# Patient Record
Sex: MALE | Race: WHITE | NOT HISPANIC OR LATINO | Employment: OTHER | URBAN - METROPOLITAN AREA
[De-identification: names, ages, dates, MRNs, and addresses within clinical notes are randomized per-mention and may not be internally consistent; named-entity substitution may affect disease eponyms.]

---

## 2017-08-28 ENCOUNTER — OFFICE VISIT (OUTPATIENT)
Dept: URGENT CARE | Facility: PHYSICIAN GROUP | Age: 62
End: 2017-08-28
Payer: OTHER GOVERNMENT

## 2017-08-28 VITALS
SYSTOLIC BLOOD PRESSURE: 124 MMHG | OXYGEN SATURATION: 98 % | RESPIRATION RATE: 18 BRPM | WEIGHT: 224 LBS | HEIGHT: 72 IN | TEMPERATURE: 98.7 F | BODY MASS INDEX: 30.34 KG/M2 | HEART RATE: 99 BPM | DIASTOLIC BLOOD PRESSURE: 74 MMHG

## 2017-08-28 DIAGNOSIS — G89.29 CHRONIC RIGHT-SIDED LOW BACK PAIN WITHOUT SCIATICA: ICD-10-CM

## 2017-08-28 DIAGNOSIS — M62.830 LUMBAR PARASPINAL MUSCLE SPASM: ICD-10-CM

## 2017-08-28 DIAGNOSIS — M54.50 CHRONIC RIGHT-SIDED LOW BACK PAIN WITHOUT SCIATICA: ICD-10-CM

## 2017-08-28 PROCEDURE — 99204 OFFICE O/P NEW MOD 45 MIN: CPT | Performed by: NURSE PRACTITIONER

## 2017-08-28 RX ORDER — CYCLOBENZAPRINE HCL 10 MG
10 TABLET ORAL 3 TIMES DAILY PRN
Qty: 30 TAB | Refills: 0 | Status: SHIPPED | OUTPATIENT
Start: 2017-08-28 | End: 2023-04-24

## 2017-08-28 ASSESSMENT — ENCOUNTER SYMPTOMS
BACK PAIN: 1
BOWEL INCONTINENCE: 0
FALLS: 0

## 2017-08-29 NOTE — PROGRESS NOTES
Subjective:      Josh Lerner is a 62 y.o. male who presents with Back Pain (pain x3days)            Back Pain   This is a new problem. The current episode started today (Pt states he has chronic back pain. However, in the last few years he will suffer from an acute back spasm once, sometimes twice per year. Today he was doing his normal activities and his back cramped). The problem occurs constantly. The problem is unchanged. The pain is present in the lumbar spine and sacro-iliac. The quality of the pain is described as cramping. The pain does not radiate. The pain is severe. The symptoms are aggravated by bending, standing and twisting. Stiffness is present all day. Pertinent negatives include no bladder incontinence or bowel incontinence. He has tried nothing for the symptoms.       Review of Systems   Gastrointestinal: Negative for bowel incontinence.   Genitourinary: Negative for bladder incontinence.   Musculoskeletal: Positive for back pain. Negative for falls.   All other systems reviewed and are negative.    No past medical history on file. No past surgical history on file.   Social History     Social History   • Marital status:      Spouse name: N/A   • Number of children: N/A   • Years of education: N/A     Occupational History   • Not on file.     Social History Main Topics   • Smoking status: Not on file   • Smokeless tobacco: Not on file   • Alcohol use Not on file   • Drug use: Unknown   • Sexual activity: Not on file     Other Topics Concern   • Not on file     Social History Narrative   • No narrative on file          Objective:     /74   Pulse 99   Temp 37.1 °C (98.7 °F)   Resp 18   Ht 1.829 m (6')   Wt 101.6 kg (224 lb)   SpO2 98%   BMI 30.38 kg/m²      Physical Exam   Constitutional: He is oriented to person, place, and time. Vital signs are normal. He appears well-developed and well-nourished.   HENT:   Head: Normocephalic and atraumatic.   Nose: Nose normal.   Eyes: EOM  are normal. Pupils are equal, round, and reactive to light.   Neck: Normal range of motion.   Cardiovascular: Normal rate and regular rhythm.    Pulmonary/Chest: Effort normal.   Musculoskeletal:        Lumbar back: He exhibits decreased range of motion, tenderness, pain and spasm.        Back:    Neurological: He is alert and oriented to person, place, and time.   Skin: Skin is warm and dry.   Psychiatric: He has a normal mood and affect. His speech is normal and behavior is normal. Thought content normal.   Vitals reviewed.              Assessment/Plan:     1. Chronic right-sided low back pain without sciatica  - cyclobenzaprine (FLEXERIL) 10 MG Tab; Take 1 Tab by mouth 3 times a day as needed.  Dispense: 30 Tab; Refill: 0    2. Lumbar paraspinal muscle spasm    Warm compresses  Back stretch booklet provided  Sedating effects of flexeril discussed  Ibuprofen 600 mg TID  Supportive care, differential diagnoses, and indications for immediate follow-up discussed with patient.    Pathogenesis of diagnosis discussed including typical length and natural progression.    Instructed to return to  or nearest emergency department if symptoms fail to improve, for any change in condition, further concerns, or new concerning symptoms.  Patient states understanding of the plan of care and discharge instructions.

## 2021-03-03 DIAGNOSIS — Z23 NEED FOR VACCINATION: ICD-10-CM

## 2023-04-14 ENCOUNTER — APPOINTMENT (OUTPATIENT)
Dept: ADMISSIONS | Facility: MEDICAL CENTER | Age: 68
DRG: 329 | End: 2023-04-14
Attending: SURGERY
Payer: MEDICARE

## 2023-04-18 ENCOUNTER — HOSPITAL ENCOUNTER (OUTPATIENT)
Dept: RADIOLOGY | Facility: MEDICAL CENTER | Age: 68
End: 2023-04-18
Attending: SURGERY
Payer: MEDICARE

## 2023-04-18 DIAGNOSIS — C20 MALIGNANT NEOPLASM OF RECTUM (HCC): ICD-10-CM

## 2023-04-18 PROCEDURE — A9579 GAD-BASE MR CONTRAST NOS,1ML: HCPCS | Performed by: SURGERY

## 2023-04-18 PROCEDURE — 72197 MRI PELVIS W/O & W/DYE: CPT

## 2023-04-18 PROCEDURE — 700117 HCHG RX CONTRAST REV CODE 255: Performed by: SURGERY

## 2023-04-18 PROCEDURE — 700111 HCHG RX REV CODE 636 W/ 250 OVERRIDE (IP): Mod: JG | Performed by: RADIOLOGY

## 2023-04-18 RX ADMIN — GADOTERIDOL 20 ML: 279.3 INJECTION, SOLUTION INTRAVENOUS at 17:38

## 2023-04-18 RX ADMIN — GLUCAGON 1 MG: 1 INJECTION, POWDER, LYOPHILIZED, FOR SOLUTION INTRAMUSCULAR; INTRAVENOUS at 16:15

## 2023-04-20 ENCOUNTER — HOSPITAL ENCOUNTER (OUTPATIENT)
Dept: RADIOLOGY | Facility: MEDICAL CENTER | Age: 68
End: 2023-04-20
Attending: INTERNAL MEDICINE

## 2023-04-20 ENCOUNTER — PRE-ADMISSION TESTING (OUTPATIENT)
Dept: ADMISSIONS | Facility: MEDICAL CENTER | Age: 68
DRG: 329 | End: 2023-04-20
Attending: SURGERY
Payer: MEDICARE

## 2023-04-20 RX ORDER — ACETAMINOPHEN 500 MG
500-1000 TABLET ORAL
COMMUNITY
End: 2023-12-17

## 2023-04-20 RX ORDER — NAPROXEN SODIUM 220 MG
220 TABLET ORAL
COMMUNITY
End: 2023-12-17

## 2023-04-20 RX ORDER — POLYETHYLENE GLYCOL 3350 17 G/17G
17 POWDER, FOR SOLUTION ORAL EVERY MORNING
Status: ON HOLD | COMMUNITY
End: 2023-05-26

## 2023-04-24 ENCOUNTER — PRE-ADMISSION TESTING (OUTPATIENT)
Dept: ADMISSIONS | Facility: MEDICAL CENTER | Age: 68
DRG: 329 | End: 2023-04-24
Attending: SURGERY
Payer: MEDICARE

## 2023-04-24 ENCOUNTER — HOSPITAL ENCOUNTER (OUTPATIENT)
Dept: RADIATION ONCOLOGY | Facility: MEDICAL CENTER | Age: 68
DRG: 329 | End: 2023-04-30
Attending: RADIOLOGY
Payer: MEDICARE

## 2023-04-24 ENCOUNTER — RESEARCH ENCOUNTER (OUTPATIENT)
Dept: HEMATOLOGY ONCOLOGY | Facility: MEDICAL CENTER | Age: 68
End: 2023-04-24
Payer: MEDICARE

## 2023-04-24 VITALS
SYSTOLIC BLOOD PRESSURE: 109 MMHG | HEART RATE: 96 BPM | OXYGEN SATURATION: 95 % | WEIGHT: 199.08 LBS | BODY MASS INDEX: 28.5 KG/M2 | HEIGHT: 70 IN | DIASTOLIC BLOOD PRESSURE: 65 MMHG | TEMPERATURE: 97.5 F

## 2023-04-24 DIAGNOSIS — Z01.812 PRE-OPERATIVE LABORATORY EXAMINATION: ICD-10-CM

## 2023-04-24 DIAGNOSIS — C20 MALIGNANT NEOPLASM OF RECTUM (HCC): ICD-10-CM

## 2023-04-24 LAB
ALBUMIN SERPL BCP-MCNC: 3.7 G/DL (ref 3.2–4.9)
ALBUMIN/GLOB SERPL: 1 G/DL
ALP SERPL-CCNC: 111 U/L (ref 30–99)
ALT SERPL-CCNC: 23 U/L (ref 2–50)
ANION GAP SERPL CALC-SCNC: 14 MMOL/L (ref 7–16)
AST SERPL-CCNC: 15 U/L (ref 12–45)
BASOPHILS # BLD AUTO: 0.5 % (ref 0–1.8)
BASOPHILS # BLD: 0.06 K/UL (ref 0–0.12)
BILIRUB SERPL-MCNC: 0.4 MG/DL (ref 0.1–1.5)
BUN SERPL-MCNC: 14 MG/DL (ref 8–22)
CALCIUM ALBUM COR SERPL-MCNC: 9.8 MG/DL (ref 8.5–10.5)
CALCIUM SERPL-MCNC: 9.6 MG/DL (ref 8.5–10.5)
CEA SERPL-MCNC: 47.6 NG/ML (ref 0–3)
CHLORIDE SERPL-SCNC: 100 MMOL/L (ref 96–112)
CO2 SERPL-SCNC: 22 MMOL/L (ref 20–33)
CREAT SERPL-MCNC: 1.06 MG/DL (ref 0.5–1.4)
EOSINOPHIL # BLD AUTO: 0.35 K/UL (ref 0–0.51)
EOSINOPHIL NFR BLD: 3 % (ref 0–6.9)
ERYTHROCYTE [DISTWIDTH] IN BLOOD BY AUTOMATED COUNT: 43.1 FL (ref 35.9–50)
GFR SERPLBLD CREATININE-BSD FMLA CKD-EPI: 77 ML/MIN/1.73 M 2
GLOBULIN SER CALC-MCNC: 3.8 G/DL (ref 1.9–3.5)
GLUCOSE SERPL-MCNC: 97 MG/DL (ref 65–99)
HCT VFR BLD AUTO: 44.5 % (ref 42–52)
HGB BLD-MCNC: 14.6 G/DL (ref 14–18)
IMM GRANULOCYTES # BLD AUTO: 0.06 K/UL (ref 0–0.11)
IMM GRANULOCYTES NFR BLD AUTO: 0.5 % (ref 0–0.9)
INR PPP: 1.23 (ref 0.87–1.13)
LYMPHOCYTES # BLD AUTO: 1.83 K/UL (ref 1–4.8)
LYMPHOCYTES NFR BLD: 15.5 % (ref 22–41)
MCH RBC QN AUTO: 29.1 PG (ref 27–33)
MCHC RBC AUTO-ENTMCNC: 32.8 G/DL (ref 33.7–35.3)
MCV RBC AUTO: 88.6 FL (ref 81.4–97.8)
MONOCYTES # BLD AUTO: 0.92 K/UL (ref 0–0.85)
MONOCYTES NFR BLD AUTO: 7.8 % (ref 0–13.4)
NEUTROPHILS # BLD AUTO: 8.62 K/UL (ref 1.82–7.42)
NEUTROPHILS NFR BLD: 72.7 % (ref 44–72)
NRBC # BLD AUTO: 0 K/UL
NRBC BLD-RTO: 0 /100 WBC
PLATELET # BLD AUTO: 391 K/UL (ref 164–446)
PMV BLD AUTO: 9.7 FL (ref 9–12.9)
POTASSIUM SERPL-SCNC: 4.2 MMOL/L (ref 3.6–5.5)
PROT SERPL-MCNC: 7.5 G/DL (ref 6–8.2)
PROTHROMBIN TIME: 15.3 SEC (ref 12–14.6)
RBC # BLD AUTO: 5.02 M/UL (ref 4.7–6.1)
SODIUM SERPL-SCNC: 136 MMOL/L (ref 135–145)
WBC # BLD AUTO: 11.8 K/UL (ref 4.8–10.8)

## 2023-04-24 PROCEDURE — 99214 OFFICE O/P EST MOD 30 MIN: CPT | Performed by: RADIOLOGY

## 2023-04-24 PROCEDURE — 85025 COMPLETE CBC W/AUTO DIFF WBC: CPT

## 2023-04-24 PROCEDURE — 80053 COMPREHEN METABOLIC PANEL: CPT

## 2023-04-24 PROCEDURE — 85610 PROTHROMBIN TIME: CPT

## 2023-04-24 PROCEDURE — 36415 COLL VENOUS BLD VENIPUNCTURE: CPT

## 2023-04-24 PROCEDURE — 99205 OFFICE O/P NEW HI 60 MIN: CPT | Performed by: RADIOLOGY

## 2023-04-24 PROCEDURE — 82378 CARCINOEMBRYONIC ANTIGEN: CPT

## 2023-04-24 ASSESSMENT — PAIN SCALES - GENERAL: PAINLEVEL: 5=MODERATE PAIN

## 2023-04-24 NOTE — PROGRESS NOTES
"Patient was seen today in clinic with Dr. Bustillos for consult.  Vitals signs and weight were obtained and pain assessment was completed.  Allergies and medications were reviewed with the patient.       Vitals/Pain:  Vitals:    04/24/23 1301   BP: 109/65   BP Location: Left arm   Patient Position: Sitting   Pulse: 96   Temp: 36.4 °C (97.5 °F)   SpO2: 95%   Weight: 90.3 kg (199 lb 1.2 oz)   Height: 1.765 m (5' 9.5\")   Pain Score: 5=Moderate Pain        Allergies:   Patient has no known allergies.    Current Medications:  Current Outpatient Medications   Medication Sig Dispense Refill    naproxen (ANAPROX) 220 MG tablet Take 220 mg by mouth 3 times a day.      acetaminophen (TYLENOL) 500 MG Tab Take 500-1,000 mg by mouth 3 times a day.      polyethylene glycol/lytes (MIRALAX) 17 g Pack Take 17 g by mouth every morning.      Wheat Dextrin (BENEFIBER PO) Take  by mouth as needed.       No current facility-administered medications for this encounter.           Greer Fernandez R.N.   "

## 2023-04-24 NOTE — CONSULTS
RADIATION ONCOLOGY CONSULT    DATE OF SERVICE: 4/24/2023    IDENTIFICATION: A 67 y.o. male with   Visit Diagnoses     ICD-10-CM   1. Malignant neoplasm of rectum (HCC)  C20     Malignant neoplasm of rectum (HCC)  Staging form: Colon and Rectum, AJCC 8th Edition  - Clinical stage from 4/24/2023: Stage IIC (cT4b, cN0, cM0) - Signed by Luis Bustillos M.D. on 4/24/2023  Total positive nodes: 0  Histologic grade (G): G2  Histologic grading system: 4 grade system  Microsatellite instability (MSI): Stable    He is here at the kind request of Dr. Sewlel     HISTORY OF PRESENT ILLNESS:   Patient is a pleasant 67-year-old male with stools and intermittent bleeding for close to a year and then underwent colonoscopy March 13, 2023 by Dr. Larson which showed multilobulated mass 2 cm from anal verge encompassing 70% circumference of distal rectum with pathology showing invasive moderately differentiated adenocarcinoma mismatch repair proteins intact.  Patient underwent flexible sigmoidoscopy with endoscopic ultrasound April 4, 2023 by Dr. Ramirez which showed T4 mass 0 to 10 cm from anal verge.  Patient had a CT chest abdomen pelvis April 6, 2023 which showed rectal mass with no evidence of distant metastatic disease.  Patient had an MRI rectal protocol April 18, 2023 which showed a large circumferential low rectal tumor with extension into the prostate internal anal sphincter clinical T4BN0 with enhancing fluid collection adjacent.  Patient was seen by Dr. Kirkpatrick and Dr. Sewell who is planning for ostomy and Mediport placement.  CEA 47.6. Plan for diverting colostomy 5/16/23.     PAST MEDICAL HISTORY:  Past Medical History:   Diagnosis Date    Cancer (HCC)     Rectal per patient       PAST SURGICAL HISTORY:  Past Surgical History:   Procedure Laterality Date    INGUINAL HERNIA REPAIR BILATERAL Bilateral        CURRENT MEDICATIONS:  Current Outpatient Medications   Medication Sig Dispense Refill    naproxen (ANAPROX) 220 MG  "tablet Take 220 mg by mouth 3 times a day.      acetaminophen (TYLENOL) 500 MG Tab Take 500-1,000 mg by mouth 3 times a day.      polyethylene glycol/lytes (MIRALAX) 17 g Pack Take 17 g by mouth every morning.      Wheat Dextrin (BENEFIBER PO) Take  by mouth as needed.       No current facility-administered medications for this encounter.       ALLERGIES:    Patient has no known allergies.    FAMILY HISTORY:    family history includes Brain Cancer in his maternal uncle; Lung Cancer in his father; Stomach Cancer in his maternal grandfather.    SOCIAL HISTORY:     reports that he has been smoking cigarettes. He has a 14.00 pack-year smoking history. He has never used smokeless tobacco. He reports that he does not currently use alcohol. He reports that he does not use drugs. He states he lives in [place] with his spouse Su. He is retired.     REVIEW OF SYSTEMS:  A review of systems for today's date of service was reviewed and uploaded into the electronic medical record.      PHYSICAL EXAM:    ECOG PERFORMANCE STATUS:      4/24/2023     1:09 PM   ECOG Performance Review   ECOG Performance Status Restricted in physically strenuous activity but ambulatory and able to carry out work of a light or sedentary nature, e.g., light house work, office work         4/24/2023     1:08 PM   Karnofsky Score   Karnofsky Score 80     /65 (BP Location: Left arm, Patient Position: Sitting)   Pulse 96   Temp 36.4 °C (97.5 °F)   Ht 1.765 m (5' 9.5\")   Wt 90.3 kg (199 lb 1.2 oz)   SpO2 95%   BMI 28.98 kg/m²   Physical Exam  Vitals reviewed.   HENT:      Head: Normocephalic.      Mouth/Throat:      Mouth: Mucous membranes are moist.   Eyes:      Pupils: Pupils are equal, round, and reactive to light.   Cardiovascular:      Rate and Rhythm: Normal rate.   Pulmonary:      Effort: Pulmonary effort is normal.   Abdominal:      General: Abdomen is flat.   Musculoskeletal:         General: Normal range of motion.      Cervical back: " Normal range of motion.   Neurological:      General: No focal deficit present.      Mental Status: He is alert.   Psychiatric:         Mood and Affect: Mood normal.        LABORATORY DATA:   No results found for: WBC, HEMOGLOBIN, HEMATOCRIT, MCV, PLATELETCT, NEUTS   No results found for: SODIUM, POTASSIUM, BUN, CREATININE, CALCIUM, ALBUMIN, ASTSGOT, ALKPHOSPHAT, IFNOTAFR, LDHTOTAL    RADIOLOGY DATA:  MR-RECTAL/PROSTATE PROTOCOL    Result Date: 4/19/2023 4/18/2023 4:21 PM HISTORY/REASON FOR EXAM:  Malignant neoplasm of rectum (HCC) TECHNIQUE/EXAM DESCRIPTION: MRI of the pelvis without and with contrast. The study was performed on a Sid 3.0 Sobia MRI scanner. Sagittal and axial T2; oblique high resolution axial and coronal T2; oblique axial diffusion-weighted imaging and ADC map with B values of 100, 400, and 1000; oblique axial pre- and post contrast images; axial post contrast T1-fat-sat of the full pelvis. 1 mg of glucagon was given intravenously at the start of the exam. 20 mL ProHance contrast was administered intravenously. COMPARISON: None. FINDINGS: RECTAL TUMOR: Morphologic description: Annular. Distance of the inferior tumor margin to the anal verge: 2cm. Distance of the inferior tumor margin to the pelvic floor: 0cm. Superior to inferior extension: 8 cm. Circumferential extension: Circumferential. Maximum depth of extramural spread beyond the muscularis propria: 13mm. Tumor border: Nodular Minimum distance from the tumor margin to mesorectal fascia: The tumor invades the mesorectal fascia. Extramural venous invasion: No tumor signal in vessels. CRM status: Distance to the mesorectal fascia <= 1 mm, potential CRM involved. Tumor extends into the prostate. Apparent defect in the lateral wall at the mid rectum with associated irregular peripherally enhancing fluid collection in the LEFT perirectal space, extending through the mesorectal fascia into the LEFT obturator space.  Fluid collection measures 4.4  cm  in the perirectal space and 3.5 cm in the obturator space, with surrounding inflammatory changes. Tumor involves the internal anal sphincter on the RIGHT. T STAGE: T4b: Tumor directly invades or is adherent to other organs or structures ALETHEA SPREAD: No visible lymph nodes, N0 ADDITIONAL PELVIC NODES: None BONES: No abnormal bony signal is appreciated.     1.  Large circumferential low rectal tumor with extension into the prostate and internal anal sphincter.  T4b N0 2.  Peripherally enhancing irregular fluid collection adjacent the tumor in the LEFT perirectal space, extending through the mesorectal fascia into the LEFT obturator space, which appears to communicate through the mass indicating abscess/contained perforation.       IMPRESSION:    A 67 y.o. with  Visit Diagnoses     ICD-10-CM   1. Malignant neoplasm of rectum (HCC)  C20     Malignant neoplasm of rectum (HCC)  Staging form: Colon and Rectum, AJCC 8th Edition  - Clinical stage from 4/24/2023: Stage IIC (cT4b, cN0, cM0) - Signed by Luis Bustillos M.D. on 4/24/2023  Total positive nodes: 0  Histologic grade (G): G2  Histologic grading system: 4 grade system  Microsatellite instability (MSI): Stable    RECOMMENDATIONS:   I discussed that given obstructive symptoms he would benefit from upfront diverting colostomy which is planned for 5/16/23 by Dr. Sewell. Afterwards the goal of treatment would be prevent recurrence, decrease the chance of needing a temporary or permanent colostomy, and ultimately delay or prevent death from rectal cancer.  Radiation therapy was discussed including high dose conformal external beam using IMRT. We will plan on 54Gy in 30 fractions with concurrent xeloda per Dr. Kirkpatrick.     Risks and side effects related to the radiation therapy include those which are:  Likely   Tanning, redness, or darkening of skin in treatment area; including substantial norman-anal desquamation causing pain which can become severe enough to require  a break in treatment.  Rash, itching or peeling of skin   Temporary hair loss in the treatment area   Temporary fatigue   Abdominal cramps   Frequent bowel movements, sometime with urgency, or diarrhea  Rectal cramps and irritation with pain on defecation   Mild rectal bleeding that does not require treatment  Bladder irritation with a stinging sensation   Frequency or urgency of urination   Small amounts of blood in the urine   In females, vaginal stenosis causing inability to have sexual activity or long term stenosis.    Less Likely    Urinary obstruction requiring the placement of a temporary urinary catheter and/or dilatation because of stricture (narrowing)  Less ability to control urine (increased incontinence)  Inability to achieve an erection (inability of the penis to become hard) in males  Rectal bleeding that requires medication or laser treatment to stop    Rare but serious   Injury to the bladder, urethra, bowel, or other tissues in the pelvis or abdomen requiring additional procedure or surgery, such as a colostomy (bag for stool).  Intestinal obstruction requiring surgery    We will set the patient up with CT simulation with contrast early next week and plan to start 7 to 10 days later.  We will plan on 6 weeks of treatment with concurrent chemotherapy.    Thank you for the opportunity to participate in his care.  If any questions or comments, please do not hesitate in calling.    Orders Placed This Encounter    REFERRAL TO ONCOLOGY NURSE NAVIGATOR       CC: Dr. Sewell, Dr. Kirkpatrick, Dr. Larson, Dr. Ramirez

## 2023-04-24 NOTE — RESEARCH NOTE
Screening/Consent note:    Participation in the [id number] clinical trial was discussed with patient today. All aspects of the study purpose and procedures were explained.  He was given ample time to review the consent and all questions were answered to his satisfaction. Patient aware that the clinical trial is voluntary and he may withdraw consent at any time without affecting the level of care they receive.  Subject signed consent without coercion and undue influence and was given a copy of the signed consent. No study-related procedures took place prior to consenting and all assessments were conducted per protocol.

## 2023-04-26 ENCOUNTER — PATIENT OUTREACH (OUTPATIENT)
Dept: ONCOLOGY | Facility: MEDICAL CENTER | Age: 68
End: 2023-04-26
Payer: MEDICARE

## 2023-04-26 NOTE — PROGRESS NOTES
Oncology nurse navigator called patient to follow up unable to leave a message on phone number 1 and on the other line ONN could hear people talking but no one acknowledged the call.  ONN will try at a later time.

## 2023-04-26 NOTE — CT SIMULATION
PATIENT NAME Josh Lerner   PRIMARY PHYSICIAN Pcp Pt States None [id number]   REFERRING PHYSICIAN Yakov Sewell M.D. 1955     Malignant neoplasm of rectum (HCC)  Staging form: Colon and Rectum, AJCC 8th Edition  - Clinical stage from 4/24/2023: Stage IIC (cT4b, cN0, cM0) - Signed by Luis Bustillos M.D. on 4/24/2023  Total positive nodes: 0  Histologic grade (G): G2  Histologic grading system: 4 grade system  Microsatellite instability (MSI): Stable         Treatment Planning CT Simulation      Order Questions     Question Answer Comment    Is this for a new course of treatment? Yes     Is this an Addendum? No     Simulation Status Initial     Planned Start Date 5/29/2023     Treatment Site Rectum     Laterality Axial     Treatment Technique IMRT     Treatment Pattern/Frequency Daily     Concurrent Chemotherapy Yes xeloda    Ordering Provider RONALDO REYES III     CT Technique 3D     Scan Extent Pelvis     Contrast IV     Treatment Device(s) Vac Edgar anal bb    Patient Attire Gown     Patient Position Supine     Chin Position Neutral     Bladder No preference     Treatment Machine No preference     Treatment Image Guidance CBCT     Frequency (CBCT) Daily     Image Guidance Match Bone     Treatment Planning Image Fusion CT/MR     Other Orders Special Tx Procedure      Weekly Physics Check

## 2023-04-28 ENCOUNTER — PATIENT OUTREACH (OUTPATIENT)
Dept: ONCOLOGY | Facility: MEDICAL CENTER | Age: 68
End: 2023-04-28
Payer: MEDICARE

## 2023-04-28 NOTE — PROGRESS NOTES
Oncology nurse navigator called patient to follow up on referral.  ONN left a voicemail with my contact number in a voicemail and introduction letter sent o the e-mail on file.

## 2023-04-28 NOTE — PROGRESS NOTES
Oncology nurse navigator called patient to follow up on referral.  VELVET introduced self my role and services to the patient and the entire support team.  Patient reports that he is mely bad way and is pretty miserable and really looking forward to the up and coming surgery with Dr. Sewell. VELVET explained that after his surgery if he needs anything or any resources not to hesitate to reach out.  Patient has my contact information on the e-mail on file.

## 2023-05-01 ENCOUNTER — HOSPITAL ENCOUNTER (OUTPATIENT)
Dept: RADIATION ONCOLOGY | Facility: MEDICAL CENTER | Age: 68
End: 2023-05-31
Attending: RADIOLOGY
Payer: MEDICARE

## 2023-05-15 ENCOUNTER — NON-PROVIDER VISIT (OUTPATIENT)
Dept: WOUND CARE | Facility: MEDICAL CENTER | Age: 68
DRG: 329 | End: 2023-05-15
Attending: SURGERY
Payer: MEDICARE

## 2023-05-15 PROCEDURE — 99211 OFF/OP EST MAY X REQ PHY/QHP: CPT

## 2023-05-15 NOTE — CERTIFICATION
"Ostomy Pre-Operative Marking and Teaching       Date of Surgery: 23  Type of Surgery: Colostomy  Surgeon: Dr. Sewell    Subjective: \"I will be mapped next week\" (for radiation and chemo to start).    Objective: Assessed patient to identify potential stoma site within his/her line of site on a flat pouching surface, within the rectus muscle, away from belt-line, bony prominences, exiting scars and umbilicus.    Assessment:  Potential site (s) located for: Colostomy  1. Procedure explained to the patient.  2. Rectus muscle identified with patient in supine position.  3. Appropriate abdominal quadrant for planned surgical procedure identified.  4. Existing scars identified.  5. Potential sites evaluated with patient:      a. Supine      b. Sitting       c. Bending forward/ twisting at waist   6. Site (s) selected with respect to skin folds, creases, abdominal contours and belt line.  7. Special considerations:      a. Patient has minimal use of left hand      b. Distended abdomen with full intestines of stool      c. Patient unable to sit for periods of time due to pain in his anus      d. Ambulatory  8. Potential site (s) marked with an \"X\" (skin prepped with skin protectant wipe, palomo applied with indelible marker, palomo fixed with skin protectant wipe again.                       Site(s) marked: LLQ    Patient Teachin. Reviewed nature of surgical procedure.  2. Reviewed basic anatomy and function of the GI or  Tract.  3. Reviewed and provided the patient with literature-Colostomy   Other_[facility] resource list.  4. Pouching system with hands on demonstrated.  5. Questions and concerns addressed from patient & daughter  6. Other- Patient able to empty his own pouch    Plan: RN report called to IP ostomy team at [facility].  Patient has been a very independent male his whole life and reports he will just figure it out, reminded patient of support in area & possible plan to return to [facility] post surgery  "

## 2023-05-15 NOTE — PATIENT INSTRUCTIONS
Change colostomies every 5-7 days. Change appliance immediately if it is leaking or peristomal skin feels irritated, has itching, or  burning.   To change the appliance, remove previous appliance, cleanse peristomal skin with warm water/washcloth, pat dry, make an ostomy template or use cardboard measuring guide and trace ostomy shape onto back of barrier, cut out barrier, apply a paste ring around barrier opening and apply appliance. Empty pouches when no more than ½ full. Check contents every 2 hours or as needed. Do not leave soap residue on tissue and do not use baby wipes or skin prep wipes.     Should you experience any significant changes in your condition, such as infection (redness, swelling, localized heat, increased pain, fever > 101 F, chills) or have any questions regarding your home care instructions, please contact the wound center at [phone]. If after hours, contact your primary care physician or go to the hospital emergency room.

## 2023-05-16 ENCOUNTER — APPOINTMENT (OUTPATIENT)
Dept: RADIOLOGY | Facility: MEDICAL CENTER | Age: 68
DRG: 329 | End: 2023-05-16
Attending: SURGERY
Payer: MEDICARE

## 2023-05-16 ENCOUNTER — HOSPITAL ENCOUNTER (INPATIENT)
Facility: MEDICAL CENTER | Age: 68
LOS: 10 days | DRG: 329 | End: 2023-05-26
Attending: SURGERY | Admitting: SURGERY
Payer: MEDICARE

## 2023-05-16 ENCOUNTER — ANESTHESIA (OUTPATIENT)
Dept: SURGERY | Facility: MEDICAL CENTER | Age: 68
DRG: 329 | End: 2023-05-16
Payer: MEDICARE

## 2023-05-16 ENCOUNTER — ANESTHESIA EVENT (OUTPATIENT)
Dept: SURGERY | Facility: MEDICAL CENTER | Age: 68
DRG: 329 | End: 2023-05-16
Payer: MEDICARE

## 2023-05-16 DIAGNOSIS — J44.9 CHRONIC OBSTRUCTIVE PULMONARY DISEASE, UNSPECIFIED COPD TYPE (HCC): Chronic | ICD-10-CM

## 2023-05-16 DIAGNOSIS — Z71.89 OSTOMY NURSE CONSULTATION: ICD-10-CM

## 2023-05-16 DIAGNOSIS — C20 MALIGNANT NEOPLASM OF RECTUM (HCC): Primary | ICD-10-CM

## 2023-05-16 LAB — GLUCOSE BLD STRIP.AUTO-MCNC: 96 MG/DL (ref 65–99)

## 2023-05-16 PROCEDURE — 700102 HCHG RX REV CODE 250 W/ 637 OVERRIDE(OP): Performed by: REGISTERED NURSE

## 2023-05-16 PROCEDURE — 700111 HCHG RX REV CODE 636 W/ 250 OVERRIDE (IP): Performed by: SURGERY

## 2023-05-16 PROCEDURE — 700105 HCHG RX REV CODE 258: Performed by: SURGERY

## 2023-05-16 PROCEDURE — C1788 PORT, INDWELLING, IMP: HCPCS | Performed by: SURGERY

## 2023-05-16 PROCEDURE — 0JH63WZ INSERTION OF TOTALLY IMPLANTABLE VASCULAR ACCESS DEVICE INTO CHEST SUBCUTANEOUS TISSUE AND FASCIA, PERCUTANEOUS APPROACH: ICD-10-PCS | Performed by: SURGERY

## 2023-05-16 PROCEDURE — 160048 HCHG OR STATISTICAL LEVEL 1-5: Performed by: SURGERY

## 2023-05-16 PROCEDURE — 64488 TAP BLOCK BI INJECTION: CPT | Performed by: SURGERY

## 2023-05-16 PROCEDURE — 3E0T3BZ INTRODUCTION OF ANESTHETIC AGENT INTO PERIPHERAL NERVES AND PLEXI, PERCUTANEOUS APPROACH: ICD-10-PCS | Performed by: ANESTHESIOLOGY

## 2023-05-16 PROCEDURE — 160036 HCHG PACU - EA ADDL 30 MINS PHASE I: Performed by: SURGERY

## 2023-05-16 PROCEDURE — 160002 HCHG RECOVERY MINUTES (STAT): Performed by: SURGERY

## 2023-05-16 PROCEDURE — 160041 HCHG SURGERY MINUTES - EA ADDL 1 MIN LEVEL 4: Performed by: SURGERY

## 2023-05-16 PROCEDURE — 700102 HCHG RX REV CODE 250 W/ 637 OVERRIDE(OP): Performed by: ANESTHESIOLOGY

## 2023-05-16 PROCEDURE — 82962 GLUCOSE BLOOD TEST: CPT

## 2023-05-16 PROCEDURE — 160009 HCHG ANES TIME/MIN: Performed by: SURGERY

## 2023-05-16 PROCEDURE — 700101 HCHG RX REV CODE 250: Performed by: SURGERY

## 2023-05-16 PROCEDURE — 700111 HCHG RX REV CODE 636 W/ 250 OVERRIDE (IP): Performed by: ANESTHESIOLOGY

## 2023-05-16 PROCEDURE — 770001 HCHG ROOM/CARE - MED/SURG/GYN PRIV*

## 2023-05-16 PROCEDURE — 64486 TAP BLOCK UNIL BY INJECTION: CPT | Mod: 50,59 | Performed by: ANESTHESIOLOGY

## 2023-05-16 PROCEDURE — A9270 NON-COVERED ITEM OR SERVICE: HCPCS | Performed by: ANESTHESIOLOGY

## 2023-05-16 PROCEDURE — 0D1N4Z4 BYPASS SIGMOID COLON TO CUTANEOUS, PERCUTANEOUS ENDOSCOPIC APPROACH: ICD-10-PCS | Performed by: SURGERY

## 2023-05-16 PROCEDURE — 160035 HCHG PACU - 1ST 60 MINS PHASE I: Performed by: SURGERY

## 2023-05-16 PROCEDURE — 02HV33Z INSERTION OF INFUSION DEVICE INTO SUPERIOR VENA CAVA, PERCUTANEOUS APPROACH: ICD-10-PCS | Performed by: SURGERY

## 2023-05-16 PROCEDURE — 700101 HCHG RX REV CODE 250: Performed by: ANESTHESIOLOGY

## 2023-05-16 PROCEDURE — 160029 HCHG SURGERY MINUTES - 1ST 30 MINS LEVEL 4: Performed by: SURGERY

## 2023-05-16 PROCEDURE — 00860 ANES XTRPRTL PX LWR ABD NOS: CPT | Performed by: ANESTHESIOLOGY

## 2023-05-16 PROCEDURE — A9270 NON-COVERED ITEM OR SERVICE: HCPCS | Performed by: REGISTERED NURSE

## 2023-05-16 DEVICE — POWER PORTMRI COMPATABLE: Type: IMPLANTABLE DEVICE | Site: CHEST | Status: FUNCTIONAL

## 2023-05-16 RX ORDER — ACETAMINOPHEN 500 MG
1000 TABLET ORAL EVERY 4 HOURS PRN
Status: DISCONTINUED | OUTPATIENT
Start: 2023-05-16 | End: 2023-05-16 | Stop reason: HOSPADM

## 2023-05-16 RX ORDER — CELECOXIB 200 MG/1
200 CAPSULE ORAL 2 TIMES DAILY
Status: DISCONTINUED | OUTPATIENT
Start: 2023-05-16 | End: 2023-05-19

## 2023-05-16 RX ORDER — OXYCODONE HCL 10 MG/1
10 TABLET, FILM COATED, EXTENDED RELEASE ORAL ONCE
Status: COMPLETED | OUTPATIENT
Start: 2023-05-16 | End: 2023-05-16

## 2023-05-16 RX ORDER — SODIUM CHLORIDE, SODIUM LACTATE, POTASSIUM CHLORIDE, CALCIUM CHLORIDE 600; 310; 30; 20 MG/100ML; MG/100ML; MG/100ML; MG/100ML
INJECTION, SOLUTION INTRAVENOUS CONTINUOUS
Status: DISCONTINUED | OUTPATIENT
Start: 2023-05-16 | End: 2023-05-16 | Stop reason: HOSPADM

## 2023-05-16 RX ORDER — LIDOCAINE HYDROCHLORIDE 20 MG/ML
INJECTION, SOLUTION EPIDURAL; INFILTRATION; INTRACAUDAL; PERINEURAL PRN
Status: DISCONTINUED | OUTPATIENT
Start: 2023-05-16 | End: 2023-05-16 | Stop reason: SURG

## 2023-05-16 RX ORDER — SCOLOPAMINE TRANSDERMAL SYSTEM 1 MG/1
1 PATCH, EXTENDED RELEASE TRANSDERMAL
Status: DISCONTINUED | OUTPATIENT
Start: 2023-05-16 | End: 2023-05-26 | Stop reason: HOSPADM

## 2023-05-16 RX ORDER — DEXAMETHASONE SODIUM PHOSPHATE 4 MG/ML
4 INJECTION, SOLUTION INTRA-ARTICULAR; INTRALESIONAL; INTRAMUSCULAR; INTRAVENOUS; SOFT TISSUE
Status: DISCONTINUED | OUTPATIENT
Start: 2023-05-16 | End: 2023-05-26 | Stop reason: HOSPADM

## 2023-05-16 RX ORDER — ACETAMINOPHEN 500 MG
1000 TABLET ORAL EVERY 6 HOURS
Status: DISCONTINUED | OUTPATIENT
Start: 2023-05-17 | End: 2023-05-19

## 2023-05-16 RX ORDER — EPHEDRINE SULFATE 50 MG/ML
5 INJECTION, SOLUTION INTRAVENOUS
Status: DISCONTINUED | OUTPATIENT
Start: 2023-05-16 | End: 2023-05-16 | Stop reason: HOSPADM

## 2023-05-16 RX ORDER — HYDROMORPHONE HYDROCHLORIDE 1 MG/ML
0.4 INJECTION, SOLUTION INTRAMUSCULAR; INTRAVENOUS; SUBCUTANEOUS
Status: DISCONTINUED | OUTPATIENT
Start: 2023-05-16 | End: 2023-05-16 | Stop reason: HOSPADM

## 2023-05-16 RX ORDER — SODIUM CHLORIDE, SODIUM LACTATE, POTASSIUM CHLORIDE, CALCIUM CHLORIDE 600; 310; 30; 20 MG/100ML; MG/100ML; MG/100ML; MG/100ML
INJECTION, SOLUTION INTRAVENOUS CONTINUOUS
Status: ACTIVE | OUTPATIENT
Start: 2023-05-16 | End: 2023-05-16

## 2023-05-16 RX ORDER — ACETAMINOPHEN 500 MG
1000 TABLET ORAL EVERY 6 HOURS PRN
Status: DISCONTINUED | OUTPATIENT
Start: 2023-05-22 | End: 2023-05-19

## 2023-05-16 RX ORDER — OXYCODONE HCL 5 MG/5 ML
10 SOLUTION, ORAL ORAL
Status: DISCONTINUED | OUTPATIENT
Start: 2023-05-16 | End: 2023-05-16 | Stop reason: HOSPADM

## 2023-05-16 RX ORDER — DIPHENHYDRAMINE HYDROCHLORIDE 50 MG/ML
12.5 INJECTION INTRAMUSCULAR; INTRAVENOUS
Status: DISCONTINUED | OUTPATIENT
Start: 2023-05-16 | End: 2023-05-16 | Stop reason: HOSPADM

## 2023-05-16 RX ORDER — HYDROMORPHONE HYDROCHLORIDE 1 MG/ML
0.5 INJECTION, SOLUTION INTRAMUSCULAR; INTRAVENOUS; SUBCUTANEOUS
Status: DISCONTINUED | OUTPATIENT
Start: 2023-05-16 | End: 2023-05-19

## 2023-05-16 RX ORDER — OXYCODONE HCL 5 MG/5 ML
5 SOLUTION, ORAL ORAL
Status: DISCONTINUED | OUTPATIENT
Start: 2023-05-16 | End: 2023-05-16 | Stop reason: HOSPADM

## 2023-05-16 RX ORDER — DEXAMETHASONE SODIUM PHOSPHATE 4 MG/ML
INJECTION, SOLUTION INTRA-ARTICULAR; INTRALESIONAL; INTRAMUSCULAR; INTRAVENOUS; SOFT TISSUE PRN
Status: DISCONTINUED | OUTPATIENT
Start: 2023-05-16 | End: 2023-05-16 | Stop reason: SURG

## 2023-05-16 RX ORDER — CELECOXIB 200 MG/1
200 CAPSULE ORAL 2 TIMES DAILY PRN
Status: DISCONTINUED | OUTPATIENT
Start: 2023-05-21 | End: 2023-05-19

## 2023-05-16 RX ORDER — TRAZODONE HYDROCHLORIDE 50 MG/1
50 TABLET ORAL NIGHTLY PRN
Status: DISCONTINUED | OUTPATIENT
Start: 2023-05-16 | End: 2023-05-19

## 2023-05-16 RX ORDER — HEPARIN SODIUM,PORCINE 1000/ML
VIAL (ML) INJECTION
Status: DISCONTINUED | OUTPATIENT
Start: 2023-05-16 | End: 2023-05-16 | Stop reason: HOSPADM

## 2023-05-16 RX ORDER — ROCURONIUM BROMIDE 10 MG/ML
INJECTION, SOLUTION INTRAVENOUS PRN
Status: DISCONTINUED | OUTPATIENT
Start: 2023-05-16 | End: 2023-05-16 | Stop reason: SURG

## 2023-05-16 RX ORDER — HALOPERIDOL 5 MG/ML
1 INJECTION INTRAMUSCULAR EVERY 6 HOURS PRN
Status: DISCONTINUED | OUTPATIENT
Start: 2023-05-16 | End: 2023-05-26 | Stop reason: HOSPADM

## 2023-05-16 RX ORDER — HYDRALAZINE HYDROCHLORIDE 20 MG/ML
5 INJECTION INTRAMUSCULAR; INTRAVENOUS
Status: DISCONTINUED | OUTPATIENT
Start: 2023-05-16 | End: 2023-05-16 | Stop reason: HOSPADM

## 2023-05-16 RX ORDER — ONDANSETRON 2 MG/ML
4 INJECTION INTRAMUSCULAR; INTRAVENOUS
Status: DISCONTINUED | OUTPATIENT
Start: 2023-05-16 | End: 2023-05-16 | Stop reason: HOSPADM

## 2023-05-16 RX ORDER — BUPIVACAINE HYDROCHLORIDE 5 MG/ML
INJECTION, SOLUTION EPIDURAL; INTRACAUDAL
Status: COMPLETED | OUTPATIENT
Start: 2023-05-16 | End: 2023-05-16

## 2023-05-16 RX ORDER — BUPIVACAINE HYDROCHLORIDE 5 MG/ML
INJECTION, SOLUTION EPIDURAL; INTRACAUDAL
Status: DISCONTINUED | OUTPATIENT
Start: 2023-05-16 | End: 2023-05-16 | Stop reason: HOSPADM

## 2023-05-16 RX ORDER — CALCIUM CARBONATE 500 MG/1
500 TABLET, CHEWABLE ORAL
Status: DISCONTINUED | OUTPATIENT
Start: 2023-05-16 | End: 2023-05-19

## 2023-05-16 RX ORDER — CEFOTETAN DISODIUM 2 G/20ML
INJECTION, POWDER, FOR SOLUTION INTRAMUSCULAR; INTRAVENOUS PRN
Status: DISCONTINUED | OUTPATIENT
Start: 2023-05-16 | End: 2023-05-16 | Stop reason: SURG

## 2023-05-16 RX ORDER — HYDROMORPHONE HYDROCHLORIDE 2 MG/ML
INJECTION, SOLUTION INTRAMUSCULAR; INTRAVENOUS; SUBCUTANEOUS PRN
Status: DISCONTINUED | OUTPATIENT
Start: 2023-05-16 | End: 2023-05-16 | Stop reason: SURG

## 2023-05-16 RX ORDER — PHENYLEPHRINE HCL IN 0.9% NACL 0.5 MG/5ML
SYRINGE (ML) INTRAVENOUS PRN
Status: DISCONTINUED | OUTPATIENT
Start: 2023-05-16 | End: 2023-05-16 | Stop reason: SURG

## 2023-05-16 RX ORDER — MEPERIDINE HYDROCHLORIDE 25 MG/ML
6.25 INJECTION INTRAMUSCULAR; INTRAVENOUS; SUBCUTANEOUS
Status: DISCONTINUED | OUTPATIENT
Start: 2023-05-16 | End: 2023-05-16 | Stop reason: HOSPADM

## 2023-05-16 RX ORDER — OXYCODONE HYDROCHLORIDE 5 MG/1
5 TABLET ORAL
Status: DISCONTINUED | OUTPATIENT
Start: 2023-05-16 | End: 2023-05-19

## 2023-05-16 RX ORDER — OXYCODONE HYDROCHLORIDE 10 MG/1
10 TABLET ORAL
Status: DISCONTINUED | OUTPATIENT
Start: 2023-05-16 | End: 2023-05-19

## 2023-05-16 RX ORDER — HYDROMORPHONE HYDROCHLORIDE 1 MG/ML
0.2 INJECTION, SOLUTION INTRAMUSCULAR; INTRAVENOUS; SUBCUTANEOUS
Status: DISCONTINUED | OUTPATIENT
Start: 2023-05-16 | End: 2023-05-16 | Stop reason: HOSPADM

## 2023-05-16 RX ORDER — ONDANSETRON 2 MG/ML
INJECTION INTRAMUSCULAR; INTRAVENOUS PRN
Status: DISCONTINUED | OUTPATIENT
Start: 2023-05-16 | End: 2023-05-16 | Stop reason: SURG

## 2023-05-16 RX ORDER — CELECOXIB 200 MG/1
200 CAPSULE ORAL ONCE
Status: COMPLETED | OUTPATIENT
Start: 2023-05-16 | End: 2023-05-16

## 2023-05-16 RX ORDER — DIPHENHYDRAMINE HYDROCHLORIDE 50 MG/ML
25 INJECTION INTRAMUSCULAR; INTRAVENOUS EVERY 6 HOURS PRN
Status: DISCONTINUED | OUTPATIENT
Start: 2023-05-16 | End: 2023-05-26 | Stop reason: HOSPADM

## 2023-05-16 RX ORDER — HYDROMORPHONE HYDROCHLORIDE 1 MG/ML
0.1 INJECTION, SOLUTION INTRAMUSCULAR; INTRAVENOUS; SUBCUTANEOUS
Status: DISCONTINUED | OUTPATIENT
Start: 2023-05-16 | End: 2023-05-16 | Stop reason: HOSPADM

## 2023-05-16 RX ORDER — HALOPERIDOL 5 MG/ML
1 INJECTION INTRAMUSCULAR
Status: DISCONTINUED | OUTPATIENT
Start: 2023-05-16 | End: 2023-05-16 | Stop reason: HOSPADM

## 2023-05-16 RX ORDER — ONDANSETRON 2 MG/ML
4 INJECTION INTRAMUSCULAR; INTRAVENOUS EVERY 4 HOURS PRN
Status: DISCONTINUED | OUTPATIENT
Start: 2023-05-16 | End: 2023-05-26 | Stop reason: HOSPADM

## 2023-05-16 RX ADMIN — CEFOTETAN DISODIUM 2 G: 2 INJECTION, POWDER, FOR SOLUTION INTRAMUSCULAR; INTRAVENOUS at 15:43

## 2023-05-16 RX ADMIN — HYDROMORPHONE HYDROCHLORIDE 0.3 MG: 2 INJECTION INTRAMUSCULAR; INTRAVENOUS; SUBCUTANEOUS at 16:55

## 2023-05-16 RX ADMIN — ACETAMINOPHEN 1000 MG: 500 TABLET, FILM COATED ORAL at 13:51

## 2023-05-16 RX ADMIN — DEXAMETHASONE SODIUM PHOSPHATE 8 MG: 4 INJECTION INTRA-ARTICULAR; INTRALESIONAL; INTRAMUSCULAR; INTRAVENOUS; SOFT TISSUE at 15:41

## 2023-05-16 RX ADMIN — ROCURONIUM BROMIDE 50 MG: 50 INJECTION, SOLUTION INTRAVENOUS at 15:41

## 2023-05-16 RX ADMIN — LIDOCAINE HYDROCHLORIDE 100 MG: 20 INJECTION, SOLUTION EPIDURAL; INFILTRATION; INTRACAUDAL at 15:41

## 2023-05-16 RX ADMIN — OXYCODONE HYDROCHLORIDE 10 MG: 10 TABLET, FILM COATED, EXTENDED RELEASE ORAL at 13:51

## 2023-05-16 RX ADMIN — Medication 100 MCG: at 16:16

## 2023-05-16 RX ADMIN — Medication 200 MCG: at 16:04

## 2023-05-16 RX ADMIN — CELECOXIB 200 MG: 200 CAPSULE ORAL at 13:50

## 2023-05-16 RX ADMIN — Medication 100 MCG: at 16:00

## 2023-05-16 RX ADMIN — SODIUM CHLORIDE, POTASSIUM CHLORIDE, SODIUM LACTATE AND CALCIUM CHLORIDE: 600; 310; 30; 20 INJECTION, SOLUTION INTRAVENOUS at 15:37

## 2023-05-16 RX ADMIN — HYDROMORPHONE HYDROCHLORIDE 0.3 MG: 2 INJECTION INTRAMUSCULAR; INTRAVENOUS; SUBCUTANEOUS at 15:52

## 2023-05-16 RX ADMIN — CELECOXIB 200 MG: 200 CAPSULE ORAL at 21:42

## 2023-05-16 RX ADMIN — BUPIVACAINE HYDROCHLORIDE 30 ML: 5 INJECTION, SOLUTION EPIDURAL; INTRACAUDAL at 15:48

## 2023-05-16 RX ADMIN — SUGAMMADEX 200 MG: 100 INJECTION, SOLUTION INTRAVENOUS at 16:55

## 2023-05-16 RX ADMIN — Medication 100 MCG: at 15:57

## 2023-05-16 RX ADMIN — PROPOFOL 50 MG: 10 INJECTION, EMULSION INTRAVENOUS at 15:41

## 2023-05-16 RX ADMIN — ONDANSETRON 4 MG: 2 INJECTION INTRAMUSCULAR; INTRAVENOUS at 15:41

## 2023-05-16 RX ADMIN — SODIUM CHLORIDE, POTASSIUM CHLORIDE, SODIUM LACTATE AND CALCIUM CHLORIDE: 600; 310; 30; 20 INJECTION, SOLUTION INTRAVENOUS at 13:57

## 2023-05-16 ASSESSMENT — LIFESTYLE VARIABLES
AVERAGE NUMBER OF DAYS PER WEEK YOU HAVE A DRINK CONTAINING ALCOHOL: 0
TOTAL SCORE: 0
HAVE YOU EVER FELT YOU SHOULD CUT DOWN ON YOUR DRINKING: NO
CONSUMPTION TOTAL: NEGATIVE
TOTAL SCORE: 0
ALCOHOL_USE: NO
EVER HAD A DRINK FIRST THING IN THE MORNING TO STEADY YOUR NERVES TO GET RID OF A HANGOVER: NO
TOTAL SCORE: 0
ON A TYPICAL DAY WHEN YOU DRINK ALCOHOL HOW MANY DRINKS DO YOU HAVE: 0
HAVE PEOPLE ANNOYED YOU BY CRITICIZING YOUR DRINKING: NO
EVER FELT BAD OR GUILTY ABOUT YOUR DRINKING: NO
HOW MANY TIMES IN THE PAST YEAR HAVE YOU HAD 5 OR MORE DRINKS IN A DAY: 0

## 2023-05-16 ASSESSMENT — PAIN DESCRIPTION - PAIN TYPE
TYPE: ACUTE PAIN;SURGICAL PAIN
TYPE: ACUTE PAIN;SURGICAL PAIN

## 2023-05-16 ASSESSMENT — FIBROSIS 4 INDEX: FIB4 SCORE: 0.54

## 2023-05-16 ASSESSMENT — PAIN SCALES - GENERAL: PAIN_LEVEL: 2

## 2023-05-16 NOTE — ANESTHESIA PROCEDURE NOTES
Airway    Date/Time: 5/16/2023 3:42 PM    Performed by: Annalise Kamara M.D.  Authorized by: Annalise Kamara M.D.    Location:  OR  Urgency:  Elective  Indications for Airway Management:  Anesthesia      Spontaneous Ventilation: absent    Sedation Level:  Deep  Preoxygenated: Yes    Patient Position:  Sniffing  MILS Maintained Throughout: Yes    Mask Difficulty Assessment:  1 - vent by mask  Final Airway Type:  Endotracheal airway  Final Endotracheal Airway:  ETT  Cuffed: Yes    Technique Used for Successful ETT Placement:  Direct laryngoscopy    Insertion Site:  Oral  Blade Type:  Lawrence  Laryngoscope Blade/Videolaryngoscope Blade Size:  2  ETT Size (mm):  7.5  Measured from:  Lips  ETT to Lips (cm):  21  Placement Verified by: capnometry    Cormack-Lehane Classification:  Grade I - full view of glottis  Number of Attempts at Approach:  1

## 2023-05-16 NOTE — OR NURSING
Assumed care in pre op . Patient is alert oriented x 4 tearful with facial grimaced due to rectal pain . Wife at bedside.   V/s stable. Allergies , Npo , surgical /medical hx , surgical procedure verified and signed by the patient.   IV started at right FA g 18 by Maranda  and LR 1 Liter connected and regulated at 10 ml/hr.Infusing well.  Dr. Kamara informed about patient's severe pain and Multimodal medications ordered given.   Waiting for MD's .  On bed resting on a semi fowlers position with call bell at bedside.

## 2023-05-16 NOTE — H&P
Surgery General History & Physical Note    Date  5/16/2023    Primary Care Physician  Pcp Pt States None    CC  Presents for elective colostomy creation and port placement    HPI  This is a 67 y.o. male who presented with history of locally advanced rectal cancer.  He is here today for port placement, flexible sigmoidoscopy and laparoscopic colostomy creation.  Since his clinic visit he denies any new medications, new physicians or emergency room visits.    Past Medical History:   Diagnosis Date    Cancer (HCC)     Rectal per patient       Past Surgical History:   Procedure Laterality Date    INGUINAL HERNIA REPAIR BILATERAL Bilateral        Current Facility-Administered Medications   Medication Dose Route Frequency Provider Last Rate Last Admin    lactated ringers infusion   Intravenous Continuous Yakov Sewell M.D. 10 mL/hr at 05/16/23 1357 New Bag at 05/16/23 1357    acetaminophen (TYLENOL) tablet 1,000 mg  1,000 mg Oral Q4HRS PRN Annalise Kamara M.D.   1,000 mg at 05/16/23 1351       Social History     Socioeconomic History    Marital status:      Spouse name: Not on file    Number of children: Not on file    Years of education: Not on file    Highest education level: Not on file   Occupational History    Not on file   Tobacco Use    Smoking status: Every Day     Packs/day: 1.00     Years: 14.00     Pack years: 14.00     Types: Cigarettes    Smokeless tobacco: Never   Vaping Use    Vaping Use: Never used   Substance and Sexual Activity    Alcohol use: Not Currently    Drug use: Never    Sexual activity: Not on file   Other Topics Concern    Not on file   Social History Narrative    Not on file     Social Determinants of Health     Financial Resource Strain: Not on file   Food Insecurity: Not on file   Transportation Needs: Not on file   Physical Activity: Not on file   Stress: Not on file   Social Connections: Not on file   Intimate Partner Violence: Not on file   Housing Stability: Not on file        Family History   Problem Relation Age of Onset    Lung Cancer Father     Brain Cancer Maternal Uncle     Stomach Cancer Maternal Grandfather        Allergies  Patient has no known allergies.    Review of Systems  Negative    Physical Exam  Vitals reviewed.   Constitutional:       Appearance: He is normal weight.   HENT:      Head: Normocephalic.      Nose: Nose normal.      Mouth/Throat:      Mouth: Mucous membranes are moist.   Eyes:      Extraocular Movements: Extraocular movements intact.   Cardiovascular:      Rate and Rhythm: Normal rate.      Pulses: Normal pulses.   Pulmonary:      Effort: Pulmonary effort is normal.   Abdominal:      General: Abdomen is flat.      Palpations: Abdomen is soft.   Musculoskeletal:         General: Normal range of motion.      Cervical back: Normal range of motion.   Skin:     General: Skin is warm and dry.   Neurological:      Mental Status: He is alert and oriented to person, place, and time.   Psychiatric:         Mood and Affect: Mood normal.         Behavior: Behavior normal.         Thought Content: Thought content normal.         Judgment: Judgment normal.         Vital Signs  Blood Pressure : 131/76   Temperature: 36.9 °C (98.5 °F)   Pulse: 97   Respiration: 16   Pulse Oximetry: 92 %       Labs:                    Radiology:  DX-PORTABLE FLUOROSCOPY < 1 HOUR    (Results Pending)         Assessment/Plan:  67-year-old male with locally advanced obstructing rectal cancer  Procedure(s):  LAPAROSCOPIC VERSUS OPEN COLOSTOMY CREATION, WITH FLEXIBLE SIGMOIDOSCOPY AND PORT PLACEMENT  SIGMOIDOSCOPY, FLEXIBLE  INSERTION, CATHETER  Risks, benefits, and alternatives were discussed with consenting person(s). Consenting person(s) were given an opportunity to ask questions and discuss other options. Risks including but not limited to failed or incomplete planned procedure, ineffective therapy, perforation, infection, bleeding, missed lesion(s), missed diagnosis, cardiac and/or  pulmonary event, aspiration, stroke, possible need for surgery, hospitalization possibly prolonged, discomfort, unsuccessful and/or incomplete procedure, possible need for repeat procedures and/or additional testings, damage to adjacent organs and/or vascular structures, medication reaction, disability, death, and other adverse events possibly life-threatening. Discussion was undertaken with Layman's terms. Consenting persons stated understanding and acceptance of these risks, and wished to proceed. Consent was given in clear state of mind.   Yakov Sewell MD PhD  [facility]  Colon and Rectal Surgeon  [phone]

## 2023-05-16 NOTE — ANESTHESIA PROCEDURE NOTES
Peripheral Block    Date/Time: 5/16/2023 3:48 PM    Performed by: Annalise Kamara M.D.  Authorized by: Annalise Kamara M.D.    Patient Location:  OR  Start Time:  5/16/2023 3:48 PM  End Time:  5/16/2023 3:52 PM  Reason for Block: at surgeon's request and post-op pain management ONLY    patient identified, IV checked, site marked, risks and benefits discussed, surgical consent, monitors and equipment checked, pre-op evaluation and timeout performed    Patient Position:  Supine  Prep: ChloraPrep    Monitoring:  Continuous pulse ox, cardiac monitor and heart rate  Block Region:  Trunk  Trunk - Block Type:  Abdominal plane block - TAP block    Laterality:  Bilateral  Procedures: ultrasound guided  Image captured, interpreted and electronically stored.  Block Type:  Single-shot  Needle Length:  100mm  Needle Gauge:  21 G  Needle Localization:  Ultrasound guidance  Injection Assessment:  Negative aspiration for heme and incremental injection  Evidence of intravascular injection: No

## 2023-05-16 NOTE — ANESTHESIA PREPROCEDURE EVALUATION
Case: [id number] Date/Time: 05/16/23 1445    Procedures:       LAPAROSCOPIC VERSUS OPEN COLOSTOMY CREATION, WITH FLEXIBLE SIGMOIDOSCOPY AND PORT PLACEMENT      SIGMOIDOSCOPY, FLEXIBLE      INSERTION, CATHETER    Pre-op diagnosis: MALIGNANT TUMOR OF RECTUM    Location: [facility] OR 08 / SURGERY [facility]    Surgeons: Yakov Sewell M.D.        67yoM with hx of rectal cancer    NPO  NKDA  No AC    +tobacco    Relevant Problems   No relevant active problems       Physical Exam    Airway   Mallampati: II       Cardiovascular - normal exam     Dental - normal exam           Pulmonary - normal exam     Abdominal - normal exam     Neurological - normal exam                 Anesthesia Plan    ASA 2       Plan - general and peripheral nerve block     Peripheral nerve block will be post-op pain control  Airway plan will be ETT          Induction: intravenous    Postoperative Plan: Postoperative administration of opioids is intended.    Pertinent diagnostic labs and testing reviewed    Informed Consent:    Anesthetic plan and risks discussed with patient.

## 2023-05-17 LAB
ANION GAP SERPL CALC-SCNC: 14 MMOL/L (ref 7–16)
BUN SERPL-MCNC: 15 MG/DL (ref 8–22)
CALCIUM SERPL-MCNC: 8.6 MG/DL (ref 8.5–10.5)
CHLORIDE SERPL-SCNC: 100 MMOL/L (ref 96–112)
CO2 SERPL-SCNC: 21 MMOL/L (ref 20–33)
CREAT SERPL-MCNC: 0.83 MG/DL (ref 0.5–1.4)
ERYTHROCYTE [DISTWIDTH] IN BLOOD BY AUTOMATED COUNT: 44.7 FL (ref 35.9–50)
GFR SERPLBLD CREATININE-BSD FMLA CKD-EPI: 95 ML/MIN/1.73 M 2
GLUCOSE SERPL-MCNC: 162 MG/DL (ref 65–99)
HCT VFR BLD AUTO: 44.8 % (ref 42–52)
HGB BLD-MCNC: 15.2 G/DL (ref 14–18)
MCH RBC QN AUTO: 30 PG (ref 27–33)
MCHC RBC AUTO-ENTMCNC: 33.9 G/DL (ref 33.7–35.3)
MCV RBC AUTO: 88.4 FL (ref 81.4–97.8)
PLATELET # BLD AUTO: 223 K/UL (ref 164–446)
PMV BLD AUTO: 10.2 FL (ref 9–12.9)
POTASSIUM SERPL-SCNC: 4.4 MMOL/L (ref 3.6–5.5)
RBC # BLD AUTO: 5.07 M/UL (ref 4.7–6.1)
SODIUM SERPL-SCNC: 135 MMOL/L (ref 135–145)
WBC # BLD AUTO: 8.8 K/UL (ref 4.8–10.8)

## 2023-05-17 PROCEDURE — 36415 COLL VENOUS BLD VENIPUNCTURE: CPT

## 2023-05-17 PROCEDURE — 97602 WOUND(S) CARE NON-SELECTIVE: CPT

## 2023-05-17 PROCEDURE — 302111 WAFER OST 2.25IN N IMG RD 2 PC (BARRIER): Performed by: SURGERY

## 2023-05-17 PROCEDURE — 770001 HCHG ROOM/CARE - MED/SURG/GYN PRIV*

## 2023-05-17 PROCEDURE — A9270 NON-COVERED ITEM OR SERVICE: HCPCS | Performed by: REGISTERED NURSE

## 2023-05-17 PROCEDURE — 700102 HCHG RX REV CODE 250 W/ 637 OVERRIDE(OP): Performed by: REGISTERED NURSE

## 2023-05-17 PROCEDURE — 302098 PASTE RING (FLAT): Performed by: SURGERY

## 2023-05-17 PROCEDURE — 80048 BASIC METABOLIC PNL TOTAL CA: CPT

## 2023-05-17 PROCEDURE — 306473 SCISSORS,IRIS STERILE DISP: Performed by: SURGERY

## 2023-05-17 PROCEDURE — 85027 COMPLETE CBC AUTOMATED: CPT

## 2023-05-17 PROCEDURE — 302102 BAG OST N IMG 2.25IN 2PC (FECAL): Performed by: SURGERY

## 2023-05-17 RX ADMIN — ACETAMINOPHEN 1000 MG: 500 TABLET, FILM COATED ORAL at 18:01

## 2023-05-17 RX ADMIN — ACETAMINOPHEN 1000 MG: 500 TABLET, FILM COATED ORAL at 11:55

## 2023-05-17 RX ADMIN — CELECOXIB 200 MG: 200 CAPSULE ORAL at 18:01

## 2023-05-17 ASSESSMENT — COGNITIVE AND FUNCTIONAL STATUS - GENERAL
HELP NEEDED FOR BATHING: A LITTLE
SUGGESTED CMS G CODE MODIFIER MOBILITY: CH
DRESSING REGULAR UPPER BODY CLOTHING: A LITTLE
DAILY ACTIVITIY SCORE: 21
MOBILITY SCORE: 24
SUGGESTED CMS G CODE MODIFIER DAILY ACTIVITY: CJ
DRESSING REGULAR LOWER BODY CLOTHING: A LITTLE

## 2023-05-17 ASSESSMENT — PATIENT HEALTH QUESTIONNAIRE - PHQ9
SUM OF ALL RESPONSES TO PHQ9 QUESTIONS 1 AND 2: 0
2. FEELING DOWN, DEPRESSED, IRRITABLE, OR HOPELESS: NOT AT ALL
1. LITTLE INTEREST OR PLEASURE IN DOING THINGS: NOT AT ALL

## 2023-05-17 ASSESSMENT — PAIN DESCRIPTION - PAIN TYPE
TYPE: ACUTE PAIN;SURGICAL PAIN
TYPE: ACUTE PAIN;SURGICAL PAIN
TYPE: ACUTE PAIN
TYPE: ACUTE PAIN
TYPE: ACUTE PAIN;SURGICAL PAIN
TYPE: ACUTE PAIN
TYPE: ACUTE PAIN

## 2023-05-17 NOTE — DISCHARGE INSTR - CASE MGT
Per Stephanie,  Pt has an  an appointment  on 5/30 Tuesday at 08:00 am with a 07'45 check in time at the [facility]

## 2023-05-17 NOTE — CARE PLAN
The patient is Stable - Low risk of patient condition declining or worsening    Shift Goals  Clinical Goals: monitor for pain  Patient Goals: ambulation    Progress made toward(s) clinical / shift goals:  Pt describes pain as very minimal compared to what he was experiencing for the last year. Pt medicated with scheduled medications and has been resting comfortably. Goal for pt is to ambulate in AM.    Patient is not progressing towards the following goals:

## 2023-05-17 NOTE — WOUND TEAM
" [facility]  Inpatient Services  New Ostomy Management & Teaching    HPI:  Reviewed  PMH: Reviewed   SH: Reviewed    Past Surgical History:   Procedure Laterality Date    CO LAP, SURG COLOSTOMY Left 5/16/2023    Procedure: LAPAROSCOPIC  COLOSTOMY CREATION;  Surgeon: Yakov Sewell M.D.;  Location: SURGERY [facility];  Service: Gastroenterology    CO SIGMOIDOSCOPY,DIAGNOSTIC N/A 5/16/2023    Procedure: SIGMOIDOSCOPY, FLEXIBLE;  Surgeon: Yakov Sewell M.D.;  Location: SURGERY [facility];  Service: Gastroenterology    CATH PLACEMENT Right 5/16/2023    Procedure: INSERTION, CATHETER;  Surgeon: Yakov Sewell M.D.;  Location: SURGERY [facility];  Service: Gastroenterology    INGUINAL HERNIA REPAIR BILATERAL Bilateral      Surgery Date: 05/16/23    Surgeon(s):  Yakov Sewell M.D.    Procedure(s):  LAPAROSCOPIC VERSUS OPEN COLOSTOMY CREATION, WITH FLEXIBLE SIGMOIDOSCOPY AND PORT PLACEMENT  SIGMOIDOSCOPY, FLEXIBLE  INSERTION, CATHETER     Permanence: yes    Pertinent History: The patient is a 67 y.o. male with locally advanced rectal cancer. patient went to OR 05/16/23 for port placement and laparoscopic colostomy creation.      PROCEDURE PERFORMED:  1.  Flexible sigmoidoscopy  2.  Tunneled central venous catheter placement with subcutaneous port under ultrasound guidance with fluoroscopy  3.  Laparoscopic colostomy creation                 Colostomy 05/16/23 LUQ (Active)   Stomal Appliance Assessment Leaking;Changed    Stoma Assessment Red;Edema    Stoma Shape Oval;Budded Less Than One Inch    Stoma Size (in) 1.5    Peristomal Assessment Intact    Mucocutaneous Junction Intact    Treatment Appliance Changed;Cleansed with water/washcloth    Peristomal Protectant Paste Ring    Stomal Appliance Paste Ring, 2\";2 1/4\" (57mm) CTF    Output (mL) 0 mL    Output Color Bloody    WOUND RN ONLY - Stomal Appliance  2 Piece;Paste Ring, 2\";2 1/4\" (57mm) CTF    Appliance Brand Shanon    Appliance " "Supplier [employer]    Ostomy Care Resources Provided [facility] Tip Sheet    WOUND NURSE ONLY - Time Spent with Patient (mins) 60                   Ostomy Appliance (type and size): 2.25\" 2 piece and 2\" Paste Ring    Interventions: daughter was at bedside but left, patient decided he did want daughter to observe teaching but she had already left.  Went over paperwork with patient, discussed and answered all questions.  Patient observed all steps and verbalized understanding.  Removed previous appliance and cleansed skin with warm wash cloth.  Measured with cardboard template, traced to barrier, barrier cut, paste ring applied to barrier, applied to skin, attached pouch.  Patient observed pinching and closing pouch.       Pt education: Ostomy folder was discussed in detail. Pt was educated regarding the following things: stoma size and shape and to be aware stoma will likely change sizes in the first 4 to 6 weeks. Pt aware that we are using the most basic supplies while in the hospital and there are many brands and options in regards to supplies. Pt educated on when to empty and how to empty, as well as burping appliance. Wear time discussed.  Pt aware that they should keep an extra set of appliances with them at all times (do not leave in warm cars). Follow up, supply ordering and support groups discussed as well as accessories that may be beneficial. Questions and concerns addressed.    Needs for next visit: hands on, would like daughter and wife to be present.  Meet between 12-1.      Evaluation: patient having a hard time looking at stoma.  He feels he will be able to manage.  Discussed high output pouch and down drain as patient will be undergoing chemo/radiation.      Flatus: Not Present  Stool Output: small and bloody  Urine Output: NA, Fecal Ostomy  Diet: Regular  Mobility: Ambulating at Baseline    Plan: Ostomy nurses to continue to follow for ostomy needs and teaching until discharge    Anticipated discharge needs: " "Supplies, supplier information, possible HH, outpatient ostomy clinic, Skilled Nursing/Rehab     Secure Start Signed Yes  Outpatient Referral Placed Yes  5 Sets of appliances in Ostomy bag for discharge Ordered    INSURANCE OPTIONS:                 [employer] ([place])              MediCARE/MEDICAID & All other Private Insurance companies (Prism Form)         X     MediCAID & Fee for Service (Care Chest Paperwork + Prism Form)                            Form signed/Catalog Marked and Copy left with patient OR medicaid paperwork given to patient      Anticipated Discharge Plans:  Outpatient Wound clinic, Family Care, and Self Care    Ostomy Supplies for DC:  New Image Flextend Extended-Wear FLAT Skin Barrier 2 1/4\" ([place] [id number]) , FECAL 2 PIECE POUCH - 20 per month, 12in New Image Lock n' Roll withOUT Filter 2 1/4\" ([place] [id number]), and Adapt Flat paste ring 2\" ([employer] [id number]) - 15 per month    "

## 2023-05-17 NOTE — CARE PLAN
The patient is Stable - Low risk of patient condition declining or worsening    Shift Goals  Clinical Goals: pain control, monitor ostomy, ambulate  Patient Goals: rest    Progress made toward(s) clinical / shift goals:      Patient's pain will be well controlled with scheduled tylenol and celebrex and increasing mobility. Patient's knowledge will improve with education about ostomy from nursing and wound care.     Problem: Knowledge Deficit - Standard  Goal: Patient and family/care givers will demonstrate understanding of plan of care, disease process/condition, diagnostic tests and medications  Outcome: Progressing     Problem: Pain - Standard  Goal: Alleviation of pain or a reduction in pain to the patient’s comfort goal  Outcome: Progressing

## 2023-05-17 NOTE — DISCHARGE PLANNING
Case Management Discharge Planning    Admission Date: 5/16/2023  GMLOS: 2.4  ALOS: 1    6-Clicks ADL Score: 21  6-Clicks Mobility Score: 24      Anticipated Discharge Dispo: Discharge Disposition: Discharged to home/self care (01) with close OP follow Up    DME Needed: No    Action(s) Taken: Updated Provider/Nurse on Discharge Plan    Pt is S/P LAPAROSCOPIC  COLOSTOMY CREATION (Left: Abdomen)      SIGMOIDOSCOPY, FLEXIBLE (Anus)      INSERTION, CATHETER (Right: Chest) on 5/16/23.    This RN CM  left a VM to [facility] scheduler to obtain appointment for Pt , awaiting return call.     [place] is [place] and has Medicare and [employer]. Pt has Su, Spouse for support.     This RN CM spoke with Stephanie  at the [facility]. Pt has an appointment on Monday May 22 at 14:30  pm at the [facility].     Escalations Completed: None    Medically Clear: No    Next Steps:   This RN CM to continue to assist Pt with discharge as needed    Barriers to Discharge:   Medical clearance    Is the patient up for discharge tomorrow: No

## 2023-05-17 NOTE — PROGRESS NOTES
"Pt awake and oriented X4, VSS. Port placement sites CDI, Abdominal sites CDI, Colostomy appliance fitted appropriately, \"beefy red\" ostomy. No pain or nausea present. Provided caloric drink and tolerating well. 10L oxymask per ERAS.   "

## 2023-05-17 NOTE — OP REPORT
DATE OF OPERATION: 5/16/2023    PREOPERATIVE DIAGNOSIS:    Locally advanced rectal cancer  Obstipation    POSTOPERATIVE DIAGNOSIS: Locally advanced rectal cancer  Obstipation    PROCEDURE PERFORMED:  1.  Flexible sigmoidoscopy  2.  Tunneled central venous catheter placement with subcutaneous port under ultrasound guidance with fluoroscopy  3.  Laparoscopic colostomy creation    SURGEON: Yakov Sewell M.D.    ASSISTANT: Mary MCNEIL  The indications for a surgical assistant in this surgery were indicated due to complexity of the procedure. Their role included aiding in incision, retraction, holding devices including camera for laparoscopic procedure, and closure of the wound.      ANESTHESIOLOGIST: Dr. Kamara    ANESTHESIA:  General endotracheal anesthesia.    ASA CLASSIFICATION: II.    INDICATION:  The patient is a 67 y.o. male with locally advanced rectal cancer. He is taken to the operating room for endoscopic evaluation with port placement and laparoscopic colostomy creation.    FINDINGS: Fixed and firm rectal mass at 4 cm from anal verge.    WOUND CLASSIFICATION: Class III, Contaminated.    SPECIMEN: None   ESTIMATED BLOOD LOSS:  50 mL.    PROCEDURE:    After informed witnessed consent was obtained, the patient was taken to the operating room and underwent general endotracheal anesthesia without incident.  Preoperative antibiotics were administered.  Bilateral lower sequential compression devices were placed.    Digital rectal examination demonstrated a firm and fixed mass in a circumferential pattern 4 cm from the anal verge.  The flexible colonoscope was introduced through the rectum and advanced under direct visualization until the distal sigmoid colon was reached.  A friable and bleeding fixed mass was visualized easily.  The colonoscope was not retroflexed within the rectum.  Careful visualization was performed as the instrument was withdrawn.  Patient tolerates to the procedure was good.         The right neck and chest area were prepped and draped in the usual sterile fashion. Time-out was called. The correct patient, correct diagnosis, correct surgery, and correct location of the surgery were discussed and agreed upon. The patient had her head turned gently towards the left side. Using a 12.5 MHz ultrasound probe, the venous anatomy and arterial anatomy of the neck was mapped out. A carotid artery, internal jugular vein and subclavian vein were all identified. A small incision was made near the junction of the 2 heads of the sternocleidomastoid muscle with a #11 blade. Through this small incision, a hollow 16-gauge needle was inserted into the neck, and then under ultrasound guidance, the internal jugular vein was cannulated. Once blood returned within the needle and syringe, a guidewire was placed through the hollow needle, advanced into superior vena cava. Fluoroscopy revealed that the guidewire was indeed within the superior vena cava. The needle was removed and the wire was secured to the neck. Next, an incision was made just below the mid portion of the left clavicle.  The incision was made with #15 blade through the skin and dermis. All bleeding was controlled with electrocautery. Incision was carried down until the fascia of the pectoralis major muscle was encountered. A pocket was then made inferior to the incisionthat would house the port. Once this was done, the port with the attached catheter was flushed with heparinized saline. The port was placed into the pocket and then using a tunneling device, the catheter portion was tunneled from this infraclavicular incision up over the catheter through a subcutaneousroute and then out the incision of the neck. The catheter was cut to the predetermined distance. A peel away introducer and trocar were then placed over the guidewire in the neck and advanced under fluoroscopic evaluation. The guidewire and the trocar removed. The catheter portion of the  PowerPort was then placed into the peel-away introducer, advanced to its full distance. The peel away introducer was then removed. Fluoroscopy was then performed   and revealed the tip of the catheter to be at the right atrial superior vena cava junction. There was no kinking of the catheter. Blood could easily be aspirated through the PowerPort and the PowerPort was injected with 10 mL of a very dilute heparinized saline and then 1 mL of 1000 units per mL heparin. The wounds were irrigated and then closed using running 3-0 Vicryl suture for the dermis and running 4-0 Monocryl suture for the skin. Dermabond and sterile dressings were applied. The patient tolerated the procedure well without complications. Final sponge and needle counts were correct.    The abdomen was prepped and draped in the usual standard sterile fashion.  A timeout was performed verifying the correct patient, procedure, site, positioning in availability of equipment prior to starting surgery.  A 5 mm incision was made lateral to the umbilicus and using a 0 degree 5 mm laparoscope the abdominal cavity was entered using an Optiview technique without injury to the underlying bowel.  Gross survey revealed no evidence of metastatic disease.  I then placed a 5 mm port in the suprapubic midline and grasped the colon and found it to be mobile and easily reached the abdominal wall.  An incision was made at the preoperatively marked in the colostomy site and a trocar was placed in the colon grasped through this and found to reach the abdominal wall without kinking or narrowing.  A trephine of skin was cut at the site and the subcutaneous tissues were retracted and incised.  I incised the anterior rectus sheath and split the rectus muscles atraumatically and similarly incised the posterior rectus sheath.  Patrice forceps were placed through this incision and the sigmoid colon was grasped.  I brought a loop of sigmoid colon through the incision easily and  divided the sigmoid colon with a linear cutting stapler.  I then passed the distal sigmoid colon back into the abdominal cavity and visualized the planned ostomy to ensure the correct orientation without kinking or twisting.  Incision sites were closed with 4-0 Monocryl in a subcuticular fashion and Dermabond was placed as a dressing.  The colostomy was then matured in the manner of Regina with interrupted 3-0 Vicryl sutures.  A colostomy appliance was then placed.  All sponge and instrument counts were correct and the patient was extubated and taken to the postanesthesia care unit in stable condition.  Yakov Sewell MD PhD  [facility]  Colon and Rectal Surgeon  [phone]

## 2023-05-17 NOTE — ANESTHESIA TIME REPORT
Anesthesia Start and Stop Event Times     Date Time Event    5/16/2023 1500 Ready for Procedure     1537 Anesthesia Start     1705 Anesthesia Stop        Responsible Staff  05/16/23    Name Role Begin End    Annalise Kamara M.D. Anesth 1537 1705        Overtime Reason:  no overtime (within assigned shift)    Comments:

## 2023-05-17 NOTE — ANESTHESIA POSTPROCEDURE EVALUATION
Patient: Josh Lerner    Procedure Summary     Date: 05/16/23 Room / Location: [facility] 08 / SURGERY [facility]    Anesthesia Start: 1537 Anesthesia Stop: 1705    Procedures:       LAPAROSCOPIC  COLOSTOMY CREATION (Left: Abdomen)      SIGMOIDOSCOPY, FLEXIBLE (Anus)      INSERTION, CATHETER (Right: Chest) Diagnosis: (LOCALLY ADVANCED RECTAL CANCER, OBSTIPATION)    Surgeons: Yakov Sewell M.D. Responsible Provider: Annalise Kamara M.D.    Anesthesia Type: general, peripheral nerve block ASA Status: 2          Final Anesthesia Type: general, peripheral nerve block  Last vitals  BP   Blood Pressure : 131/76    Temp   36.9 °C (98.5 °F)    Pulse   97   Resp   16    SpO2   92 %      Anesthesia Post Evaluation    Patient location during evaluation: PACU  Patient participation: complete - patient participated  Level of consciousness: awake and alert  Pain score: 2    Airway patency: patent  Anesthetic complications: no  Cardiovascular status: adequate and hemodynamically stable  Respiratory status: acceptable  Hydration status: acceptable    PONV: none          No notable events documented.

## 2023-05-17 NOTE — PROGRESS NOTES
..4 Eyes Skin Assessment Completed by MARITZA Hui and MARITZA Iqbal.    Head WDL  Ears WDL  Nose WDL  Mouth WDL  Neck Incision R neck  Breast/Chest Incision R chest  Shoulder Blades WDL  Spine WDL  (R) Arm/Elbow/Hand WDL  (L) Arm/Elbow/Hand Redness and Scab elbow  Abdomen Incision x2, LLQ ostomy  Groin WDL  Scrotum/Coccyx/Buttocks WDL  (R) Leg WDL  (L) Leg WDL  (R) Heel/Foot/Toe dry  (L) Heel/Foot/Toe dry          Devices In Places Pulse Ox, SCD's, and Central Line (chest port not accessed)      Interventions In Place Pillows and Low Air Loss Mattress    Possible Skin Injury No    Pictures Uploaded Into Epic N/A  Wound Consult Placed Yes for ostomy  RN Wound Prevention Protocol Ordered No

## 2023-05-17 NOTE — PROGRESS NOTES
Bedside report received.  Assessment complete.  A&O x 4. Patient calls appropriately.  Patient ambulates with standby assist. Bed alarm off.   Patient has 0/10 pain. Pain managed with prescribed medications.  Denies N&V. Tolerating regular diet.  Surgical: LLQ ostomy, x2 lap sites with dermabond, R neck incision closed with dermabond  + void, - flatus, - BM via ostomy.  Patient denies SOB.  SCD's refused.  Review plan with of care with patient. Call light and personal belongings within reach. Hourly rounding in place. All needs met at this time.

## 2023-05-18 ENCOUNTER — HOSPITAL ENCOUNTER (OUTPATIENT)
Dept: RADIATION ONCOLOGY | Facility: MEDICAL CENTER | Age: 68
End: 2023-05-18

## 2023-05-18 LAB
ANION GAP SERPL CALC-SCNC: 12 MMOL/L (ref 7–16)
BUN SERPL-MCNC: 26 MG/DL (ref 8–22)
CALCIUM SERPL-MCNC: 8.2 MG/DL (ref 8.5–10.5)
CHLORIDE SERPL-SCNC: 100 MMOL/L (ref 96–112)
CO2 SERPL-SCNC: 22 MMOL/L (ref 20–33)
CREAT SERPL-MCNC: 0.96 MG/DL (ref 0.5–1.4)
ERYTHROCYTE [DISTWIDTH] IN BLOOD BY AUTOMATED COUNT: 45.1 FL (ref 35.9–50)
GFR SERPLBLD CREATININE-BSD FMLA CKD-EPI: 86 ML/MIN/1.73 M 2
GLUCOSE SERPL-MCNC: 120 MG/DL (ref 65–99)
HCT VFR BLD AUTO: 43 % (ref 42–52)
HGB BLD-MCNC: 14.1 G/DL (ref 14–18)
MCH RBC QN AUTO: 29 PG (ref 27–33)
MCHC RBC AUTO-ENTMCNC: 32.8 G/DL (ref 33.7–35.3)
MCV RBC AUTO: 88.3 FL (ref 81.4–97.8)
PLATELET # BLD AUTO: 208 K/UL (ref 164–446)
PMV BLD AUTO: 10.1 FL (ref 9–12.9)
POTASSIUM SERPL-SCNC: 3.8 MMOL/L (ref 3.6–5.5)
RBC # BLD AUTO: 4.87 M/UL (ref 4.7–6.1)
SODIUM SERPL-SCNC: 134 MMOL/L (ref 135–145)
WBC # BLD AUTO: 8.7 K/UL (ref 4.8–10.8)

## 2023-05-18 PROCEDURE — 700102 HCHG RX REV CODE 250 W/ 637 OVERRIDE(OP): Performed by: REGISTERED NURSE

## 2023-05-18 PROCEDURE — 97602 WOUND(S) CARE NON-SELECTIVE: CPT

## 2023-05-18 PROCEDURE — 77263 THER RADIOLOGY TX PLNG CPLX: CPT | Performed by: RADIOLOGY

## 2023-05-18 PROCEDURE — 77334 RADIATION TREATMENT AID(S): CPT | Performed by: RADIOLOGY

## 2023-05-18 PROCEDURE — 77470 SPECIAL RADIATION TREATMENT: CPT | Mod: 26 | Performed by: RADIOLOGY

## 2023-05-18 PROCEDURE — A9270 NON-COVERED ITEM OR SERVICE: HCPCS | Performed by: REGISTERED NURSE

## 2023-05-18 PROCEDURE — 77334 RADIATION TREATMENT AID(S): CPT | Mod: 26 | Performed by: RADIOLOGY

## 2023-05-18 PROCEDURE — 770001 HCHG ROOM/CARE - MED/SURG/GYN PRIV*

## 2023-05-18 PROCEDURE — 36415 COLL VENOUS BLD VENIPUNCTURE: CPT

## 2023-05-18 PROCEDURE — 77290 THER RAD SIMULAJ FIELD CPLX: CPT | Performed by: RADIOLOGY

## 2023-05-18 PROCEDURE — 80048 BASIC METABOLIC PNL TOTAL CA: CPT

## 2023-05-18 PROCEDURE — 85027 COMPLETE CBC AUTOMATED: CPT

## 2023-05-18 PROCEDURE — 77470 SPECIAL RADIATION TREATMENT: CPT | Performed by: RADIOLOGY

## 2023-05-18 RX ADMIN — CELECOXIB 200 MG: 200 CAPSULE ORAL at 16:55

## 2023-05-18 RX ADMIN — ANTACID TABLETS 500 MG: 500 TABLET, CHEWABLE ORAL at 16:55

## 2023-05-18 RX ADMIN — ACETAMINOPHEN 1000 MG: 500 TABLET, FILM COATED ORAL at 16:55

## 2023-05-18 RX ADMIN — ACETAMINOPHEN 1000 MG: 500 TABLET, FILM COATED ORAL at 12:05

## 2023-05-18 RX ADMIN — ANTACID TABLETS 500 MG: 500 TABLET, CHEWABLE ORAL at 10:44

## 2023-05-18 ASSESSMENT — PAIN DESCRIPTION - PAIN TYPE
TYPE: ACUTE PAIN
TYPE: ACUTE PAIN
TYPE: ACUTE PAIN;SURGICAL PAIN
TYPE: ACUTE PAIN
TYPE: ACUTE PAIN;SURGICAL PAIN

## 2023-05-18 NOTE — CARE PLAN
The patient is Stable - Low risk of patient condition declining or worsening    Shift Goals  Clinical Goals: monitor ostomy output  Patient Goals: rest    Progress made toward(s) clinical / shift goals:  Ostomy with scant bloody output, stoma red. Pt has resting comfortably during the shift.    Patient is not progressing towards the following goals:

## 2023-05-18 NOTE — PROGRESS NOTES
Bedside report received.  Assessment complete.  A&O x 4. Patient calls appropriately.  Patient ambulates with standby assist. Bed alarm off.   Patient has 0/10 pain. Pain managed with prescribed medications.  Denies N&V. Tolerating regular diet.  Surgical LLQ ostomy, x2 lap sites with dermabond, R neck incision closed with dermabond  + void, - flatus, - BM via ostomy.   Patient denies SOB.  SCD's refused.  Review plan with of care with patient. Call light and personal belongings within reach. Hourly rounding in place. All needs met at this time.

## 2023-05-18 NOTE — DIETARY
"Nutrition services: Day 2 of admit.  Josh Lerner is a 67 y.o. male with admitting DX of malignant tumor of rectum    Consult received for MST of 4 on nutrition screen due to report of >/= 34 lb wt loss x > 1 year. No report of poor PO PTA.       Assessment:  Height: 175.3 cm (5' 9\")  Weight: 88 kg (194 lb 0.1 oz)  Body mass index is 28.65 kg/m²., BMI classification: overweight  Diet/Intake: regular ERAS/25-50% of a snack and 25-50% of meals x 2 today.     Evaluation:   Pt w/ lap colostomy creation.   RD visited pt in his room. Pt reports UBW of 245 lb 1 year ago. He has lost 51 lb (21%) x 1 year. Wt loss is significant. Wt loss is r/t pt was not able to have BM. He started taking laxatives which were causing frequent (~18/day) small BMs. He said that he still had an appetite. Intake was less due to his irregular BMs.   Pt noted to have moderate fat loss in upper arm and moderate muscle loss in temple and clavicle region.   Pt ate poorly at lunch. He still is having discomfort that he r/t his bowel function not returning yet. He is agreeable to trying Boost supplements w/ meals for additional kcals and protein.   Pt's dtr brought food in to pt. Pt is on regular diet. Outside food encouraged.     Malnutrition Risk: pt meets ASPEN criteria for moderate malnutrition in the context of chronic illness r/t diminished PO intake due to irregular bowel movements and hypermetabolism w/ dx of rectal cancer AEB 21% wt loss x 1 year, moderate muscle loss and moderate fat loss.     Recommendations/Plan:  Add Boost Plus to meals TID   Encourage intake of >50%  Document intake of all meals and supplements  as % taken in ADL's to provide interdisciplinary communication across all shifts.   Monitor weight.  Nutrition rep will continue to see patient for ongoing meal and snack preferences.         RD will follow.  "

## 2023-05-18 NOTE — RADIATION COMPLETION NOTES
DATE OF SERVICE: 5/18/2023    DIAGNOSIS:  Malignant neoplasm of rectum (HCC)  Staging form: Colon and Rectum, AJCC 8th Edition  - Clinical stage from 4/24/2023: Stage IIC (cT4b, cN0, cM0) - Signed by Luis Bustillos M.D. on 4/24/2023  Total positive nodes: 0  Histologic grade (G): G2  Histologic grading system: 4 grade system  Microsatellite instability (MSI): Stable       DATE OF SERVICE: 5/18/2023    TYPE OF SIMULATION: Pelvis    GOAL OF TREATMENT:   [x] Curative  [] Palliative  [] Oligometastatic    CONTRAST:    [x] IV Contrast*  [] Small Bowel  [] Rectal  [] Urethral              POSITION:    [x]  Supine  [] Prone with belly board    COMPLEX:  [x] Complex Blocking   [x]Arcs  [] Custom Blocks  [] >3 Sites    PROCEDURE: Patient positioned on CT table in Vac-Edgar immobilization device with or without belly board depending on position. CT acquired thorough the entire volume of interest.  Images reviewed and exported to treatment planning system.    I have personally reviewed the relevant data, performed the target localization, and determined all relevant factors for this patient’s simulation.    *Omnipaque 80 -100cc IVP in conjunction with 500cc NS

## 2023-05-18 NOTE — RADIATION COMPLETION NOTES
PATIENT NAME Josh Shah [facility]   PRIMARY PHYSICIAN Pcp Pt States None [id number]   REFERRING PHYSICIAN No ref. provider found 1955       DATE OF SERVICE: 5/18/2023    DIAGNOSIS:  Malignant neoplasm of rectum (HCC)  Staging form: Colon and Rectum, AJCC 8th Edition  - Clinical stage from 4/24/2023: Stage IIC (cT4b, cN0, cM0) - Signed by Luis Bustillos M.D. on 4/24/2023  Total positive nodes: 0  Histologic grade (G): G2  Histologic grading system: 4 grade system  Microsatellite instability (MSI): Stable       SPECIAL TREATMENT PROCEDURE NOTE:  Considerable additional effort required in the management of this case because of administration of concurrent xeloda chemotherapy which may result in increased normal tissue toxicity. This includes longer and possibly more frequent on treatment visits.

## 2023-05-18 NOTE — PROGRESS NOTES
Bedside report received.  Assessment complete.  A&O x 4. Patient calls appropriately.  Patient is up self.   Patient has 3/10 pain. Pain managed with heat packs and ambulation.  Denies N&V. Tolerating regular diet.  2 lap sites closed with dermabond, CDI. LLQ ostomy with scant bloody output, stoma red. R neck incision closed with dermabond. R chest port.  + void  Patient denies SOB.  Review plan with of care with patient. Call light and personal belongings within reach. Hourly rounding in place. All needs met at this time.

## 2023-05-18 NOTE — PROGRESS NOTES
"S:  67 y.o.male s/p port placement and colostomy creation  POD# 1.  Patient did well overnight.  Participating with ostomy education.  Tolerating a diet.  Ambulating without assistance.    O:  /77   Pulse 95   Temp 36.9 °C (98.4 °F) (Temporal)   Resp 14   Ht 1.753 m (5' 9\")   Wt 88 kg (194 lb 0.1 oz)   SpO2 88%   I/O last 3 completed shifts:  In: 240 [P.O.:240]  Out: 1750 [Urine:1750]  Recent Labs     05/17/23  0016 05/18/23  0042   SODIUM 135 134*   POTASSIUM 4.4 3.8   CHLORIDE 100 100   CO2 21 22   GLUCOSE 162* 120*   BUN 15 26*   CREATININE 0.83 0.96   CALCIUM 8.6 8.2*     Recent Labs     05/17/23 0016 05/18/23  0042   WBC 8.8 8.7   RBC 5.07 4.87   HEMOGLOBIN 15.2 14.1   HEMATOCRIT 44.8 43.0   MCV 88.4 88.3   MCH 30.0 29.0   MCHC 33.9 32.8*   RDW 44.7 45.1   PLATELETCT 223 208   MPV 10.2 10.1       Alert and Oriented x3, No Acute Distress  Normal Respiratory Effort  Abdomen soft, appropriately tender  Incisions/Bandages clean/dry/intact  Extremities warm and well perfused  Ostomy pink and healthy, output scant    A/P:  Patient encouraged to take meals in the chair rather then the bed.  Patient educated on appropriate judicious use of narcotics.  Hep-Lock IV.  Encourage by mouth intake.  Johansen catheter removed and patient voiding appropriately.  Advance to low residue diet.  Patient encouraged to ambulate ad aby.  Yakov Sewell M.D. PhD  [facility]  Colon and Rectal Surgery  (Office) [phone]  (Cell)  [phone]  "

## 2023-05-18 NOTE — PROGRESS NOTES
"S:  67 y.o.male s/p port placement and colostomy creation  POD# 2.  Patient did well overnight.  Participating with ostomy education.  Tolerating a diet.  Ambulating without assistance.    O:  /77   Pulse 95   Temp 36.9 °C (98.4 °F) (Temporal)   Resp 14   Ht 1.753 m (5' 9\")   Wt 88 kg (194 lb 0.1 oz)   SpO2 88%   I/O last 3 completed shifts:  In: 240 [P.O.:240]  Out: 1750 [Urine:1750]  Recent Labs     05/17/23  0016 05/18/23  0042   SODIUM 135 134*   POTASSIUM 4.4 3.8   CHLORIDE 100 100   CO2 21 22   GLUCOSE 162* 120*   BUN 15 26*   CREATININE 0.83 0.96   CALCIUM 8.6 8.2*       Recent Labs     05/17/23 0016 05/18/23  0042   WBC 8.8 8.7   RBC 5.07 4.87   HEMOGLOBIN 15.2 14.1   HEMATOCRIT 44.8 43.0   MCV 88.4 88.3   MCH 30.0 29.0   MCHC 33.9 32.8*   RDW 44.7 45.1   PLATELETCT 223 208   MPV 10.2 10.1         Alert and Oriented x3, No Acute Distress  Normal Respiratory Effort  Abdomen soft, appropriately tender  Incisions/Bandages clean/dry/intact  Extremities warm and well perfused  Ostomy pink and healthy, output stool and flatus    A/P:  Patient encouraged to take meals in the chair rather then the bed.  Patient educated on appropriate judicious use of narcotics.  Encourage by mouth intake.  patient voiding appropriately.  Low residue diet.  Patient encouraged to ambulate ad aby.  Possibly home tomorrow if comfortable with ostomy education and care  Yakov Sewell M.D. PhD  [facility]  Colon and Rectal Surgery  (Office) [phone]  (Cell)  [phone]  "

## 2023-05-18 NOTE — CARE PLAN
The patient is Stable - Low risk of patient condition declining or worsening    Shift Goals  Clinical Goals: monitor ostomy output, prep for radiation mapping  Patient Goals: rest    Progress made toward(s) clinical / shift goals:      Patient's pain will be controlled with scheduled tylenol and celebrex in addition to heat packs and encouraging increased ambulation.     Problem: Pain - Standard  Goal: Alleviation of pain or a reduction in pain to the patient’s comfort goal  Outcome: Progressing

## 2023-05-18 NOTE — WOUND TEAM
" [facility]  Inpatient Services  New Ostomy Management & Teaching    HPI:  Reviewed  PMH: Reviewed   SH: Reviewed    Past Surgical History:   Procedure Laterality Date    ME LAP, SURG COLOSTOMY Left 5/16/2023    Procedure: LAPAROSCOPIC  COLOSTOMY CREATION;  Surgeon: Yakov Sewell M.D.;  Location: SURGERY [facility];  Service: Gastroenterology    ME SIGMOIDOSCOPY,DIAGNOSTIC N/A 5/16/2023    Procedure: SIGMOIDOSCOPY, FLEXIBLE;  Surgeon: Yakov Sewell M.D.;  Location: SURGERY [facility];  Service: Gastroenterology    CATH PLACEMENT Right 5/16/2023    Procedure: INSERTION, CATHETER;  Surgeon: Yakov Sewell M.D.;  Location: SURGERY [facility];  Service: Gastroenterology    INGUINAL HERNIA REPAIR BILATERAL Bilateral      Surgery Date: 05/16/23    Surgeon(s):  Yakov Sewell M.D.    Procedure(s):  LAPAROSCOPIC VERSUS OPEN COLOSTOMY CREATION, WITH FLEXIBLE SIGMOIDOSCOPY AND PORT PLACEMENT  SIGMOIDOSCOPY, FLEXIBLE  INSERTION, CATHETER     Permanence: yes    Pertinent History: The patient is a 67 y.o. male with locally advanced rectal cancer. patient went to OR 05/16/23 for port placement and laparoscopic colostomy creation.      PROCEDURE PERFORMED:  1.  Flexible sigmoidoscopy  2.  Tunneled central venous catheter placement with subcutaneous port under ultrasound guidance with fluoroscopy  3.  Laparoscopic colostomy creation                 Colostomy 05/16/23 LUQ (Active)   Stomal Appliance Assessment Intact;Changed    Stoma Assessment Pink;Red;Edema    Stoma Shape Budded Less Than One Inch;Oval    Stoma Size (in) 1.5    Peristomal Assessment Intact    Mucocutaneous Junction Intact    Treatment Appliance Changed;Cleansed with water/washcloth    Peristomal Protectant Paste Ring    Stomal Appliance Paste Ring, 2\";2 1/4\" (57mm) CTF    Output (mL) 0 mL    Output Color Bloody    WOUND RN ONLY - Stomal Appliance  2 Piece;Paste Ring, 2\";2 1/2\" (64mm) CTF    Appliance Brand Shanon  " "  Appliance Supplier [facility]    Ostomy Care Resources Provided UOAA Tip Sheet    WOUND NURSE ONLY - Time Spent with Patient (mins) 60                   Ostomy Appliance (type and size): 2.25\" 2 piece and 2\" Paste Ring    Interventions: daughter at bedside, observing and participating.  Answered questions.  Removed previous appliance and cleansed skin with warm wash cloth.  Checked cardboard template and measured to barrier, started to cut barrier and daughter finished, removed plastic and applied paste ring, applied to skin, daughter removed paper backing, rubbed to adhere.  Started to attach pouch and daughter completed, patient and daughter both practiced closing end.       Pt education: Ostomy folder was discussed in detail. Pt was educated regarding the following things: stoma size and shape and to be aware stoma will likely change sizes in the first 4 to 6 weeks. Pt aware that we are using the most basic supplies while in the hospital and there are many brands and options in regards to supplies. Pt educated on when to empty and how to empty, as well as burping appliance. Wear time discussed.  Pt aware that they should keep an extra set of appliances with them at all times (do not leave in warm cars). Follow up, supply ordering and support groups discussed as well as accessories that may be beneficial. Questions and concerns addressed.    Needs for next visit: hands on.  Daughter/family to continue to be present.  Saturday 12-1    Evaluation:   05/18/23:  still with output.  Some gas appreciated.  ABD distended.  Patient still having a hard time accepting stoma.      05/17/23:  patient having a hard time looking at stoma.  He feels he will be able to manage.  Discussed high output pouch and down drain as patient will be undergoing chemo/radiation.      Flatus: Not Present  Stool Output: small and bloody  Urine Output: NA, Fecal Ostomy  Diet: Regular  Mobility: Ambulating at Baseline  - not wanting to ambulate due " "to discomfort.      Plan: Ostomy nurses to continue to follow for ostomy needs and teaching until discharge    Anticipated discharge needs: Supplies, supplier information, possible HH, outpatient ostomy clinic, Skilled Nursing/Rehab     Secure Start Signed Yes  Outpatient Referral Placed Yes  5 Sets of appliances in Ostomy bag for discharge Ordered    INSURANCE OPTIONS:                 long-term [employer] ([employer])              MediCARE/MEDICAID & All other Private Insurance companies (Prism Form)         X     MediCAID & Fee for Service (Care Chest Paperwork + Prism Form)                            Form signed/Catalog Marked and Copy left with patient OR medicaid paperwork given to patient      Anticipated Discharge Plans:  Outpatient Wound clinic, Family Care, and Self Care    Ostomy Supplies for DC:  New Image Flextend Extended-Wear FLAT Skin Barrier 2 1/4\" (Shanon [id number]) , FECAL 2 PIECE POUCH - 20 per month, 12in New Image Lock n' Roll withOUT Filter 2 1/4\" (Shanon [id number]), and Adapt Flat paste ring 2\" (Shanon [id number]) - 15 per month    "

## 2023-05-18 NOTE — RADIATION COMPLETION NOTES
Clinical Treatment Planning Note    DATE OF SERVICE: 5/18/2023    DIAGNOSIS:  Malignant neoplasm of rectum (HCC)  Staging form: Colon and Rectum, AJCC 8th Edition  - Clinical stage from 4/24/2023: Stage IIC (cT4b, cN0, cM0) - Signed by Luis Bustillos M.D. on 4/24/2023  Total positive nodes: 0  Histologic grade (G): G2  Histologic grading system: 4 grade system  Microsatellite instability (MSI): Stable         IMAGING REVIEWED:  [] CT     [x] MRI     [] PET/CT     [] BONE SCAN     [] MAMMO     [] OTHER      TREATMENT INTENT:   [x] CURATIVE     [] MAINTENANCE     []  PALLIATIVE      []  SUPPORTIVE     []  PROPHYLACTIC     [] BENIGN     []  CONSOLIDATIVE      [] DEFINITIVE   []  OLOGIMETASTATIC      LINE OF TREATMENT:  [] ADJUVANT   [x] DEFINITIVE   [] NEOADJUVANT   [] RE-TREATMENT      TECHNIQUE PLANNED:  [x] IMRT   [] 3D   [] SBRT   [] SRS/SRT   [] HDR   [] ELECTRON       IMRT JUSTIFICATION:  []   An immediately adjacent area has been previously irradiated and abutting portals must be established with high precision.    []  Dose escalation is planned to deliver radiation doses in excess of those commonly utilized for similar tumors with conventional treatment.    [x]  The target volume is concave or convex, and the critical normal tissues are within or around that convexity or concavity.    [x]  The target volume is in close proximity to critical structures that must be protected.    []  The volume of interest must be covered with narrow margins to adequately protect  immediately adjacent structures.      FIELDS & BLOCKING:  [x] COMPLEX BLOCKS     []  = 3 TX AREAS     []  ARCS     []  CUSTOM SHEILD        []  SIMPLE BLOCK      CHEMOTHERAPY:  [x]  CONCURRENT     []  INDUCTION     [] SEQUENTIAL     []  <30 DAYS FROM XRT      NOTES:  OAR CONSTRAINTS: (GUIDELINES ONLY NOT ABSOLUTE)   Target Prescribed Coverage   PTV 95% of PTV covered by 100% (cGy) of RX Dose     PTV 99% of PTV covered by 93% (cGy) of RX Dose       ALYSSIA  Goal   R Femoral Head Max Dose < 50Gy   L Femoral Head Max Dose < 50Gy   Individual Small Bowel Loops V15Gy < 120cc   Entire Cavity Small Bowel V45Gy < 195cc   Bladder V40Gy < 40%   Bladder V45Gy < 15%   *RTOG 0822, QUANTEC

## 2023-05-19 ENCOUNTER — ANESTHESIA (OUTPATIENT)
Dept: SURGERY | Facility: MEDICAL CENTER | Age: 68
DRG: 329 | End: 2023-05-19
Payer: MEDICARE

## 2023-05-19 ENCOUNTER — APPOINTMENT (OUTPATIENT)
Dept: RADIOLOGY | Facility: MEDICAL CENTER | Age: 68
DRG: 329 | End: 2023-05-19
Attending: SURGERY
Payer: MEDICARE

## 2023-05-19 ENCOUNTER — ANESTHESIA EVENT (OUTPATIENT)
Dept: SURGERY | Facility: MEDICAL CENTER | Age: 68
DRG: 329 | End: 2023-05-19
Payer: MEDICARE

## 2023-05-19 LAB
ANION GAP SERPL CALC-SCNC: 14 MMOL/L (ref 7–16)
BUN SERPL-MCNC: 26 MG/DL (ref 8–22)
CALCIUM SERPL-MCNC: 8.5 MG/DL (ref 8.5–10.5)
CHLORIDE SERPL-SCNC: 101 MMOL/L (ref 96–112)
CO2 SERPL-SCNC: 22 MMOL/L (ref 20–33)
CREAT SERPL-MCNC: 0.65 MG/DL (ref 0.5–1.4)
ERYTHROCYTE [DISTWIDTH] IN BLOOD BY AUTOMATED COUNT: 45.1 FL (ref 35.9–50)
GFR SERPLBLD CREATININE-BSD FMLA CKD-EPI: 103 ML/MIN/1.73 M 2
GLUCOSE SERPL-MCNC: 113 MG/DL (ref 65–99)
HCT VFR BLD AUTO: 44.6 % (ref 42–52)
HGB BLD-MCNC: 14.7 G/DL (ref 14–18)
MCH RBC QN AUTO: 29.2 PG (ref 27–33)
MCHC RBC AUTO-ENTMCNC: 33 G/DL (ref 33.7–35.3)
MCV RBC AUTO: 88.7 FL (ref 81.4–97.8)
PLATELET # BLD AUTO: 216 K/UL (ref 164–446)
PMV BLD AUTO: 10.1 FL (ref 9–12.9)
POTASSIUM SERPL-SCNC: 3.5 MMOL/L (ref 3.6–5.5)
RBC # BLD AUTO: 5.03 M/UL (ref 4.7–6.1)
SODIUM SERPL-SCNC: 137 MMOL/L (ref 135–145)
WBC # BLD AUTO: 9.5 K/UL (ref 4.8–10.8)

## 2023-05-19 PROCEDURE — 700105 HCHG RX REV CODE 258: Performed by: SURGERY

## 2023-05-19 PROCEDURE — 700111 HCHG RX REV CODE 636 W/ 250 OVERRIDE (IP): Performed by: SURGERY

## 2023-05-19 PROCEDURE — 770001 HCHG ROOM/CARE - MED/SURG/GYN PRIV*

## 2023-05-19 PROCEDURE — 74177 CT ABD & PELVIS W/CONTRAST: CPT

## 2023-05-19 PROCEDURE — 160041 HCHG SURGERY MINUTES - EA ADDL 1 MIN LEVEL 4: Performed by: SURGERY

## 2023-05-19 PROCEDURE — 36415 COLL VENOUS BLD VENIPUNCTURE: CPT

## 2023-05-19 PROCEDURE — 700101 HCHG RX REV CODE 250: Performed by: ANESTHESIOLOGY

## 2023-05-19 PROCEDURE — 700111 HCHG RX REV CODE 636 W/ 250 OVERRIDE (IP): Performed by: ANESTHESIOLOGY

## 2023-05-19 PROCEDURE — 700117 HCHG RX CONTRAST REV CODE 255: Performed by: SURGERY

## 2023-05-19 PROCEDURE — 160029 HCHG SURGERY MINUTES - 1ST 30 MINS LEVEL 4: Performed by: SURGERY

## 2023-05-19 PROCEDURE — 99140 ANES COMP EMERGENCY COND: CPT | Performed by: ANESTHESIOLOGY

## 2023-05-19 PROCEDURE — 0DTN0ZZ RESECTION OF SIGMOID COLON, OPEN APPROACH: ICD-10-PCS | Performed by: SURGERY

## 2023-05-19 PROCEDURE — A9270 NON-COVERED ITEM OR SERVICE: HCPCS | Performed by: REGISTERED NURSE

## 2023-05-19 PROCEDURE — 160048 HCHG OR STATISTICAL LEVEL 1-5: Performed by: SURGERY

## 2023-05-19 PROCEDURE — 160002 HCHG RECOVERY MINUTES (STAT): Performed by: SURGERY

## 2023-05-19 PROCEDURE — 160009 HCHG ANES TIME/MIN: Performed by: SURGERY

## 2023-05-19 PROCEDURE — 700102 HCHG RX REV CODE 250 W/ 637 OVERRIDE(OP): Performed by: REGISTERED NURSE

## 2023-05-19 PROCEDURE — 700105 HCHG RX REV CODE 258: Performed by: ANESTHESIOLOGY

## 2023-05-19 PROCEDURE — 700111 HCHG RX REV CODE 636 W/ 250 OVERRIDE (IP): Performed by: REGISTERED NURSE

## 2023-05-19 PROCEDURE — 00840 ANES IPER PX LOWER ABD NOS: CPT | Performed by: ANESTHESIOLOGY

## 2023-05-19 PROCEDURE — 160035 HCHG PACU - 1ST 60 MINS PHASE I: Performed by: SURGERY

## 2023-05-19 PROCEDURE — 80048 BASIC METABOLIC PNL TOTAL CA: CPT

## 2023-05-19 PROCEDURE — 85027 COMPLETE CBC AUTOMATED: CPT

## 2023-05-19 RX ORDER — CELECOXIB 200 MG/1
200 CAPSULE ORAL 2 TIMES DAILY
Status: DISCONTINUED | OUTPATIENT
Start: 2023-05-19 | End: 2023-05-19

## 2023-05-19 RX ORDER — OXYCODONE HYDROCHLORIDE 10 MG/1
10 TABLET ORAL
Status: DISCONTINUED | OUTPATIENT
Start: 2023-05-19 | End: 2023-05-19

## 2023-05-19 RX ORDER — ACETAMINOPHEN 500 MG
1000 TABLET ORAL EVERY 6 HOURS PRN
Status: DISCONTINUED | OUTPATIENT
Start: 2023-05-22 | End: 2023-05-19

## 2023-05-19 RX ORDER — CALCIUM CARBONATE 500 MG/1
500 TABLET, CHEWABLE ORAL
Status: DISCONTINUED | OUTPATIENT
Start: 2023-05-19 | End: 2023-05-26

## 2023-05-19 RX ORDER — OXYCODONE HYDROCHLORIDE 5 MG/1
5 TABLET ORAL
Status: DISCONTINUED | OUTPATIENT
Start: 2023-05-19 | End: 2023-05-19

## 2023-05-19 RX ORDER — LORAZEPAM 2 MG/ML
0.5 INJECTION INTRAMUSCULAR EVERY 4 HOURS PRN
Status: DISCONTINUED | OUTPATIENT
Start: 2023-05-19 | End: 2023-05-26 | Stop reason: HOSPADM

## 2023-05-19 RX ORDER — ACETAMINOPHEN 500 MG
1000 TABLET ORAL EVERY 6 HOURS
Status: DISCONTINUED | OUTPATIENT
Start: 2023-05-19 | End: 2023-05-19

## 2023-05-19 RX ORDER — HYDROMORPHONE HYDROCHLORIDE 1 MG/ML
1 INJECTION, SOLUTION INTRAMUSCULAR; INTRAVENOUS; SUBCUTANEOUS
Status: DISCONTINUED | OUTPATIENT
Start: 2023-05-19 | End: 2023-05-26 | Stop reason: HOSPADM

## 2023-05-19 RX ORDER — CEFOTETAN DISODIUM 2 G/20ML
INJECTION, POWDER, FOR SOLUTION INTRAMUSCULAR; INTRAVENOUS PRN
Status: DISCONTINUED | OUTPATIENT
Start: 2023-05-19 | End: 2023-05-20 | Stop reason: SURG

## 2023-05-19 RX ORDER — TRAZODONE HYDROCHLORIDE 50 MG/1
50 TABLET ORAL NIGHTLY PRN
Status: DISCONTINUED | OUTPATIENT
Start: 2023-05-19 | End: 2023-05-19

## 2023-05-19 RX ORDER — SODIUM CHLORIDE, SODIUM LACTATE, POTASSIUM CHLORIDE, CALCIUM CHLORIDE 600; 310; 30; 20 MG/100ML; MG/100ML; MG/100ML; MG/100ML
INJECTION, SOLUTION INTRAVENOUS
Status: DISCONTINUED | OUTPATIENT
Start: 2023-05-19 | End: 2023-05-20 | Stop reason: SURG

## 2023-05-19 RX ORDER — ROCURONIUM BROMIDE 10 MG/ML
INJECTION, SOLUTION INTRAVENOUS PRN
Status: DISCONTINUED | OUTPATIENT
Start: 2023-05-19 | End: 2023-05-20 | Stop reason: SURG

## 2023-05-19 RX ORDER — CELECOXIB 200 MG/1
200 CAPSULE ORAL 2 TIMES DAILY PRN
Status: DISCONTINUED | OUTPATIENT
Start: 2023-05-21 | End: 2023-05-19

## 2023-05-19 RX ORDER — SUCCINYLCHOLINE CHLORIDE 20 MG/ML
INJECTION INTRAMUSCULAR; INTRAVENOUS PRN
Status: DISCONTINUED | OUTPATIENT
Start: 2023-05-19 | End: 2023-05-20 | Stop reason: SURG

## 2023-05-19 RX ORDER — SODIUM CHLORIDE, SODIUM LACTATE, POTASSIUM CHLORIDE, CALCIUM CHLORIDE 600; 310; 30; 20 MG/100ML; MG/100ML; MG/100ML; MG/100ML
INJECTION, SOLUTION INTRAVENOUS CONTINUOUS
Status: DISCONTINUED | OUTPATIENT
Start: 2023-05-19 | End: 2023-05-26 | Stop reason: HOSPADM

## 2023-05-19 RX ORDER — HYDROMORPHONE HYDROCHLORIDE 1 MG/ML
0.5 INJECTION, SOLUTION INTRAMUSCULAR; INTRAVENOUS; SUBCUTANEOUS
Status: DISCONTINUED | OUTPATIENT
Start: 2023-05-19 | End: 2023-05-19

## 2023-05-19 RX ORDER — LIDOCAINE HYDROCHLORIDE 20 MG/ML
INJECTION, SOLUTION EPIDURAL; INFILTRATION; INTRACAUDAL; PERINEURAL PRN
Status: DISCONTINUED | OUTPATIENT
Start: 2023-05-19 | End: 2023-05-20 | Stop reason: SURG

## 2023-05-19 RX ADMIN — CELECOXIB 200 MG: 200 CAPSULE ORAL at 04:36

## 2023-05-19 RX ADMIN — ROCURONIUM BROMIDE 50 MG: 50 INJECTION, SOLUTION INTRAVENOUS at 23:51

## 2023-05-19 RX ADMIN — LIDOCAINE HYDROCHLORIDE 80 MG: 20 INJECTION, SOLUTION EPIDURAL; INFILTRATION; INTRACAUDAL at 23:42

## 2023-05-19 RX ADMIN — SUCCINYLCHOLINE CHLORIDE 130 MG: 20 INJECTION, SOLUTION INTRAMUSCULAR; INTRAVENOUS at 23:42

## 2023-05-19 RX ADMIN — PROPOFOL 150 MG: 10 INJECTION, EMULSION INTRAVENOUS at 23:42

## 2023-05-19 RX ADMIN — ANTACID TABLETS 500 MG: 500 TABLET, CHEWABLE ORAL at 04:36

## 2023-05-19 RX ADMIN — SODIUM CHLORIDE, POTASSIUM CHLORIDE, SODIUM LACTATE AND CALCIUM CHLORIDE: 600; 310; 30; 20 INJECTION, SOLUTION INTRAVENOUS at 23:33

## 2023-05-19 RX ADMIN — HYDROMORPHONE HYDROCHLORIDE 0.5 MG: 1 INJECTION, SOLUTION INTRAMUSCULAR; INTRAVENOUS; SUBCUTANEOUS at 18:40

## 2023-05-19 RX ADMIN — CEFOTETAN DISODIUM 2 G: 2 INJECTION, POWDER, FOR SOLUTION INTRAMUSCULAR; INTRAVENOUS at 23:44

## 2023-05-19 RX ADMIN — FENTANYL CITRATE 100 MCG: 50 INJECTION, SOLUTION INTRAMUSCULAR; INTRAVENOUS at 23:37

## 2023-05-19 RX ADMIN — IOHEXOL 100 ML: 350 INJECTION, SOLUTION INTRAVENOUS at 22:30

## 2023-05-19 RX ADMIN — FENTANYL CITRATE 50 MCG: 50 INJECTION, SOLUTION INTRAMUSCULAR; INTRAVENOUS at 23:59

## 2023-05-19 RX ADMIN — ACETAMINOPHEN 1000 MG: 500 TABLET, FILM COATED ORAL at 04:36

## 2023-05-19 RX ADMIN — HYDROMORPHONE HYDROCHLORIDE 0.5 MG: 1 INJECTION, SOLUTION INTRAMUSCULAR; INTRAVENOUS; SUBCUTANEOUS at 15:29

## 2023-05-19 RX ADMIN — SODIUM CHLORIDE, POTASSIUM CHLORIDE, SODIUM LACTATE AND CALCIUM CHLORIDE: 600; 310; 30; 20 INJECTION, SOLUTION INTRAVENOUS at 19:55

## 2023-05-19 RX ADMIN — ONDANSETRON 4 MG: 2 INJECTION INTRAMUSCULAR; INTRAVENOUS at 15:31

## 2023-05-19 RX ADMIN — HYDROMORPHONE HYDROCHLORIDE 1 MG: 1 INJECTION, SOLUTION INTRAMUSCULAR; INTRAVENOUS; SUBCUTANEOUS at 20:48

## 2023-05-19 ASSESSMENT — PAIN DESCRIPTION - PAIN TYPE
TYPE: ACUTE PAIN

## 2023-05-19 NOTE — PROGRESS NOTES
Assumed care of patient at 0645. Bedside report received. Assessment complete.  AA&Ox4. Denies CP/SOB.  Reporting discomfort. Declined intervention at this time. Educated patient regarding pharmacologic and non pharmacologic modalities for pain management.   Skin per flow sheets.3x lap sites to abdomen, new chest port placed to right, 1 incision above port site. LLQ ostomy.  Tolerating diet. Oral hydration encouraged. Denies N/V.  + void. Scant output on ostomy. Abdomen is distended. Hyperactive bowel sounds.   Ambulates self. Ambulation encouraged, patient declined at this time, stated he would walk with his wife around noon.  Fall prevention measures in place per flowsheets.  SCD's in use/Educated on SCD use. Pharmacologic VTE prophylaxis in use.  Plan of care discussed, all questions answered.  Educated regarding importance of oral care. Oral care kit at bedside. Call light is within reach, treaded slipper socks on, bed in lowest/ locked position, hourly rounding in place, all needs met at this time.

## 2023-05-19 NOTE — CARE PLAN
The patient is Stable - Low risk of patient condition declining or worsening    Shift Goals  Clinical Goals: Safety  Patient Goals: Rest    Progress made toward(s) clinical / shift goals:      Problem: Pain - Standard  Goal: Alleviation of pain or a reduction in pain to the patient’s comfort goal  Outcome: Progressing  Flowsheets (Taken 5/18/2023 1940)  Pain Rating Scale (NPRS): 0  Note: Declines at this time. Provided extra pillows/ blankets for support/ repositioning. Will continue to assess/ treat accordingly.     Problem: Skin Integrity  Goal: Skin integrity is maintained or improved  Outcome: Progressing  Note: Appropriate interventions in place to maintain/ improve skin integrity.

## 2023-05-19 NOTE — DOCUMENTATION QUERY
[facility]                                                                       Query Response Note      PATIENT:               KETURAH GRANADOS  ACCT #:                  [id number]  MRN:                     [id number]  :                      1955  ADMIT DATE:       2023 11:35 AM  DISCH DATE:          RESPONDING  PROVIDER #:        [id number]           QUERY TEXT:    Per RD evaluation on 23, patient meets ASPEN criteria for moderate malnutrition in the context of chronic illness AEB 21% weight loss x 1 year, moderate muscle loss and moderate fat loss.    Based upon your judgment and the above clinical indicators, please select the most appropriate diagnosis for the findings.      The patient's clinical indicators include:  68 yo M with malignant tumor of rectum admitted for elective colostomy creation and port placement    Clinical Indicators: American Society for Parenteral and Enteral Nutrition (ASPEN) criteria (presence of at least two)  Per dietary consult dated 23:   - 21% wt loss x 1 year  - moderate muscle loss in temple and clavicle region  - moderate fat loss in upper arm    Risk Factors:  Decreased intake due to his irregular BMs, reported frequent (18/day) small BMs after laxative use, malignant tumor of rectum    Treatment: RD consult; document oral intake; monitor wt.; nutrition rep, Boost pus TID    Contact me with any questions.    Thank you for your time and attention,  Laura Feliciano RN, CDI  [email or web address]  Connect via email, Voalte or messenger.  Options provided:   -- Moderate malnutrition   -- Mild malnutrition   -- Insignificant finding/no effect on patient stay and treatment   -- Other explanation, (please specify other explanation)      Query created by: Laura Feliciano on 2023 10:13 AM    RESPONSE TEXT:    Moderate malnutrition          Electronically signed by:  NURIS DENISE MD  5/19/2023 11:27 AM

## 2023-05-20 LAB
ANION GAP SERPL CALC-SCNC: 13 MMOL/L (ref 7–16)
BUN SERPL-MCNC: 29 MG/DL (ref 8–22)
CALCIUM SERPL-MCNC: 8.4 MG/DL (ref 8.5–10.5)
CHLORIDE SERPL-SCNC: 101 MMOL/L (ref 96–112)
CO2 SERPL-SCNC: 23 MMOL/L (ref 20–33)
CREAT SERPL-MCNC: 0.75 MG/DL (ref 0.5–1.4)
ERYTHROCYTE [DISTWIDTH] IN BLOOD BY AUTOMATED COUNT: 44.6 FL (ref 35.9–50)
GFR SERPLBLD CREATININE-BSD FMLA CKD-EPI: 98 ML/MIN/1.73 M 2
GLUCOSE SERPL-MCNC: 168 MG/DL (ref 65–99)
HCT VFR BLD AUTO: 44.3 % (ref 42–52)
HGB BLD-MCNC: 14.9 G/DL (ref 14–18)
MCH RBC QN AUTO: 29.6 PG (ref 27–33)
MCHC RBC AUTO-ENTMCNC: 33.6 G/DL (ref 33.7–35.3)
MCV RBC AUTO: 87.9 FL (ref 81.4–97.8)
PLATELET # BLD AUTO: 217 K/UL (ref 164–446)
PMV BLD AUTO: 10.2 FL (ref 9–12.9)
POTASSIUM SERPL-SCNC: 4.7 MMOL/L (ref 3.6–5.5)
RBC # BLD AUTO: 5.04 M/UL (ref 4.7–6.1)
SODIUM SERPL-SCNC: 137 MMOL/L (ref 135–145)
WBC # BLD AUTO: 7.6 K/UL (ref 4.8–10.8)

## 2023-05-20 PROCEDURE — 85027 COMPLETE CBC AUTOMATED: CPT

## 2023-05-20 PROCEDURE — A9270 NON-COVERED ITEM OR SERVICE: HCPCS | Performed by: REGISTERED NURSE

## 2023-05-20 PROCEDURE — 700105 HCHG RX REV CODE 258: Performed by: SURGERY

## 2023-05-20 PROCEDURE — 700111 HCHG RX REV CODE 636 W/ 250 OVERRIDE (IP): Performed by: SURGERY

## 2023-05-20 PROCEDURE — 700111 HCHG RX REV CODE 636 W/ 250 OVERRIDE (IP): Performed by: ANESTHESIOLOGY

## 2023-05-20 PROCEDURE — 88304 TISSUE EXAM BY PATHOLOGIST: CPT

## 2023-05-20 PROCEDURE — 97602 WOUND(S) CARE NON-SELECTIVE: CPT

## 2023-05-20 PROCEDURE — 80048 BASIC METABOLIC PNL TOTAL CA: CPT

## 2023-05-20 PROCEDURE — 302098 PASTE RING (FLAT): Performed by: SURGERY

## 2023-05-20 PROCEDURE — 700101 HCHG RX REV CODE 250: Performed by: ANESTHESIOLOGY

## 2023-05-20 PROCEDURE — 88307 TISSUE EXAM BY PATHOLOGIST: CPT

## 2023-05-20 PROCEDURE — 700102 HCHG RX REV CODE 250 W/ 637 OVERRIDE(OP): Performed by: REGISTERED NURSE

## 2023-05-20 PROCEDURE — 770001 HCHG ROOM/CARE - MED/SURG/GYN PRIV*

## 2023-05-20 PROCEDURE — 36415 COLL VENOUS BLD VENIPUNCTURE: CPT

## 2023-05-20 RX ORDER — KETAMINE HYDROCHLORIDE 50 MG/ML
INJECTION, SOLUTION, CONCENTRATE INTRAMUSCULAR; INTRAVENOUS PRN
Status: DISCONTINUED | OUTPATIENT
Start: 2023-05-20 | End: 2023-05-20 | Stop reason: SURG

## 2023-05-20 RX ORDER — DEXAMETHASONE SODIUM PHOSPHATE 4 MG/ML
INJECTION, SOLUTION INTRA-ARTICULAR; INTRALESIONAL; INTRAMUSCULAR; INTRAVENOUS; SOFT TISSUE PRN
Status: DISCONTINUED | OUTPATIENT
Start: 2023-05-20 | End: 2023-05-20 | Stop reason: SURG

## 2023-05-20 RX ORDER — HYDROMORPHONE HYDROCHLORIDE 1 MG/ML
0.1 INJECTION, SOLUTION INTRAMUSCULAR; INTRAVENOUS; SUBCUTANEOUS
Status: DISCONTINUED | OUTPATIENT
Start: 2023-05-20 | End: 2023-05-20 | Stop reason: HOSPADM

## 2023-05-20 RX ORDER — HYDRALAZINE HYDROCHLORIDE 20 MG/ML
5 INJECTION INTRAMUSCULAR; INTRAVENOUS
Status: DISCONTINUED | OUTPATIENT
Start: 2023-05-20 | End: 2023-05-20 | Stop reason: HOSPADM

## 2023-05-20 RX ORDER — HYDROMORPHONE HYDROCHLORIDE 1 MG/ML
0.4 INJECTION, SOLUTION INTRAMUSCULAR; INTRAVENOUS; SUBCUTANEOUS
Status: DISCONTINUED | OUTPATIENT
Start: 2023-05-20 | End: 2023-05-20 | Stop reason: HOSPADM

## 2023-05-20 RX ORDER — HALOPERIDOL 5 MG/ML
1 INJECTION INTRAMUSCULAR
Status: DISCONTINUED | OUTPATIENT
Start: 2023-05-20 | End: 2023-05-20 | Stop reason: HOSPADM

## 2023-05-20 RX ORDER — OXYCODONE HCL 5 MG/5 ML
10 SOLUTION, ORAL ORAL
Status: DISCONTINUED | OUTPATIENT
Start: 2023-05-20 | End: 2023-05-20 | Stop reason: HOSPADM

## 2023-05-20 RX ORDER — LABETALOL HYDROCHLORIDE 5 MG/ML
5 INJECTION, SOLUTION INTRAVENOUS
Status: DISCONTINUED | OUTPATIENT
Start: 2023-05-20 | End: 2023-05-20 | Stop reason: HOSPADM

## 2023-05-20 RX ORDER — EPHEDRINE SULFATE 50 MG/ML
5 INJECTION, SOLUTION INTRAVENOUS
Status: DISCONTINUED | OUTPATIENT
Start: 2023-05-20 | End: 2023-05-20 | Stop reason: HOSPADM

## 2023-05-20 RX ORDER — ONDANSETRON 2 MG/ML
INJECTION INTRAMUSCULAR; INTRAVENOUS PRN
Status: DISCONTINUED | OUTPATIENT
Start: 2023-05-20 | End: 2023-05-20 | Stop reason: SURG

## 2023-05-20 RX ORDER — SODIUM CHLORIDE, SODIUM LACTATE, POTASSIUM CHLORIDE, CALCIUM CHLORIDE 600; 310; 30; 20 MG/100ML; MG/100ML; MG/100ML; MG/100ML
INJECTION, SOLUTION INTRAVENOUS CONTINUOUS
Status: DISCONTINUED | OUTPATIENT
Start: 2023-05-20 | End: 2023-05-20 | Stop reason: HOSPADM

## 2023-05-20 RX ORDER — DIPHENHYDRAMINE HYDROCHLORIDE 50 MG/ML
12.5 INJECTION INTRAMUSCULAR; INTRAVENOUS
Status: DISCONTINUED | OUTPATIENT
Start: 2023-05-20 | End: 2023-05-20 | Stop reason: HOSPADM

## 2023-05-20 RX ORDER — ONDANSETRON 2 MG/ML
4 INJECTION INTRAMUSCULAR; INTRAVENOUS
Status: DISCONTINUED | OUTPATIENT
Start: 2023-05-20 | End: 2023-05-20 | Stop reason: HOSPADM

## 2023-05-20 RX ORDER — HYDROMORPHONE HYDROCHLORIDE 1 MG/ML
0.2 INJECTION, SOLUTION INTRAMUSCULAR; INTRAVENOUS; SUBCUTANEOUS
Status: DISCONTINUED | OUTPATIENT
Start: 2023-05-20 | End: 2023-05-20 | Stop reason: HOSPADM

## 2023-05-20 RX ORDER — OXYCODONE HCL 5 MG/5 ML
5 SOLUTION, ORAL ORAL
Status: DISCONTINUED | OUTPATIENT
Start: 2023-05-20 | End: 2023-05-20 | Stop reason: HOSPADM

## 2023-05-20 RX ADMIN — HYDROMORPHONE HYDROCHLORIDE 0.4 MG: 1 INJECTION, SOLUTION INTRAMUSCULAR; INTRAVENOUS; SUBCUTANEOUS at 01:07

## 2023-05-20 RX ADMIN — HYDROMORPHONE HYDROCHLORIDE 1 MG: 1 INJECTION, SOLUTION INTRAMUSCULAR; INTRAVENOUS; SUBCUTANEOUS at 07:43

## 2023-05-20 RX ADMIN — HYDROMORPHONE HYDROCHLORIDE 0.2 MG: 1 INJECTION, SOLUTION INTRAMUSCULAR; INTRAVENOUS; SUBCUTANEOUS at 01:12

## 2023-05-20 RX ADMIN — HYDROMORPHONE HYDROCHLORIDE 1 MG: 1 INJECTION, SOLUTION INTRAMUSCULAR; INTRAVENOUS; SUBCUTANEOUS at 17:40

## 2023-05-20 RX ADMIN — SUGAMMADEX 200 MG: 100 INJECTION, SOLUTION INTRAVENOUS at 00:34

## 2023-05-20 RX ADMIN — DEXAMETHASONE SODIUM PHOSPHATE 4 MG: 4 INJECTION INTRA-ARTICULAR; INTRALESIONAL; INTRAMUSCULAR; INTRAVENOUS; SOFT TISSUE at 00:08

## 2023-05-20 RX ADMIN — HYDROMORPHONE HYDROCHLORIDE 1 MG: 1 INJECTION, SOLUTION INTRAMUSCULAR; INTRAVENOUS; SUBCUTANEOUS at 12:51

## 2023-05-20 RX ADMIN — ONDANSETRON 4 MG: 2 INJECTION INTRAMUSCULAR; INTRAVENOUS at 00:27

## 2023-05-20 RX ADMIN — SODIUM CHLORIDE, POTASSIUM CHLORIDE, SODIUM LACTATE AND CALCIUM CHLORIDE: 600; 310; 30; 20 INJECTION, SOLUTION INTRAVENOUS at 08:01

## 2023-05-20 RX ADMIN — HYDROMORPHONE HYDROCHLORIDE 1 MG: 1 INJECTION, SOLUTION INTRAMUSCULAR; INTRAVENOUS; SUBCUTANEOUS at 21:35

## 2023-05-20 RX ADMIN — Medication 20 MG: at 00:06

## 2023-05-20 RX ADMIN — HYDROMORPHONE HYDROCHLORIDE 1 MG: 1 INJECTION, SOLUTION INTRAMUSCULAR; INTRAVENOUS; SUBCUTANEOUS at 03:32

## 2023-05-20 RX ADMIN — Medication 1 LOZENGE: at 17:53

## 2023-05-20 RX ADMIN — HYDROMORPHONE HYDROCHLORIDE 0.4 MG: 1 INJECTION, SOLUTION INTRAMUSCULAR; INTRAVENOUS; SUBCUTANEOUS at 01:02

## 2023-05-20 RX ADMIN — FENTANYL CITRATE 50 MCG: 50 INJECTION, SOLUTION INTRAMUSCULAR; INTRAVENOUS at 00:39

## 2023-05-20 RX ADMIN — LORAZEPAM 0.5 MG: 2 INJECTION INTRAMUSCULAR; INTRAVENOUS at 18:09

## 2023-05-20 ASSESSMENT — PAIN DESCRIPTION - PAIN TYPE
TYPE: SURGICAL PAIN
TYPE: ACUTE PAIN;SURGICAL PAIN
TYPE: SURGICAL PAIN
TYPE: ACUTE PAIN;SURGICAL PAIN
TYPE: SURGICAL PAIN

## 2023-05-20 NOTE — OP REPORT
DATE OF OPERATION: 5/20/2023    PREOPERATIVE DIAGNOSIS:    History of rectal cancer  Colostomy in place  Large bowel obstruction    POSTOPERATIVE DIAGNOSIS:    History of rectal cancer  Colostomy in place  Sigmoid volvulus    PROCEDURE PERFORMED:  1.  Sigmoid colectomy  2.  Complex ostomy revision    SURGEON: Yakov Sewell M.D.    ASSISTANT: Celina    ANESTHESIOLOGIST: Dr Ro    ANESTHESIA:  General endotracheal anesthesia.    ASA CLASSIFICATION: III.    INDICATION:  The patient is a 67 y.o. male with history of rectal cancer with obstruction status post colostomy creation with obstruction at the site of the colostomy. He is taken to the operating room for exploratory laparotomy with possible ostomy revision.    FINDINGS: Patient had a very redundant sigmoid colon which had volvulized.  Sigmoid colon was resected and the ostomy was recreated with the descending colon    WOUND CLASSIFICATION: Class III, Contaminated.    SPECIMEN: Colostomy and and sigmoid colon    ESTIMATED BLOOD LOSS: 20 mL.    PROCEDURE:    After informed witnessed consent was obtained, the patient was taken to the operating room and underwent general endotracheal anesthesia without incident.  Preoperative antibiotics were administered.  Bilateral lower sequential compression devices were placed.  The abdomen was prepped and draped in the usual standard sterile fashion.  A timeout was performed verifying the correct patient, procedure, site, positioning in availability of equipment prior to starting surgery.   I began by taking down the mucocutaneous border of the colostomy and exteriorizing it.  I divided the colostomy end with a linear cutting stapler and placed back into the abdomen and found a large amount of dilated volvulized sigmoid colon which had twisted upon itself and I exteriorized all of this redundant sigmoid colon and divided the mesentery with a LigaSure device.  I then divided the bowel with a linear cutting stapler at the  descending colon.  I passed off the specimen.  I then recreated the colostomy with the descending colon in the manner of Regina with interrupted 3-0 Vicryl sutures.  A colostomy appliance was placed.  All sponge and instrument counts were correct x2.  Patient was extubated and taken to the postanesthesia care unit in stable condition.  Yakov Sewell MD PhD  [facility]  Colon and Rectal Surgeon  [phone]

## 2023-05-20 NOTE — OR NURSING
0044 Pt from OR, asleep, with O2 support of 10 L/min via oxymask, respirations regular and spontaneous, vital signs within normal limits. Pt colostomy beefy red in color, colostomy bag in place and intact. Pt with NG tube at right nare, hooked to low continuous wall suction. SCDs ON, bed set to lowest position and locked in place.    0102 Pt in pain, medicated per MAR.    0110 Pt able to cough, noted colostomy passes air.    0128 Report given to Delroy SALAS.    0031 Updated Su (Spouse) thru telephone call.    0140 Pt to room with transport, with O2 support of 5 L/min via oxymask.

## 2023-05-20 NOTE — PROGRESS NOTES
"S:  67 y.o.male s/p port placement and colostomy creation  POD# 3.  Patient complaining of worsening abdominal pain.  O:  BP (!) 139/90   Pulse (!) 109   Temp 36.9 °C (98.4 °F) (Temporal)   Resp 14   Ht 1.753 m (5' 9\")   Wt 88 kg (194 lb 0.1 oz)   SpO2 92%   I/O last 3 completed shifts:  In: 240 [P.O.:240]  Out: 1750 [Urine:1750]  Recent Labs     05/17/23  0016 05/18/23  0042 05/19/23  0436   SODIUM 135 134* 137   POTASSIUM 4.4 3.8 3.5*   CHLORIDE 100 100 101   CO2 21 22 22   GLUCOSE 162* 120* 113*   BUN 15 26* 26*   CREATININE 0.83 0.96 0.65   CALCIUM 8.6 8.2* 8.5       Recent Labs     05/17/23 0016 05/18/23  0042 05/19/23  0436   WBC 8.8 8.7 9.5   RBC 5.07 4.87 5.03   HEMOGLOBIN 15.2 14.1 14.7   HEMATOCRIT 44.8 43.0 44.6   MCV 88.4 88.3 88.7   MCH 30.0 29.0 29.2   MCHC 33.9 32.8* 33.0*   RDW 44.7 45.1 45.1   PLATELETCT 223 208 216   MPV 10.2 10.1 10.1         Alert and Oriented x3, No Acute Distress  Normal Respiratory Effort  Abdomen distended diffusely tender  Incisions/Bandages clean/dry/intact  Extremities warm and well perfused  Ostomy pink and healthy, output normal    A/P:  NGT with strict NPO  IVF  Minimize narcotics  CT stat  Yakov Sewell M.D. PhD  [facility]  Colon and Rectal Surgery  (Office) [phone]  (Cell)  [phone]  "

## 2023-05-20 NOTE — ANESTHESIA PREPROCEDURE EVALUATION
Case: [id number] Anesthesia Start Date/Time: 05/19/23 5943    Procedure: LAPAROTOMY, EXPLORATORY (Abdomen)    Anesthesia type: General    Location: [facility] OR 10 / SURGERY [facility]    Surgeons: Yakov Sewell M.D.        S/p laparoscopic colostomy 5/16, now with likely large bowel obstruction.     Relevant Problems   Other   (positive) Malignant neoplasm of rectum (HCC)       Physical Exam    Airway   Mallampati: II  TM distance: >3 FB  Neck ROM: full       Cardiovascular - normal exam  Rhythm: regular  Rate: normal  (-) murmur     Dental - normal exam           Pulmonary - normal exam  Breath sounds clear to auscultation     Abdominal    Neurological - normal exam                 Anesthesia Plan    ASA 2- EMERGENT   ASA physical status emergent criteria: compromised vital organ, limb or tissue    Plan - general       Airway plan will be ETT          Induction: intravenous    Postoperative Plan: Postoperative administration of opioids is intended.    Pertinent diagnostic labs and testing reviewed    Informed Consent:    Anesthetic plan and risks discussed with patient.    Use of blood products discussed with: patient whom consented to blood products.

## 2023-05-20 NOTE — PROGRESS NOTES
Assumed care of patient at 0645. Bedside report received. Assessment complete. Family member bedside   AA&Ox4. Denies CP/SOB.   Reporting mild pain. Medicated for pain. Educated patient regarding pharmacologic and non pharmacologic modalities for pain management.   Skin per flow sheets.3x lap sites to abdomen, new chest port placed to right, 1 incision above port site. LLQ ostomy.  Strict NPO at this time. Patient states desire to drink water, understands NPO status. Fluids running. R nare NG to low continuous suction. Denies N/V.  + void. Scant output on ostomy, +gas from ostomy. Abdomen is distended. Hyperactive bowel sounds.   Ambulates SBA. Ambulation encouraged, patient declined at this time.  Fall prevention measures in place per flowsheets.  SCD's in use/Educated on SCD use. Pharmacologic VTE prophylaxis in use.  Plan of care discussed, all questions answered.  Educated regarding importance of oral care. Oral care kit at bedside. Call light is within reach, treaded slipper socks on, bed in lowest/ locked position, hourly rounding in place, all needs met at this time.

## 2023-05-20 NOTE — ANESTHESIA TIME REPORT
Anesthesia Start and Stop Event Times     Date Time Event    5/19/2023 2326 Ready for Procedure     2333 Anesthesia Start    5/20/2023 0043 Anesthesia Stop        Responsible Staff  05/19/23 to 05/20/23    Name Role Begin End    Miguel Ro M.D. Anesth 2333 0043        Overtime Reason:  no overtime (within assigned shift)    Comments:

## 2023-05-20 NOTE — CARE PLAN
Problem: Knowledge Deficit - Standard  Goal: Patient and family/care givers will demonstrate understanding of plan of care, disease process/condition, diagnostic tests and medications  Outcome: Progressing     Problem: Pain - Standard  Goal: Alleviation of pain or a reduction in pain to the patient’s comfort goal  Outcome: Progressing   The patient is Watcher - Medium risk of patient condition declining or worsening    Shift Goals  Clinical Goals: ct scan, pain control, ngt to suction  Patient Goals: pain control  Family Goals: updates on poc    Progress made toward(s) clinical / shift goals:  patient a+o x 4, 6-8/10 sharp intermittent pain to left mid and lower abdomen - iv dilaudid admin with minimal relief, hyperactive bowel sounds only auscultated in LUQ, colostomy present - no new output since report from day nurse, patient denies nausea, abdomen distended/semi-firm, tender to palpation left mid to left lower quadrant, iv fluids running a/o, patient went down to CT scan, Dr. Sewell notified of pain and completion of ct scan, patient maintained npo, no needs at this time, will monitor closely, wife at bedside.     Patient is not progressing towards the following goals: n/a    Fall precautions/hourly rounding maintained, call light within reach and functioning, all items within reach.  Patient encouraged to call for assistance, poc reviewed with patient, ?'s/concerns answered.

## 2023-05-20 NOTE — PROGRESS NOTES
"S:  67 y.o.male s/p laparoscopic colostomy creation and port placement with volvulus of sigmoid colon last night.  Patient reports improved abdominal pain.    O:  /73   Pulse 94   Temp 36.5 °C (97.7 °F) (Temporal)   Resp 20   Ht 1.753 m (5' 9\")   Wt 88 kg (194 lb 0.1 oz)   SpO2 92%   No intake/output data recorded.  Recent Labs     05/18/23 0042 05/19/23 0436 05/20/23  0324   SODIUM 134* 137 137   POTASSIUM 3.8 3.5* 4.7   CHLORIDE 100 101 101   CO2 22 22 23   GLUCOSE 120* 113* 168*   BUN 26* 26* 29*   CREATININE 0.96 0.65 0.75   CALCIUM 8.2* 8.5 8.4*       Recent Labs     05/18/23 0042 05/19/23 0436 05/20/23  0324   WBC 8.7 9.5 7.6   RBC 4.87 5.03 5.04   HEMOGLOBIN 14.1 14.7 14.9   HEMATOCRIT 43.0 44.6 44.3   MCV 88.3 88.7 87.9   MCH 29.0 29.2 29.6   MCHC 32.8* 33.0* 33.6*   RDW 45.1 45.1 44.6   PLATELETCT 208 216 217   MPV 10.1 10.1 10.2         Alert and Oriented x3, No Acute Distress  Normal Respiratory Effort  Abdomen soft, appropriately tender  Incisions/Bandages clean/dry/intact  Extremities warm and well perfused  Ostomy pink and healthy, output scant      A/P: 67-year-old male with a history of rectal cancer status post colostomy creation and port placement revision of colostomy and sigmoid colectomy  Out of bed to chair for meals  Continue IV fluids  Continue ostomy education  Discontinue NG tube and allow clear liquids    Yakov Sewell MD PhD  [facility]  Colon and Rectal Surgeon  [phone]  "

## 2023-05-20 NOTE — PROGRESS NOTES
"S:  67 y.o.male s/p laparoscopic colostomy creation and port placement now with large bowel obstruction.  Patient with increasing abdominal distention and no output from his nasogastric tube underwent CT imaging demonstrated possible transition point at the colostomy creation site.    O:  /86   Pulse (!) 106   Temp 36.7 °C (98.1 °F) (Temporal)   Resp 18   Ht 1.753 m (5' 9\")   Wt 88 kg (194 lb 0.1 oz)   SpO2 93%   No intake/output data recorded.  Recent Labs     05/17/23  0016 05/18/23  0042 05/19/23  0436   SODIUM 135 134* 137   POTASSIUM 4.4 3.8 3.5*   CHLORIDE 100 100 101   CO2 21 22 22   GLUCOSE 162* 120* 113*   BUN 15 26* 26*   CREATININE 0.83 0.96 0.65   CALCIUM 8.6 8.2* 8.5     Recent Labs     05/17/23 0016 05/18/23 0042 05/19/23  0436   WBC 8.8 8.7 9.5   RBC 5.07 4.87 5.03   HEMOGLOBIN 15.2 14.1 14.7   HEMATOCRIT 44.8 43.0 44.6   MCV 88.4 88.3 88.7   MCH 30.0 29.0 29.2   MCHC 33.9 32.8* 33.0*   RDW 44.7 45.1 45.1   PLATELETCT 223 208 216   MPV 10.2 10.1 10.1       Alert and Oriented x3, No Acute Distress  Normal Respiratory Effort  Abdomen soft, appropriately tender  Incisions/Bandages clean/dry/intact  Extremities warm and well perfused  Ostomy pink and healthy, output scant      A/P: 67-year-old male with a history of rectal cancer status post colostomy creation and port placement now with large bowel obstruction possibly a colostomy site  Patient will be taken to the operating room for colostomy revision possible exploratory laparotomy with bowel resection.  I discussed with the patient that I have not clear on why his ostomy is not functioning however that the dilatation of his large bowel is concerning for perforation and we will proceed emergently to the operating room for colostomy revision.  I discussed the possibility of an exploratory laparotomy in case of inability to identify the obstruction at the colostomy site.  Risks, benefits, and alternatives were discussed with patient.  The " patient was given an opportunity to ask questions and discuss other options. Risks including but not limited to failed or incomplete planned procedure, ineffective therapy, perforation, infection, bleeding, missed lesion(s), missed diagnosis, cardiac and/or pulmonary event, aspiration, stroke, possible need for surgery, hospitalization possibly prolonged, discomfort, unsuccessful and/or incomplete procedure, possible need for repeat procedures and/or additional testings, damage to adjacent organs and/or vascular structures, medication reaction, disability, death, and other adverse events possibly life-threatening. Discussion was undertaken with Layman's terms.  The patient stated understanding and acceptance of these risks, and wished to proceed. Consent was given in clear state of mind.   Yakov Sewell MD PhD  [facility]  Colon and Rectal Surgeon  [phone]

## 2023-05-20 NOTE — ANESTHESIA PROCEDURE NOTES
Airway    Date/Time: 5/19/2023 11:43 PM    Performed by: Miguel Ro M.D.  Authorized by: Miguel Ro M.D.    Location:  OR  Urgency:  Elective  Indications for Airway Management:  Anesthesia      Spontaneous Ventilation: absent    Sedation Level:  Deep  Preoxygenated: Yes    Patient Position:  Sniffing  Mask Difficulty Assessment:  0 - not attempted  Final Airway Type:  Endotracheal airway  Final Endotracheal Airway:  ETT  Cuffed: Yes    Technique Used for Successful ETT Placement:  Direct laryngoscopy    Insertion Site:  Oral  Blade Type:  Sal  Laryngoscope Blade/Videolaryngoscope Blade Size:  3  ETT Size (mm):  8.0  Measured from:  Lips  ETT to Lips (cm):  24  Placement Verified by: auscultation and capnometry    Cormack-Lehane Classification:  Grade I - full view of glottis  Number of Attempts at Approach:  1  Number of Other Approaches Attempted:  0

## 2023-05-20 NOTE — WOUND TEAM
" [facility]  Inpatient Services  New Ostomy Management & Teaching    HPI:  Reviewed  PMH: Reviewed   SH: Reviewed    Past Surgical History:   Procedure Laterality Date    [place] LAP, SURG COLOSTOMY Left 5/16/2023    Procedure: LAPAROSCOPIC  COLOSTOMY CREATION;  Surgeon: Yakov Sewell M.D.;  Location: SURGERY [facility];  Service: Gastroenterology    NJ SIGMOIDOSCOPY,DIAGNOSTIC N/A 5/16/2023    Procedure: SIGMOIDOSCOPY, FLEXIBLE;  Surgeon: Yakov Sewell M.D.;  Location: SURGERY [facility];  Service: Gastroenterology    CATH PLACEMENT Right 5/16/2023    Procedure: INSERTION, CATHETER;  Surgeon: Yakov Sewell M.D.;  Location: SURGERY [facility];  Service: Gastroenterology    INGUINAL HERNIA REPAIR BILATERAL Bilateral      Surgery Date: 05/16/23    Surgeon(s):  Yakov Sewell M.D.    Procedure(s):  LAPAROSCOPIC VERSUS OPEN COLOSTOMY CREATION, WITH FLEXIBLE SIGMOIDOSCOPY AND PORT PLACEMENT  SIGMOIDOSCOPY, FLEXIBLE  INSERTION, CATHETER     Permanence: yes    Pertinent History: The patient is a 67 y.o. male with locally advanced rectal cancer. patient went to OR 05/16/23 for port placement and laparoscopic colostomy creation. Patient had sigmoid colon resection with ostomy revision on 5/19 r/t large bowel obstruction and volvulus.    PROCEDURE PERFORMED:  1.  Flexible sigmoidoscopy  2.  Tunneled central venous catheter placement with subcutaneous port under ultrasound guidance with fluoroscopy  3.  Laparoscopic colostomy creation                 Colostomy 05/16/23 LUQ (Active)   Wound Image   05/20/23 1600   Stomal Appliance Assessment Changed    Stoma Assessment Beefy red;Edema    Stoma Shape Budded Greater Than One Inch;Round    Stoma Size (in) 2    Peristomal Assessment Pink    Mucocutaneous Junction Intact    Treatment Appliance Changed    Peristomal Protectant Paste Ring    Stomal Appliance Paste Ring, 2\";2 3/4\" (70mm) CTF    Output (mL) 0 mL    Output Color Bloody    WOUND RN ONLY - " "Stomal Appliance  2 Piece;Paste Ring, 2\";2 3/4\" (70mm) CTF    Appliance (Pouch) # Pouch: [id number], Barrier: [id number]    Appliance Brand Shanon    Appliance Supplier [employer]    Ostomy Care Resources Provided [facility] Tip Sheet    WOUND NURSE ONLY - Time Spent with Patient (mins) 60       Ostomy Appliance (type and size): 2 3/4\" 2 piece and 2\" Paste Ring    Interventions: All of the following performed by wife Su with verbal guidance from this RN: Removed previous appliance and cleansed skin with warm wash cloth.  Checked cardboard template and measured to barrier, started to cut barrier and daughter finished, removed plastic and applied paste ring, applied to skin, daughter removed paper backing, rubbed to adhere.  Started to attach pouch and daughter completed, patient and daughter both practiced closing end.       Pt education: Ostomy folder was discussed in detail. Pt was educated regarding the following things: stoma size and shape and to be aware stoma will likely change sizes in the first 4 to 6 weeks. Pt aware that we are using the most basic supplies while in the hospital and there are many brands and options in regards to supplies. Pt educated on when to empty and how to empty, as well as burping appliance. Wear time discussed.  Pt aware that they should keep an extra set of appliances with them at all times (do not leave in warm cars). Follow up, supply ordering and support groups discussed as well as accessories that may be beneficial. Questions and concerns addressed.    Needs for next visit: Continue hands-on teaching with wife Nitza. Patient would also like daughters to learn appliance change as well.    Evaluation:   5/20/23: Patient with lots of pain today r/t having undergone surgery yesterday. Revised colostomy larger than previous stoma- fits into 2 3/4\". Nitza performed entire change today with verbal guidance- did very well. Will attempt to coordinate to do further ostomy education with one of their " "daughters on Monday ~1600.    05/18/23:  still with output.  Some gas appreciated.  ABD distended.  Patient still having a hard time accepting stoma.    05/17/23:  patient having a hard time looking at stoma.  He feels he will be able to manage.  Discussed high output pouch and down drain as patient will be undergoing chemo/radiation.      Flatus: Present  Stool Output: minimum and bloody  Urine Output: NA, Fecal Ostomy  Diet: Regular  Mobility: Ambulating at Baseline  - not wanting to ambulate due to discomfort.      Plan: Ostomy nurses to continue to follow for ostomy needs and teaching until discharge    Anticipated discharge needs: Supplies, supplier information, possible HH, outpatient ostomy clinic, Skilled Nursing/Rehab     Secure Start Signed Yes  Outpatient Referral Placed Yes  5 Sets of appliances in Ostomy bag for discharge Ordered    INSURANCE OPTIONS:                 [employer] ([employer])              MediCARE/MEDICAID & All other Private Insurance companies (Prism Form)         X     MediCAID & Fee for Service (Care Chest Paperwork + [employer] Form)                            Form signed/Catalog Marked and Copy left with patient OR medicaid paperwork given to patient      Anticipated Discharge Plans:  Outpatient Wound clinic, Family Care, and Self Care    Ostomy Supplies for DC:  New Image Flextend Extended-Wear FLAT Skin Barrier 2 3/4\" ([place] [id number]), FECAL 2 PIECE POUCH - 20 per month, 12in New Image Lock n' Roll withOUT Filter 2 3/4\" ([place] [id number]), and Adapt Flat paste ring 2\" ([place] [id number]) - 15 per month    "

## 2023-05-21 LAB
ANION GAP SERPL CALC-SCNC: 11 MMOL/L (ref 7–16)
BUN SERPL-MCNC: 22 MG/DL (ref 8–22)
CALCIUM SERPL-MCNC: 8.7 MG/DL (ref 8.5–10.5)
CHLORIDE SERPL-SCNC: 98 MMOL/L (ref 96–112)
CO2 SERPL-SCNC: 25 MMOL/L (ref 20–33)
CREAT SERPL-MCNC: 0.63 MG/DL (ref 0.5–1.4)
ERYTHROCYTE [DISTWIDTH] IN BLOOD BY AUTOMATED COUNT: 43.9 FL (ref 35.9–50)
GFR SERPLBLD CREATININE-BSD FMLA CKD-EPI: 104 ML/MIN/1.73 M 2
GLUCOSE SERPL-MCNC: 122 MG/DL (ref 65–99)
HCT VFR BLD AUTO: 41.8 % (ref 42–52)
HGB BLD-MCNC: 13.6 G/DL (ref 14–18)
MCH RBC QN AUTO: 28.8 PG (ref 27–33)
MCHC RBC AUTO-ENTMCNC: 32.5 G/DL (ref 33.7–35.3)
MCV RBC AUTO: 88.4 FL (ref 81.4–97.8)
PLATELET # BLD AUTO: 222 K/UL (ref 164–446)
PMV BLD AUTO: 9.9 FL (ref 9–12.9)
POTASSIUM SERPL-SCNC: 4.8 MMOL/L (ref 3.6–5.5)
RBC # BLD AUTO: 4.73 M/UL (ref 4.7–6.1)
SODIUM SERPL-SCNC: 134 MMOL/L (ref 135–145)
WBC # BLD AUTO: 8.8 K/UL (ref 4.8–10.8)

## 2023-05-21 PROCEDURE — 51798 US URINE CAPACITY MEASURE: CPT

## 2023-05-21 PROCEDURE — 85027 COMPLETE CBC AUTOMATED: CPT

## 2023-05-21 PROCEDURE — 36415 COLL VENOUS BLD VENIPUNCTURE: CPT

## 2023-05-21 PROCEDURE — 80048 BASIC METABOLIC PNL TOTAL CA: CPT

## 2023-05-21 PROCEDURE — 700105 HCHG RX REV CODE 258: Performed by: SURGERY

## 2023-05-21 PROCEDURE — 700111 HCHG RX REV CODE 636 W/ 250 OVERRIDE (IP): Performed by: SURGERY

## 2023-05-21 PROCEDURE — 700102 HCHG RX REV CODE 250 W/ 637 OVERRIDE(OP): Performed by: SURGERY

## 2023-05-21 PROCEDURE — A9270 NON-COVERED ITEM OR SERVICE: HCPCS | Performed by: SURGERY

## 2023-05-21 PROCEDURE — 770001 HCHG ROOM/CARE - MED/SURG/GYN PRIV*

## 2023-05-21 RX ORDER — ENOXAPARIN SODIUM 100 MG/ML
40 INJECTION SUBCUTANEOUS DAILY
Status: DISCONTINUED | OUTPATIENT
Start: 2023-05-21 | End: 2023-05-26 | Stop reason: HOSPADM

## 2023-05-21 RX ADMIN — SODIUM CHLORIDE, POTASSIUM CHLORIDE, SODIUM LACTATE AND CALCIUM CHLORIDE: 600; 310; 30; 20 INJECTION, SOLUTION INTRAVENOUS at 17:54

## 2023-05-21 RX ADMIN — HYDROMORPHONE HYDROCHLORIDE 1 MG: 1 INJECTION, SOLUTION INTRAMUSCULAR; INTRAVENOUS; SUBCUTANEOUS at 22:26

## 2023-05-21 RX ADMIN — ENOXAPARIN SODIUM 40 MG: 100 INJECTION SUBCUTANEOUS at 17:43

## 2023-05-21 RX ADMIN — HYDROMORPHONE HYDROCHLORIDE 1 MG: 1 INJECTION, SOLUTION INTRAMUSCULAR; INTRAVENOUS; SUBCUTANEOUS at 05:13

## 2023-05-21 RX ADMIN — HYDROMORPHONE HYDROCHLORIDE 1 MG: 1 INJECTION, SOLUTION INTRAMUSCULAR; INTRAVENOUS; SUBCUTANEOUS at 10:15

## 2023-05-21 RX ADMIN — HYDROMORPHONE HYDROCHLORIDE 1 MG: 1 INJECTION, SOLUTION INTRAMUSCULAR; INTRAVENOUS; SUBCUTANEOUS at 17:43

## 2023-05-21 RX ADMIN — ANTACID TABLETS 500 MG: 500 TABLET, CHEWABLE ORAL at 05:21

## 2023-05-21 RX ADMIN — ANTACID TABLETS 500 MG: 500 TABLET, CHEWABLE ORAL at 10:15

## 2023-05-21 RX ADMIN — HYDROMORPHONE HYDROCHLORIDE 1 MG: 1 INJECTION, SOLUTION INTRAMUSCULAR; INTRAVENOUS; SUBCUTANEOUS at 14:30

## 2023-05-21 RX ADMIN — ANTACID TABLETS 500 MG: 500 TABLET, CHEWABLE ORAL at 14:30

## 2023-05-21 RX ADMIN — HYDROMORPHONE HYDROCHLORIDE 1 MG: 1 INJECTION, SOLUTION INTRAMUSCULAR; INTRAVENOUS; SUBCUTANEOUS at 01:31

## 2023-05-21 ASSESSMENT — PAIN DESCRIPTION - PAIN TYPE: TYPE: ACUTE PAIN

## 2023-05-21 NOTE — CARE PLAN
The patient is Watcher - Medium risk of patient condition declining or worsening    Shift Goals  Clinical Goals: Pain control, mobilize, safety  Patient Goals: Pain control, sleep  Family Goals: Update on POC    Progress made toward(s) clinical / shift goals:      Problem: Knowledge Deficit - Standard  Goal: Patient and family/care givers will demonstrate understanding of plan of care, disease process/condition, diagnostic tests and medications  Description: Target End Date:  1-3 days or as soon as patient condition allows    Document in Patient Education    1.  Patient and family/caregiver oriented to unit, equipment, visitation policy and means for communicating concern  2.  Complete/review Learning Assessment  3.  Assess knowledge level of disease process/condition, treatment plan, diagnostic tests and medications  4.  Explain disease process/condition, treatment plan, diagnostic tests and medications  Outcome: Progressing     Problem: Pain - Standard  Goal: Alleviation of pain or a reduction in pain to the patient’s comfort goal  Description: Target End Date:  Prior to discharge or change in level of care    Document on Vitals flowsheet    1.  Document pain using the appropriate pain scale per order or unit policy  2.  Educate and implement non-pharmacologic comfort measures (i.e. relaxation, distraction, massage, cold/heat therapy, etc.)  3.  Pain management medications as ordered  4.  Reassess pain after pain med administration per policy  5.  If opiods administered assess patient's response to pain medication is appropriate per POSS sedation scale  6.  Follow pain management plan developed in collaboration with patient and interdisciplinary team (including palliative care or pain specialists if applicable)  Outcome: Progressing

## 2023-05-21 NOTE — PROGRESS NOTES
Patient aox4. No visible signs of distress. Requiring 6L of O2 oxymask to maintain adequate O2 saturations, patient with productive cough, no complaints of SOB, sleeps with mouth open. Patient reporting increased abdominal pain this shift, medicated per MAR with some relief. Tolerating diet without any n/v. +bowel sounds, +output in ostomy bag.

## 2023-05-21 NOTE — PROGRESS NOTES
"Surgery staff    67 y.o.male s/p laparoscopic colostomy creation and port placement with volvulus of sigmoid colon two nights ago necessitating revision.  Too much pain to get up to chair or walk.  Burping up last night's clears. Minimal stoma output.    /78   Pulse 94   Temp 36.5 °C (97.7 °F) (Temporal)   Resp 17   Ht 1.753 m (5' 9\")   Wt 88 kg (194 lb 0.1 oz)   SpO2 90%   BMI 28.65 kg/m²   NAD, face mask, comfortable, pleasant, talkative  Bilateral upper airway wheezing, mild.  RRR  Abdomen soft but distended. Stoma intact with small amount of stool in bag.     A/P: 67 y.o.male s/p laparoscopic colostomy creation and port placement with volvulus of sigmoid colon two nights ago necessitating revision.  Slow to open up post op. Leave on clears today with aspiration precautions.   Therapies, OOB.   Lovenox in place  "

## 2023-05-22 ENCOUNTER — NON-PROVIDER VISIT (OUTPATIENT)
Dept: WOUND CARE | Facility: MEDICAL CENTER | Age: 68
DRG: 329 | End: 2023-05-22
Attending: SURGERY
Payer: MEDICARE

## 2023-05-22 LAB
ANION GAP SERPL CALC-SCNC: 9 MMOL/L (ref 7–16)
BUN SERPL-MCNC: 32 MG/DL (ref 8–22)
CALCIUM SERPL-MCNC: 8.4 MG/DL (ref 8.5–10.5)
CHLORIDE SERPL-SCNC: 98 MMOL/L (ref 96–112)
CO2 SERPL-SCNC: 30 MMOL/L (ref 20–33)
CREAT SERPL-MCNC: 0.59 MG/DL (ref 0.5–1.4)
ERYTHROCYTE [DISTWIDTH] IN BLOOD BY AUTOMATED COUNT: 44.7 FL (ref 35.9–50)
GFR SERPLBLD CREATININE-BSD FMLA CKD-EPI: 106 ML/MIN/1.73 M 2
GLUCOSE SERPL-MCNC: 160 MG/DL (ref 65–99)
HCT VFR BLD AUTO: 40.3 % (ref 42–52)
HGB BLD-MCNC: 13.2 G/DL (ref 14–18)
MCH RBC QN AUTO: 29.3 PG (ref 27–33)
MCHC RBC AUTO-ENTMCNC: 32.8 G/DL (ref 33.7–35.3)
MCV RBC AUTO: 89.4 FL (ref 81.4–97.8)
PATHOLOGY CONSULT NOTE: NORMAL
PLATELET # BLD AUTO: 235 K/UL (ref 164–446)
PMV BLD AUTO: 10 FL (ref 9–12.9)
POTASSIUM SERPL-SCNC: 4.4 MMOL/L (ref 3.6–5.5)
RBC # BLD AUTO: 4.51 M/UL (ref 4.7–6.1)
SODIUM SERPL-SCNC: 137 MMOL/L (ref 135–145)
WBC # BLD AUTO: 8.9 K/UL (ref 4.8–10.8)

## 2023-05-22 PROCEDURE — 700111 HCHG RX REV CODE 636 W/ 250 OVERRIDE (IP): Performed by: SURGERY

## 2023-05-22 PROCEDURE — A9270 NON-COVERED ITEM OR SERVICE: HCPCS | Performed by: SURGERY

## 2023-05-22 PROCEDURE — 302098 PASTE RING (FLAT): Performed by: SURGERY

## 2023-05-22 PROCEDURE — 700102 HCHG RX REV CODE 250 W/ 637 OVERRIDE(OP): Performed by: SURGERY

## 2023-05-22 PROCEDURE — 97602 WOUND(S) CARE NON-SELECTIVE: CPT

## 2023-05-22 PROCEDURE — 770001 HCHG ROOM/CARE - MED/SURG/GYN PRIV*

## 2023-05-22 PROCEDURE — 85027 COMPLETE CBC AUTOMATED: CPT

## 2023-05-22 PROCEDURE — 80048 BASIC METABOLIC PNL TOTAL CA: CPT

## 2023-05-22 PROCEDURE — 36415 COLL VENOUS BLD VENIPUNCTURE: CPT

## 2023-05-22 PROCEDURE — 700105 HCHG RX REV CODE 258: Performed by: SURGERY

## 2023-05-22 RX ADMIN — ENOXAPARIN SODIUM 40 MG: 100 INJECTION SUBCUTANEOUS at 17:26

## 2023-05-22 RX ADMIN — HYDROMORPHONE HYDROCHLORIDE 1 MG: 1 INJECTION, SOLUTION INTRAMUSCULAR; INTRAVENOUS; SUBCUTANEOUS at 15:35

## 2023-05-22 RX ADMIN — ANTACID TABLETS 500 MG: 500 TABLET, CHEWABLE ORAL at 15:35

## 2023-05-22 RX ADMIN — SODIUM CHLORIDE, POTASSIUM CHLORIDE, SODIUM LACTATE AND CALCIUM CHLORIDE: 600; 310; 30; 20 INJECTION, SOLUTION INTRAVENOUS at 20:25

## 2023-05-22 RX ADMIN — HYDROMORPHONE HYDROCHLORIDE 1 MG: 1 INJECTION, SOLUTION INTRAMUSCULAR; INTRAVENOUS; SUBCUTANEOUS at 02:38

## 2023-05-22 RX ADMIN — ANTACID TABLETS 500 MG: 500 TABLET, CHEWABLE ORAL at 11:56

## 2023-05-22 RX ADMIN — ANTACID TABLETS 500 MG: 500 TABLET, CHEWABLE ORAL at 09:23

## 2023-05-22 RX ADMIN — SODIUM CHLORIDE, POTASSIUM CHLORIDE, SODIUM LACTATE AND CALCIUM CHLORIDE: 600; 310; 30; 20 INJECTION, SOLUTION INTRAVENOUS at 11:54

## 2023-05-22 RX ADMIN — SODIUM CHLORIDE, POTASSIUM CHLORIDE, SODIUM LACTATE AND CALCIUM CHLORIDE: 600; 310; 30; 20 INJECTION, SOLUTION INTRAVENOUS at 02:39

## 2023-05-22 RX ADMIN — HYDROMORPHONE HYDROCHLORIDE 1 MG: 1 INJECTION, SOLUTION INTRAMUSCULAR; INTRAVENOUS; SUBCUTANEOUS at 09:23

## 2023-05-22 ASSESSMENT — PAIN DESCRIPTION - PAIN TYPE
TYPE: ACUTE PAIN
TYPE: ACUTE PAIN

## 2023-05-22 ASSESSMENT — PATIENT HEALTH QUESTIONNAIRE - PHQ9
SUM OF ALL RESPONSES TO PHQ9 QUESTIONS 1 AND 2: 0
1. LITTLE INTEREST OR PLEASURE IN DOING THINGS: NOT AT ALL
2. FEELING DOWN, DEPRESSED, IRRITABLE, OR HOPELESS: NOT AT ALL

## 2023-05-22 NOTE — CARE PLAN
The patient is Stable - Low risk of patient condition declining or worsening    Shift Goals  Clinical Goals: Pain control, increased mobility    Progress made toward(s) clinical / shift goals: PRN Dilaudid administered for complaints of pain. Patient reports reduction in pain level post medication administration.    Patient is not progressing towards the following goals: Patient refuses out of bed mobility due to pain.

## 2023-05-22 NOTE — PROGRESS NOTES
Assumed care of patient  AAOx4  Pt on 6L oxymask.  in place  Diminished lung sounds. IS encouraged.  Abdomen round, semifirm, tender. Hypoactive bowel sounds. +output in ostomy, brown. Stoma beefy red.  +void in urinal    LR infusing at 125    Pt resting comfortably in bed  Frequent rounding in place, call light within reach  Calls appropriately. Education provided, safety maintained    Pt refusing ambulation, education provided on importance of early ambulation, frequent repositioning.

## 2023-05-22 NOTE — DIETARY
Nutrition Update:    Day 6 of admit.  Josh Lerner is a 67 y.o. male with admitting DX of Rectum neoplasm.    Patient being followed to optimize nutrition.    Current Diet: Clear liquid    Problem: Nutritional:  Goal: Achieve adequate nutritional intake  Description: Patient will consume 50% of meals  Outcome: Progressing slower than expected    Hx of rectal cancer s/p colostomy creation, with obstruction at colostomy site.  Pt s/p sigmoid colectomy with complex ostomy revision 5/20.  Pt was previously on full liquids 5/16, then advanced Regular diet the same day with PO intake 0-50%.  Clear liquid diet resumed post procedure (5/20). Recent PO intake has been <25%.  Per chart review, yesterday pt had tender, distended abdomen and was not tolerating PO fluids. Pt also reporting pain.  +output via ostomy today    RD will continue to follow for diet advancement and adequate PO intake.

## 2023-05-22 NOTE — PROGRESS NOTES
Patient unable to attend scheduled initial ostomy evaluation due patient still currently admitted in hospital.

## 2023-05-22 NOTE — PROGRESS NOTES
"Received report of patient at start of shift. Patient is AOx4, family at bedside through shift. PRN Dilaudid administered for complaints of pain. Assessment complete. Abdomen distended, tender, and semi-firm. Small amount of stool present via ostomy. Patient reports intermittent nausea. Occasional hiccups observed. Patient states \"Whatever you do, do not put another tube in my nose. I can't handle that again.\" Patient not tolerating PO fluids. LR @ 125 ml/hr restarted. Patient refuses out of bed mobility due to pain. Patient updated on plan of care, encouraged to notify staff for any needs/assistance. Call light within reach.  "

## 2023-05-22 NOTE — PROGRESS NOTES
"S:  67 y.o.male s/p laparoscopic colostomy creation and port placement with volvulus of sigmoid colon repair.  overnight reports markedly improved pain.  Denies nausea or vomiting.  Tolerating clear liquids.  O:  /62   Pulse 74   Temp 36.7 °C (98.1 °F) (Temporal)   Resp 18   Ht 1.753 m (5' 9\")   Wt 88 kg (194 lb 0.1 oz)   SpO2 93%   No intake/output data recorded.  Recent Labs     05/20/23 0324 05/21/23  0017 05/22/23  0302   SODIUM 137 134* 137   POTASSIUM 4.7 4.8 4.4   CHLORIDE 101 98 98   CO2 23 25 30   GLUCOSE 168* 122* 160*   BUN 29* 22 32*   CREATININE 0.75 0.63 0.59   CALCIUM 8.4* 8.7 8.4*       Recent Labs     05/20/23 0324 05/21/23  0017 05/22/23  0302   WBC 7.6 8.8 8.9   RBC 5.04 4.73 4.51*   HEMOGLOBIN 14.9 13.6* 13.2*   HEMATOCRIT 44.3 41.8* 40.3*   MCV 87.9 88.4 89.4   MCH 29.6 28.8 29.3   MCHC 33.6* 32.5* 32.8*   RDW 44.6 43.9 44.7   PLATELETCT 217 222 235   MPV 10.2 9.9 10.0         Alert and Oriented x3, No Acute Distress  Normal Respiratory Effort  Abdomen soft, appropriately tender  Incisions/Bandages clean/dry/intact  Extremities warm and well perfused  Ostomy pink and healthy, output stool and flatus      A/P: 67-year-old male with a history of rectal cancer status post colostomy creation and port placement revision of colostomy and sigmoid colectomy  Out of bed to chair for meals  Continue IV fluids  Continue ostomy education  Continue clear liquids  DC planning when patient is tolerating regular diet with good bowel function  Yakov Sewell MD PhD  [facility]  Colon and Rectal Surgeon  [phone]  "

## 2023-05-22 NOTE — WOUND TEAM
[facility]  Inpatient Services  New Ostomy Management & Teaching    HPI:  Reviewed  PMH: Reviewed   SH: Reviewed    Past Surgical History:   Procedure Laterality Date    VA PART REMOVAL COLON W ANASTOMOSIS  5/19/2023    Procedure: SIGMOID COLON RESECTION;  Surgeon: Yakov Sewell M.D.;  Location: SURGERY [facility];  Service: Gastroenterology    VA COLOSTOMY  5/19/2023    Procedure: REVISION, COLOSTOMY;  Surgeon: Yakov Sewell M.D.;  Location: SURGERY [facility];  Service: Gastroenterology    VA LAP, SURG COLOSTOMY Left 5/16/2023    Procedure: LAPAROSCOPIC  COLOSTOMY CREATION;  Surgeon: Yakov Sewell M.D.;  Location: SURGERY [facility];  Service: Gastroenterology    VA SIGMOIDOSCOPY,DIAGNOSTIC N/A 5/16/2023    Procedure: SIGMOIDOSCOPY, FLEXIBLE;  Surgeon: Yakov Sewell M.D.;  Location: SURGERY [facility];  Service: Gastroenterology    CATH PLACEMENT Right 5/16/2023    Procedure: INSERTION, CATHETER;  Surgeon: Yakov Sewell M.D.;  Location: SURGERY [facility];  Service: Gastroenterology    INGUINAL HERNIA REPAIR BILATERAL Bilateral      Surgery Date: 05/16/23    Surgeon(s):  Yakov Sewell M.D.    Procedure(s):  LAPAROSCOPIC VERSUS OPEN COLOSTOMY CREATION, WITH FLEXIBLE SIGMOIDOSCOPY AND PORT PLACEMENT  SIGMOIDOSCOPY, FLEXIBLE  INSERTION, CATHETER     Permanence: yes    Pertinent History: The patient is a 67 y.o. male with locally advanced rectal cancer. patient went to OR 05/16/23 for port placement and laparoscopic colostomy creation. Patient had sigmoid colon resection with ostomy revision on 5/19 r/t large bowel obstruction and volvulus.    PROCEDURE PERFORMED:  1.  Flexible sigmoidoscopy  2.  Tunneled central venous catheter placement with subcutaneous port under ultrasound guidance with fluoroscopy  3.  Laparoscopic colostomy creation                 05/19/23:  patient went back to surgery after retractable pain.      Findings: Patient had a very redundant  "sigmoid colon which had volvulized.  Sigmoid colon was resected and the ostomy was recreated with the descending colon    Colostomy 05/19/23 LUQ (Active)   Wound Image   05/20/23 1600   Stomal Appliance Assessment Intact;Changed    Stoma Assessment Red;Edema;Pink    Stoma Shape Budded Less Than One Inch;Round    Stoma Size (in) 2    Peristomal Assessment Intact    Mucocutaneous Junction Intact    Treatment Appliance Changed;Cleansed with water/washcloth    Peristomal Protectant Paste Ring    Stomal Appliance Paste Ring, 2\";2 3/4\" (70mm) CTF    Output (mL) 200 mL    Output Color Brown    WOUND RN ONLY - Stomal Appliance  2 Piece;Paste Ring, 2\";2 3/4\" (70mm) CTF    Appliance (Pouch) # Pouch: [id number], Barrier: [id number]    Appliance Brand [place]    Appliance Supplier Prism    Ostomy Care Resources Provided UOAA Tip Sheet    WOUND NURSE ONLY - Time Spent with Patient (mins) 60       Ostomy Appliance (type and size): 2 3/4\" 2 piece and 2\" Paste Ring    Interventions: preformed with wife, daughter and friend observed:  Removed previous appliance and cleansed skin with warm wash cloth.  Patient having continuous liquid output at this time so further steps completed per RN: Checked cardboard template and measured to barrier, cut barrier and applied paste ring to barrier, due to output attached pouch and closed end first, cleansed skin again and applied.  rubbed to adhere.  Answered questions.       Pt education: Ostomy folder was discussed in detail. Pt was educated regarding the following things: stoma size and shape and to be aware stoma will likely change sizes in the first 4 to 6 weeks. Pt aware that we are using the most basic supplies while in the hospital and there are many brands and options in regards to supplies. Pt educated on when to empty and how to empty, as well as burping appliance. Wear time discussed.  Pt aware that they should keep an extra set of appliances with them at all times (do not leave in warm " "cars). Follow up, supply ordering and support groups discussed as well as accessories that may be beneficial. Questions and concerns addressed.    Needs for next visit: Continue hands-on teaching with wife Nitza. Patient would also like daughters to learn appliance change as well.  Afternoon works well for family.  Will meet again on Wednesday.      Evaluation:   05/22/23: patient feeling much better overall.  Wife is feeling comfortable with ostomy changes.  Would like to continue hands on while patient is IP.      5/20/23: Patient with lots of pain today r/t having undergone surgery yesterday. Revised colostomy larger than previous stoma- fits into 2 3/4\". Nitza performed entire change today with verbal guidance- did very well. Will attempt to coordinate to do further ostomy education with one of their daughters on Monday ~1600.  05/18/23:  still with output.  Some gas appreciated.  ABD distended.  Patient still having a hard time accepting stoma.    05/17/23:  patient having a hard time looking at stoma.  He feels he will be able to manage.  Discussed high output pouch and down drain as patient will be undergoing chemo/radiation.      Flatus: Present  Stool Output: moderate, large, and brown  Urine Output: NA, Fecal Ostomy  Diet: Clears  Mobility: Ambulating at Baseline      Plan: Ostomy nurses to continue to follow for ostomy needs and teaching until discharge    Anticipated discharge needs: Supplies, supplier information, possible HH, outpatient ostomy clinic, Skilled Nursing/Rehab     Secure Start Signed Yes  Outpatient Referral Placed Yes  5 Sets of appliances in Ostomy bag for discharge Ordered    INSURANCE OPTIONS:                 [email or web address] ([employer])              MediCARE/MEDICAID & All other Private Insurance companies (Prism Form)         X     MediCAID & Fee for Service (Care Chest Paperwork + Prism Form)                            Form signed/Catalog Marked and Copy left with " "patient OR medicaid paperwork given to patient      Anticipated Discharge Plans:  Outpatient Wound clinic, Family Care, and Self Care    Ostomy Supplies for DC:  New Image Flextend Extended-Wear FLAT Skin Barrier 2 3/4\" (Shanon [id number]), FECAL 2 PIECE POUCH - 20 per month, 12in New Image Lock n' Roll withOUT Filter 2 3/4\" (Shanon [id number]), and Adapt Flat paste ring 2\" (Shanon [id number]) - 15 per month    "

## 2023-05-22 NOTE — CARE PLAN
The patient is Watcher - Medium risk of patient condition declining or worsening    Shift Goals  Clinical Goals: pain control, +output ostomy  Patient Goals: pain control, slee  Family Goals: Update on POC    Progress made toward(s) clinical / shift goals:    Problem: Knowledge Deficit - Standard  Goal: Patient and family/care givers will demonstrate understanding of plan of care, disease process/condition, diagnostic tests and medications  Description: Target End Date:  1-3 days or as soon as patient condition allows    Document in Patient Education    1.  Patient and family/caregiver oriented to unit, equipment, visitation policy and means for communicating concern  2.  Complete/review Learning Assessment  3.  Assess knowledge level of disease process/condition, treatment plan, diagnostic tests and medications  4.  Explain disease process/condition, treatment plan, diagnostic tests and medications  Outcome: Progressing     Problem: Pain - Standard  Goal: Alleviation of pain or a reduction in pain to the patient’s comfort goal  Description: Target End Date:  Prior to discharge or change in level of care    Document on Vitals flowsheet    1.  Document pain using the appropriate pain scale per order or unit policy  2.  Educate and implement non-pharmacologic comfort measures (i.e. relaxation, distraction, massage, cold/heat therapy, etc.)  3.  Pain management medications as ordered  4.  Reassess pain after pain med administration per policy  5.  If opiods administered assess patient's response to pain medication is appropriate per POSS sedation scale  6.  Follow pain management plan developed in collaboration with patient and interdisciplinary team (including palliative care or pain specialists if applicable)  Outcome: Progressing     Problem: Skin Integrity  Goal: Skin integrity is maintained or improved  Description: Target End Date:  Prior to discharge or change in level of care    Document interventions on Skin  Risk/Dieudonne flowsheet groups and corresponding LDA    1.  Assess and monitor skin integrity, appearance and/or temperature  2.  Assess risk factors for impaired skin integrity and/or pressures ulcers  3.  Implement precautions to protect skin integrity in collaboration with interdisciplinary team  4.  Implement pressure ulcer prevention protocol if at risk for skin breakdown  5.  Confirm wound care consult if at risk for skin breakdown  6.  Ensure patient use of pressure relieving devices  (Low air loss bed, waffle overlay, heel protectors, ROHO cushion, etc)  Outcome: Progressing       Patient is not progressing towards the following goals:

## 2023-05-23 LAB
ANION GAP SERPL CALC-SCNC: 9 MMOL/L (ref 7–16)
BUN SERPL-MCNC: 18 MG/DL (ref 8–22)
CALCIUM SERPL-MCNC: 8.4 MG/DL (ref 8.5–10.5)
CHLORIDE SERPL-SCNC: 100 MMOL/L (ref 96–112)
CO2 SERPL-SCNC: 27 MMOL/L (ref 20–33)
CREAT SERPL-MCNC: 0.5 MG/DL (ref 0.5–1.4)
ERYTHROCYTE [DISTWIDTH] IN BLOOD BY AUTOMATED COUNT: 43.1 FL (ref 35.9–50)
GFR SERPLBLD CREATININE-BSD FMLA CKD-EPI: 111 ML/MIN/1.73 M 2
GLUCOSE SERPL-MCNC: 83 MG/DL (ref 65–99)
HCT VFR BLD AUTO: 33.9 % (ref 42–52)
HGB BLD-MCNC: 11.2 G/DL (ref 14–18)
MCH RBC QN AUTO: 29.2 PG (ref 27–33)
MCHC RBC AUTO-ENTMCNC: 33 G/DL (ref 32.3–36.5)
MCV RBC AUTO: 88.3 FL (ref 81.4–97.8)
PLATELET # BLD AUTO: 217 K/UL (ref 164–446)
PMV BLD AUTO: 9.8 FL (ref 9–12.9)
POTASSIUM SERPL-SCNC: 4 MMOL/L (ref 3.6–5.5)
RBC # BLD AUTO: 3.84 M/UL (ref 4.7–6.1)
SODIUM SERPL-SCNC: 136 MMOL/L (ref 135–145)
WBC # BLD AUTO: 6.2 K/UL (ref 4.8–10.8)

## 2023-05-23 PROCEDURE — 700105 HCHG RX REV CODE 258: Performed by: SURGERY

## 2023-05-23 PROCEDURE — 700111 HCHG RX REV CODE 636 W/ 250 OVERRIDE (IP): Performed by: SURGERY

## 2023-05-23 PROCEDURE — 85027 COMPLETE CBC AUTOMATED: CPT

## 2023-05-23 PROCEDURE — 770001 HCHG ROOM/CARE - MED/SURG/GYN PRIV*

## 2023-05-23 PROCEDURE — 80048 BASIC METABOLIC PNL TOTAL CA: CPT

## 2023-05-23 PROCEDURE — A9270 NON-COVERED ITEM OR SERVICE: HCPCS | Performed by: SURGERY

## 2023-05-23 PROCEDURE — 700102 HCHG RX REV CODE 250 W/ 637 OVERRIDE(OP): Performed by: SURGERY

## 2023-05-23 PROCEDURE — 36415 COLL VENOUS BLD VENIPUNCTURE: CPT

## 2023-05-23 RX ADMIN — HYDROMORPHONE HYDROCHLORIDE 1 MG: 1 INJECTION, SOLUTION INTRAMUSCULAR; INTRAVENOUS; SUBCUTANEOUS at 03:25

## 2023-05-23 RX ADMIN — SODIUM CHLORIDE, POTASSIUM CHLORIDE, SODIUM LACTATE AND CALCIUM CHLORIDE: 600; 310; 30; 20 INJECTION, SOLUTION INTRAVENOUS at 03:35

## 2023-05-23 RX ADMIN — ENOXAPARIN SODIUM 40 MG: 100 INJECTION SUBCUTANEOUS at 17:28

## 2023-05-23 RX ADMIN — ANTACID TABLETS 500 MG: 500 TABLET, CHEWABLE ORAL at 21:30

## 2023-05-23 ASSESSMENT — PAIN DESCRIPTION - PAIN TYPE
TYPE: SURGICAL PAIN
TYPE: SURGICAL PAIN

## 2023-05-23 ASSESSMENT — PAIN SCALES - GENERAL: PAIN_LEVEL: 3

## 2023-05-23 NOTE — PROGRESS NOTES
Received report of patient at start of shift. Patient is AOx4, no complaints of pain. Assessment complete. Patient tolerating regular diet, wife bringing in food from home. Patient ambulates with SBA, assisted with shower this morning. Patient updated on plan of care, encouraged to notify staff for any needs/assistance. Call light within reach.

## 2023-05-23 NOTE — DIETARY
Nutrition Update:    Day 7 of admit.  Josh Lerner is a 67 y.o. male with admitting DX of Rectum neoplasm.    Patient being followed to optimize nutrition.    Current Diet: Regular    Problem: Nutritional:  Goal: Achieve adequate nutritional intake  Description: Patient will consume 50% of meals on diet > clear liquids  Outcome: Progressing    Diet advanced to Regular this morning.  Will continue to follow for adequate PO intake.

## 2023-05-23 NOTE — PROGRESS NOTES
AA&Ox 4. On 3-5L of oxygen via oxymask.  Pt verbalizes acute pain; declines need for pain medication at this time. PRN pain meds in use per MAR.  Skin per flowsheets.  Lap sites with derma bond, EDUARDA to neck, R chest and abd.  LLQ ostomy, intact.  Denies SOB/chest pain/numbness or tingling.  + void. LBM 5/22 (brown, into ostomy) and +flatus.  Denies N/V. Tolerating clears diet.  Pt can ambulate with x1 assist using a FWW.  SCDs refused. Lovenox in use for VTE prophylaxis.

## 2023-05-23 NOTE — ANESTHESIA POSTPROCEDURE EVALUATION
Patient: Josh Lerner    Procedure Summary     Date: 05/19/23 Room / Location: [place] [address] / SURGERY [facility]    Anesthesia Start: 2333 Anesthesia Stop: 05/20/23 0043    Procedures:       SIGMOID COLON RESECTION (Abdomen)      REVISION, COLOSTOMY (Abdomen) Diagnosis: (SIGMOID VOLVULUS WITH OBSTRUCTION)    Surgeons: Yakov Sewell M.D. Responsible Provider: Miguel Ro M.D.    Anesthesia Type: general ASA Status: 2 - Emergent          Final Anesthesia Type: general  Last vitals  BP   Blood Pressure : 109/67    Temp   36.7 °C (98.1 °F)    Pulse   75   Resp   18    SpO2   93 %      Anesthesia Post Evaluation    Patient location during evaluation: PACU  Patient participation: complete - patient participated  Level of consciousness: sleepy but conscious  Pain score: 3    Airway patency: patent  Anesthetic complications: no  Cardiovascular status: hemodynamically stable  Respiratory status: acceptable  Hydration status: euvolemic    PONV: none          There were no known notable events for this encounter.     Nurse Pain Score: 0 (NPRS)

## 2023-05-23 NOTE — CARE PLAN
The patient is Stable - Low risk of patient condition declining or worsening    Shift Goals  Clinical Goals: Pain control, tolerate diet, increased mobility    Progress made toward(s) clinical / shift goals: Patient reports reduction in pain with use of PRN Dilaudid. Patient tolerating clear liquid diet. Patient ambulates with SBA, walked in room and sat up in chair today.    Patient is not progressing towards the following goals: N/A

## 2023-05-23 NOTE — PROGRESS NOTES
"Received report of patient at start of shift. Patient is AOx4, family at bedside throughout day. Patient reports feeling \"much better\" compared to yesterday. PRN Dilaudid administered for complaints of moderate pain. Assessment complete. Patient tolerating clear liquid diet. LLQ colostomy with + output. Abdomen round and soft. Patient ambulates with SBA, walked in room and sat up in chair today. Patient updated on plan of care, encouraged to notify staff for any needs/assistance. Call light within reach.   "

## 2023-05-24 ENCOUNTER — APPOINTMENT (OUTPATIENT)
Dept: RADIOLOGY | Facility: MEDICAL CENTER | Age: 68
DRG: 329 | End: 2023-05-24
Attending: SURGERY
Payer: MEDICARE

## 2023-05-24 LAB
ERYTHROCYTE [DISTWIDTH] IN BLOOD BY AUTOMATED COUNT: 43.1 FL (ref 35.9–50)
HCT VFR BLD AUTO: 36.4 % (ref 42–52)
HGB BLD-MCNC: 12 G/DL (ref 14–18)
MCH RBC QN AUTO: 29.3 PG (ref 27–33)
MCHC RBC AUTO-ENTMCNC: 33 G/DL (ref 32.3–36.5)
MCV RBC AUTO: 88.8 FL (ref 81.4–97.8)
PLATELET # BLD AUTO: 254 K/UL (ref 164–446)
PMV BLD AUTO: 9.7 FL (ref 9–12.9)
RBC # BLD AUTO: 4.1 M/UL (ref 4.7–6.1)
WBC # BLD AUTO: 7.5 K/UL (ref 4.8–10.8)

## 2023-05-24 PROCEDURE — 36415 COLL VENOUS BLD VENIPUNCTURE: CPT

## 2023-05-24 PROCEDURE — A9270 NON-COVERED ITEM OR SERVICE: HCPCS | Performed by: SURGERY

## 2023-05-24 PROCEDURE — 770001 HCHG ROOM/CARE - MED/SURG/GYN PRIV*

## 2023-05-24 PROCEDURE — 71046 X-RAY EXAM CHEST 2 VIEWS: CPT

## 2023-05-24 PROCEDURE — 97602 WOUND(S) CARE NON-SELECTIVE: CPT

## 2023-05-24 PROCEDURE — 700111 HCHG RX REV CODE 636 W/ 250 OVERRIDE (IP): Performed by: SURGERY

## 2023-05-24 PROCEDURE — 700102 HCHG RX REV CODE 250 W/ 637 OVERRIDE(OP): Performed by: SURGERY

## 2023-05-24 PROCEDURE — 85027 COMPLETE CBC AUTOMATED: CPT

## 2023-05-24 RX ORDER — CHOLECALCIFEROL (VITAMIN D3) 125 MCG
5 CAPSULE ORAL NIGHTLY PRN
Status: DISCONTINUED | OUTPATIENT
Start: 2023-05-24 | End: 2023-05-26 | Stop reason: HOSPADM

## 2023-05-24 RX ADMIN — ENOXAPARIN SODIUM 40 MG: 100 INJECTION SUBCUTANEOUS at 16:34

## 2023-05-24 RX ADMIN — ANTACID TABLETS 500 MG: 500 TABLET, CHEWABLE ORAL at 16:37

## 2023-05-24 RX ADMIN — Medication 5 MG: at 22:34

## 2023-05-24 RX ADMIN — ANTACID TABLETS 500 MG: 500 TABLET, CHEWABLE ORAL at 00:58

## 2023-05-24 ASSESSMENT — PAIN DESCRIPTION - PAIN TYPE
TYPE: ACUTE PAIN;SURGICAL PAIN
TYPE: SURGICAL PAIN
TYPE: SURGICAL PAIN

## 2023-05-24 NOTE — PROGRESS NOTES
Bedside report received, assessment completed    A&O x  4, pt calls appropriately  Mobility: Up with SBA, FWW   - PT/OT evaluations needed   Fall Risk Assessment: low, bed alarm n/a, door notifications n/a  Pain Assessment / Reassessment completed, medication provided per MAR  Diet: Regular  LDA:   IV Access: 20G LFA, CDI/ flushed/ SL  GI/: + void, + flatus, + BM  DVT Prophylaxis: lovenox, SCD on while in bed   Dieudonne Score: 20, Interventions per flow sheet  Procedures:    - 5/16 Laparoscopic colostomy creation   - 5/19 Sigmoid colon resection   D/C Plan: home no needs     Reviewed plan of care with patient, bed in lowest position and locked, pt resting comfortably now, call light within reach, all needs met at this time. Interventions will be executed per plan of care

## 2023-05-24 NOTE — CARE PLAN
The patient is Stable - Low risk of patient condition declining or worsening    Problem: Knowledge Deficit - Standard  Goal: Patient and family/care givers will demonstrate understanding of plan of care, disease process/condition, diagnostic tests and medications  Outcome: Progressing     Problem: Pain - Standard  Goal: Alleviation of pain or a reduction in pain to the patient’s comfort goal  Outcome: Progressing     Shift Goals  Clinical Goals: pain control, rest  Patient Goals: rest, pain control  Family Goals: Update on POC    Progress made toward(s) clinical / shift goals:  Patient's pain managed per MAR. Patient able to obtain adequate rest during this shift.    Patient is not progressing towards the following goals:

## 2023-05-24 NOTE — WOUND TEAM
[facility]  Inpatient Services  New Ostomy Management & Teaching    HPI:  Reviewed  PMH: Reviewed   SH: Reviewed    Past Surgical History:   Procedure Laterality Date    RI PART REMOVAL COLON W ANASTOMOSIS  5/19/2023    Procedure: SIGMOID COLON RESECTION;  Surgeon: Yakov Sewell M.D.;  Location: SURGERY [facility];  Service: Gastroenterology    RI COLOSTOMY  5/19/2023    Procedure: REVISION, COLOSTOMY;  Surgeon: Yakov Sewell M.D.;  Location: SURGERY [facility];  Service: Gastroenterology    RI LAP, SURG COLOSTOMY Left 5/16/2023    Procedure: LAPAROSCOPIC  COLOSTOMY CREATION;  Surgeon: Yakov Sewell M.D.;  Location: SURGERY [facility];  Service: Gastroenterology    RI SIGMOIDOSCOPY,DIAGNOSTIC N/A 5/16/2023    Procedure: SIGMOIDOSCOPY, FLEXIBLE;  Surgeon: Yakov Sewell M.D.;  Location: SURGERY [facility];  Service: Gastroenterology    CATH PLACEMENT Right 5/16/2023    Procedure: INSERTION, CATHETER;  Surgeon: Yakov Sewell M.D.;  Location: SURGERY [facility];  Service: Gastroenterology    INGUINAL HERNIA REPAIR BILATERAL Bilateral      Surgery Date: 05/16/23    Surgeon(s):  Yakov Sewell M.D.    Procedure(s):  LAPAROSCOPIC VERSUS OPEN COLOSTOMY CREATION, WITH FLEXIBLE SIGMOIDOSCOPY AND PORT PLACEMENT  SIGMOIDOSCOPY, FLEXIBLE  INSERTION, CATHETER     Permanence: yes    Pertinent History: The patient is a 67 y.o. male with locally advanced rectal cancer. patient went to OR 05/16/23 for port placement and laparoscopic colostomy creation. Patient had sigmoid colon resection with ostomy revision on 5/19 r/t large bowel obstruction and volvulus.    PROCEDURE PERFORMED:  1.  Flexible sigmoidoscopy  2.  Tunneled central venous catheter placement with subcutaneous port under ultrasound guidance with fluoroscopy  3.  Laparoscopic colostomy creation                 05/19/23:  patient went back to surgery after retractable pain.      Findings: Patient had a very redundant  "sigmoid colon which had volvulized.  Sigmoid colon was resected and the ostomy was recreated with the descending colon    Colostomy 05/19/23 LUQ (Active)   Wound Image   05/20/23 1600   Stomal Appliance Assessment Intact;Changed    Stoma Assessment Red;Pink    Stoma Shape Round;Budded Less Than One Inch    Stoma Size (in) 2    Peristomal Assessment Intact    Mucocutaneous Junction Intact    Treatment Appliance Changed;Cleansed with water/washcloth    Peristomal Protectant Paste Ring    Stomal Appliance Paste Ring, 2\";2 3/4\" (70mm) CTF    Output (mL) 150 mL    Output Color Brown    WOUND RN ONLY - Stomal Appliance  2 Piece;Paste Ring, 2\";2 3/4\" (70mm) CTF    Appliance (Pouch) # Pouch: [id number], Barrier: [id number]    Appliance Brand [place]    Appliance Supplier [employer]    Secure Start completed Yes    Ostomy Care Resources Provided UOAA Tip Sheet    WOUND NURSE ONLY - Time Spent with Patient (mins) 60       Ostomy Appliance (type and size): 2 3/4\" 2 piece and 2\" Paste Ring    Interventions: all steps preformed by wife: Removed previous appliance and cleansed skin with warm wash cloth.  Assisted her to check sizing, wife cut barrier to 2\", she removed plastic and stretched paste ring, applied paste ring to barrier, applied barrier to skin.  Attached pouch and closed end.       Pt education: Ostomy folder was discussed in detail. Pt was educated regarding the following things: stoma size and shape and to be aware stoma will likely change sizes in the first 4 to 6 weeks. Pt aware that we are using the most basic supplies while in the hospital and there are many brands and options in regards to supplies. Pt educated on when to empty and how to empty, as well as burping appliance. Wear time discussed.  Pt aware that they should keep an extra set of appliances with them at all times (do not leave in warm cars). Follow up, supply ordering and support groups discussed as well as accessories that may be beneficial. Questions and " "concerns addressed.    Needs for next visit:  none.  Wife comfortable with care.  Will follow up with OP clinic post Dc.      Evaluation:   05/24/23:  wife competent in changes. Will follow up with ostomy RN post DC.    05/22/23: patient feeling much better overall.  Wife is feeling comfortable with ostomy changes.  Would like to continue hands on while patient is IP.    5/20/23: Patient with lots of pain today r/t having undergone surgery yesterday. Revised colostomy larger than previous stoma- fits into 2 3/4\". Nitza performed entire change today with verbal guidance- did very well. Will attempt to coordinate to do further ostomy education with one of their daughters on Monday ~1600.  05/18/23:  still with output.  Some gas appreciated.  ABD distended.  Patient still having a hard time accepting stoma.    05/17/23:  patient having a hard time looking at stoma.  He feels he will be able to manage.  Discussed high output pouch and down drain as patient will be undergoing chemo/radiation.      Flatus: Present  Stool Output: moderate, large, and brown  Urine Output: NA, Fecal Ostomy  Diet: Regular  Mobility: Not Mobilizing   chest xray completed today.  Working on mobilizing with PT.     Plan: Ostomy nurses to continue to follow for ostomy needs and teaching until discharge    Anticipated discharge needs: Supplies, supplier information, possible HH, outpatient ostomy clinic, Skilled Nursing/Rehab     Secure Start Signed Yes  Outpatient Referral Placed Yes  5 Sets of appliances in Ostomy bag for discharge Ordered    INSURANCE OPTIONS:                 [employer] ([employer])              MediCARE/MEDICAID & All other Private Insurance companies (Prism Form)         X     MediCAID & Fee for Service (Care Chest Paperwork + [employer] Form)                            Form signed/Catalog Marked and Copy left with patient OR medicaid paperwork given to patient      Anticipated Discharge Plans:  Outpatient " "Wound clinic, Family Care, and Self Care    Ostomy Supplies for DC:  New Image Flextend Extended-Wear FLAT Skin Barrier 2 3/4\" (Shanon [id number]), FECAL 2 PIECE POUCH - 20 per month, 12in New Image Lock n' Roll withOUT Filter 2 3/4\" (Shanon [id number]), and Adapt Flat paste ring 2\" (Shanon [id number]) - 15 per month    "

## 2023-05-24 NOTE — CARE PLAN
The patient is Stable - Low risk of patient condition declining or worsening    Shift Goals  Clinical Goals: walking O2/ Pain Mgt/ CXR/ PT/OT evaluations  Patient Goals: Discharge Home  Family Goals: Update on POC    Progress made toward(s) clinical / shift goals:       Problem: Knowledge Deficit - Standard  Goal: Patient and family/care givers will demonstrate understanding of plan of care, disease process/condition, diagnostic tests and medications  Outcome: Progressing     Problem: Pain - Standard  Goal: Alleviation of pain or a reduction in pain to the patient’s comfort goal  Outcome: Progressing     Problem: Skin Integrity  Goal: Skin integrity is maintained or improved  Outcome: Progressing     Problem: Fall Risk  Goal: Patient will remain free from falls  Outcome: Progressing

## 2023-05-24 NOTE — CARE PLAN
The patient is Stable - Low risk of patient condition declining or worsening    Shift Goals  Clinical Goals: Pain control, tolerate diet, increased mobility    Progress made toward(s) clinical / shift goals:  Patient denies pain this shift, tolerating regular diet. Patient ambulated 125 feet x2 in hallway.    Patient is not progressing towards the following goals: N/A

## 2023-05-24 NOTE — PROGRESS NOTES
Bedside report received.  Assessment complete.    A&O x 4. Patient calls appropriately.    Patient ambulates with standby to x1 assist with FFW. Bed frame alarm on.     Patient states no pain at this time. No pharmacological interventions provided at this time.    Denies N&V. Tolerating regular diet.    R chest port incision, dermabond, EDUARDA. Right neck incision, dermabond, EDUARDA. 3 lap sites to abdomen, dermabond, EDUARDA.    + void, + flatus and + BM via LLQ ostomy, last BM 5/23.    Patient denies SOB.    Review plan with of care with patient. Call light and personal belongings within reach. Hourly rounding in place. All needs met at this time.

## 2023-05-25 PROBLEM — J44.9 COPD (CHRONIC OBSTRUCTIVE PULMONARY DISEASE) (HCC): Chronic | Status: ACTIVE | Noted: 2023-05-25

## 2023-05-25 PROCEDURE — 97162 PT EVAL MOD COMPLEX 30 MIN: CPT

## 2023-05-25 PROCEDURE — 97535 SELF CARE MNGMENT TRAINING: CPT

## 2023-05-25 PROCEDURE — 700111 HCHG RX REV CODE 636 W/ 250 OVERRIDE (IP): Performed by: SURGERY

## 2023-05-25 PROCEDURE — 77301 RADIOTHERAPY DOSE PLAN IMRT: CPT | Mod: 26 | Performed by: RADIOLOGY

## 2023-05-25 PROCEDURE — 77338 DESIGN MLC DEVICE FOR IMRT: CPT | Mod: 26 | Performed by: RADIOLOGY

## 2023-05-25 PROCEDURE — 77301 RADIOTHERAPY DOSE PLAN IMRT: CPT | Performed by: RADIOLOGY

## 2023-05-25 PROCEDURE — 77300 RADIATION THERAPY DOSE PLAN: CPT | Performed by: RADIOLOGY

## 2023-05-25 PROCEDURE — 77338 DESIGN MLC DEVICE FOR IMRT: CPT | Performed by: RADIOLOGY

## 2023-05-25 PROCEDURE — 770001 HCHG ROOM/CARE - MED/SURG/GYN PRIV*

## 2023-05-25 PROCEDURE — 77300 RADIATION THERAPY DOSE PLAN: CPT | Mod: 26 | Performed by: RADIOLOGY

## 2023-05-25 PROCEDURE — 97165 OT EVAL LOW COMPLEX 30 MIN: CPT

## 2023-05-25 RX ADMIN — ENOXAPARIN SODIUM 40 MG: 100 INJECTION SUBCUTANEOUS at 18:10

## 2023-05-25 ASSESSMENT — COGNITIVE AND FUNCTIONAL STATUS - GENERAL
DAILY ACTIVITIY SCORE: 20
WALKING IN HOSPITAL ROOM: A LITTLE
STANDING UP FROM CHAIR USING ARMS: A LITTLE
CLIMB 3 TO 5 STEPS WITH RAILING: A LOT
MOVING FROM LYING ON BACK TO SITTING ON SIDE OF FLAT BED: A LITTLE
HELP NEEDED FOR BATHING: A LITTLE
DRESSING REGULAR LOWER BODY CLOTHING: A LITTLE
SUGGESTED CMS G CODE MODIFIER DAILY ACTIVITY: CJ
PERSONAL GROOMING: A LITTLE
TURNING FROM BACK TO SIDE WHILE IN FLAT BAD: A LITTLE
MOBILITY SCORE: 15
TOILETING: A LITTLE
SUGGESTED CMS G CODE MODIFIER MOBILITY: CK
MOVING TO AND FROM BED TO CHAIR: UNABLE

## 2023-05-25 ASSESSMENT — PAIN DESCRIPTION - PAIN TYPE
TYPE: ACUTE PAIN
TYPE: ACUTE PAIN
TYPE: ACUTE PAIN;SURGICAL PAIN
TYPE: ACUTE PAIN;SURGICAL PAIN
TYPE: ACUTE PAIN
TYPE: ACUTE PAIN

## 2023-05-25 ASSESSMENT — GAIT ASSESSMENTS
ASSISTIVE DEVICE: NONE;SINGLE POINT CANE
DISTANCE (FEET): 80
DEVIATION: ANTALGIC;INCREASED BASE OF SUPPORT
GAIT LEVEL OF ASSIST: MINIMAL ASSIST

## 2023-05-25 ASSESSMENT — ACTIVITIES OF DAILY LIVING (ADL): TOILETING: INDEPENDENT
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
## 2023-05-25 NOTE — DISCHARGE PLANNING
Case Management Discharge Planning    Admission Date: 5/16/2023  GMLOS: 9.8  ALOS: 9    6-Clicks ADL Score: 20  6-Clicks Mobility Score: 15  PT and/or OT Eval ordered: Yes  Post-acute Referrals Ordered: Yes  Post-acute Choice Obtained: Yes  Has referral(s) been sent to post-acute provider:  Yes      Anticipated Discharge Dispo: Discharge Disposition: Discharged to home/self care (01)    DME Needed: No    Action(s) Taken: Choice obtained    Medically Clear: No    Next Steps: Received updates from bedside RN, pt needing HH post discharge.    Met with pt at bedside, obtained HH choice for [facility], faxed to [facility].    Barriers to Discharge: Medical clearence, HH acceptance

## 2023-05-25 NOTE — PROGRESS NOTES
Bedside report received.  Assessment complete.     A&O x 4. Patient calls appropriately.     Patient ambulates with standby to x1 assist with FFW. Bed frame alarm on.      Patient states no pain at this time. No pharmacological interventions provided at this time.     Denies N&V. Tolerating regular diet.     R chest port incision, dermabond, EDUARDA. Right neck incision, dermabond, EDUARDA. 3 lap sites to abdomen, dermabond, EDUARDA.     + void, + flatus and + BM via LLQ ostomy, last BM 5/24.     Patient denies SOB.     Review plan with of care with patient. Call light and personal belongings within reach. Hourly rounding in place. All needs met at this time.

## 2023-05-25 NOTE — PROGRESS NOTES
"S:  67 y.o.male s/p laparoscopic colostomy creation and port placement with volvulus of sigmoid colon repair.  overnight reports markedly improved pain.  Denies nausea or vomiting.  Tolerating clear liquids.  O:  BP 95/64   Pulse 82   Temp 37.1 °C (98.8 °F) (Temporal)   Resp 18   Ht 1.753 m (5' 9\")   Wt 88 kg (194 lb 0.1 oz)   SpO2 90%   No intake/output data recorded.  Recent Labs     05/22/23  0302 05/23/23  0714   SODIUM 137 136   POTASSIUM 4.4 4.0   CHLORIDE 98 100   CO2 30 27   GLUCOSE 160* 83   BUN 32* 18   CREATININE 0.59 0.50   CALCIUM 8.4* 8.4*       Recent Labs     05/22/23  0302 05/23/23  0714 05/24/23  1012   WBC 8.9 6.2 7.5   RBC 4.51* 3.84* 4.10*   HEMOGLOBIN 13.2* 11.2* 12.0*   HEMATOCRIT 40.3* 33.9* 36.4*   MCV 89.4 88.3 88.8   MCH 29.3 29.2 29.3   MCHC 32.8* 33.0 33.0   RDW 44.7 43.1 43.1   PLATELETCT 235 217 254   MPV 10.0 9.8 9.7         Alert and Oriented x3, No Acute Distress  Normal Respiratory Effort  Abdomen soft, appropriately tender  Incisions/Bandages clean/dry/intact  Extremities warm and well perfused  Ostomy pink and healthy, output stool and flatus      A/P: 67-year-old male with a history of rectal cancer status post colostomy creation and port placement revision of colostomy and sigmoid colectomy  Out of bed to chair for meals  Continue IV fluids  Continue ostomy education  Advance diet  DC planning when patient is tolerating regular diet with good bowel function  Yakov Sewell MD PhD  [facility]  Colon and Rectal Surgeon  [phone]  "

## 2023-05-25 NOTE — FACE TO FACE
Face to Face Note  -  Durable Medical Equipment    Yakov Sewell M.D. - NPI: [id number]  I certify that this patient is under my care and that they have had a durable medical equipment(DME)face to face encounter by myself that meets the physician DME face-to-face encounter requirements with this patient on:    Date of encounter:   Patient:                    MRN:                       YOB: 2023  Josh Lerner  [id number]  1955     The encounter with the patient was in whole, or in part, for the following medical condition, which is the primary reason for durable medical equipment:  Post-Op Surgery    I certify that, based on my findings, the following durable medical equipment is medically necessary:  Cane.        ------------------------------------------------------------------------------------------------------------------    Face to Face Supporting Documentation - Home Health    The encounter with this patient was in whole or in part the primary reason for home health admission.    Date of encounter:   Patient:                    MRN:                       YOB: 2023  Josh Lerner  [id number]  1955     Home health to see patient for:  Wound Care, Physical Therapy evaluation and treatment, and Occupational therapy evaluation and treatment    Skilled need for:  Surgical Aftercare ostomy    Skilled nursing interventions to include:  Wound Care    Homebound evidenced status by:  Needs the assistance of another person in order to leave the home. Leaving home must require a considerable and taxing effort. There must exist a normal inability to leave the home.    Community Physician to provide follow up care: Pcp Pt States None     Optional Interventions    Wound information & treatment:    Home Infusion Therapy orders:    Line/Drain/Airway:    I certify the face to face encounter for this home care referral meets the CMS requirements and the  encounter/clinical assessment with the patient was, in whole, or in part, for the medical condition(s) listed above, which is the primary reason for home health care. Based on my clinical findings: the service(s) are medically necessary, support the need for home health care, and the homebound criteria are met.  I certify that this patient has had a face to face encounter by myself.  Yakov Sewell M.D. - NPI: [id number]    *Debility, frailty and advanced age in the absence of an acute deterioration or exacerbation of a condition do not qualify a patient for home health.
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
## 2023-05-25 NOTE — DISCHARGE PLANNING
Case Management Discharge Planning    Admission Date: 5/16/2023  GMLOS: 9.8  ALOS: 9    6-Clicks ADL Score: 20  6-Clicks Mobility Score: 15  PT and/or OT Eval ordered: Yes  Post-acute Referrals Ordered: Yes  Post-acute Choice Obtained: Yes  Has referral(s) been sent to post-acute provider:  Yes      Anticipated Discharge Dispo: Discharge Disposition: D/T to home under care of home health w/planned hosp IP readmit (86)    DME Needed: Yes    DME Ordered: Yes    Action(s) Taken: Updated Provider/Nurse on Discharge Plan  This RN CM spoke with Joyce of [facility].    Pt has been accepted and Joyce is aware that the plan is to discharge Pt tonight or tomorrow.    Per Dr Joanne Cook to follow Pt while on Home Health.     This RN CM informed MARITZA Garcia  about this update.  This RN CM requested MARITZA Elkins to order Pt;s cane with Traction.   Per MARITZA Elkins Pt's wife will just purchase  a cane.    This RN CM requested Dr Sewell to write face to face for DME oxygen. , order is in but there is no face to face.    Escalations Completed: None    Medically Clear: No    Next Steps:  This RN CM to continue  to assist Pt with discharge as needed    Barriers to Discharge:   Pending medical clearance  Pending DME acceptance and delivery to bedside.     Is the patient up for discharge tomorrow: No    Addendum: 5/26/23     Pt has been accepted by [facility], [facility] is now processing Pt's oxygen. Dr Sewell updated the oxygen order. Informed Kiley of [facility].    Per Kiley Pt has  been accepted and oxygen will be delivered soon. This RN CM requested MARITZA Rg to order the cane with Traction.

## 2023-05-25 NOTE — PROGRESS NOTES
"S:  67 y.o.male s/p laparoscopic colostomy creation and port placement with volvulus of sigmoid colon repair.    Denies nausea or vomiting.  Tolerating regular diet  O:  BP 95/64   Pulse 82   Temp 37.1 °C (98.8 °F) (Temporal)   Resp 18   Ht 1.753 m (5' 9\")   Wt 88 kg (194 lb 0.1 oz)   SpO2 90%   No intake/output data recorded.  Recent Labs     05/22/23  0302 05/23/23  0714   SODIUM 137 136   POTASSIUM 4.4 4.0   CHLORIDE 98 100   CO2 30 27   GLUCOSE 160* 83   BUN 32* 18   CREATININE 0.59 0.50   CALCIUM 8.4* 8.4*       Recent Labs     05/22/23  0302 05/23/23  0714 05/24/23  1012   WBC 8.9 6.2 7.5   RBC 4.51* 3.84* 4.10*   HEMOGLOBIN 13.2* 11.2* 12.0*   HEMATOCRIT 40.3* 33.9* 36.4*   MCV 89.4 88.3 88.8   MCH 29.3 29.2 29.3   MCHC 32.8* 33.0 33.0   RDW 44.7 43.1 43.1   PLATELETCT 235 217 254   MPV 10.0 9.8 9.7         Alert and Oriented x3, No Acute Distress  Normal Respiratory Effort  Abdomen soft, appropriately tender  Incisions/Bandages clean/dry/intact  Extremities warm and well perfused  Ostomy pink and healthy, output stool and flatus      A/P: 67-year-old male with a history of rectal cancer status post colostomy creation and port placement revision of colostomy and sigmoid colectomy  Out of bed to chair for meals  PT/OT reccs for DC  Continue ostomy education  Continue diet  Yakov Sewell MD PhD  [facility]  Colon and Rectal Surgeon  [phone]  "

## 2023-05-25 NOTE — PROGRESS NOTES
AA&Ox4. Denies CP/SOB.  No complaints of pain.  Educated patient regarding pharmacologic and non pharmacologic modalities for pain management.  Skin per flowsheet.  Tolerating regular diet. Denies N/V.  + void. Last BM 5/25 per ostomy.  Pt ambulates SB w/FWW.  All needs met at this time. Call light within reach. Pt calls appropriately. Bed low and locked, non skid socks in place. Hourly rounding in place.

## 2023-05-25 NOTE — CARE PLAN
The patient is Stable - Low risk of patient condition declining or worsening    Problem: Knowledge Deficit - Standard  Goal: Patient and family/care givers will demonstrate understanding of plan of care, disease process/condition, diagnostic tests and medications  Outcome: Progressing     Problem: Pain - Standard  Goal: Alleviation of pain or a reduction in pain to the patient’s comfort goal  Outcome: Progressing     Shift Goals  Clinical Goals: pain control, rest  Patient Goals: rest, comfort  Family Goals: Update on POC    Progress made toward(s) clinical / shift goals:  Patient's pain managed per MAR. Patient is able to obtain adequate rest during this shift.    Patient is not progressing towards the following goals:

## 2023-05-26 VITALS
BODY MASS INDEX: 28.73 KG/M2 | TEMPERATURE: 98.4 F | HEIGHT: 69 IN | DIASTOLIC BLOOD PRESSURE: 70 MMHG | HEART RATE: 76 BPM | SYSTOLIC BLOOD PRESSURE: 110 MMHG | RESPIRATION RATE: 16 BRPM | OXYGEN SATURATION: 94 % | WEIGHT: 194 LBS

## 2023-05-26 PROBLEM — Z93.3 COLOSTOMY IN PLACE (HCC): Status: ACTIVE | Noted: 2023-05-26

## 2023-05-26 RX ORDER — CALCIUM CARBONATE 500 MG/1
500 TABLET, CHEWABLE ORAL
Status: DISCONTINUED | OUTPATIENT
Start: 2023-05-26 | End: 2023-05-26 | Stop reason: HOSPADM

## 2023-05-26 ASSESSMENT — PATIENT HEALTH QUESTIONNAIRE - PHQ9
2. FEELING DOWN, DEPRESSED, IRRITABLE, OR HOPELESS: NOT AT ALL
SUM OF ALL RESPONSES TO PHQ9 QUESTIONS 1 AND 2: 0
1. LITTLE INTEREST OR PLEASURE IN DOING THINGS: NOT AT ALL

## 2023-05-26 ASSESSMENT — PAIN DESCRIPTION - PAIN TYPE
TYPE: ACUTE PAIN
TYPE: ACUTE PAIN

## 2023-05-26 NOTE — DIETARY
Nutrition Update:    Day 10 of admit.  Josh Lerner is a 67 y.o. male with admitting DX of Rectum neoplasm [D49.0].  Patient being followed to optimize nutrition.    Current Diet: Regular. Per ADL,  PO variable but eating 50% or greater of last five out of seven documented meals + supplements. No supplements orders in place.   Pt reports minimal intake /poor appetite and he dislikes hospital food but states family bringing in foods from home that he tolerates better with Top care supplement drinks brought from home. Discussed tips for optimizing nutrition and encouraged small, frequent meals with nutrient dense foods, reviewed different high Kcal and high protein foods as well as foods tolerated better with colostomy. Pt verbalized understanding and declined handouts on topics discussed; pt states he has a lot of literature at home. Pt declined adding any snacks, etc as he states he is leaving today.   Pt status post colostomy creation and port placement revision of colostomy and sigmoid colectomy      Problem: Nutritional:  Goal: Achieve adequate nutritional intake  Description: Patient will consume >50% of meals  Outcome: Not met.       Plan/Rec: Will add Boost Plus to meals for pt to try. RD to follow for adequate intake. RD following.

## 2023-05-26 NOTE — FACE TO FACE
"Face to Face Note  -  Durable Medical Equipment    Yakov Sewell M.D. - NPI: [id number]  I certify that this patient is under my care and that they had a durable medical equipment(DME)face to face encounter by myself that meets the physician DME face-to-face encounter requirements with this patient on:    Date of encounter:   Patient:                    MRN:                       YOB: 2023  [facility]  [id number]  1955     The encounter with the patient was in whole, or in part, for the following medical condition, which is the primary reason for durable medical equipment:  COPD    I certify that, based on my findings, the following durable medical equipment is medically necessary:    Oxygen   HOME O2 Saturation Measurements:(Values must be present for Home Oxygen orders)  Room air sat at rest: 88  Room air sat with amb: 83  With liters of O2: 5, O2 sat at rest with O2: 95  With Liters of O2: 6, O2 sat with amb with O2 : 94  Is the patient mobile?: Yes  If patient feels more short of breath, they can go up to 6 liters per minute and contact healthcare provider.    Supporting Symptoms: The patient requires supplemental oxygen, as the following interventions have been tried with limited or no improvement: \"Ambulation with oximetry and \"Incentive spirometry.    My Clinical findings support the need for the above equipment due to:  Hypoxia  "

## 2023-05-26 NOTE — PROGRESS NOTES
"S:  67 y.o.male s/p laparoscopic colostomy creation and port placement with volvulus of sigmoid colon repair.    Denies nausea or vomiting.  Tolerating regular diet  O:  /71   Pulse 85   Temp 36.6 °C (97.9 °F) (Temporal)   Resp 18   Ht 1.753 m (5' 9\")   Wt 88 kg (194 lb 0.1 oz)   SpO2 95%   No intake/output data recorded.  Recent Labs     05/23/23  0714   SODIUM 136   POTASSIUM 4.0   CHLORIDE 100   CO2 27   GLUCOSE 83   BUN 18   CREATININE 0.50   CALCIUM 8.4*       Recent Labs     05/23/23  0714 05/24/23  1012   WBC 6.2 7.5   RBC 3.84* 4.10*   HEMOGLOBIN 11.2* 12.0*   HEMATOCRIT 33.9* 36.4*   MCV 88.3 88.8   MCH 29.2 29.3   MCHC 33.0 33.0   RDW 43.1 43.1   PLATELETCT 217 254   MPV 9.8 9.7         Alert and Oriented x3, No Acute Distress  Normal Respiratory Effort  Abdomen soft, appropriately tender  Incisions/Bandages clean/dry/intact  Extremities warm and well perfused  Ostomy pink and healthy, output stool and flatus      A/P: 67-year-old male with a history of rectal cancer status post colostomy creation and port placement revision of colostomy and sigmoid colectomy  Out of bed to chair for meals  PT/OT reccs for DC  Continue ostomy education  Continue diet  Home in the AM if DC needs met at home  Yakov Sewell MD PhD  [facility]  Colon and Rectal Surgeon  [phone]  "

## 2023-05-26 NOTE — DISCHARGE PLANNING
0828-  Received Choice Form @: 0816  Agency/Facility Name: [facility]  Referral Sent per Choice Form @: 0828  (Sent via hard fax through )    0914-  Agency/Facility Name: [facility]  Spoke To: Joyce  Outcome: Per KAM Cook to inform intake when pt is discharging.

## 2023-05-26 NOTE — PROGRESS NOTES
Bedside report received.  Assessment complete.     A&O x 4. Patient calls appropriately.     Patient ambulates with standby assist with cane.      Patient states no pain at this time. No pharmacological interventions provided at this time.     Denies N&V. Tolerating regular diet.     R chest port incision, dermabond, EDUARDA. Right neck incision, dermabond, EDUARDA. 3 lap sites to abdomen, dermabond, EDUARDA.     + void, + flatus and + BM via LLQ ostomy, last BM 5/25.     Patient denies SOB.     Review plan with of care with patient. Call light and personal belongings within reach. Hourly rounding in place. All needs met at this time.

## 2023-05-26 NOTE — CARE PLAN
The patient is Stable - Low risk of patient condition declining or worsening    Problem: Knowledge Deficit - Standard  Goal: Patient and family/care givers will demonstrate understanding of plan of care, disease process/condition, diagnostic tests and medications  Outcome: Progressing     Problem: Pain - Standard  Goal: Alleviation of pain or a reduction in pain to the patient’s comfort goal  Outcome: Progressing     Shift Goals  Clinical Goals: rest  Patient Goals: rest, comfort  Family Goals: Update on POC    Progress made toward(s) clinical / shift goals:  Patient's pain managed per MAR. Patient able to obtain adequate rest during this shift.    Patient is not progressing towards the following goals:

## 2023-05-29 NOTE — DISCHARGE SUMMARY
Discharge Summary    CHIEF COMPLAINT ON ADMISSION  No chief complaint on file.      Reason for Admission  Rectum neoplasm     Admission Date  5/16/2023    CODE STATUS  Prior    HPI & HOSPITAL COURSE  This is a 67 y.o. male here with rectal cancer status post port placement and and colostomy creation.  The patient's hospital course was complicated by a sigmoid volvulus involving the sigmoid colon and portion used for sigmoid colostomy.  He was returned to the operating room on postoperative day 3 and the sigmoid colon was resected and the descending colon was instead used for the ostomy.he then continued to improve and at time of discharge was participating with all of his own ostomy care, ambulating without assistance with good pain control and bowel function.  He was seen by physical therapy and occupational therapy for discharge recommendations.  He was given a postoperative appointment as well as instructions.    Therefore, he is discharged in good and stable condition to home with organized home healthcare and close outpatient follow-up.    The patient met 2-midnight criteria for an inpatient stay at the time of discharge.    Discharge Date  5/26/2023    FOLLOW UP ITEMS POST DISCHARGE  Oncology treatment for rectal cancer    DISCHARGE DIAGNOSES  Principal Problem:    COPD (chronic obstructive pulmonary disease) (HCC) (Chronic) (POA: Yes)  Active Problems:    Malignant neoplasm of rectum (HCC) (POA: Yes)    Colostomy in place (HCC) (POA: No)  Resolved Problems:    * No resolved hospital problems. *      FOLLOW UP  Future Appointments   Date Time Provider Department Center   5/30/2023  8:00 AM TAMIE Aly [facility] [address]   5/30/2023  3:45 PM Rohini STEVEN M.D. RADT None   5/31/2023  3:45 PM RADIATION THERAPY RADT None   6/1/2023  3:45 PM RADIATION THERAPY RADT None   6/2/2023  3:45 PM RADIATION THERAPY RADT None   6/5/2023  3:45 PM RADIATION THERAPY RADT None   6/6/2023  3:45 PM RADIATION THERAPY  RADT None   6/7/2023  3:45 PM RADIATION THERAPY RADT None   6/8/2023  3:45 PM RADIATION THERAPY RADT None   6/9/2023  3:45 PM RADIATION THERAPY RADT None   6/12/2023  3:45 PM RADIATION THERAPY RADT None   6/13/2023  3:45 PM RADIATION THERAPY RADT None   6/14/2023  3:45 PM RADIATION THERAPY RADT None   6/15/2023  3:45 PM RADIATION THERAPY RADT None   6/16/2023  3:45 PM RADIATION THERAPY RADT None   6/19/2023  3:45 PM RADIATION THERAPY RADT None   6/20/2023  3:45 PM RADIATION THERAPY RADT None   6/21/2023  3:45 PM RADIATION THERAPY RADT None   6/22/2023  3:45 PM RADIATION THERAPY RADT None   6/23/2023  3:45 PM RADIATION THERAPY RADT None   6/26/2023  3:45 PM RADIATION THERAPY RADT None   6/27/2023  3:45 PM RADIATION THERAPY RADT None   6/28/2023  3:45 PM RADIATION THERAPY RADT None   6/29/2023  3:45 PM RADIATION THERAPY RADT None   6/30/2023  3:45 PM RADIATION THERAPY RADT None   7/3/2023  3:45 PM RADIATION THERAPY RADT None   7/5/2023  3:45 PM RADIATION THERAPY RADT None   7/6/2023  3:45 PM RADIATION THERAPY RADT None   7/7/2023  3:45 PM RADIATION THERAPY RADT None   7/10/2023  3:45 PM RADIATION THERAPY RADT None   7/11/2023  3:45 PM RADIATION THERAPY RADT None     [facility]  [address]  [place] [address]  [phone]  Follow up in 1 week(s)  For ostomy assistance    Yakov Sewell M.D.  [address] [facility]  [address]  [facility] [id number] [phone]    Schedule an appointment as soon as possible for a visit in 2 week(s)  Routine follow-up, For wound re-check    Pcp Pt States None            MEDICATIONS ON DISCHARGE     Medication List        CONTINUE taking these medications        Instructions   acetaminophen 500 MG Tabs  Commonly known as: TYLENOL   Take 500-1,000 mg by mouth 3 times a day.  Dose: 500-1,000 mg     BENEFIBER PO   Take  by mouth as needed.     naproxen 220 MG tablet  Commonly known as: ANAPROX   Take 220 mg by mouth 3 times a day.  Dose: 220 mg            STOP taking these  medications      polyethylene glycol/lytes 17 g Pack  Commonly known as: MIRALAX              Allergies  No Known Allergies    DIET  No orders of the defined types were placed in this encounter.      ACTIVITY  As tolerated.  Exercise encouraged.  10-lb lifting restriction    CONSULTATIONS  Pathology  Wound care  Physical therapy and occupational therapy    PROCEDURES  Laparoscopic creation of a sigmoid colostomy with port placement  Take back for Ostomy revision with creation of an end ostomy using descending colon    LABORATORY  Lab Results   Component Value Date    SODIUM 136 05/23/2023    POTASSIUM 4.0 05/23/2023    CHLORIDE 100 05/23/2023    CO2 27 05/23/2023    GLUCOSE 83 05/23/2023    BUN 18 05/23/2023    CREATININE 0.50 05/23/2023        Lab Results   Component Value Date    WBC 7.5 05/24/2023    HEMOGLOBIN 12.0 (L) 05/24/2023    HEMATOCRIT 36.4 (L) 05/24/2023    PLATELETCT 254 05/24/2023        Total time of the discharge process exceeds 25 minutes.

## 2023-05-30 ENCOUNTER — HOSPITAL ENCOUNTER (OUTPATIENT)
Dept: RADIATION ONCOLOGY | Facility: MEDICAL CENTER | Age: 68
End: 2023-05-30

## 2023-05-30 ENCOUNTER — HOSPITAL ENCOUNTER (OUTPATIENT)
Facility: MEDICAL CENTER | Age: 68
End: 2023-05-30
Attending: INTERNAL MEDICINE
Payer: MEDICARE

## 2023-05-30 LAB
ALBUMIN SERPL BCP-MCNC: 3.1 G/DL (ref 3.2–4.9)
ALBUMIN/GLOB SERPL: 1.2 G/DL
ALP SERPL-CCNC: 87 U/L (ref 30–99)
ALT SERPL-CCNC: 34 U/L (ref 2–50)
ANION GAP SERPL CALC-SCNC: 7 MMOL/L (ref 7–16)
AST SERPL-CCNC: 19 U/L (ref 12–45)
BILIRUB SERPL-MCNC: 0.2 MG/DL (ref 0.1–1.5)
BUN SERPL-MCNC: 20 MG/DL (ref 8–22)
CALCIUM ALBUM COR SERPL-MCNC: 9.1 MG/DL (ref 8.5–10.5)
CALCIUM SERPL-MCNC: 8.4 MG/DL (ref 8.5–10.5)
CEA SERPL-MCNC: 48.6 NG/ML (ref 0–3)
CHEMOTHERAPY INFUSION START DATE: NORMAL
CHEMOTHERAPY RECORDS: 1.8
CHEMOTHERAPY RECORDS: 4500
CHEMOTHERAPY RECORDS: NORMAL
CHEMOTHERAPY RX CANCER: NORMAL
CHLORIDE SERPL-SCNC: 102 MMOL/L (ref 96–112)
CO2 SERPL-SCNC: 29 MMOL/L (ref 20–33)
CREAT SERPL-MCNC: 0.65 MG/DL (ref 0.5–1.4)
DATE 1ST CHEMO CANCER: NORMAL
GFR SERPLBLD CREATININE-BSD FMLA CKD-EPI: 103 ML/MIN/1.73 M 2
GLOBULIN SER CALC-MCNC: 2.6 G/DL (ref 1.9–3.5)
GLUCOSE SERPL-MCNC: 114 MG/DL (ref 65–99)
POTASSIUM SERPL-SCNC: 3.9 MMOL/L (ref 3.6–5.5)
PROT SERPL-MCNC: 5.7 G/DL (ref 6–8.2)
RAD ONC ARIA COURSE LAST TREATMENT DATE: NORMAL
RAD ONC ARIA COURSE TREATMENT ELAPSED DAYS: NORMAL
RAD ONC ARIA REFERENCE POINT DOSAGE GIVEN TO DATE: 1.8
RAD ONC ARIA REFERENCE POINT DOSAGE GIVEN TO DATE: 1.85
RAD ONC ARIA REFERENCE POINT ID: NORMAL
RAD ONC ARIA REFERENCE POINT ID: NORMAL
RAD ONC ARIA REFERENCE POINT SESSION DOSAGE GIVEN: 1.8
RAD ONC ARIA REFERENCE POINT SESSION DOSAGE GIVEN: 1.85
SODIUM SERPL-SCNC: 138 MMOL/L (ref 135–145)

## 2023-05-30 PROCEDURE — 77386 HCHG IMRT DELIVERY COMPLEX: CPT | Performed by: RADIOLOGY

## 2023-05-30 PROCEDURE — 82378 CARCINOEMBRYONIC ANTIGEN: CPT

## 2023-05-30 PROCEDURE — 77280 THER RAD SIMULAJ FIELD SMPL: CPT | Performed by: RADIOLOGY

## 2023-05-30 PROCEDURE — 80053 COMPREHEN METABOLIC PANEL: CPT

## 2023-05-30 NOTE — CT SIMULATION
DATE OF SERVICE: 5/30/2023    Radiation Therapy Episodes       Active Episodes       Radiation Therapy: IMRT (5/29/2023)                   Radiation Treatments         Plan Last Treated On Elapsed Days Fractions Treated Prescribed Fraction Dose (cGy) Prescribed Total Dose (cGy)    Rectum 5/30/2023 0 @ [id number] 1 of 25 180 4,500                  Reference Point Last Treated On Elapsed Days Most Recent Session Dose (cGy) Total Dose (cGy)    Rectum 5/30/2023 0 @ [id number] 180 180    Rectum CP 5/30/2023 0 @ [id number] 185 185                            First Visit Simple Simulation: Called by [employer] machine to verify treatment parameters including:  treatment site, treatment dose, and treatment setup prior to first treatment. Image derived shifts reviewed in all appropriate planes.  Shifts approved.  Patient treated.    I have personally reviewed the relevant data, performed the target localization, and determined all relevant factors for this patient’s simulation.

## 2023-05-31 ENCOUNTER — HOSPITAL ENCOUNTER (OUTPATIENT)
Dept: RADIATION ONCOLOGY | Facility: MEDICAL CENTER | Age: 68
End: 2023-05-31

## 2023-05-31 VITALS
WEIGHT: 192.68 LBS | BODY MASS INDEX: 28.45 KG/M2 | DIASTOLIC BLOOD PRESSURE: 65 MMHG | HEART RATE: 82 BPM | SYSTOLIC BLOOD PRESSURE: 122 MMHG | OXYGEN SATURATION: 98 %

## 2023-05-31 LAB
CHEMOTHERAPY INFUSION START DATE: NORMAL
CHEMOTHERAPY RECORDS: 1.8
CHEMOTHERAPY RECORDS: 4500
CHEMOTHERAPY RECORDS: NORMAL
CHEMOTHERAPY RX CANCER: NORMAL
DATE 1ST CHEMO CANCER: NORMAL
RAD ONC ARIA COURSE LAST TREATMENT DATE: NORMAL
RAD ONC ARIA COURSE TREATMENT ELAPSED DAYS: NORMAL
RAD ONC ARIA REFERENCE POINT DOSAGE GIVEN TO DATE: 3.6
RAD ONC ARIA REFERENCE POINT DOSAGE GIVEN TO DATE: 3.7
RAD ONC ARIA REFERENCE POINT ID: NORMAL
RAD ONC ARIA REFERENCE POINT ID: NORMAL
RAD ONC ARIA REFERENCE POINT SESSION DOSAGE GIVEN: 1.8
RAD ONC ARIA REFERENCE POINT SESSION DOSAGE GIVEN: 1.85

## 2023-05-31 PROCEDURE — 77386 HCHG IMRT DELIVERY COMPLEX: CPT | Performed by: RADIOLOGY

## 2023-05-31 PROCEDURE — 77014 PR CT GUIDANCE PLACEMENT RAD THERAPY FIELDS: CPT | Mod: 26 | Performed by: RADIOLOGY

## 2023-05-31 ASSESSMENT — FIBROSIS 4 INDEX: FIB4 SCORE: 0.86

## 2023-05-31 NOTE — ON TREATMENT VISIT
ON TREATMENT  NOTE  RADIATION ONCOLOGY DEPARTMENT    Patient name:  Josh Lerner    Primary Physician:  Pcp Pt States None MRN: [id number]  CSN: [id number]   Referring physician:  Yakov Sewell M.D.   : 1955, 67 y.o.     ENCOUNTER DATE:  2023    DIAGNOSIS:  Malignant neoplasm of rectum (HCC)  Staging form: Colon and Rectum, AJCC 8th Edition  - Clinical stage from 2023: Stage IIC (cT4b, cN0, cM0) - Signed by Luis Bustillos M.D. on 2023  Total positive nodes: 0  Histologic grade (G): G2  Histologic grading system: 4 grade system  Microsatellite instability (MSI): Stable      TREATMENT SUMMARY:  Course First Treatment Date 2023  Course Last Treatment Date 2023  Radiation Treatments       Active   Plans   Rectum   Most recent treatment: Dose planned: 180 cGy (fraction 2 of 25 on 2023)   Total: Dose planned: 4,500 cGy   Elapsed Days:  @ [id number]           Historical   No historical radiation treatments to show.                 SUBJECTIVE:  Moderate pain and urinary hesitancy     VITAL SIGNS:      2023     3:52 PM 2023     7:29 AM 2023     5:36 AM 2023     7:53 PM 2023     4:05 PM 2023     7:40 AM 2023     3:45 AM   Vitals   SYSTOLIC 122 110 121 116 121 116 106   DIASTOLIC 65 70 67 74 71 67 67   Pulse 82 76 70 75 85 76 73   Temperature  36.9 °C (98.4 °F) 36.4 °C (97.5 °F) 36.7 °C (98.1 °F) 36.6 °C (97.9 °F) 36.6 °C (97.9 °F) 37.1 °C (98.8 °F)   Respiration  16 18 18 18 17 18   Weight 192.68         BMI 28.45 kg/m2         Pulse Oximetry 98 % 94 % 92 % 96 % 95 % 91 % 91 %     KPS: 100, Fully active, able to carry on all pre-disease performed without restriction (ECOG equivalent 0)  Encounter Vitals  Blood Pressure : 122/65  Pulse: 82  Pulse Oximetry: 98 %  Weight: 87.4 kg (192 lb 10.9 oz)      2023     1:01 PM   Pain Assessment   Pain Score 5=MODERATE P   Pain Loc RECTUM          PHYSICAL EXAM:  Physical Exam  Constitutional:        Appearance: Normal appearance.   Abdominal:      General: There is no distension.      Palpations: Abdomen is soft.   Skin:     Findings: No erythema.   Neurological:      Mental Status: He is alert.              5/31/2023     3:48 PM   Toxicity Assessment   Toxicity Assessment Male Pelvis   Fatigue (lethargy, malaise, asthenia) None   Radiation Dermatitis None   Anorexia None   Colitis None   Constipation None   Dehydration None   Diarrhea w/o Colostomy Increase of <4 stools/day over pre-treatment   Flatulence Moderate   Nausea None   Proctitis None   Vomiting None   RT - Pain due to RT Mild pain not interfering with function   Tumor Pain (onset or exacerbation of tumor pain due to treatment) Moderate pain, pain or analgesics interfering with function, but not interfering with activities of daily living   Dysuria (painful urination) None   Urinary Frequency Normal   Urinary Urgency None   Bladder Spasms Absent   Incontinence None   Urinary Retention Hesitency or dribbling, but no significant residual urine and/or retention occuring during the immediate postoperative period       CURRENT MEDICATIONS:    Current Outpatient Medications:     naproxen (ANAPROX) 220 MG tablet, Take 220 mg by mouth 3 times a day., Disp: , Rfl:     acetaminophen (TYLENOL) 500 MG Tab, Take 500-1,000 mg by mouth 3 times a day., Disp: , Rfl:     Wheat Dextrin (BENEFIBER PO), Take  by mouth as needed., Disp: , Rfl:     LABORATORY DATA:   Lab Results   Component Value Date/Time    SODIUM 138 05/30/2023 09:00 AM    POTASSIUM 3.9 05/30/2023 09:00 AM    CHLORIDE 102 05/30/2023 09:00 AM    CO2 29 05/30/2023 09:00 AM    GLUCOSE 114 (H) 05/30/2023 09:00 AM    BUN 20 05/30/2023 09:00 AM    CREATININE 0.65 05/30/2023 09:00 AM       Lab Results   Component Value Date/Time    WBC 7.5 05/24/2023 10:12 AM    RBC 4.10 (L) 05/24/2023 10:12 AM    HEMOGLOBIN 12.0 (L) 05/24/2023 10:12 AM    HEMATOCRIT 36.4 (L) 05/24/2023 10:12 AM    MCV 88.8 05/24/2023  10:12 AM    MCH 29.3 05/24/2023 10:12 AM    MCHC 33.0 05/24/2023 10:12 AM    PLATELETCT 254 05/24/2023 10:12 AM         RADIOLOGY DATA:  CT-ABDOMEN-PELVIS WITH    Result Date: 5/19/2023  1. Left lower quadrant ostomy. There is intraperitoneal air which could relate to recent surgery. Correlate clinically. 2. Dilated colon proximal to the colostomy and decompressed sigmoid colon, concerning for colonic obstruction. The transition point appears to be at the anastomosis or the colostomy. 3. Lobulated rectal mass is redemonstrated.    DX-CHEST-2 VIEWS    Result Date: 5/24/2023  Bibasilar pulmonary opacities again noted, with possible slight increase in left. This could be atelectasis/scarring with pneumonitis not excluded.    DX-PORTABLE FLUOROSCOPY < 1 HOUR    Result Date: 5/17/2023  Portable fluoroscopy utilized for 18 seconds. INTERPRETING LOCATION: [address] [place] [address], [facility], [id number]    MR-RECTAL/PROSTATE PROTOCOL    Result Date: 4/19/2023  1.  Large circumferential low rectal tumor with extension into the prostate and internal anal sphincter.  T4b N0 2.  Peripherally enhancing irregular fluid collection adjacent the tumor in the LEFT perirectal space, extending through the mesorectal fascia into the LEFT obturator space, which appears to communicate through the mass indicating abscess/contained perforation.     DX-ABDOMEN FOR TUBE PLACEMENT    Result Date: 5/19/2023  1.  Nasogastric tube is now looped in the stomach. 2.  Gaseous dilated bowel loops.    DX-ABDOMEN FOR TUBE PLACEMENT    Result Date: 5/19/2023  1.  NG tube tip projects at the distal esophagus. Recommend advancing before use. 2.  Partially visualized gaseous distention of the colon and bowel. 3.  Bibasilar airspace disease, likely atelectasis.      IMPRESSION:  Cancer Staging   Malignant neoplasm of rectum (HCC)  Staging form: Colon and Rectum, AJCC 8th Edition  - Clinical stage from 4/24/2023: Stage IIC (cT4b, cN0, cM0) - Signed by Luis Bustillos M.D. on  4/24/2023      PLAN:  No change in treatment plan    Disposition:  Treatment plan and imaging reviewed. Questions answered. Continue therapy outlined. Cont tylenol and aleve for pain and inflammation of prostate to help with urinary retension as maybe due to prostate involvement of tumor.    Luis Bustillos M.D.    No orders of the defined types were placed in this encounter.

## 2023-06-01 ENCOUNTER — HOSPITAL ENCOUNTER (OUTPATIENT)
Dept: RADIATION ONCOLOGY | Facility: MEDICAL CENTER | Age: 68
End: 2023-06-01

## 2023-06-01 ENCOUNTER — HOSPITAL ENCOUNTER (OUTPATIENT)
Dept: RADIATION ONCOLOGY | Facility: MEDICAL CENTER | Age: 68
End: 2023-06-30
Attending: RADIOLOGY
Payer: MEDICARE

## 2023-06-01 LAB
CHEMOTHERAPY INFUSION START DATE: NORMAL
CHEMOTHERAPY RECORDS: 1.8
CHEMOTHERAPY RECORDS: 4500
CHEMOTHERAPY RECORDS: NORMAL
CHEMOTHERAPY RX CANCER: NORMAL
DATE 1ST CHEMO CANCER: NORMAL
RAD ONC ARIA COURSE LAST TREATMENT DATE: NORMAL
RAD ONC ARIA COURSE TREATMENT ELAPSED DAYS: NORMAL
RAD ONC ARIA REFERENCE POINT DOSAGE GIVEN TO DATE: 5.4
RAD ONC ARIA REFERENCE POINT DOSAGE GIVEN TO DATE: 5.56
RAD ONC ARIA REFERENCE POINT ID: NORMAL
RAD ONC ARIA REFERENCE POINT ID: NORMAL
RAD ONC ARIA REFERENCE POINT SESSION DOSAGE GIVEN: 1.8
RAD ONC ARIA REFERENCE POINT SESSION DOSAGE GIVEN: 1.85

## 2023-06-01 PROCEDURE — 77386 HCHG IMRT DELIVERY COMPLEX: CPT | Performed by: RADIOLOGY

## 2023-06-01 PROCEDURE — 77014 PR CT GUIDANCE PLACEMENT RAD THERAPY FIELDS: CPT | Mod: 26 | Performed by: RADIOLOGY

## 2023-06-01 PROCEDURE — 77336 RADIATION PHYSICS CONSULT: CPT | Performed by: RADIOLOGY

## 2023-06-02 ENCOUNTER — HOSPITAL ENCOUNTER (OUTPATIENT)
Dept: RADIATION ONCOLOGY | Facility: MEDICAL CENTER | Age: 68
End: 2023-06-02
Payer: MEDICARE

## 2023-06-02 ENCOUNTER — DOCUMENTATION (OUTPATIENT)
Dept: RADIATION ONCOLOGY | Facility: MEDICAL CENTER | Age: 68
End: 2023-06-02
Payer: MEDICARE

## 2023-06-02 LAB
CHEMOTHERAPY INFUSION START DATE: NORMAL
CHEMOTHERAPY RECORDS: 1.8
CHEMOTHERAPY RECORDS: 4500
CHEMOTHERAPY RECORDS: NORMAL
CHEMOTHERAPY RX CANCER: NORMAL
DATE 1ST CHEMO CANCER: NORMAL
RAD ONC ARIA COURSE LAST TREATMENT DATE: NORMAL
RAD ONC ARIA COURSE TREATMENT ELAPSED DAYS: NORMAL
RAD ONC ARIA REFERENCE POINT DOSAGE GIVEN TO DATE: 7.2
RAD ONC ARIA REFERENCE POINT DOSAGE GIVEN TO DATE: 7.41
RAD ONC ARIA REFERENCE POINT ID: NORMAL
RAD ONC ARIA REFERENCE POINT ID: NORMAL
RAD ONC ARIA REFERENCE POINT SESSION DOSAGE GIVEN: 1.8
RAD ONC ARIA REFERENCE POINT SESSION DOSAGE GIVEN: 1.85

## 2023-06-02 PROCEDURE — 77014 PR CT GUIDANCE PLACEMENT RAD THERAPY FIELDS: CPT | Mod: 26 | Performed by: RADIOLOGY

## 2023-06-02 PROCEDURE — 77386 HCHG IMRT DELIVERY COMPLEX: CPT | Performed by: RADIOLOGY

## 2023-06-02 NOTE — PROGRESS NOTES
Nutrition Services: RD Consultation/ New Radiation Start  Josh Lerner is a 67 y.o. male with diagnosis of malignant neoplasm of rectum    RD met with pt to assess current intake, appetite, and nutritional status.  Pt presents to appointment with his son. Pt states he recently quit smoking and is feeling hungry all the time. He states he has a good appetite and eating frequently throughout the day. He has an ostomy in place that he reports emptying it 3-4x/ day. Pt reports no GI concerns. He states he has medications ready for if he experiences any nausea. Per pt, his wife cooks and prepares his food most of the time. He reports he has a weight loss of ~60 lbs in the past 1 year. Per pt, he is not able to be physically active per MD as his ostomy is healing, but he would like to be active again and lift weights when okay per MD. Pt asked about having undercooked meats and if he can eat steak and candy. Pt did not express any further nutrition-related questions or concerns at this time.     Dietary intake pattern:    Per pt, he eats all types of foods and snacks. He reports he has snacks in the car which include a sandwich in the car, pepperoni and cheese, crackers, soda and candy. He reports he normally has meals with chicken or fish for protein. Pt states he normally drinks 6 large tumbler sizes of water, 1 soda, 2 cups of coffee and coconut milk daily. Per pt, he snacks on candy a lot since giving up smoking.     Past Medical History:   Diagnosis Date    Cancer (HCC)     Rectal per patient       Past Surgical History:   Procedure Laterality Date    IA PART REMOVAL COLON W ANASTOMOSIS  5/19/2023    Procedure: SIGMOID COLON RESECTION;  Surgeon: Yakov Sewell M.D.;  Location: SURGERY [facility];  Service: Gastroenterology    IA COLOSTOMY  5/19/2023    Procedure: REVISION, COLOSTOMY;  Surgeon: Yakov Sewell M.D.;  Location: SURGERY [facility];  Service: Gastroenterology    IA LAP, SURG COLOSTOMY  "Left 5/16/2023    Procedure: LAPAROSCOPIC  COLOSTOMY CREATION;  Surgeon: Yakov Sewell M.D.;  Location: SURGERY [facility];  Service: Gastroenterology    DE SIGMOIDOSCOPY,DIAGNOSTIC N/A 5/16/2023    Procedure: SIGMOIDOSCOPY, FLEXIBLE;  Surgeon: Yakov Sewell M.D.;  Location: SURGERY [facility];  Service: Gastroenterology    CATH PLACEMENT Right 5/16/2023    Procedure: INSERTION, CATHETER;  Surgeon: Yakov Sewell M.D.;  Location: SURGERY [facility];  Service: Gastroenterology    INGUINAL HERNIA REPAIR BILATERAL Bilateral      Biochemical/ Anthropometric Assessment:  Treatment Regimen: Radiation  Pertinent Labs (5/30): glucose 114, calcium 8.4, albumin 3.1, total protein 5.7  Pertinent Meds: naproxen,Benefiber PO  Wt Readings from Last 1 Encounters:   05/31/23 87.4 kg (192 lb 10.9 oz)      Ht Readings from Last 1 Encounters:   05/16/23 1.753 m (5' 9\")      BMI Readings from Last 1 Encounters:   05/31/23 28.45 kg/m²   BMI Classification: overweight    Weight History:  Wt Readings from Last 6 Encounters:   05/31/23 87.4 kg (192 lb 10.9 oz)   05/16/23 88 kg (194 lb 0.1 oz)   04/24/23 90.3 kg (199 lb 1.2 oz)   08/28/17 102 kg (224 lb)     Weight Change/Malnutrition Risk: Pt appears to have a weight loss of 2.9 kg/ 3.2% in >1 month which is not considered severe. Pt reports a weight loss of 60 lbs/ 23.7% in 1 year which is considered severe.     Interventions:  Introduced self and discussed role of dietitian throughout treatment process   Provided and discussed handout regarding general nutrition guidelines as well as nutritional recommendations for patient's with colorectal cancer, including tips/tricks should side effects of tx occur.   Encouraged balanced diet with plant-based focus. Advised reducing or eliminating red/ processed meats as well as foods high in saturated fats, sodium, and added sugars as able. Reviewed sources. Verbalized importance of nutrition and maintaining LBM throughout treatment. "   Discussed food safety and recommended for pt to eat cooked meats/fish and avoid raw meats/fish  Continue with meal and snack frequency to 4-6 x/day.  Focus on high protein foods, fruits and vegetables with all meals/snacks - handout provided.    Pt reports understanding and was receptive to information provided.   RD provided contact information. Encouraged pt to reach out as questions/concerns arise.   RD available PRN   [phone]

## 2023-06-05 ENCOUNTER — HOSPITAL ENCOUNTER (OUTPATIENT)
Dept: RADIATION ONCOLOGY | Facility: MEDICAL CENTER | Age: 68
End: 2023-06-05
Payer: MEDICARE

## 2023-06-05 LAB
CHEMOTHERAPY INFUSION START DATE: NORMAL
CHEMOTHERAPY RECORDS: 1.8
CHEMOTHERAPY RECORDS: 4500
CHEMOTHERAPY RECORDS: NORMAL
CHEMOTHERAPY RX CANCER: NORMAL
DATE 1ST CHEMO CANCER: NORMAL
RAD ONC ARIA COURSE LAST TREATMENT DATE: NORMAL
RAD ONC ARIA COURSE TREATMENT ELAPSED DAYS: NORMAL
RAD ONC ARIA REFERENCE POINT DOSAGE GIVEN TO DATE: 9
RAD ONC ARIA REFERENCE POINT DOSAGE GIVEN TO DATE: 9.26
RAD ONC ARIA REFERENCE POINT ID: NORMAL
RAD ONC ARIA REFERENCE POINT ID: NORMAL
RAD ONC ARIA REFERENCE POINT SESSION DOSAGE GIVEN: 1.8
RAD ONC ARIA REFERENCE POINT SESSION DOSAGE GIVEN: 1.85

## 2023-06-05 PROCEDURE — 77014 PR CT GUIDANCE PLACEMENT RAD THERAPY FIELDS: CPT | Mod: 26 | Performed by: RADIOLOGY

## 2023-06-05 PROCEDURE — 77386 HCHG IMRT DELIVERY COMPLEX: CPT | Performed by: RADIOLOGY

## 2023-06-05 PROCEDURE — 77427 RADIATION TX MANAGEMENT X5: CPT | Performed by: RADIOLOGY

## 2023-06-06 ENCOUNTER — HOSPITAL ENCOUNTER (OUTPATIENT)
Facility: MEDICAL CENTER | Age: 68
End: 2023-06-06
Attending: INTERNAL MEDICINE
Payer: MEDICARE

## 2023-06-06 ENCOUNTER — HOSPITAL ENCOUNTER (OUTPATIENT)
Dept: RADIATION ONCOLOGY | Facility: MEDICAL CENTER | Age: 68
End: 2023-06-06
Payer: MEDICARE

## 2023-06-06 LAB
ALBUMIN SERPL BCP-MCNC: 2.8 G/DL (ref 3.2–4.9)
ALBUMIN/GLOB SERPL: 1 G/DL
ALP SERPL-CCNC: 99 U/L (ref 30–99)
ALT SERPL-CCNC: 29 U/L (ref 2–50)
ANION GAP SERPL CALC-SCNC: 7 MMOL/L (ref 7–16)
AST SERPL-CCNC: 21 U/L (ref 12–45)
BILIRUB SERPL-MCNC: 0.2 MG/DL (ref 0.1–1.5)
BUN SERPL-MCNC: 20 MG/DL (ref 8–22)
CALCIUM ALBUM COR SERPL-MCNC: 9.3 MG/DL (ref 8.5–10.5)
CALCIUM SERPL-MCNC: 8.3 MG/DL (ref 8.5–10.5)
CHEMOTHERAPY INFUSION START DATE: NORMAL
CHEMOTHERAPY RECORDS: 1.8
CHEMOTHERAPY RECORDS: 4500
CHEMOTHERAPY RECORDS: NORMAL
CHEMOTHERAPY RX CANCER: NORMAL
CHLORIDE SERPL-SCNC: 106 MMOL/L (ref 96–112)
CO2 SERPL-SCNC: 31 MMOL/L (ref 20–33)
CREAT SERPL-MCNC: 0.58 MG/DL (ref 0.5–1.4)
DATE 1ST CHEMO CANCER: NORMAL
GFR SERPLBLD CREATININE-BSD FMLA CKD-EPI: 106 ML/MIN/1.73 M 2
GLOBULIN SER CALC-MCNC: 2.7 G/DL (ref 1.9–3.5)
GLUCOSE SERPL-MCNC: 110 MG/DL (ref 65–99)
POTASSIUM SERPL-SCNC: 4.3 MMOL/L (ref 3.6–5.5)
PROT SERPL-MCNC: 5.5 G/DL (ref 6–8.2)
RAD ONC ARIA COURSE LAST TREATMENT DATE: NORMAL
RAD ONC ARIA COURSE TREATMENT ELAPSED DAYS: NORMAL
RAD ONC ARIA REFERENCE POINT DOSAGE GIVEN TO DATE: 10.8
RAD ONC ARIA REFERENCE POINT DOSAGE GIVEN TO DATE: 11.11
RAD ONC ARIA REFERENCE POINT ID: NORMAL
RAD ONC ARIA REFERENCE POINT ID: NORMAL
RAD ONC ARIA REFERENCE POINT SESSION DOSAGE GIVEN: 1.8
RAD ONC ARIA REFERENCE POINT SESSION DOSAGE GIVEN: 1.85
SODIUM SERPL-SCNC: 144 MMOL/L (ref 135–145)

## 2023-06-06 PROCEDURE — 80053 COMPREHEN METABOLIC PANEL: CPT

## 2023-06-06 PROCEDURE — 77014 PR CT GUIDANCE PLACEMENT RAD THERAPY FIELDS: CPT | Mod: 26 | Performed by: RADIOLOGY

## 2023-06-06 PROCEDURE — 77386 HCHG IMRT DELIVERY COMPLEX: CPT | Performed by: RADIOLOGY

## 2023-06-07 ENCOUNTER — HOSPITAL ENCOUNTER (OUTPATIENT)
Dept: RADIATION ONCOLOGY | Facility: MEDICAL CENTER | Age: 68
End: 2023-06-07
Payer: MEDICARE

## 2023-06-07 VITALS
WEIGHT: 201.94 LBS | BODY MASS INDEX: 29.82 KG/M2 | HEART RATE: 88 BPM | TEMPERATURE: 97.4 F | SYSTOLIC BLOOD PRESSURE: 139 MMHG | DIASTOLIC BLOOD PRESSURE: 75 MMHG | OXYGEN SATURATION: 97 %

## 2023-06-07 LAB
CHEMOTHERAPY INFUSION START DATE: NORMAL
CHEMOTHERAPY RECORDS: 1.8
CHEMOTHERAPY RECORDS: 4500
CHEMOTHERAPY RECORDS: NORMAL
CHEMOTHERAPY RX CANCER: NORMAL
DATE 1ST CHEMO CANCER: NORMAL
RAD ONC ARIA COURSE LAST TREATMENT DATE: NORMAL
RAD ONC ARIA COURSE TREATMENT ELAPSED DAYS: NORMAL
RAD ONC ARIA REFERENCE POINT DOSAGE GIVEN TO DATE: 12.6
RAD ONC ARIA REFERENCE POINT DOSAGE GIVEN TO DATE: 12.96
RAD ONC ARIA REFERENCE POINT ID: NORMAL
RAD ONC ARIA REFERENCE POINT ID: NORMAL
RAD ONC ARIA REFERENCE POINT SESSION DOSAGE GIVEN: 1.8
RAD ONC ARIA REFERENCE POINT SESSION DOSAGE GIVEN: 1.85

## 2023-06-07 PROCEDURE — 77386 HCHG IMRT DELIVERY COMPLEX: CPT | Performed by: RADIOLOGY

## 2023-06-07 PROCEDURE — 77014 PR CT GUIDANCE PLACEMENT RAD THERAPY FIELDS: CPT | Mod: 26 | Performed by: RADIOLOGY

## 2023-06-07 ASSESSMENT — PAIN SCALES - GENERAL: PAINLEVEL: NO PAIN

## 2023-06-07 ASSESSMENT — FIBROSIS 4 INDEX: FIB4 SCORE: 1.03

## 2023-06-07 NOTE — ON TREATMENT VISIT
ON TREATMENT  NOTE  RADIATION ONCOLOGY DEPARTMENT    Patient name:  Josh Lerner    Primary Physician:  Pcp Pt States None MRN: [id number]  CSN: [id number]   Referring physician:  Yakov Sewell M.D.   : 1955, 67 y.o.     ENCOUNTER DATE:  2023      DIAGNOSIS:  Malignant neoplasm of rectum (HCC)  Staging form: Colon and Rectum, AJCC 8th Edition  - Clinical stage from 2023: Stage IIC (cT4b, cN0, cM0) - Signed by Luis Bustillos M.D. on 2023  Total positive nodes: 0  Histologic grade (G): G2  Histologic grading system: 4 grade system  Microsatellite instability (MSI): Stable      TREATMENT SUMMARY:  Course First Treatment Date 2023  Course Last Treatment Date 2023  Radiation Treatments       Active   Plans   Rectum   Most recent treatment: Dose planned: 180 cGy (fraction 7 of 25 on 2023)   Total: Dose planned: 4,500 cGy   Elapsed Days: 8 @ [id number]           Historical   No historical radiation treatments to show.                 SUBJECTIVE:  Well appearing    VITAL SIGNS:      2023     3:45 PM 2023     3:52 PM 2023     7:29 AM 2023     5:36 AM 2023     7:53 PM 2023     4:05 PM 2023     7:40 AM   Vitals   SYSTOLIC 139 122 110 121 116 121 116   DIASTOLIC 75 65 70 67 74 71 67   Pulse 88 82 76 70 75 85 76   Temperature 36.3 °C (97.4 °F)  36.9 °C (98.4 °F) 36.4 °C (97.5 °F) 36.7 °C (98.1 °F) 36.6 °C (97.9 °F) 36.6 °C (97.9 °F)   Respiration   16 18 18 18 17   Weight 201.94 192.68        BMI 29.82 kg/m2 28.45 kg/m2        Pulse Oximetry 97 % 98 % 94 % 92 % 96 % 95 % 91 %     KPS: 100, Fully active, able to carry on all pre-disease performed without restriction (ECOG equivalent 0)  Encounter Vitals  Temperature: 36.3 °C (97.4 °F)  Blood Pressure : 139/75  Pulse: 88  Pulse Oximetry: 97 %  Weight: 91.6 kg (201 lb 15.1 oz)  Pain Score: No pain      2023     3:45 PM 2023     1:01 PM   Pain Assessment   Pain Score NO PAIN 5=MODERATE P    Pain Loc  RECTUM          PHYSICAL EXAM:  Physical Exam         6/7/2023     3:48 PM 5/31/2023     3:48 PM   Toxicity Assessment   Toxicity Assessment Male Pelvis Male Pelvis   Fatigue (lethargy, malaise, asthenia) None None   Radiation Dermatitis None None   Anorexia None None   Colitis None None   Constipation None None   Dehydration None None   Diarrhea w/o Colostomy None Increase of <4 stools/day over pre-treatment   Flatulence None Moderate   Nausea None None   Proctitis None None   Vomiting None None   RT - Pain due to RT None Mild pain not interfering with function   Tumor Pain (onset or exacerbation of tumor pain due to treatment) None Moderate pain, pain or analgesics interfering with function, but not interfering with activities of daily living   Dysuria (painful urination) None None   Urinary Frequency Normal Normal   Urinary Urgency Present None   Bladder Spasms Absent Absent   Incontinence None None   Urinary Retention Normal Hesitency or dribbling, but no significant residual urine and/or retention occuring during the immediate postoperative period       CURRENT MEDICATIONS:    Current Outpatient Medications:     naproxen (ANAPROX) 220 MG tablet, Take 220 mg by mouth 3 times a day., Disp: , Rfl:     acetaminophen (TYLENOL) 500 MG Tab, Take 500-1,000 mg by mouth 3 times a day., Disp: , Rfl:     Wheat Dextrin (BENEFIBER PO), Take  by mouth as needed., Disp: , Rfl:     LABORATORY DATA:   Lab Results   Component Value Date/Time    SODIUM 144 06/06/2023 10:05 AM    POTASSIUM 4.3 06/06/2023 10:05 AM    CHLORIDE 106 06/06/2023 10:05 AM    CO2 31 06/06/2023 10:05 AM    GLUCOSE 110 (H) 06/06/2023 10:05 AM    BUN 20 06/06/2023 10:05 AM    CREATININE 0.58 06/06/2023 10:05 AM       Lab Results   Component Value Date/Time    WBC 7.5 05/24/2023 10:12 AM    RBC 4.10 (L) 05/24/2023 10:12 AM    HEMOGLOBIN 12.0 (L) 05/24/2023 10:12 AM    HEMATOCRIT 36.4 (L) 05/24/2023 10:12 AM    MCV 88.8 05/24/2023 10:12 AM    MCH  29.3 05/24/2023 10:12 AM    MCHC 33.0 05/24/2023 10:12 AM    PLATELETCT 254 05/24/2023 10:12 AM         RADIOLOGY DATA:  CT-ABDOMEN-PELVIS WITH    Result Date: 5/19/2023  1. Left lower quadrant ostomy. There is intraperitoneal air which could relate to recent surgery. Correlate clinically. 2. Dilated colon proximal to the colostomy and decompressed sigmoid colon, concerning for colonic obstruction. The transition point appears to be at the anastomosis or the colostomy. 3. Lobulated rectal mass is redemonstrated.    DX-CHEST-2 VIEWS    Result Date: 5/24/2023  Bibasilar pulmonary opacities again noted, with possible slight increase in left. This could be atelectasis/scarring with pneumonitis not excluded.    DX-PORTABLE FLUOROSCOPY < 1 HOUR    Result Date: 5/17/2023  Portable fluoroscopy utilized for 18 seconds. INTERPRETING LOCATION: [address] [place] [address], [facility], [id number]    MR-RECTAL/PROSTATE PROTOCOL    Result Date: 4/19/2023  1.  Large circumferential low rectal tumor with extension into the prostate and internal anal sphincter.  T4b N0 2.  Peripherally enhancing irregular fluid collection adjacent the tumor in the LEFT perirectal space, extending through the mesorectal fascia into the LEFT obturator space, which appears to communicate through the mass indicating abscess/contained perforation.     DX-ABDOMEN FOR TUBE PLACEMENT    Result Date: 5/19/2023  1.  Nasogastric tube is now looped in the stomach. 2.  Gaseous dilated bowel loops.    DX-ABDOMEN FOR TUBE PLACEMENT    Result Date: 5/19/2023  1.  NG tube tip projects at the distal esophagus. Recommend advancing before use. 2.  Partially visualized gaseous distention of the colon and bowel. 3.  Bibasilar airspace disease, likely atelectasis.      IMPRESSION:  Cancer Staging   Malignant neoplasm of rectum (HCC)  Staging form: Colon and Rectum, AJCC 8th Edition  - Clinical stage from 4/24/2023: Stage IIC (cT4b, cN0, cM0) - Signed by Luis Bustillos M.D. on  4/24/2023      PLAN:  No change in treatment plan    Disposition:  Treatment plan and imaging reviewed. Questions answered. Continue therapy outlined.     Luis Bustillos M.D.    No orders of the defined types were placed in this encounter.

## 2023-06-08 ENCOUNTER — HOSPITAL ENCOUNTER (OUTPATIENT)
Dept: RADIATION ONCOLOGY | Facility: MEDICAL CENTER | Age: 68
End: 2023-06-08
Payer: MEDICARE

## 2023-06-08 LAB
CHEMOTHERAPY INFUSION START DATE: NORMAL
CHEMOTHERAPY RECORDS: 1.8
CHEMOTHERAPY RECORDS: 4500
CHEMOTHERAPY RECORDS: NORMAL
CHEMOTHERAPY RX CANCER: NORMAL
DATE 1ST CHEMO CANCER: NORMAL
RAD ONC ARIA COURSE LAST TREATMENT DATE: NORMAL
RAD ONC ARIA COURSE TREATMENT ELAPSED DAYS: NORMAL
RAD ONC ARIA REFERENCE POINT DOSAGE GIVEN TO DATE: 14.4
RAD ONC ARIA REFERENCE POINT DOSAGE GIVEN TO DATE: 14.81
RAD ONC ARIA REFERENCE POINT ID: NORMAL
RAD ONC ARIA REFERENCE POINT ID: NORMAL
RAD ONC ARIA REFERENCE POINT SESSION DOSAGE GIVEN: 1.8
RAD ONC ARIA REFERENCE POINT SESSION DOSAGE GIVEN: 1.85

## 2023-06-08 PROCEDURE — 77336 RADIATION PHYSICS CONSULT: CPT | Performed by: RADIOLOGY

## 2023-06-08 PROCEDURE — 77014 PR CT GUIDANCE PLACEMENT RAD THERAPY FIELDS: CPT | Mod: 26 | Performed by: RADIOLOGY

## 2023-06-08 PROCEDURE — 77386 HCHG IMRT DELIVERY COMPLEX: CPT | Performed by: RADIOLOGY

## 2023-06-09 ENCOUNTER — HOSPITAL ENCOUNTER (OUTPATIENT)
Dept: RADIATION ONCOLOGY | Facility: MEDICAL CENTER | Age: 68
End: 2023-06-09
Payer: MEDICARE

## 2023-06-09 LAB
CHEMOTHERAPY INFUSION START DATE: NORMAL
CHEMOTHERAPY RECORDS: 1.8
CHEMOTHERAPY RECORDS: 4500
CHEMOTHERAPY RECORDS: NORMAL
CHEMOTHERAPY RX CANCER: NORMAL
DATE 1ST CHEMO CANCER: NORMAL
RAD ONC ARIA COURSE LAST TREATMENT DATE: NORMAL
RAD ONC ARIA COURSE TREATMENT ELAPSED DAYS: NORMAL
RAD ONC ARIA REFERENCE POINT DOSAGE GIVEN TO DATE: 16.2
RAD ONC ARIA REFERENCE POINT DOSAGE GIVEN TO DATE: 16.67
RAD ONC ARIA REFERENCE POINT ID: NORMAL
RAD ONC ARIA REFERENCE POINT ID: NORMAL
RAD ONC ARIA REFERENCE POINT SESSION DOSAGE GIVEN: 1.8
RAD ONC ARIA REFERENCE POINT SESSION DOSAGE GIVEN: 1.85

## 2023-06-09 PROCEDURE — 77386 HCHG IMRT DELIVERY COMPLEX: CPT | Performed by: RADIOLOGY

## 2023-06-09 PROCEDURE — 77014 PR CT GUIDANCE PLACEMENT RAD THERAPY FIELDS: CPT | Mod: 26 | Performed by: RADIOLOGY

## 2023-06-12 ENCOUNTER — HOSPITAL ENCOUNTER (OUTPATIENT)
Dept: RADIATION ONCOLOGY | Facility: MEDICAL CENTER | Age: 68
End: 2023-06-12
Payer: MEDICARE

## 2023-06-12 LAB
CHEMOTHERAPY INFUSION START DATE: NORMAL
CHEMOTHERAPY RECORDS: 1.8
CHEMOTHERAPY RECORDS: 4500
CHEMOTHERAPY RECORDS: NORMAL
CHEMOTHERAPY RX CANCER: NORMAL
DATE 1ST CHEMO CANCER: NORMAL
RAD ONC ARIA COURSE LAST TREATMENT DATE: NORMAL
RAD ONC ARIA COURSE TREATMENT ELAPSED DAYS: NORMAL
RAD ONC ARIA REFERENCE POINT DOSAGE GIVEN TO DATE: 18
RAD ONC ARIA REFERENCE POINT DOSAGE GIVEN TO DATE: 18.52
RAD ONC ARIA REFERENCE POINT ID: NORMAL
RAD ONC ARIA REFERENCE POINT ID: NORMAL
RAD ONC ARIA REFERENCE POINT SESSION DOSAGE GIVEN: 1.8
RAD ONC ARIA REFERENCE POINT SESSION DOSAGE GIVEN: 1.85

## 2023-06-12 PROCEDURE — 77014 PR CT GUIDANCE PLACEMENT RAD THERAPY FIELDS: CPT | Mod: 26 | Performed by: RADIOLOGY

## 2023-06-12 PROCEDURE — 77427 RADIATION TX MANAGEMENT X5: CPT | Performed by: RADIOLOGY

## 2023-06-12 PROCEDURE — 77386 HCHG IMRT DELIVERY COMPLEX: CPT | Performed by: RADIOLOGY

## 2023-06-14 ENCOUNTER — HOSPITAL ENCOUNTER (OUTPATIENT)
Dept: RADIATION ONCOLOGY | Facility: MEDICAL CENTER | Age: 68
End: 2023-06-14
Payer: MEDICARE

## 2023-06-14 VITALS
WEIGHT: 201.5 LBS | BODY MASS INDEX: 29.76 KG/M2 | HEART RATE: 87 BPM | OXYGEN SATURATION: 95 % | SYSTOLIC BLOOD PRESSURE: 139 MMHG | DIASTOLIC BLOOD PRESSURE: 78 MMHG

## 2023-06-14 LAB
CHEMOTHERAPY INFUSION START DATE: NORMAL
CHEMOTHERAPY RECORDS: 1.8
CHEMOTHERAPY RECORDS: 4500
CHEMOTHERAPY RECORDS: NORMAL
CHEMOTHERAPY RX CANCER: NORMAL
DATE 1ST CHEMO CANCER: NORMAL
RAD ONC ARIA COURSE LAST TREATMENT DATE: NORMAL
RAD ONC ARIA COURSE TREATMENT ELAPSED DAYS: NORMAL
RAD ONC ARIA REFERENCE POINT DOSAGE GIVEN TO DATE: 19.8
RAD ONC ARIA REFERENCE POINT DOSAGE GIVEN TO DATE: 20.37
RAD ONC ARIA REFERENCE POINT ID: NORMAL
RAD ONC ARIA REFERENCE POINT ID: NORMAL
RAD ONC ARIA REFERENCE POINT SESSION DOSAGE GIVEN: 1.8
RAD ONC ARIA REFERENCE POINT SESSION DOSAGE GIVEN: 1.85

## 2023-06-14 PROCEDURE — 77386 HCHG IMRT DELIVERY COMPLEX: CPT | Performed by: RADIOLOGY

## 2023-06-14 PROCEDURE — 77014 PR CT GUIDANCE PLACEMENT RAD THERAPY FIELDS: CPT | Mod: 26 | Performed by: RADIOLOGY

## 2023-06-14 ASSESSMENT — FIBROSIS 4 INDEX: FIB4 SCORE: 1.03

## 2023-06-14 NOTE — ON TREATMENT VISIT
ON TREATMENT  NOTE  RADIATION ONCOLOGY DEPARTMENT    Patient name:  Josh Lerner    Primary Physician:  Pcp Pt States None MRN: [id number]  CSN: [id number]   Referring physician:  Yakov Sewell M.D.   : 1955, 67 y.o.     ENCOUNTER DATE:  2023      DIAGNOSIS:  Malignant neoplasm of rectum (HCC)  Staging form: Colon and Rectum, AJCC 8th Edition  - Clinical stage from 2023: Stage IIC (cT4b, cN0, cM0) - Signed by Luis Bustillos M.D. on 2023  Total positive nodes: 0  Histologic grade (G): G2  Histologic grading system: 4 grade system  Microsatellite instability (MSI): Stable      TREATMENT SUMMARY:  Course First Treatment Date 2023  Course Last Treatment Date 2023  Radiation Treatments       Active   Plans   Rectum   Most recent treatment: Dose planned: 180 cGy (fraction 11 of 25 on 2023)   Total: Dose planned: 4,500 cGy   Elapsed Days: 15 @ [id number]           Historical   No historical radiation treatments to show.                 SUBJECTIVE:  Pain improving    VITAL SIGNS:      2023     3:44 PM 2023     3:45 PM 2023     3:52 PM 2023     7:29 AM 2023     5:36 AM 2023     7:53 PM 2023     4:05 PM   Vitals   SYSTOLIC 139 139 122 110 121 116 121   DIASTOLIC 78 75 65 70 67 74 71   Pulse 87 88 82 76 70 75 85   Temperature  36.3 °C (97.4 °F)  36.9 °C (98.4 °F) 36.4 °C (97.5 °F) 36.7 °C (98.1 °F) 36.6 °C (97.9 °F)   Respiration    16 18 18 18   Weight 201.5 201.94 192.68       BMI 29.76 kg/m2 29.82 kg/m2 28.45 kg/m2       Pulse Oximetry 95 % 97 % 98 % 94 % 92 % 96 % 95 %     KPS: 100, Fully active, able to carry on all pre-disease performed without restriction (ECOG equivalent 0)  Encounter Vitals  Blood Pressure : 139/78  Pulse: 87  Pulse Oximetry: 95 %  Weight: 91.4 kg (201 lb 8 oz)      2023     3:45 PM 2023     1:01 PM   Pain Assessment   Pain Score NO PAIN 5=MODERATE P   Pain Loc  RECTUM          PHYSICAL EXAM:  Physical  Exam  Constitutional:       Appearance: Normal appearance.   Abdominal:      General: There is no distension.      Palpations: Abdomen is soft.   Skin:     Findings: No erythema.   Neurological:      Mental Status: He is alert.              6/14/2023     3:43 PM 6/7/2023     3:48 PM 5/31/2023     3:48 PM   Toxicity Assessment   Toxicity Assessment Male Pelvis Male Pelvis Male Pelvis   Fatigue (lethargy, malaise, asthenia) None None None   Radiation Dermatitis None None None   Anorexia None None None   Colitis None None None   Constipation None None None   Dehydration  None None   Diarrhea w/o Colostomy None None Increase of <4 stools/day over pre-treatment   Flatulence None None Moderate   Nausea None None None   Proctitis None None None   Vomiting None None None   RT - Pain due to RT None None Mild pain not interfering with function   Tumor Pain (onset or exacerbation of tumor pain due to treatment) None None Moderate pain, pain or analgesics interfering with function, but not interfering with activities of daily living   Dysuria (painful urination) None None None   Urinary Frequency Normal Normal Normal   Urinary Urgency Present Present None   Bladder Spasms Absent Absent Absent   Incontinence None None None   Urinary Retention Normal Normal Hesitency or dribbling, but no significant residual urine and/or retention occuring during the immediate postoperative period       CURRENT MEDICATIONS:    Current Outpatient Medications:     naproxen (ANAPROX) 220 MG tablet, Take 220 mg by mouth 3 times a day., Disp: , Rfl:     acetaminophen (TYLENOL) 500 MG Tab, Take 500-1,000 mg by mouth 3 times a day., Disp: , Rfl:     Wheat Dextrin (BENEFIBER PO), Take  by mouth as needed., Disp: , Rfl:     LABORATORY DATA:   Lab Results   Component Value Date/Time    SODIUM 144 06/06/2023 10:05 AM    POTASSIUM 4.3 06/06/2023 10:05 AM    CHLORIDE 106 06/06/2023 10:05 AM    CO2 31 06/06/2023 10:05 AM    GLUCOSE 110 (H) 06/06/2023 10:05 AM     BUN 20 06/06/2023 10:05 AM    CREATININE 0.58 06/06/2023 10:05 AM       Lab Results   Component Value Date/Time    WBC 7.5 05/24/2023 10:12 AM    RBC 4.10 (L) 05/24/2023 10:12 AM    HEMOGLOBIN 12.0 (L) 05/24/2023 10:12 AM    HEMATOCRIT 36.4 (L) 05/24/2023 10:12 AM    MCV 88.8 05/24/2023 10:12 AM    MCH 29.3 05/24/2023 10:12 AM    MCHC 33.0 05/24/2023 10:12 AM    PLATELETCT 254 05/24/2023 10:12 AM         RADIOLOGY DATA:  CT-ABDOMEN-PELVIS WITH    Result Date: 5/19/2023  1. Left lower quadrant ostomy. There is intraperitoneal air which could relate to recent surgery. Correlate clinically. 2. Dilated colon proximal to the colostomy and decompressed sigmoid colon, concerning for colonic obstruction. The transition point appears to be at the anastomosis or the colostomy. 3. Lobulated rectal mass is redemonstrated.    DX-CHEST-2 VIEWS    Result Date: 5/24/2023  Bibasilar pulmonary opacities again noted, with possible slight increase in left. This could be atelectasis/scarring with pneumonitis not excluded.    DX-PORTABLE FLUOROSCOPY < 1 HOUR    Result Date: 5/17/2023  Portable fluoroscopy utilized for 18 seconds. INTERPRETING LOCATION: [address] [place] [address], [id number]    MR-RECTAL/PROSTATE PROTOCOL    Result Date: 4/19/2023  1.  Large circumferential low rectal tumor with extension into the prostate and internal anal sphincter.  T4b N0 2.  Peripherally enhancing irregular fluid collection adjacent the tumor in the LEFT perirectal space, extending through the mesorectal fascia into the LEFT obturator space, which appears to communicate through the mass indicating abscess/contained perforation.     DX-ABDOMEN FOR TUBE PLACEMENT    Result Date: 5/19/2023  1.  Nasogastric tube is now looped in the stomach. 2.  Gaseous dilated bowel loops.    DX-ABDOMEN FOR TUBE PLACEMENT    Result Date: 5/19/2023  1.  NG tube tip projects at the distal esophagus. Recommend advancing before use. 2.  Partially visualized gaseous distention of  the colon and bowel. 3.  Bibasilar airspace disease, likely atelectasis.      IMPRESSION:  Cancer Staging   Malignant neoplasm of rectum (HCC)  Staging form: Colon and Rectum, AJCC 8th Edition  - Clinical stage from 4/24/2023: Stage IIC (cT4b, cN0, cM0) - Signed by Luis Bustillos M.D. on 4/24/2023      PLAN:  No change in treatment plan    Disposition:  Treatment plan and imaging reviewed. Questions answered. Continue therapy outlined.     Luis Bustillos M.D.    No orders of the defined types were placed in this encounter.

## 2023-06-15 ENCOUNTER — HOSPITAL ENCOUNTER (OUTPATIENT)
Dept: RADIATION ONCOLOGY | Facility: MEDICAL CENTER | Age: 68
End: 2023-06-15
Payer: MEDICARE

## 2023-06-15 ENCOUNTER — DOCUMENTATION (OUTPATIENT)
Dept: RADIATION ONCOLOGY | Facility: MEDICAL CENTER | Age: 68
End: 2023-06-15
Payer: MEDICARE

## 2023-06-15 LAB
CHEMOTHERAPY INFUSION START DATE: NORMAL
CHEMOTHERAPY RECORDS: 1.8
CHEMOTHERAPY RECORDS: 4500
CHEMOTHERAPY RECORDS: NORMAL
CHEMOTHERAPY RX CANCER: NORMAL
DATE 1ST CHEMO CANCER: NORMAL
RAD ONC ARIA COURSE LAST TREATMENT DATE: NORMAL
RAD ONC ARIA COURSE TREATMENT ELAPSED DAYS: NORMAL
RAD ONC ARIA REFERENCE POINT DOSAGE GIVEN TO DATE: 21.6
RAD ONC ARIA REFERENCE POINT DOSAGE GIVEN TO DATE: 22.22
RAD ONC ARIA REFERENCE POINT ID: NORMAL
RAD ONC ARIA REFERENCE POINT ID: NORMAL
RAD ONC ARIA REFERENCE POINT SESSION DOSAGE GIVEN: 1.8
RAD ONC ARIA REFERENCE POINT SESSION DOSAGE GIVEN: 1.85

## 2023-06-15 PROCEDURE — 77386 HCHG IMRT DELIVERY COMPLEX: CPT | Performed by: RADIOLOGY

## 2023-06-15 PROCEDURE — 77014 PR CT GUIDANCE PLACEMENT RAD THERAPY FIELDS: CPT | Mod: 26 | Performed by: RADIOLOGY

## 2023-06-15 NOTE — PROGRESS NOTES
Nutrition Services: Brief Update  Wt Readings from Last 7 Encounters:   06/14/23 91.4 kg (201 lb 8 oz)   06/07/23 91.6 kg (201 lb 15.1 oz)   05/31/23 87.4 kg (192 lb 10.9 oz)   05/16/23 88 kg (194 lb 0.1 oz)   04/24/23 90.3 kg (199 lb 1.2 oz)   08/28/17 102 kg (224 lb)     Weight Change: Pt's weight appears to be stable in the past week.     Pertinent Recent Lab work (DATE 6/6): glucose 110, calcium 8.3, albumin 2.8, total protein 5.5    RD able to visit pt briefly following XRT. Per pt, he is doing well and eating like he normally does. He states he is now eating larger portions, but eating less times daily. He reports he does not have any nutrition concerns. Pt asked about when he will be able to drive again and when he can lift weights.     Plan/Recommend:  RD encouraged pt to continue his current diet regimen  Recommended for him to contact his MD about driving and recommendations for physical activity  Encouraged pt to contact RD with any nutrition related questions or concerns     Pt verbalizes understanding and is receptive to information provided.   RD available PRN and will make further recommendations as indicated.   [phone]

## 2023-06-16 ENCOUNTER — HOSPITAL ENCOUNTER (OUTPATIENT)
Dept: RADIATION ONCOLOGY | Facility: MEDICAL CENTER | Age: 68
End: 2023-06-16
Payer: MEDICARE

## 2023-06-16 LAB
CHEMOTHERAPY INFUSION START DATE: NORMAL
CHEMOTHERAPY RECORDS: 1.8
CHEMOTHERAPY RECORDS: 4500
CHEMOTHERAPY RECORDS: NORMAL
CHEMOTHERAPY RX CANCER: NORMAL
DATE 1ST CHEMO CANCER: NORMAL
RAD ONC ARIA COURSE LAST TREATMENT DATE: NORMAL
RAD ONC ARIA COURSE TREATMENT ELAPSED DAYS: NORMAL
RAD ONC ARIA REFERENCE POINT DOSAGE GIVEN TO DATE: 23.4
RAD ONC ARIA REFERENCE POINT DOSAGE GIVEN TO DATE: 24.07
RAD ONC ARIA REFERENCE POINT ID: NORMAL
RAD ONC ARIA REFERENCE POINT ID: NORMAL
RAD ONC ARIA REFERENCE POINT SESSION DOSAGE GIVEN: 1.8
RAD ONC ARIA REFERENCE POINT SESSION DOSAGE GIVEN: 1.85

## 2023-06-16 PROCEDURE — 77386 HCHG IMRT DELIVERY COMPLEX: CPT | Performed by: RADIOLOGY

## 2023-06-16 PROCEDURE — 77014 PR CT GUIDANCE PLACEMENT RAD THERAPY FIELDS: CPT | Mod: 26 | Performed by: RADIOLOGY

## 2023-06-16 PROCEDURE — 77336 RADIATION PHYSICS CONSULT: CPT | Performed by: RADIOLOGY

## 2023-06-19 ENCOUNTER — HOSPITAL ENCOUNTER (OUTPATIENT)
Dept: RADIATION ONCOLOGY | Facility: MEDICAL CENTER | Age: 68
End: 2023-06-19
Payer: MEDICARE

## 2023-06-19 LAB
CHEMOTHERAPY INFUSION START DATE: NORMAL
CHEMOTHERAPY RECORDS: 1.8
CHEMOTHERAPY RECORDS: 4500
CHEMOTHERAPY RECORDS: NORMAL
CHEMOTHERAPY RX CANCER: NORMAL
DATE 1ST CHEMO CANCER: NORMAL
RAD ONC ARIA COURSE LAST TREATMENT DATE: NORMAL
RAD ONC ARIA COURSE TREATMENT ELAPSED DAYS: NORMAL
RAD ONC ARIA REFERENCE POINT DOSAGE GIVEN TO DATE: 25.2
RAD ONC ARIA REFERENCE POINT DOSAGE GIVEN TO DATE: 25.92
RAD ONC ARIA REFERENCE POINT ID: NORMAL
RAD ONC ARIA REFERENCE POINT ID: NORMAL
RAD ONC ARIA REFERENCE POINT SESSION DOSAGE GIVEN: 1.8
RAD ONC ARIA REFERENCE POINT SESSION DOSAGE GIVEN: 1.85

## 2023-06-19 PROCEDURE — 77014 PR CT GUIDANCE PLACEMENT RAD THERAPY FIELDS: CPT | Mod: 26 | Performed by: RADIOLOGY

## 2023-06-19 PROCEDURE — 77386 HCHG IMRT DELIVERY COMPLEX: CPT | Performed by: RADIOLOGY

## 2023-06-20 ENCOUNTER — HOSPITAL ENCOUNTER (OUTPATIENT)
Facility: MEDICAL CENTER | Age: 68
End: 2023-06-20
Attending: INTERNAL MEDICINE
Payer: MEDICARE

## 2023-06-20 ENCOUNTER — HOSPITAL ENCOUNTER (OUTPATIENT)
Dept: RADIATION ONCOLOGY | Facility: MEDICAL CENTER | Age: 68
End: 2023-06-20
Payer: MEDICARE

## 2023-06-20 LAB
ALBUMIN SERPL BCP-MCNC: 3.3 G/DL (ref 3.2–4.9)
ALBUMIN/GLOB SERPL: 1.3 G/DL
ALP SERPL-CCNC: 111 U/L (ref 30–99)
ALT SERPL-CCNC: 43 U/L (ref 2–50)
ANION GAP SERPL CALC-SCNC: 6 MMOL/L (ref 7–16)
AST SERPL-CCNC: 29 U/L (ref 12–45)
BILIRUB SERPL-MCNC: 0.3 MG/DL (ref 0.1–1.5)
BUN SERPL-MCNC: 18 MG/DL (ref 8–22)
CALCIUM ALBUM COR SERPL-MCNC: 9.4 MG/DL (ref 8.5–10.5)
CALCIUM SERPL-MCNC: 8.8 MG/DL (ref 8.5–10.5)
CHEMOTHERAPY INFUSION START DATE: NORMAL
CHEMOTHERAPY RECORDS: 1.8
CHEMOTHERAPY RECORDS: 4500
CHEMOTHERAPY RECORDS: NORMAL
CHEMOTHERAPY RX CANCER: NORMAL
CHLORIDE SERPL-SCNC: 106 MMOL/L (ref 96–112)
CO2 SERPL-SCNC: 28 MMOL/L (ref 20–33)
CREAT SERPL-MCNC: 0.65 MG/DL (ref 0.5–1.4)
DATE 1ST CHEMO CANCER: NORMAL
GFR SERPLBLD CREATININE-BSD FMLA CKD-EPI: 103 ML/MIN/1.73 M 2
GLOBULIN SER CALC-MCNC: 2.5 G/DL (ref 1.9–3.5)
GLUCOSE SERPL-MCNC: 123 MG/DL (ref 65–99)
POTASSIUM SERPL-SCNC: 4.4 MMOL/L (ref 3.6–5.5)
PROT SERPL-MCNC: 5.8 G/DL (ref 6–8.2)
RAD ONC ARIA COURSE LAST TREATMENT DATE: NORMAL
RAD ONC ARIA COURSE TREATMENT ELAPSED DAYS: NORMAL
RAD ONC ARIA REFERENCE POINT DOSAGE GIVEN TO DATE: 27
RAD ONC ARIA REFERENCE POINT DOSAGE GIVEN TO DATE: 27.78
RAD ONC ARIA REFERENCE POINT ID: NORMAL
RAD ONC ARIA REFERENCE POINT ID: NORMAL
RAD ONC ARIA REFERENCE POINT SESSION DOSAGE GIVEN: 1.8
RAD ONC ARIA REFERENCE POINT SESSION DOSAGE GIVEN: 1.85
SODIUM SERPL-SCNC: 140 MMOL/L (ref 135–145)

## 2023-06-20 PROCEDURE — 77427 RADIATION TX MANAGEMENT X5: CPT | Performed by: RADIOLOGY

## 2023-06-20 PROCEDURE — 80053 COMPREHEN METABOLIC PANEL: CPT

## 2023-06-20 PROCEDURE — 77386 HCHG IMRT DELIVERY COMPLEX: CPT | Performed by: RADIOLOGY

## 2023-06-20 PROCEDURE — 77014 PR CT GUIDANCE PLACEMENT RAD THERAPY FIELDS: CPT | Mod: 26 | Performed by: RADIOLOGY

## 2023-06-21 ENCOUNTER — HOSPITAL ENCOUNTER (OUTPATIENT)
Dept: RADIATION ONCOLOGY | Facility: MEDICAL CENTER | Age: 68
End: 2023-06-21
Payer: MEDICARE

## 2023-06-21 VITALS
OXYGEN SATURATION: 95 % | BODY MASS INDEX: 30.18 KG/M2 | SYSTOLIC BLOOD PRESSURE: 115 MMHG | HEART RATE: 90 BPM | WEIGHT: 204.37 LBS | DIASTOLIC BLOOD PRESSURE: 71 MMHG

## 2023-06-21 LAB
CHEMOTHERAPY INFUSION START DATE: NORMAL
CHEMOTHERAPY RECORDS: 1.8
CHEMOTHERAPY RECORDS: 4500
CHEMOTHERAPY RECORDS: NORMAL
CHEMOTHERAPY RX CANCER: NORMAL
DATE 1ST CHEMO CANCER: NORMAL
RAD ONC ARIA COURSE LAST TREATMENT DATE: NORMAL
RAD ONC ARIA COURSE TREATMENT ELAPSED DAYS: NORMAL
RAD ONC ARIA REFERENCE POINT DOSAGE GIVEN TO DATE: 28.8
RAD ONC ARIA REFERENCE POINT DOSAGE GIVEN TO DATE: 29.63
RAD ONC ARIA REFERENCE POINT ID: NORMAL
RAD ONC ARIA REFERENCE POINT ID: NORMAL
RAD ONC ARIA REFERENCE POINT SESSION DOSAGE GIVEN: 1.8
RAD ONC ARIA REFERENCE POINT SESSION DOSAGE GIVEN: 1.85

## 2023-06-21 PROCEDURE — 77386 HCHG IMRT DELIVERY COMPLEX: CPT | Performed by: RADIOLOGY

## 2023-06-21 PROCEDURE — 77014 PR CT GUIDANCE PLACEMENT RAD THERAPY FIELDS: CPT | Mod: 26 | Performed by: RADIOLOGY

## 2023-06-21 ASSESSMENT — FIBROSIS 4 INDEX: FIB4 SCORE: 1.17

## 2023-06-21 NOTE — ON TREATMENT VISIT
ON TREATMENT  NOTE  RADIATION ONCOLOGY DEPARTMENT    Patient name:  Josh Lerner    Primary Physician:  Pcp Pt States None MRN: [id number]  CSN: [id number]   Referring physician:  Yakov Sewell M.D.   : 1955, 67 y.o.     ENCOUNTER DATE:  2023    DIAGNOSIS:  Malignant neoplasm of rectum (HCC)  Staging form: Colon and Rectum, AJCC 8th Edition  - Clinical stage from 2023: Stage IIC (cT4b, cN0, cM0) - Signed by Luis Bustillos M.D. on 2023  Total positive nodes: 0  Histologic grade (G): G2  Histologic grading system: 4 grade system  Microsatellite instability (MSI): Stable      TREATMENT SUMMARY:  Course First Treatment Date 2023  Course Last Treatment Date 2023  Radiation Treatments       Active   Plans   Rectum   Most recent treatment: Dose planned: 180 cGy (fraction 16 of 25 on 2023)   Total: Dose planned: 4,500 cGy   Elapsed Days:  @ [id number]           Historical   No historical radiation treatments to show.                 SUBJECTIVE:  Well appearing    VITAL SIGNS:      2023     3:47 PM 2023     3:44 PM 2023     3:45 PM 2023     3:52 PM 2023     7:29 AM 2023     5:36 AM 2023     7:53 PM   Vitals   SYSTOLIC 115 139 139 122 110 121 116   DIASTOLIC 71 78 75 65 70 67 74   Pulse 90 87 88 82 76 70 75   Temperature   36.3 °C (97.4 °F)  36.9 °C (98.4 °F) 36.4 °C (97.5 °F) 36.7 °C (98.1 °F)   Respiration     16 18 18   Weight 204.37 201.5 201.94 192.68      BMI 30.18 kg/m2 29.76 kg/m2 29.82 kg/m2 28.45 kg/m2      Pulse Oximetry 95 % 95 % 97 % 98 % 94 % 92 % 96 %     KPS: 100, Fully active, able to carry on all pre-disease performed without restriction (ECOG equivalent 0)  Encounter Vitals  Blood Pressure : 115/71  Pulse: 90  Pulse Oximetry: 95 %  Weight: 92.7 kg (204 lb 5.9 oz)      2023     3:45 PM 2023     1:01 PM   Pain Assessment   Pain Score NO PAIN 5=MODERATE P   Pain Loc  RECTUM          PHYSICAL EXAM:  Physical  Exam  Constitutional:       Appearance: Normal appearance.   Abdominal:      General: There is no distension.      Palpations: Abdomen is soft.   Skin:     Findings: No erythema.   Neurological:      Mental Status: He is alert.              6/21/2023     3:45 PM 6/14/2023     3:43 PM 6/7/2023     3:48 PM 5/31/2023     3:48 PM   Toxicity Assessment   Toxicity Assessment Male Pelvis Male Pelvis Male Pelvis Male Pelvis   Fatigue (lethargy, malaise, asthenia) None None None None   Radiation Dermatitis None None None None   Anorexia None None None None   Colitis None None None None   Constipation None None None None   Dehydration   None None   Diarrhea w/o Colostomy None None None Increase of <4 stools/day over pre-treatment   Flatulence None None None Moderate   Nausea None None None None   Proctitis None None None None   Vomiting None None None None   RT - Pain due to RT None None None Mild pain not interfering with function   Tumor Pain (onset or exacerbation of tumor pain due to treatment) None None None Moderate pain, pain or analgesics interfering with function, but not interfering with activities of daily living   Dysuria (painful urination) None None None None   Urinary Frequency Normal Normal Normal Normal   Urinary Urgency None Present Present None   Bladder Spasms Absent Absent Absent Absent   Incontinence None None None None   Urinary Retention Normal Normal Normal Hesitency or dribbling, but no significant residual urine and/or retention occuring during the immediate postoperative period       CURRENT MEDICATIONS:    Current Outpatient Medications:     naproxen (ANAPROX) 220 MG tablet, Take 220 mg by mouth 3 times a day., Disp: , Rfl:     acetaminophen (TYLENOL) 500 MG Tab, Take 500-1,000 mg by mouth 3 times a day., Disp: , Rfl:     Wheat Dextrin (BENEFIBER PO), Take  by mouth as needed., Disp: , Rfl:     LABORATORY DATA:   Lab Results   Component Value Date/Time    SODIUM 140 06/20/2023 07:53 AM     POTASSIUM 4.4 06/20/2023 07:53 AM    CHLORIDE 106 06/20/2023 07:53 AM    CO2 28 06/20/2023 07:53 AM    GLUCOSE 123 (H) 06/20/2023 07:53 AM    BUN 18 06/20/2023 07:53 AM    CREATININE 0.65 06/20/2023 07:53 AM       Lab Results   Component Value Date/Time    WBC 7.5 05/24/2023 10:12 AM    RBC 4.10 (L) 05/24/2023 10:12 AM    HEMOGLOBIN 12.0 (L) 05/24/2023 10:12 AM    HEMATOCRIT 36.4 (L) 05/24/2023 10:12 AM    MCV 88.8 05/24/2023 10:12 AM    MCH 29.3 05/24/2023 10:12 AM    MCHC 33.0 05/24/2023 10:12 AM    PLATELETCT 254 05/24/2023 10:12 AM         RADIOLOGY DATA:  CT-ABDOMEN-PELVIS WITH    Result Date: 5/19/2023  1. Left lower quadrant ostomy. There is intraperitoneal air which could relate to recent surgery. Correlate clinically. 2. Dilated colon proximal to the colostomy and decompressed sigmoid colon, concerning for colonic obstruction. The transition point appears to be at the anastomosis or the colostomy. 3. Lobulated rectal mass is redemonstrated.    DX-CHEST-2 VIEWS    Result Date: 5/24/2023  Bibasilar pulmonary opacities again noted, with possible slight increase in left. This could be atelectasis/scarring with pneumonitis not excluded.    DX-PORTABLE FLUOROSCOPY < 1 HOUR    Result Date: 5/17/2023  Portable fluoroscopy utilized for 18 seconds. INTERPRETING LOCATION: [address] [place] [address], [id number]    DX-ABDOMEN FOR TUBE PLACEMENT    Result Date: 5/19/2023  1.  Nasogastric tube is now looped in the stomach. 2.  Gaseous dilated bowel loops.    DX-ABDOMEN FOR TUBE PLACEMENT    Result Date: 5/19/2023  1.  NG tube tip projects at the distal esophagus. Recommend advancing before use. 2.  Partially visualized gaseous distention of the colon and bowel. 3.  Bibasilar airspace disease, likely atelectasis.      IMPRESSION:  Cancer Staging   Malignant neoplasm of rectum (HCC)  Staging form: Colon and Rectum, AJCC 8th Edition  - Clinical stage from 4/24/2023: Stage IIC (cT4b, cN0, cM0) - Signed by Luis Bustillos M.D. on  4/24/2023      PLAN:  No change in treatment plan    Disposition:  Treatment plan and imaging reviewed. Questions answered. Continue therapy outlined.     Luis Bustillos M.D.    No orders of the defined types were placed in this encounter.

## 2023-06-22 ENCOUNTER — HOSPITAL ENCOUNTER (OUTPATIENT)
Dept: RADIATION ONCOLOGY | Facility: MEDICAL CENTER | Age: 68
End: 2023-06-22
Payer: MEDICARE

## 2023-06-22 LAB
CHEMOTHERAPY INFUSION START DATE: NORMAL
CHEMOTHERAPY RECORDS: 1.8
CHEMOTHERAPY RECORDS: 4500
CHEMOTHERAPY RECORDS: NORMAL
CHEMOTHERAPY RX CANCER: NORMAL
DATE 1ST CHEMO CANCER: NORMAL
RAD ONC ARIA COURSE LAST TREATMENT DATE: NORMAL
RAD ONC ARIA COURSE TREATMENT ELAPSED DAYS: NORMAL
RAD ONC ARIA REFERENCE POINT DOSAGE GIVEN TO DATE: 30.6
RAD ONC ARIA REFERENCE POINT DOSAGE GIVEN TO DATE: 31.48
RAD ONC ARIA REFERENCE POINT ID: NORMAL
RAD ONC ARIA REFERENCE POINT ID: NORMAL
RAD ONC ARIA REFERENCE POINT SESSION DOSAGE GIVEN: 1.8
RAD ONC ARIA REFERENCE POINT SESSION DOSAGE GIVEN: 1.85

## 2023-06-22 PROCEDURE — 77014 PR CT GUIDANCE PLACEMENT RAD THERAPY FIELDS: CPT | Mod: 26 | Performed by: RADIOLOGY

## 2023-06-22 PROCEDURE — 77386 HCHG IMRT DELIVERY COMPLEX: CPT | Performed by: RADIOLOGY

## 2023-06-23 ENCOUNTER — HOSPITAL ENCOUNTER (OUTPATIENT)
Dept: RADIATION ONCOLOGY | Facility: MEDICAL CENTER | Age: 68
End: 2023-06-23
Payer: MEDICARE

## 2023-06-23 LAB
CHEMOTHERAPY INFUSION START DATE: NORMAL
CHEMOTHERAPY RECORDS: 1.8
CHEMOTHERAPY RECORDS: 4500
CHEMOTHERAPY RECORDS: NORMAL
CHEMOTHERAPY RX CANCER: NORMAL
DATE 1ST CHEMO CANCER: NORMAL
RAD ONC ARIA COURSE LAST TREATMENT DATE: NORMAL
RAD ONC ARIA COURSE TREATMENT ELAPSED DAYS: NORMAL
RAD ONC ARIA REFERENCE POINT DOSAGE GIVEN TO DATE: 32.4
RAD ONC ARIA REFERENCE POINT DOSAGE GIVEN TO DATE: 33.33
RAD ONC ARIA REFERENCE POINT ID: NORMAL
RAD ONC ARIA REFERENCE POINT ID: NORMAL
RAD ONC ARIA REFERENCE POINT SESSION DOSAGE GIVEN: 1.8
RAD ONC ARIA REFERENCE POINT SESSION DOSAGE GIVEN: 1.85

## 2023-06-23 PROCEDURE — 77336 RADIATION PHYSICS CONSULT: CPT | Performed by: RADIOLOGY

## 2023-06-23 PROCEDURE — 77014 PR CT GUIDANCE PLACEMENT RAD THERAPY FIELDS: CPT | Mod: 26 | Performed by: RADIOLOGY

## 2023-06-23 PROCEDURE — 77386 HCHG IMRT DELIVERY COMPLEX: CPT | Performed by: RADIOLOGY

## 2023-06-26 ENCOUNTER — HOSPITAL ENCOUNTER (OUTPATIENT)
Dept: RADIATION ONCOLOGY | Facility: MEDICAL CENTER | Age: 68
End: 2023-06-26
Payer: MEDICARE

## 2023-06-26 ENCOUNTER — DOCUMENTATION (OUTPATIENT)
Dept: RADIATION ONCOLOGY | Facility: MEDICAL CENTER | Age: 68
End: 2023-06-26
Payer: MEDICARE

## 2023-06-26 LAB
CHEMOTHERAPY INFUSION START DATE: NORMAL
CHEMOTHERAPY RECORDS: 1.8
CHEMOTHERAPY RECORDS: 4500
CHEMOTHERAPY RECORDS: NORMAL
CHEMOTHERAPY RX CANCER: NORMAL
DATE 1ST CHEMO CANCER: NORMAL
RAD ONC ARIA COURSE LAST TREATMENT DATE: NORMAL
RAD ONC ARIA COURSE TREATMENT ELAPSED DAYS: NORMAL
RAD ONC ARIA REFERENCE POINT DOSAGE GIVEN TO DATE: 34.2
RAD ONC ARIA REFERENCE POINT DOSAGE GIVEN TO DATE: 35.18
RAD ONC ARIA REFERENCE POINT ID: NORMAL
RAD ONC ARIA REFERENCE POINT ID: NORMAL
RAD ONC ARIA REFERENCE POINT SESSION DOSAGE GIVEN: 1.8
RAD ONC ARIA REFERENCE POINT SESSION DOSAGE GIVEN: 1.85

## 2023-06-26 PROCEDURE — 77386 HCHG IMRT DELIVERY COMPLEX: CPT | Performed by: RADIOLOGY

## 2023-06-26 PROCEDURE — 77014 PR CT GUIDANCE PLACEMENT RAD THERAPY FIELDS: CPT | Mod: 26 | Performed by: RADIOLOGY

## 2023-06-26 NOTE — PROGRESS NOTES
Nutrition Services: Brief Update- Signing Off  Wt Readings from Last 7 Encounters:   06/21/23 92.7 kg (204 lb 5.9 oz)   06/14/23 91.4 kg (201 lb 8 oz)   06/07/23 91.6 kg (201 lb 15.1 oz)   05/31/23 87.4 kg (192 lb 10.9 oz)   05/16/23 88 kg (194 lb 0.1 oz)   04/24/23 90.3 kg (199 lb 1.2 oz)   08/28/17 102 kg (224 lb)     Weight Change: wt remaining stable x 2 months minimally     Weight remaining desirably stable. No nutrition needs requested or indicated at this time as a result of weight stability. RD will remain available PRN. Pt previously provided RD information at previous visits. Please re-consult RD as needed per pt preferences or if nutrition status changes.      Please contact [phone]

## 2023-06-27 ENCOUNTER — HOSPITAL ENCOUNTER (OUTPATIENT)
Dept: RADIATION ONCOLOGY | Facility: MEDICAL CENTER | Age: 68
End: 2023-06-27
Payer: MEDICARE

## 2023-06-27 PROCEDURE — 77338 DESIGN MLC DEVICE FOR IMRT: CPT | Mod: XU | Performed by: RADIOLOGY

## 2023-06-27 PROCEDURE — 77014 PR CT GUIDANCE PLACEMENT RAD THERAPY FIELDS: CPT | Mod: 26 | Performed by: RADIOLOGY

## 2023-06-27 PROCEDURE — 77300 RADIATION THERAPY DOSE PLAN: CPT | Mod: 26 | Performed by: RADIOLOGY

## 2023-06-27 PROCEDURE — 77338 DESIGN MLC DEVICE FOR IMRT: CPT | Mod: 26 | Performed by: RADIOLOGY

## 2023-06-27 PROCEDURE — 77427 RADIATION TX MANAGEMENT X5: CPT | Performed by: RADIOLOGY

## 2023-06-27 PROCEDURE — 77300 RADIATION THERAPY DOSE PLAN: CPT | Performed by: RADIOLOGY

## 2023-06-27 PROCEDURE — 77386 HCHG IMRT DELIVERY COMPLEX: CPT | Performed by: RADIOLOGY

## 2023-06-28 ENCOUNTER — HOSPITAL ENCOUNTER (OUTPATIENT)
Dept: RADIATION ONCOLOGY | Facility: MEDICAL CENTER | Age: 68
End: 2023-06-28
Payer: MEDICARE

## 2023-06-28 VITALS
OXYGEN SATURATION: 93 % | SYSTOLIC BLOOD PRESSURE: 138 MMHG | WEIGHT: 211.2 LBS | HEART RATE: 86 BPM | DIASTOLIC BLOOD PRESSURE: 78 MMHG | BODY MASS INDEX: 31.19 KG/M2

## 2023-06-28 LAB
CHEMOTHERAPY INFUSION START DATE: NORMAL
CHEMOTHERAPY RECORDS: 1.8
CHEMOTHERAPY RECORDS: 4500
CHEMOTHERAPY RECORDS: NORMAL
CHEMOTHERAPY RX CANCER: NORMAL
DATE 1ST CHEMO CANCER: NORMAL
RAD ONC ARIA COURSE LAST TREATMENT DATE: NORMAL
RAD ONC ARIA COURSE TREATMENT ELAPSED DAYS: NORMAL
RAD ONC ARIA REFERENCE POINT DOSAGE GIVEN TO DATE: 37.8
RAD ONC ARIA REFERENCE POINT DOSAGE GIVEN TO DATE: 38.89
RAD ONC ARIA REFERENCE POINT ID: NORMAL
RAD ONC ARIA REFERENCE POINT ID: NORMAL
RAD ONC ARIA REFERENCE POINT SESSION DOSAGE GIVEN: 3.6
RAD ONC ARIA REFERENCE POINT SESSION DOSAGE GIVEN: 3.7

## 2023-06-28 PROCEDURE — 77386 HCHG IMRT DELIVERY COMPLEX: CPT | Performed by: RADIOLOGY

## 2023-06-28 PROCEDURE — 77014 PR CT GUIDANCE PLACEMENT RAD THERAPY FIELDS: CPT | Mod: 26 | Performed by: RADIOLOGY

## 2023-06-28 ASSESSMENT — FIBROSIS 4 INDEX: FIB4 SCORE: 1.17

## 2023-06-28 NOTE — ON TREATMENT VISIT
ON TREATMENT  NOTE  RADIATION ONCOLOGY DEPARTMENT    Patient name:  Josh Lerner    Primary Physician:  Pcp Pt States None MRN: [id number]  CSN: [id number]   Referring physician:  Yakov Sewell M.D.   : 1955, 67 y.o.     ENCOUNTER DATE:  2023      DIAGNOSIS:  Malignant neoplasm of rectum (HCC)  Staging form: Colon and Rectum, AJCC 8th Edition  - Clinical stage from 2023: Stage IIC (cT4b, cN0, cM0) - Signed by Luis Bustillos M.D. on 2023  Total positive nodes: 0  Histologic grade (G): G2  Histologic grading system: 4 grade system  Microsatellite instability (MSI): Stable      TREATMENT SUMMARY:  Course First Treatment Date 2023  Course Last Treatment Date 2023  Aria Treatment Information          2023   Aria Course Treatment Dates   Course First Treatment Date 2023    Course Last Treatment Date 2023    Aria Treatment Summary   Rectum  Plan from Course C1_Rectal   Fraction    Elapsed Course Days  @ [id number]   Prescribed Fraction Dose 180 cGy   Prescribed Total Dose 4,500 cGy   Rectum  Reference Point from Course C1_Rectal   Elapsed Course Days  @ [id number]   Session Dose 360 cGy   Total Dose 3,780 cGy   Rectum CP  Reference Point from Course C1_Rectal   Elapsed Course Days 29 @    Session Dose 370 cGy   Total Dose 3,889 cGy          SUBJECTIVE:  Tolerate treatments well.  No complaints.  Able to have bowel movements.  Some rectal bleeding present.    VITAL SIGNS:      2023     3:17 PM 2023     3:47 PM 2023     3:44 PM 2023     3:45 PM 2023     3:52 PM 2023     7:29 AM 2023     5:36 AM   Vitals   SYSTOLIC 138 115 139 139 122 110 121   DIASTOLIC 78 71 78 75 65 70 67   Pulse 86 90 87 88 82 76 70   Temperature    36.3 °C (97.4 °F)  36.9 °C (98.4 °F) 36.4 °C (97.5 °F)   Respiration      16 18   Weight 211.2 204.37 201.5 201.94 192.68     BMI 31.19 kg/m2 30.18 kg/m2 29.76 kg/m2 29.82 kg/m2 28.45  kg/m2     Pulse Oximetry 93 % 95 % 95 % 97 % 98 % 94 % 92 %     KPS: 70, Cares for self; unable to carry on normal activity or to do active work (ECOG equivalent 1)  Encounter Vitals  Blood Pressure : 138/78  Pulse: 86  Pulse Oximetry: 93 %  Weight: 95.8 kg (211 lb 3.2 oz)      6/7/2023     3:45 PM 4/24/2023     1:01 PM   Pain Assessment   Pain Score NO PAIN 5=MODERATE P   Pain Loc  RECTUM          PHYSICAL EXAM:  Physical Exam  Vitals and nursing note reviewed.   Constitutional:       General: He is not in acute distress.     Appearance: He is well-developed.   HENT:      Head: Normocephalic.   Skin:     General: Skin is warm and dry.      Findings: No erythema.   Neurological:      Mental Status: He is alert and oriented to person, place, and time.   Psychiatric:         Behavior: Behavior normal.         Thought Content: Thought content normal.         Judgment: Judgment normal.              6/28/2023     3:17 PM 6/21/2023     3:45 PM 6/14/2023     3:43 PM 6/7/2023     3:48 PM 5/31/2023     3:48 PM   Toxicity Assessment   Toxicity Assessment Male Pelvis Male Pelvis Male Pelvis Male Pelvis Male Pelvis   Fatigue (lethargy, malaise, asthenia) None None None None None   Radiation Dermatitis None None None None None   Anorexia None None None None None   Colitis None None None None None   Constipation None None None None None   Dehydration None   None None   Diarrhea w/o Colostomy None None None None Increase of <4 stools/day over pre-treatment   Flatulence None None None None Moderate   Nausea None None None None None   Proctitis None None None None None   Vomiting None None None None None   RT - Pain due to RT None None None None Mild pain not interfering with function   Tumor Pain (onset or exacerbation of tumor pain due to treatment) None None None None Moderate pain, pain or analgesics interfering with function, but not interfering with activities of daily living   Dysuria (painful urination) None None None None  None   Urinary Frequency Normal Normal Normal Normal Normal   Urinary Urgency Present None Present Present None   Bladder Spasms Absent Absent Absent Absent Absent   Incontinence None None None None None   Urinary Retention Normal Normal Normal Normal Hesitency or dribbling, but no significant residual urine and/or retention occuring during the immediate postoperative period       CURRENT MEDICATIONS:    Current Outpatient Medications:     naproxen (ANAPROX) 220 MG tablet, Take 220 mg by mouth 3 times a day., Disp: , Rfl:     acetaminophen (TYLENOL) 500 MG Tab, Take 500-1,000 mg by mouth 3 times a day., Disp: , Rfl:     Wheat Dextrin (BENEFIBER PO), Take  by mouth as needed., Disp: , Rfl:     LABORATORY DATA:   Lab Results   Component Value Date/Time    SODIUM 140 06/20/2023 07:53 AM    POTASSIUM 4.4 06/20/2023 07:53 AM    CHLORIDE 106 06/20/2023 07:53 AM    CO2 28 06/20/2023 07:53 AM    GLUCOSE 123 (H) 06/20/2023 07:53 AM    BUN 18 06/20/2023 07:53 AM    CREATININE 0.65 06/20/2023 07:53 AM       Lab Results   Component Value Date/Time    WBC 7.5 05/24/2023 10:12 AM    RBC 4.10 (L) 05/24/2023 10:12 AM    HEMOGLOBIN 12.0 (L) 05/24/2023 10:12 AM    HEMATOCRIT 36.4 (L) 05/24/2023 10:12 AM    MCV 88.8 05/24/2023 10:12 AM    MCH 29.3 05/24/2023 10:12 AM    MCHC 33.0 05/24/2023 10:12 AM    PLATELETCT 254 05/24/2023 10:12 AM         RADIOLOGY DATA:  CT-ABDOMEN-PELVIS WITH    Result Date: 5/19/2023  1. Left lower quadrant ostomy. There is intraperitoneal air which could relate to recent surgery. Correlate clinically. 2. Dilated colon proximal to the colostomy and decompressed sigmoid colon, concerning for colonic obstruction. The transition point appears to be at the anastomosis or the colostomy. 3. Lobulated rectal mass is redemonstrated.    DX-CHEST-2 VIEWS    Result Date: 5/24/2023  Bibasilar pulmonary opacities again noted, with possible slight increase in left. This could be atelectasis/scarring with pneumonitis not  excluded.    DX-PORTABLE FLUOROSCOPY < 1 HOUR    Result Date: 5/17/2023  Portable fluoroscopy utilized for 18 seconds. INTERPRETING LOCATION: [facility], [address]    DX-ABDOMEN FOR TUBE PLACEMENT    Result Date: 5/19/2023  1.  Nasogastric tube is now looped in the stomach. 2.  Gaseous dilated bowel loops.    DX-ABDOMEN FOR TUBE PLACEMENT    Result Date: 5/19/2023  1.  NG tube tip projects at the distal esophagus. Recommend advancing before use. 2.  Partially visualized gaseous distention of the colon and bowel. 3.  Bibasilar airspace disease, likely atelectasis.      IMPRESSION:  Cancer Staging   Malignant neoplasm of rectum (HCC)  Staging form: Colon and Rectum, AJCC 8th Edition  - Clinical stage from 4/24/2023: Stage IIC (cT4b, cN0, cM0) - Signed by Luis Bustillos M.D. on 4/24/2023      PLAN:  No change in treatment plan    Disposition:  Treatment plan and imaging reviewed. Questions answered. Continue therapy outlined.     Rohini STEVEN M.D.    No orders of the defined types were placed in this encounter.

## 2023-06-29 ENCOUNTER — RESEARCH ENCOUNTER (OUTPATIENT)
Dept: HEMATOLOGY ONCOLOGY | Facility: MEDICAL CENTER | Age: 68
End: 2023-06-29
Payer: MEDICARE

## 2023-06-29 ENCOUNTER — HOSPITAL ENCOUNTER (OUTPATIENT)
Dept: RADIATION ONCOLOGY | Facility: MEDICAL CENTER | Age: 68
End: 2023-06-29

## 2023-06-29 LAB
CHEMOTHERAPY INFUSION START DATE: NORMAL
CHEMOTHERAPY RECORDS: 1.8
CHEMOTHERAPY RECORDS: 4500
CHEMOTHERAPY RECORDS: NORMAL
CHEMOTHERAPY RX CANCER: NORMAL
DATE 1ST CHEMO CANCER: NORMAL
RAD ONC ARIA COURSE LAST TREATMENT DATE: NORMAL
RAD ONC ARIA COURSE TREATMENT ELAPSED DAYS: NORMAL
RAD ONC ARIA REFERENCE POINT DOSAGE GIVEN TO DATE: 39.6
RAD ONC ARIA REFERENCE POINT DOSAGE GIVEN TO DATE: 40.74
RAD ONC ARIA REFERENCE POINT ID: NORMAL
RAD ONC ARIA REFERENCE POINT ID: NORMAL
RAD ONC ARIA REFERENCE POINT SESSION DOSAGE GIVEN: 1.8
RAD ONC ARIA REFERENCE POINT SESSION DOSAGE GIVEN: 1.85

## 2023-06-29 PROCEDURE — 77014 PR CT GUIDANCE PLACEMENT RAD THERAPY FIELDS: CPT | Mod: 26 | Performed by: RADIOLOGY

## 2023-06-29 NOTE — RESEARCH NOTE
[id number]    I called the patient's number on file and asked how many stools the patient had per day at baseline/prior to treatment. I was told an average of 3 stools/day, so this was documented in the addwish system per requirement for the [id number] study that the patient is participating in.    Ashleigh Campos

## 2023-06-30 ENCOUNTER — HOSPITAL ENCOUNTER (OUTPATIENT)
Dept: RADIATION ONCOLOGY | Facility: MEDICAL CENTER | Age: 68
End: 2023-06-30
Payer: MEDICARE

## 2023-06-30 LAB
CHEMOTHERAPY INFUSION START DATE: NORMAL
CHEMOTHERAPY RECORDS: 1.8
CHEMOTHERAPY RECORDS: 4500
CHEMOTHERAPY RECORDS: NORMAL
CHEMOTHERAPY RX CANCER: NORMAL
DATE 1ST CHEMO CANCER: NORMAL
RAD ONC ARIA COURSE LAST TREATMENT DATE: NORMAL
RAD ONC ARIA COURSE TREATMENT ELAPSED DAYS: NORMAL
RAD ONC ARIA REFERENCE POINT DOSAGE GIVEN TO DATE: 41.4
RAD ONC ARIA REFERENCE POINT DOSAGE GIVEN TO DATE: 42.59
RAD ONC ARIA REFERENCE POINT ID: NORMAL
RAD ONC ARIA REFERENCE POINT ID: NORMAL
RAD ONC ARIA REFERENCE POINT SESSION DOSAGE GIVEN: 1.8
RAD ONC ARIA REFERENCE POINT SESSION DOSAGE GIVEN: 1.85

## 2023-06-30 PROCEDURE — 77386 HCHG IMRT DELIVERY COMPLEX: CPT | Performed by: RADIOLOGY

## 2023-06-30 PROCEDURE — 77014 PR CT GUIDANCE PLACEMENT RAD THERAPY FIELDS: CPT | Mod: 26 | Performed by: RADIOLOGY

## 2023-06-30 PROCEDURE — 77336 RADIATION PHYSICS CONSULT: CPT | Performed by: RADIOLOGY

## 2023-07-03 ENCOUNTER — HOSPITAL ENCOUNTER (OUTPATIENT)
Dept: RADIATION ONCOLOGY | Facility: MEDICAL CENTER | Age: 68
End: 2023-07-31
Attending: RADIOLOGY
Payer: MEDICARE

## 2023-07-03 ENCOUNTER — HOSPITAL ENCOUNTER (OUTPATIENT)
Dept: RADIATION ONCOLOGY | Facility: MEDICAL CENTER | Age: 68
End: 2023-07-03

## 2023-07-03 LAB
CHEMOTHERAPY INFUSION START DATE: NORMAL
CHEMOTHERAPY RECORDS: 1.8
CHEMOTHERAPY RECORDS: 4500
CHEMOTHERAPY RECORDS: NORMAL
CHEMOTHERAPY RX CANCER: NORMAL
DATE 1ST CHEMO CANCER: NORMAL
RAD ONC ARIA COURSE LAST TREATMENT DATE: NORMAL
RAD ONC ARIA COURSE TREATMENT ELAPSED DAYS: NORMAL
RAD ONC ARIA REFERENCE POINT DOSAGE GIVEN TO DATE: 43.2
RAD ONC ARIA REFERENCE POINT DOSAGE GIVEN TO DATE: 44.44
RAD ONC ARIA REFERENCE POINT ID: NORMAL
RAD ONC ARIA REFERENCE POINT ID: NORMAL
RAD ONC ARIA REFERENCE POINT SESSION DOSAGE GIVEN: 1.8
RAD ONC ARIA REFERENCE POINT SESSION DOSAGE GIVEN: 1.85

## 2023-07-03 PROCEDURE — 77386 HCHG IMRT DELIVERY COMPLEX: CPT | Performed by: RADIOLOGY

## 2023-07-03 PROCEDURE — 77014 PR CT GUIDANCE PLACEMENT RAD THERAPY FIELDS: CPT | Mod: 26 | Performed by: RADIOLOGY

## 2023-07-05 ENCOUNTER — HOSPITAL ENCOUNTER (OUTPATIENT)
Dept: RADIATION ONCOLOGY | Facility: MEDICAL CENTER | Age: 68
End: 2023-07-05
Payer: MEDICARE

## 2023-07-05 VITALS
HEART RATE: 92 BPM | TEMPERATURE: 97.7 F | WEIGHT: 211.86 LBS | BODY MASS INDEX: 31.29 KG/M2 | SYSTOLIC BLOOD PRESSURE: 114 MMHG | DIASTOLIC BLOOD PRESSURE: 67 MMHG | OXYGEN SATURATION: 94 %

## 2023-07-05 DIAGNOSIS — C20 MALIGNANT NEOPLASM OF RECTUM (HCC): ICD-10-CM

## 2023-07-05 LAB
CHEMOTHERAPY INFUSION START DATE: NORMAL
CHEMOTHERAPY RECORDS: 1.8
CHEMOTHERAPY RECORDS: 4500
CHEMOTHERAPY RECORDS: NORMAL
CHEMOTHERAPY RX CANCER: NORMAL
DATE 1ST CHEMO CANCER: NORMAL
RAD ONC ARIA COURSE LAST TREATMENT DATE: NORMAL
RAD ONC ARIA COURSE TREATMENT ELAPSED DAYS: NORMAL
RAD ONC ARIA REFERENCE POINT DOSAGE GIVEN TO DATE: 45
RAD ONC ARIA REFERENCE POINT DOSAGE GIVEN TO DATE: 46.29
RAD ONC ARIA REFERENCE POINT ID: NORMAL
RAD ONC ARIA REFERENCE POINT ID: NORMAL
RAD ONC ARIA REFERENCE POINT SESSION DOSAGE GIVEN: 1.8
RAD ONC ARIA REFERENCE POINT SESSION DOSAGE GIVEN: 1.85

## 2023-07-05 PROCEDURE — 77427 RADIATION TX MANAGEMENT X5: CPT | Performed by: RADIOLOGY

## 2023-07-05 PROCEDURE — 77014 PR CT GUIDANCE PLACEMENT RAD THERAPY FIELDS: CPT | Mod: 26 | Performed by: RADIOLOGY

## 2023-07-05 PROCEDURE — 77386 HCHG IMRT DELIVERY COMPLEX: CPT | Performed by: RADIOLOGY

## 2023-07-05 RX ORDER — LIDOCAINE 50 MG/G
0.5 OINTMENT TOPICAL PRN
Qty: 50 G | Refills: 5 | Status: SHIPPED | OUTPATIENT
Start: 2023-07-05 | End: 2023-07-15

## 2023-07-05 ASSESSMENT — PAIN SCALES - GENERAL: PAINLEVEL: 2=MINIMAL-SLIGHT

## 2023-07-05 ASSESSMENT — FIBROSIS 4 INDEX: FIB4 SCORE: 1.17

## 2023-07-05 NOTE — ON TREATMENT VISIT
ON TREATMENT  NOTE  RADIATION ONCOLOGY DEPARTMENT    Patient name:  Josh Lerner    Primary Physician:  Pcp Pt States None MRN: [id number]  CSN: [id number]   Referring physician:  Yakov Sewell M.D.   : 1955, 67 y.o.     ENCOUNTER DATE:  2023    DIAGNOSIS:  Malignant neoplasm of rectum (HCC)  Staging form: Colon and Rectum, AJCC 8th Edition  - Clinical stage from 2023: Stage IIC (cT4b, cN0, cM0) - Signed by Luis Bustillos M.D. on 2023  Total positive nodes: 0  Histologic grade (G): G2  Histologic grading system: 4 grade system  Microsatellite instability (MSI): Stable      TREATMENT SUMMARY:  Course First Treatment Date 2023  Course Last Treatment Date 2023  Radiation Treatments       Active   Plans   Rectum   Most recent treatment: Dose planned: 180 cGy (fraction 24 of 25 on 7/3/2023)   Total: Dose planned: 4,500 cGy   Elapsed Days: 34 @ [id number]           Historical   No historical radiation treatments to show.                 SUBJECTIVE:  Well appearing, mild proctitis    VITAL SIGNS:      2023     2:53 PM 2023     3:17 PM 2023     3:47 PM 2023     3:44 PM 2023     3:45 PM 2023     3:52 PM 2023     7:29 AM   Vitals   SYSTOLIC 114 138 115 139 139 122 110   DIASTOLIC 67 78 71 78 75 65 70   Pulse 92 86 90 87 88 82 76   Temperature 36.5 °C (97.7 °F)    36.3 °C (97.4 °F)  36.9 °C (98.4 °F)   Respiration       16   Weight 211.86 211.2 204.37 201.5 201.94 192.68    BMI 31.29 kg/m2 31.19 kg/m2 30.18 kg/m2 29.76 kg/m2 29.82 kg/m2 28.45 kg/m2    Pulse Oximetry 94 % 93 % 95 % 95 % 97 % 98 % 94 %     KPS: 90, Able to carry on normal activity; minor signs or symptoms of disease (ECOG equivalent 0)  Encounter Vitals  Temperature: 36.5 °C (97.7 °F)  Blood Pressure : 114/67  Pulse: 92  Pulse Oximetry: 94 %  Weight: 96.1 kg (211 lb 13.8 oz)  Pain Score: 2=Minimal-Slight      2023     2:53 PM 2023     3:45 PM 2023     1:01 PM   Pain  Assessment   Pain Score 2=MINIMAL PA NO PAIN 5=MODERATE P   Pain Loc RECTUM  RECTUM          PHYSICAL EXAM:  Physical Exam  Constitutional:       Appearance: Normal appearance.   Abdominal:      General: There is no distension.      Palpations: Abdomen is soft.   Skin:     Findings: No erythema.   Neurological:      Mental Status: He is alert.              7/5/2023     2:55 PM 6/28/2023     3:17 PM 6/21/2023     3:45 PM 6/14/2023     3:43 PM 6/7/2023     3:48 PM 5/31/2023     3:48 PM   Toxicity Assessment   Toxicity Assessment Male Pelvis Male Pelvis Male Pelvis Male Pelvis Male Pelvis Male Pelvis   Fatigue (lethargy, malaise, asthenia) None None None None None None   Radiation Dermatitis Faint erythema or dry desquamation None None None None None   Anorexia None None None None None None   Colitis None None None None None None   Constipation None None None None None None   Dehydration None None   None None   Diarrhea w/o Colostomy None None None None None Increase of <4 stools/day over pre-treatment   Flatulence None None None None None Moderate   Nausea None None None None None None   Proctitis None None None None None None   Vomiting None None None None None None   RT - Pain due to RT Mild pain not interfering with function None None None None Mild pain not interfering with function   Tumor Pain (onset or exacerbation of tumor pain due to treatment) None None None None None Moderate pain, pain or analgesics interfering with function, but not interfering with activities of daily living   Dysuria (painful urination) None None None None None None   Urinary Frequency Normal Normal Normal Normal Normal Normal   Urinary Urgency Present Present None Present Present None   Bladder Spasms Absent Absent Absent Absent Absent Absent   Incontinence None None None None None None   Urinary Retention Normal Normal Normal Normal Normal Hesitency or dribbling, but no significant residual urine and/or retention occuring during the  immediate postoperative period       CURRENT MEDICATIONS:    Current Outpatient Medications:     silver sulfADIAZINE (SILVADENE) 1 % Cream, Apply 1/8 inch layer to affected area BID, Disp: 400 g, Rfl: 2    lidocaine (XYLOCAINE) 5 % Ointment, Apply 0.5 g topically as needed (moderate pain) for up to 10 days., Disp: 50 g, Rfl: 5    naproxen (ANAPROX) 220 MG tablet, Take 220 mg by mouth 3 times a day., Disp: , Rfl:     acetaminophen (TYLENOL) 500 MG Tab, Take 500-1,000 mg by mouth 3 times a day., Disp: , Rfl:     Wheat Dextrin (BENEFIBER PO), Take  by mouth as needed., Disp: , Rfl:     LABORATORY DATA:   Lab Results   Component Value Date/Time    SODIUM 140 06/20/2023 07:53 AM    POTASSIUM 4.4 06/20/2023 07:53 AM    CHLORIDE 106 06/20/2023 07:53 AM    CO2 28 06/20/2023 07:53 AM    GLUCOSE 123 (H) 06/20/2023 07:53 AM    BUN 18 06/20/2023 07:53 AM    CREATININE 0.65 06/20/2023 07:53 AM       Lab Results   Component Value Date/Time    WBC 7.5 05/24/2023 10:12 AM    RBC 4.10 (L) 05/24/2023 10:12 AM    HEMOGLOBIN 12.0 (L) 05/24/2023 10:12 AM    HEMATOCRIT 36.4 (L) 05/24/2023 10:12 AM    MCV 88.8 05/24/2023 10:12 AM    MCH 29.3 05/24/2023 10:12 AM    MCHC 33.0 05/24/2023 10:12 AM    PLATELETCT 254 05/24/2023 10:12 AM         RADIOLOGY DATA:  CT-ABDOMEN-PELVIS WITH    Result Date: 5/19/2023  1. Left lower quadrant ostomy. There is intraperitoneal air which could relate to recent surgery. Correlate clinically. 2. Dilated colon proximal to the colostomy and decompressed sigmoid colon, concerning for colonic obstruction. The transition point appears to be at the anastomosis or the colostomy. 3. Lobulated rectal mass is redemonstrated.    DX-CHEST-2 VIEWS    Result Date: 5/24/2023  Bibasilar pulmonary opacities again noted, with possible slight increase in left. This could be atelectasis/scarring with pneumonitis not excluded.    DX-PORTABLE FLUOROSCOPY < 1 HOUR    Result Date: 5/17/2023  Portable fluoroscopy utilized for 18  seconds. INTERPRETING LOCATION: [facility], [address]    DX-ABDOMEN FOR TUBE PLACEMENT    Result Date: 5/19/2023  1.  Nasogastric tube is now looped in the stomach. 2.  Gaseous dilated bowel loops.    DX-ABDOMEN FOR TUBE PLACEMENT    Result Date: 5/19/2023  1.  NG tube tip projects at the distal esophagus. Recommend advancing before use. 2.  Partially visualized gaseous distention of the colon and bowel. 3.  Bibasilar airspace disease, likely atelectasis.      IMPRESSION:  Cancer Staging   Malignant neoplasm of rectum (HCC)  Staging form: Colon and Rectum, AJCC 8th Edition  - Clinical stage from 4/24/2023: Stage IIC (cT4b, cN0, cM0) - Signed by Luis Bustillos M.D. on 4/24/2023      PLAN:  No change in treatment plan    Disposition:  Treatment plan and imaging reviewed. Questions answered. Continue therapy outlined.     Luis Bustillos M.D.    Orders Placed This Encounter    silver sulfADIAZINE (SILVADENE) 1 % Cream    lidocaine (XYLOCAINE) 5 % Ointment

## 2023-07-06 ENCOUNTER — HOSPITAL ENCOUNTER (OUTPATIENT)
Dept: RADIATION ONCOLOGY | Facility: MEDICAL CENTER | Age: 68
End: 2023-07-06
Payer: MEDICARE

## 2023-07-06 LAB
CHEMOTHERAPY INFUSION START DATE: NORMAL
CHEMOTHERAPY RECORDS: 1.8
CHEMOTHERAPY RECORDS: 900
CHEMOTHERAPY RECORDS: NORMAL
CHEMOTHERAPY RX CANCER: NORMAL
DATE 1ST CHEMO CANCER: NORMAL
RAD ONC ARIA COURSE LAST TREATMENT DATE: NORMAL
RAD ONC ARIA COURSE TREATMENT ELAPSED DAYS: NORMAL
RAD ONC ARIA REFERENCE POINT DOSAGE GIVEN TO DATE: 1.8
RAD ONC ARIA REFERENCE POINT DOSAGE GIVEN TO DATE: 1.84
RAD ONC ARIA REFERENCE POINT ID: NORMAL
RAD ONC ARIA REFERENCE POINT ID: NORMAL
RAD ONC ARIA REFERENCE POINT SESSION DOSAGE GIVEN: 1.8
RAD ONC ARIA REFERENCE POINT SESSION DOSAGE GIVEN: 1.84

## 2023-07-06 PROCEDURE — 77386 HCHG IMRT DELIVERY COMPLEX: CPT | Performed by: RADIOLOGY

## 2023-07-06 PROCEDURE — 77014 PR CT GUIDANCE PLACEMENT RAD THERAPY FIELDS: CPT | Mod: 26 | Performed by: RADIOLOGY

## 2023-07-07 ENCOUNTER — HOSPITAL ENCOUNTER (OUTPATIENT)
Dept: RADIATION ONCOLOGY | Facility: MEDICAL CENTER | Age: 68
End: 2023-07-07
Payer: MEDICARE

## 2023-07-07 ENCOUNTER — HOSPITAL ENCOUNTER (OUTPATIENT)
Dept: RADIOLOGY | Facility: MEDICAL CENTER | Age: 68
End: 2023-07-07
Attending: INTERNAL MEDICINE
Payer: MEDICARE

## 2023-07-07 DIAGNOSIS — C20 MALIGNANT NEOPLASM OF RECTUM (HCC): ICD-10-CM

## 2023-07-07 LAB
CHEMOTHERAPY INFUSION START DATE: NORMAL
CHEMOTHERAPY RECORDS: 1.8
CHEMOTHERAPY RECORDS: 900
CHEMOTHERAPY RECORDS: NORMAL
CHEMOTHERAPY RX CANCER: NORMAL
DATE 1ST CHEMO CANCER: NORMAL
RAD ONC ARIA COURSE LAST TREATMENT DATE: NORMAL
RAD ONC ARIA COURSE TREATMENT ELAPSED DAYS: NORMAL
RAD ONC ARIA REFERENCE POINT DOSAGE GIVEN TO DATE: 3.6
RAD ONC ARIA REFERENCE POINT DOSAGE GIVEN TO DATE: 3.69
RAD ONC ARIA REFERENCE POINT ID: NORMAL
RAD ONC ARIA REFERENCE POINT ID: NORMAL
RAD ONC ARIA REFERENCE POINT SESSION DOSAGE GIVEN: 1.8
RAD ONC ARIA REFERENCE POINT SESSION DOSAGE GIVEN: 1.84

## 2023-07-07 PROCEDURE — 93971 EXTREMITY STUDY: CPT | Mod: RT

## 2023-07-07 PROCEDURE — 77386 HCHG IMRT DELIVERY COMPLEX: CPT | Performed by: RADIOLOGY

## 2023-07-07 PROCEDURE — 77014 PR CT GUIDANCE PLACEMENT RAD THERAPY FIELDS: CPT | Mod: 26 | Performed by: RADIOLOGY

## 2023-07-10 ENCOUNTER — HOSPITAL ENCOUNTER (OUTPATIENT)
Dept: RADIATION ONCOLOGY | Facility: MEDICAL CENTER | Age: 68
End: 2023-07-10
Payer: MEDICARE

## 2023-07-10 LAB
CHEMOTHERAPY INFUSION START DATE: NORMAL
CHEMOTHERAPY RECORDS: 1.8
CHEMOTHERAPY RECORDS: 900
CHEMOTHERAPY RECORDS: NORMAL
CHEMOTHERAPY RX CANCER: NORMAL
DATE 1ST CHEMO CANCER: NORMAL
RAD ONC ARIA COURSE LAST TREATMENT DATE: NORMAL
RAD ONC ARIA COURSE TREATMENT ELAPSED DAYS: NORMAL
RAD ONC ARIA REFERENCE POINT DOSAGE GIVEN TO DATE: 5.4
RAD ONC ARIA REFERENCE POINT DOSAGE GIVEN TO DATE: 5.53
RAD ONC ARIA REFERENCE POINT ID: NORMAL
RAD ONC ARIA REFERENCE POINT ID: NORMAL
RAD ONC ARIA REFERENCE POINT SESSION DOSAGE GIVEN: 1.8
RAD ONC ARIA REFERENCE POINT SESSION DOSAGE GIVEN: 1.84

## 2023-07-10 PROCEDURE — 77336 RADIATION PHYSICS CONSULT: CPT | Performed by: RADIOLOGY

## 2023-07-10 PROCEDURE — 77014 PR CT GUIDANCE PLACEMENT RAD THERAPY FIELDS: CPT | Mod: 26 | Performed by: RADIOLOGY

## 2023-07-10 PROCEDURE — 77386 HCHG IMRT DELIVERY COMPLEX: CPT | Performed by: RADIOLOGY

## 2023-07-11 ENCOUNTER — HOSPITAL ENCOUNTER (OUTPATIENT)
Dept: RADIATION ONCOLOGY | Facility: MEDICAL CENTER | Age: 68
End: 2023-07-11
Payer: MEDICARE

## 2023-07-11 LAB
CHEMOTHERAPY INFUSION START DATE: NORMAL
CHEMOTHERAPY RECORDS: 1.8
CHEMOTHERAPY RECORDS: 900
CHEMOTHERAPY RECORDS: NORMAL
CHEMOTHERAPY RX CANCER: NORMAL
DATE 1ST CHEMO CANCER: NORMAL
RAD ONC ARIA COURSE LAST TREATMENT DATE: NORMAL
RAD ONC ARIA COURSE TREATMENT ELAPSED DAYS: NORMAL
RAD ONC ARIA REFERENCE POINT DOSAGE GIVEN TO DATE: 7.2
RAD ONC ARIA REFERENCE POINT DOSAGE GIVEN TO DATE: 7.38
RAD ONC ARIA REFERENCE POINT ID: NORMAL
RAD ONC ARIA REFERENCE POINT ID: NORMAL
RAD ONC ARIA REFERENCE POINT SESSION DOSAGE GIVEN: 1.8
RAD ONC ARIA REFERENCE POINT SESSION DOSAGE GIVEN: 1.84

## 2023-07-11 PROCEDURE — 77014 PR CT GUIDANCE PLACEMENT RAD THERAPY FIELDS: CPT | Mod: 26 | Performed by: RADIOLOGY

## 2023-07-11 PROCEDURE — 77386 HCHG IMRT DELIVERY COMPLEX: CPT | Performed by: RADIOLOGY

## 2023-07-12 ENCOUNTER — HOSPITAL ENCOUNTER (OUTPATIENT)
Dept: RADIATION ONCOLOGY | Facility: MEDICAL CENTER | Age: 68
End: 2023-07-12
Payer: MEDICARE

## 2023-07-12 VITALS
WEIGHT: 214.29 LBS | DIASTOLIC BLOOD PRESSURE: 72 MMHG | BODY MASS INDEX: 31.64 KG/M2 | SYSTOLIC BLOOD PRESSURE: 113 MMHG | OXYGEN SATURATION: 95 % | HEART RATE: 91 BPM | TEMPERATURE: 97.8 F

## 2023-07-12 DIAGNOSIS — C20 MALIGNANT NEOPLASM OF RECTUM (HCC): ICD-10-CM

## 2023-07-12 LAB
CHEMOTHERAPY INFUSION START DATE: NORMAL
CHEMOTHERAPY INFUSION START DATE: NORMAL
CHEMOTHERAPY INFUSION STOP DATE: NORMAL
CHEMOTHERAPY RECORDS: 1.8
CHEMOTHERAPY RECORDS: 4500
CHEMOTHERAPY RECORDS: 900
CHEMOTHERAPY RECORDS: 900
CHEMOTHERAPY RECORDS: NORMAL
CHEMOTHERAPY RX CANCER: NORMAL
CHEMOTHERAPY RX CANCER: NORMAL
DATE 1ST CHEMO CANCER: NORMAL
DATE 1ST CHEMO CANCER: NORMAL
RAD ONC ARIA COURSE LAST TREATMENT DATE: NORMAL
RAD ONC ARIA COURSE LAST TREATMENT DATE: NORMAL
RAD ONC ARIA COURSE TREATMENT ELAPSED DAYS: NORMAL
RAD ONC ARIA COURSE TREATMENT ELAPSED DAYS: NORMAL
RAD ONC ARIA REFERENCE POINT DOSAGE GIVEN TO DATE: 45
RAD ONC ARIA REFERENCE POINT DOSAGE GIVEN TO DATE: 46.29
RAD ONC ARIA REFERENCE POINT DOSAGE GIVEN TO DATE: 9
RAD ONC ARIA REFERENCE POINT DOSAGE GIVEN TO DATE: 9
RAD ONC ARIA REFERENCE POINT DOSAGE GIVEN TO DATE: 9.22
RAD ONC ARIA REFERENCE POINT DOSAGE GIVEN TO DATE: 9.22
RAD ONC ARIA REFERENCE POINT ID: NORMAL
RAD ONC ARIA REFERENCE POINT SESSION DOSAGE GIVEN: 1.8
RAD ONC ARIA REFERENCE POINT SESSION DOSAGE GIVEN: 1.84

## 2023-07-12 PROCEDURE — 77386 HCHG IMRT DELIVERY COMPLEX: CPT | Performed by: RADIOLOGY

## 2023-07-12 PROCEDURE — 77014 PR CT GUIDANCE PLACEMENT RAD THERAPY FIELDS: CPT | Mod: 26 | Performed by: RADIOLOGY

## 2023-07-12 PROCEDURE — 77427 RADIATION TX MANAGEMENT X5: CPT | Performed by: RADIOLOGY

## 2023-07-12 ASSESSMENT — FIBROSIS 4 INDEX: FIB4 SCORE: 1.18

## 2023-07-12 ASSESSMENT — PAIN SCALES - GENERAL: PAINLEVEL: NO PAIN

## 2023-07-12 NOTE — ON TREATMENT VISIT
ON TREATMENT  NOTE  RADIATION ONCOLOGY DEPARTMENT    Patient name:  Josh Lerner    Primary Physician:  Pcp Pt States None MRN: [id number]  CSN: [id number]   Referring physician:  Yakov Sewell M.D.   : 1955, 68 y.o.     ENCOUNTER DATE:  2023    DIAGNOSIS:  Malignant neoplasm of rectum (HCC)  Staging form: Colon and Rectum, AJCC 8th Edition  - Clinical stage from 2023: Stage IIC (cT4b, cN0, cM0) - Signed by Luis Bustillos M.D. on 2023  Total positive nodes: 0  Histologic grade (G): G2  Histologic grading system: 4 grade system  Microsatellite instability (MSI): Stable      TREATMENT SUMMARY:  Course First Treatment Date 2023  Course Last Treatment Date 2023  Radiation Treatments       Active   Plans   Rectum   Most recent treatment: Dose planned: 180 cGy (fraction 25 of 25 on 2023)   Total: Dose planned: 4,500 cGy   Elapsed Days: 36 @ [id number]      Rectum Bst   Most recent treatment: Dose planned: 180 cGy (fraction 5 of 5 on 2023)   Total: Dose planned: 900 cGy   Elapsed Days: 43 @ [id number]           Historical   No historical radiation treatments to show.               SUBJECTIVE:  Moderate rectal irritaiton    VITAL SIGNS:      2023     3:07 PM 2023     2:53 PM 2023     3:17 PM 2023     3:47 PM 2023     3:44 PM 2023     3:45 PM 2023     3:52 PM   Vitals   SYSTOLIC 113 114 138 115 139 139 122   DIASTOLIC 72 67 78 71 78 75 65   Pulse 91 92 86 90 87 88 82   Temperature 36.6 °C (97.8 °F) 36.5 °C (97.7 °F)    36.3 °C (97.4 °F)    Weight 214.29 211.86 211.2 204.37 201.5 201.94 192.68   BMI 31.64 kg/m2 31.29 kg/m2 31.19 kg/m2 30.18 kg/m2 29.76 kg/m2 29.82 kg/m2 28.45 kg/m2   Pulse Oximetry 95 % 94 % 93 % 95 % 95 % 97 % 98 %     KPS: 100, Fully active, able to carry on all pre-disease performed without restriction (ECOG equivalent 0)  Encounter Vitals  Temperature: 36.6 °C (97.8 °F)  Blood Pressure : 113/72  Pulse: 91  Pulse  Oximetry: 95 %  Weight: 97.2 kg (214 lb 4.6 oz)  Pain Score: No pain      7/12/2023     3:07 PM 7/5/2023     2:53 PM 6/7/2023     3:45 PM 4/24/2023     1:01 PM   Pain Assessment   Pain Score NO PAIN 2=MINIMAL PA NO PAIN 5=MODERATE P   Pain Loc  RECTUM  RECTUM          PHYSICAL EXAM:  Physical Exam  Constitutional:       Appearance: Normal appearance.   Abdominal:      General: There is no distension.      Palpations: Abdomen is soft.   Skin:     Findings: No erythema.   Neurological:      Mental Status: He is alert.              7/12/2023     3:10 PM 7/5/2023     2:55 PM 6/28/2023     3:17 PM 6/21/2023     3:45 PM 6/14/2023     3:43 PM 6/7/2023     3:48 PM 5/31/2023     3:48 PM   Toxicity Assessment   Toxicity Assessment Male Pelvis Male Pelvis Male Pelvis Male Pelvis Male Pelvis Male Pelvis Male Pelvis   Fatigue (lethargy, malaise, asthenia) None None None None None None None   Radiation Dermatitis Faint erythema or dry desquamation Faint erythema or dry desquamation None None None None None   Anorexia None None None None None None None   Colitis None None None None None None None   Constipation None None None None None None None   Dehydration None None None   None None   Diarrhea w/o Colostomy None None None None None None Increase of <4 stools/day over pre-treatment   Flatulence None None None None None None Moderate   Nausea None None None None None None None   Proctitis None None None None None None None   Vomiting None None None None None None None   RT - Pain due to RT Mild pain not interfering with function Mild pain not interfering with function None None None None Mild pain not interfering with function   Tumor Pain (onset or exacerbation of tumor pain due to treatment) None None None None None None Moderate pain, pain or analgesics interfering with function, but not interfering with activities of daily living   Dysuria (painful urination) None None None None None None None   Urinary Frequency Normal  Normal Normal Normal Normal Normal Normal   Urinary Urgency Present Present Present None Present Present None   Bladder Spasms Absent Absent Absent Absent Absent Absent Absent   Incontinence None None None None None None None   Urinary Retention Normal Normal Normal Normal Normal Normal Hesitency or dribbling, but no significant residual urine and/or retention occuring during the immediate postoperative period       CURRENT MEDICATIONS:    Current Outpatient Medications:     silver sulfADIAZINE (SILVADENE) 1 % Cream, Apply 1/8 inch layer to affected area BID, Disp: 400 g, Rfl: 2    lidocaine (XYLOCAINE) 5 % Ointment, Apply 0.5 g topically as needed (moderate pain) for up to 10 days., Disp: 50 g, Rfl: 5    naproxen (ANAPROX) 220 MG tablet, Take 220 mg by mouth 3 times a day., Disp: , Rfl:     acetaminophen (TYLENOL) 500 MG Tab, Take 500-1,000 mg by mouth 3 times a day., Disp: , Rfl:     Wheat Dextrin (BENEFIBER PO), Take  by mouth as needed., Disp: , Rfl:     LABORATORY DATA:   Lab Results   Component Value Date/Time    SODIUM 140 06/20/2023 07:53 AM    POTASSIUM 4.4 06/20/2023 07:53 AM    CHLORIDE 106 06/20/2023 07:53 AM    CO2 28 06/20/2023 07:53 AM    GLUCOSE 123 (H) 06/20/2023 07:53 AM    BUN 18 06/20/2023 07:53 AM    CREATININE 0.65 06/20/2023 07:53 AM       Lab Results   Component Value Date/Time    WBC 7.5 05/24/2023 10:12 AM    RBC 4.10 (L) 05/24/2023 10:12 AM    HEMOGLOBIN 12.0 (L) 05/24/2023 10:12 AM    HEMATOCRIT 36.4 (L) 05/24/2023 10:12 AM    MCV 88.8 05/24/2023 10:12 AM    MCH 29.3 05/24/2023 10:12 AM    MCHC 33.0 05/24/2023 10:12 AM    PLATELETCT 254 05/24/2023 10:12 AM         RADIOLOGY DATA:  CT-ABDOMEN-PELVIS WITH    Result Date: 5/19/2023  1. Left lower quadrant ostomy. There is intraperitoneal air which could relate to recent surgery. Correlate clinically. 2. Dilated colon proximal to the colostomy and decompressed sigmoid colon, concerning for colonic obstruction. The transition point appears to  be at the anastomosis or the colostomy. 3. Lobulated rectal mass is redemonstrated.    DX-CHEST-2 VIEWS    Result Date: 5/24/2023  Bibasilar pulmonary opacities again noted, with possible slight increase in left. This could be atelectasis/scarring with pneumonitis not excluded.    DX-PORTABLE FLUOROSCOPY < 1 HOUR    Result Date: 5/17/2023  Portable fluoroscopy utilized for 18 seconds. INTERPRETING LOCATION: [address] [facility], [place], [address]    DX-ABDOMEN FOR TUBE PLACEMENT    Result Date: 5/19/2023  1.  Nasogastric tube is now looped in the stomach. 2.  Gaseous dilated bowel loops.    DX-ABDOMEN FOR TUBE PLACEMENT    Result Date: 5/19/2023  1.  NG tube tip projects at the distal esophagus. Recommend advancing before use. 2.  Partially visualized gaseous distention of the colon and bowel. 3.  Bibasilar airspace disease, likely atelectasis.      IMPRESSION:  Cancer Staging   Malignant neoplasm of rectum (HCC)  Staging form: Colon and Rectum, AJCC 8th Edition  - Clinical stage from 4/24/2023: Stage IIC (cT4b, cN0, cM0) - Signed by Luis Bustillos M.D. on 4/24/2023      PLAN:  No change in treatment plan    Disposition:  Treatment plan and imaging reviewed. Questions answered. Continue therapy outlined.  Follow up around 10 wks post chemoRT per protocol.    Luis Bustillos M.D.    No orders of the defined types were placed in this encounter.

## 2023-07-13 NOTE — RADIATION COMPLETION NOTES
END OF TREATMENT SUMMARY    Patient name:  Josh Lerner    Primary Physician:  Pcp Pt States None MRN: [id number]  CSN: [id number]   Referring physician:  Dr. Donato SÁNCHEZB: 1955, 68 y.o.       TREATMENT SUMMARY:        Course First Treatment Date 2023    Course Last Treatment Date 2023   Course Elapsed Days 43 @ [id number]   Course Intent Curative     Radiation Therapy Episodes       Active Episodes       Radiation Therapy: IMRT (2023)                   Radiation Treatments         Plan Last Treated On Elapsed Days Fractions Treated Prescribed Fraction Dose (cGy) Prescribed Total Dose (cGy)    Rectum 2023 43 @ [id number] 25 of 25 180 4,500    Rectum Bst 2023 43 @ [id number] 5 of 5 180 900                  Reference Point Last Treated On Elapsed Days Most Recent Session Dose (cGy) Total Dose (cGy)    Rectum 2023 43 @ [id number] -- 4,500    Rectum Bst 2023 43 @ [id number] -- 900    Rectum Bst CP 2023 43 @ [id number] -- 922    Rectum CP 2023 43 @ [id number] -- 4,629                                     STAGE:   Malignant neoplasm of rectum (HCC)  Staging form: Colon and Rectum, AJCC 8th Edition  - Clinical stage from 2023: Stage IIC (cT4b, cN0, cM0) - Signed by Luis Bustillos M.D. on 2023  Total positive nodes: 0  Histologic grade (G): G2  Histologic grading system: 4 grade system  Microsatellite instability (MSI): Stable       TREATMENT INDICATION:   curative     CONCURRENT SYSTEMIC TREATMENT:   xeloda     RT COURSE DISCONTINUED EARLY:   No     PATIENT EXPERIENCE:       2023     3:10 PM 2023     2:55 PM 2023     3:17 PM 2023     3:45 PM 2023     3:43 PM 2023     3:48 PM 2023     3:48 PM   Toxicity Assessment   Toxicity Assessment Male Pelvis Male Pelvis Male Pelvis Male Pelvis Male Pelvis Male Pelvis Male Pelvis   Fatigue (lethargy, malaise, asthenia) None None None None None None None   Radiation  Dermatitis Faint erythema or dry desquamation Faint erythema or dry desquamation None None None None None   Anorexia None None None None None None None   Colitis None None None None None None None   Constipation None None None None None None None   Dehydration None None None   None None   Diarrhea w/o Colostomy None None None None None None Increase of <4 stools/day over pre-treatment   Flatulence None None None None None None Moderate   Nausea None None None None None None None   Proctitis None None None None None None None   Vomiting None None None None None None None   RT - Pain due to RT Mild pain not interfering with function Mild pain not interfering with function None None None None Mild pain not interfering with function   Tumor Pain (onset or exacerbation of tumor pain due to treatment) None None None None None None Moderate pain, pain or analgesics interfering with function, but not interfering with activities of daily living   Dysuria (painful urination) None None None None None None None   Urinary Frequency Normal Normal Normal Normal Normal Normal Normal   Urinary Urgency Present Present Present None Present Present None   Bladder Spasms Absent Absent Absent Absent Absent Absent Absent   Incontinence None None None None None None None   Urinary Retention Normal Normal Normal Normal Normal Normal Hesitency or dribbling, but no significant residual urine and/or retention occuring during the immediate postoperative period        FOLLOW-UP PLAN:   8 Weeks     COMMENT:          ANATOMIC TARGET SUMMARY    ANATOMIC TARGET MODALITY TECHNIQUE   rectum   External beam, photons IMRT            COMMENT:         DIAGRAMS:      DOSE VOLUME HISTOGRAMS:

## 2023-07-31 ENCOUNTER — HOSPITAL ENCOUNTER (OUTPATIENT)
Dept: LAB | Facility: MEDICAL CENTER | Age: 68
End: 2023-07-31
Attending: RADIOLOGY
Payer: MEDICARE

## 2023-07-31 DIAGNOSIS — C20 MALIGNANT NEOPLASM OF RECTUM (HCC): ICD-10-CM

## 2023-07-31 LAB
ALBUMIN SERPL BCP-MCNC: 4.1 G/DL (ref 3.2–4.9)
ALBUMIN/GLOB SERPL: 1.2 G/DL
ALP SERPL-CCNC: 121 U/L (ref 30–99)
ALT SERPL-CCNC: 25 U/L (ref 2–50)
ANION GAP SERPL CALC-SCNC: 10 MMOL/L (ref 7–16)
AST SERPL-CCNC: 20 U/L (ref 12–45)
BILIRUB SERPL-MCNC: 0.6 MG/DL (ref 0.1–1.5)
BUN SERPL-MCNC: 19 MG/DL (ref 8–22)
CALCIUM ALBUM COR SERPL-MCNC: 9 MG/DL (ref 8.5–10.5)
CALCIUM SERPL-MCNC: 9.1 MG/DL (ref 8.5–10.5)
CEA SERPL-MCNC: 41.9 NG/ML (ref 0–3)
CHLORIDE SERPL-SCNC: 105 MMOL/L (ref 96–112)
CO2 SERPL-SCNC: 25 MMOL/L (ref 20–33)
CREAT SERPL-MCNC: 0.89 MG/DL (ref 0.5–1.4)
GFR SERPLBLD CREATININE-BSD FMLA CKD-EPI: 93 ML/MIN/1.73 M 2
GLOBULIN SER CALC-MCNC: 3.3 G/DL (ref 1.9–3.5)
GLUCOSE SERPL-MCNC: 106 MG/DL (ref 65–99)
POTASSIUM SERPL-SCNC: 4.4 MMOL/L (ref 3.6–5.5)
PROT SERPL-MCNC: 7.4 G/DL (ref 6–8.2)
SODIUM SERPL-SCNC: 140 MMOL/L (ref 135–145)

## 2023-07-31 PROCEDURE — 36415 COLL VENOUS BLD VENIPUNCTURE: CPT

## 2023-07-31 PROCEDURE — 80053 COMPREHEN METABOLIC PANEL: CPT

## 2023-07-31 PROCEDURE — 82378 CARCINOEMBRYONIC ANTIGEN: CPT

## 2023-08-07 ENCOUNTER — HOSPITAL ENCOUNTER (OUTPATIENT)
Dept: RADIATION ONCOLOGY | Facility: MEDICAL CENTER | Age: 68
End: 2023-08-31
Attending: RADIOLOGY
Payer: MEDICARE

## 2023-08-07 VITALS
HEART RATE: 92 BPM | WEIGHT: 219.36 LBS | BODY MASS INDEX: 32.39 KG/M2 | DIASTOLIC BLOOD PRESSURE: 81 MMHG | OXYGEN SATURATION: 95 % | SYSTOLIC BLOOD PRESSURE: 130 MMHG

## 2023-08-07 DIAGNOSIS — C20 MALIGNANT NEOPLASM OF RECTUM (HCC): ICD-10-CM

## 2023-08-07 PROCEDURE — 99212 OFFICE O/P EST SF 10 MIN: CPT | Performed by: RADIOLOGY

## 2023-08-07 ASSESSMENT — FIBROSIS 4 INDEX: FIB4 SCORE: 1.07

## 2023-08-07 ASSESSMENT — PAIN SCALES - GENERAL: PAINLEVEL: NO PAIN

## 2023-08-07 NOTE — PROGRESS NOTES
Patient was seen today in clinic with Dr. Bustillos for follow up.  Vitals signs and weight were obtained and pain assessment was completed.  Allergies and medications were reviewed with the patient.  Toxicities of treatment assessed.     Vitals/Pain:  Vitals:    08/07/23 1526   BP: 130/81   BP Location: Left arm   Patient Position: Sitting   BP Cuff Size: Adult   Pulse: 92   SpO2: 95%   Weight: 99.5 kg (219 lb 5.7 oz)   Pain Score: No pain        Allergies:   Patient has no known allergies.    Current Medications:  Current Outpatient Medications   Medication Sig Dispense Refill    silver sulfADIAZINE (SILVADENE) 1 % Cream Apply 1/8 inch layer to affected area BID (Patient not taking: Reported on 8/7/2023) 400 g 2    naproxen (ANAPROX) 220 MG tablet Take 220 mg by mouth 3 times a day. (Patient not taking: Reported on 8/7/2023)      acetaminophen (TYLENOL) 500 MG Tab Take 500-1,000 mg by mouth 3 times a day. (Patient not taking: Reported on 8/7/2023)      Wheat Dextrin (BENEFIBER PO) Take  by mouth as needed. (Patient not taking: Reported on 8/7/2023)       No current facility-administered medications for this encounter.         PCP:  Pcp Pt States None        Marvel Lerma Ass't

## 2023-08-07 NOTE — PROGRESS NOTES
RADIATION ONCOLOGY FOLLOW-UP    Patient name:  Josh Lerner    Primary Physician:  Pcp Pt States None MRN: [id number]  CSN: [id number]   Referring physician:  Margaux Sewell M.*  : 1955, 68 y.o.     DATE OF SERVICE: 2023    IDENTIFICATION:   A 68 y.o. male with    Malignant neoplasm of rectum (HCC)  Staging form: Colon and Rectum, AJCC 8th Edition  - Clinical stage from 2023: Stage IIC (cT4b, cN0, cM0) - Signed by Luis Bustillos M.D. on 2023  Total positive nodes: 0  Histologic grade (G): G2  Histologic grading system: 4 grade system  Microsatellite instability (MSI): Stable      RADIATION SUMMARY:  Radiation Oncology          2023   Aria Course Treatment Dates   Course First Treatment Date 2023    Course Last Treatment Date 2023    [facility]ria Treatment Summary   Rectum  Plan from Course C1_Rectal   Fraction  25 of 25   Elapsed Course Days  43 @    Prescribed Fraction Dose  180 cGy   Prescribed Total Dose  4,500 cGy   Rectum Bst  Plan from Course C1_Rectal   Fraction 4 of 5 5 of 5    5 of 5   Elapsed Course Days 42 @ [id number] 43 @     43 @    Prescribed Fraction Dose 180 cGy 180 cGy    180 cGy   Prescribed Total Dose 900 cGy 900 cGy    900 cGy   Rectum  Reference Point from Course C1_Rectal   Elapsed Course Days  43 @    Session Dose  --   Total Dose  4,500 cGy   Rectum Bst  Reference Point from Course C1_Rectal   Elapsed Course Days 42 @ [id number] 43 @     43 @    Session Dose 180 cGy 180 cGy    --   Total Dose 720 cGy 900 cGy    900 cGy   Rectum Bst CP  Reference Point from Course C1_Rectal   Elapsed Course Days 42 @ [id number] 43 @     43 @    Session Dose 184 cGy 184 cGy    --   Total Dose 738 cGy 922 cGy    922 cGy   Rectum CP  Reference Point from Course C1_Rectal   Elapsed Course Days  43 @    Session Dose  --    Total Dose  4,629 cGy      Details          More values are hidden. Newest values shown. Go to activity for more data.                HISTORY OF PRESENT ILLNESS:  Patient is a pleasant 67-year-old male with stools and intermittent bleeding for close to a year and then underwent colonoscopy March 13, 2023 by Dr. Larson which showed multilobulated mass 2 cm from anal verge encompassing 70% circumference of distal rectum with pathology showing invasive moderately differentiated adenocarcinoma mismatch repair proteins intact.  Patient underwent flexible sigmoidoscopy with endoscopic ultrasound April 4, 2023 by Dr. Ramirez which showed T4 mass 0 to 10 cm from anal verge.  Patient had a CT chest abdomen pelvis April 6, 2023 which showed rectal mass with no evidence of distant metastatic disease.  Patient had an MRI rectal protocol April 18, 2023 which showed a large circumferential low rectal tumor with extension into the prostate internal anal sphincter clinical T4BN0 with enhancing fluid collection adjacent.  Patient was seen by Dr. Kirkpatrick and Dr. Sewell who is planning for ostomy and Mediport placement.  CEA 47.6. Plan for diverting colostomy 5/16/23.     05/17/2023  Patient had diverting colostomy yesterday doing well we will plan for radiation mapping tomorrow  may 18 2023 with plan to start treatment May 29, 2023.    INTERVAL HISTORY:  Patient completed chemoradiation therapy 54 Carnes in 30 fractions on July 12, 2023 on [facility] protocol randomized to FOLFOXIRI with plan to start Wednesday.  Patient had CEA 7/31/23 41.9 down from 48.6 2 months ago.  He has mild mucus secretion but no bill hematochezia.  Overall his rectal symptoms are improving.    PROBLEM LIST:  Patient Active Problem List   Diagnosis    Malignant neoplasm of rectum (HCC)    COPD (chronic obstructive pulmonary disease) (HCC)    Colostomy in place (HCC)       CURRENT MEDICATIONS:  Current Outpatient Medications   Medication Sig Dispense  Refill    silver sulfADIAZINE (SILVADENE) 1 % Cream Apply 1/8 inch layer to affected area BID (Patient not taking: Reported on 8/7/2023) 400 g 2    naproxen (ANAPROX) 220 MG tablet Take 220 mg by mouth 3 times a day. (Patient not taking: Reported on 8/7/2023)      acetaminophen (TYLENOL) 500 MG Tab Take 500-1,000 mg by mouth 3 times a day. (Patient not taking: Reported on 8/7/2023)      Wheat Dextrin (BENEFIBER PO) Take  by mouth as needed. (Patient not taking: Reported on 8/7/2023)       No current facility-administered medications for this encounter.       ALLERGIES:  Patient has no known allergies.    REVIEW OF SYSTEMS:    A complete review of system taken. Pertinent items in HPI.  All others negative.    PHYSICAL EXAM:  PERFORMANCE STATUS:      4/24/2023     1:09 PM   ECOG Performance Review   ECOG Performance Status Restricted in physically strenuous activity but ambulatory and able to carry out work of a light or sedentary nature, e.g., light house work, office work         4/24/2023     1:08 PM   Karnofsky Score   Karnofsky Score 80     /81 (BP Location: Left arm, Patient Position: Sitting, BP Cuff Size: Adult)   Pulse 92   Wt 99.5 kg (219 lb 5.7 oz)   SpO2 95%   BMI 32.39 kg/m²   Physical Exam  Vitals reviewed.   HENT:      Nose: Nose normal.      Mouth/Throat:      Mouth: Mucous membranes are moist.   Eyes:      Pupils: Pupils are equal, round, and reactive to light.   Cardiovascular:      Rate and Rhythm: Normal rate.   Pulmonary:      Effort: Pulmonary effort is normal.   Abdominal:      General: Abdomen is flat.   Musculoskeletal:         General: Normal range of motion.      Cervical back: Normal range of motion.   Neurological:      General: No focal deficit present.      Mental Status: He is alert.   Psychiatric:         Mood and Affect: Mood normal.         LABORATORY DATA:   Lab Results   Component Value Date/Time    WBC 7.5 05/24/2023 10:12 AM    RBC 4.10 (L) 05/24/2023 10:12 AM     HEMOGLOBIN 12.0 (L) 05/24/2023 10:12 AM    HEMATOCRIT 36.4 (L) 05/24/2023 10:12 AM    MCV 88.8 05/24/2023 10:12 AM    MCH 29.3 05/24/2023 10:12 AM    MCHC 33.0 05/24/2023 10:12 AM    RDW 43.1 05/24/2023 10:12 AM    PLATELETCT 254 05/24/2023 10:12 AM    MPV 9.7 05/24/2023 10:12 AM    NEUTSPOLYS 72.70 (H) 04/24/2023 02:06 PM    LYMPHOCYTES 15.50 (L) 04/24/2023 02:06 PM    MONOCYTES 7.80 04/24/2023 02:06 PM    EOSINOPHILS 3.00 04/24/2023 02:06 PM    BASOPHILS 0.50 04/24/2023 02:06 PM      Lab Results   Component Value Date/Time    SODIUM 140 07/31/2023 09:29 AM    POTASSIUM 4.4 07/31/2023 09:29 AM    CHLORIDE 105 07/31/2023 09:29 AM    CO2 25 07/31/2023 09:29 AM    GLUCOSE 106 (H) 07/31/2023 09:29 AM    BUN 19 07/31/2023 09:29 AM    CREATININE 0.89 07/31/2023 09:29 AM         RADIOLOGY DATA:  No results found.    IMPRESSION:    A 68 y.o. with   Malignant neoplasm of rectum (HCC)  Staging form: Colon and Rectum, AJCC 8th Edition  - Clinical stage from 4/24/2023: Stage IIC (cT4b, cN0, cM0) - Signed by Luis Bustillos M.D. on 4/24/2023  Total positive nodes: 0  Histologic grade (G): G2  Histologic grading system: 4 grade system  Microsatellite instability (MSI): Stable      CANCER STATUS:  Disease Partial Response    RECOMMENDATIONS:   Patient is clinically doing well after completion of his chemoradiation therapy and is going to start chemotherapy on Wednesday, August 9, 2023 on [facility] protocol randomized to FOLFOXIRI.  We will see him back after his 8 cycles of chemotherapy to set him up for his posttreatment imaging.    Thank you for the opportunity to participate in his care.  If any questions or comments, please do not hesitate in calling.    Orders Placed This Encounter    MR-RECTAL/PROSTATE PROTOCOL    CT-CHEST,ABDOMEN,PELVIS WITH

## 2023-08-09 ENCOUNTER — HOSPITAL ENCOUNTER (OUTPATIENT)
Facility: MEDICAL CENTER | Age: 68
End: 2023-08-09
Attending: INTERNAL MEDICINE
Payer: MEDICARE

## 2023-08-09 DIAGNOSIS — C20 MALIGNANT NEOPLASM OF RECTUM (HCC): ICD-10-CM

## 2023-08-09 LAB
HBV CORE AB SERPL QL IA: NONREACTIVE
HBV SURFACE AB SERPL IA-ACNC: <3.5 MIU/ML (ref 0–10)
HBV SURFACE AG SER QL: NORMAL
HCV AB SER QL: NORMAL

## 2023-08-09 PROCEDURE — 86704 HEP B CORE ANTIBODY TOTAL: CPT | Mod: GA

## 2023-08-09 PROCEDURE — 87340 HEPATITIS B SURFACE AG IA: CPT | Mod: GA

## 2023-08-09 PROCEDURE — 86803 HEPATITIS C AB TEST: CPT

## 2023-08-09 PROCEDURE — 86706 HEP B SURFACE ANTIBODY: CPT | Mod: GA

## 2023-12-17 ENCOUNTER — APPOINTMENT (OUTPATIENT)
Dept: RADIOLOGY | Facility: MEDICAL CENTER | Age: 68
End: 2023-12-17
Attending: EMERGENCY MEDICINE
Payer: MEDICARE

## 2023-12-17 ENCOUNTER — HOSPITAL ENCOUNTER (EMERGENCY)
Facility: MEDICAL CENTER | Age: 68
End: 2023-12-17
Attending: EMERGENCY MEDICINE
Payer: MEDICARE

## 2023-12-17 VITALS
WEIGHT: 240 LBS | DIASTOLIC BLOOD PRESSURE: 99 MMHG | SYSTOLIC BLOOD PRESSURE: 150 MMHG | BODY MASS INDEX: 35.55 KG/M2 | RESPIRATION RATE: 18 BRPM | TEMPERATURE: 98.4 F | HEIGHT: 69 IN | HEART RATE: 91 BPM | OXYGEN SATURATION: 95 %

## 2023-12-17 DIAGNOSIS — M79.605 PAIN AND SWELLING OF LEFT LOWER EXTREMITY: ICD-10-CM

## 2023-12-17 DIAGNOSIS — M79.89 PAIN AND SWELLING OF LEFT LOWER EXTREMITY: ICD-10-CM

## 2023-12-17 DIAGNOSIS — I82.412 ACUTE DEEP VEIN THROMBOSIS (DVT) OF FEMORAL VEIN OF LEFT LOWER EXTREMITY (HCC): ICD-10-CM

## 2023-12-17 LAB
ALBUMIN SERPL BCP-MCNC: 3.9 G/DL (ref 3.2–4.9)
ALBUMIN/GLOB SERPL: 1.3 G/DL
ALP SERPL-CCNC: 151 U/L (ref 30–99)
ALT SERPL-CCNC: 22 U/L (ref 2–50)
ANION GAP SERPL CALC-SCNC: 10 MMOL/L (ref 7–16)
AST SERPL-CCNC: 18 U/L (ref 12–45)
BILIRUB SERPL-MCNC: 0.3 MG/DL (ref 0.1–1.5)
BUN SERPL-MCNC: 18 MG/DL (ref 8–22)
CALCIUM ALBUM COR SERPL-MCNC: 9 MG/DL (ref 8.5–10.5)
CALCIUM SERPL-MCNC: 8.9 MG/DL (ref 8.4–10.2)
CHLORIDE SERPL-SCNC: 105 MMOL/L (ref 96–112)
CO2 SERPL-SCNC: 25 MMOL/L (ref 20–33)
CREAT SERPL-MCNC: 0.87 MG/DL (ref 0.5–1.4)
ERYTHROCYTE [DISTWIDTH] IN BLOOD BY AUTOMATED COUNT: 54.4 FL (ref 35.9–50)
GFR SERPLBLD CREATININE-BSD FMLA CKD-EPI: 94 ML/MIN/1.73 M 2
GLOBULIN SER CALC-MCNC: 3.1 G/DL (ref 1.9–3.5)
GLUCOSE SERPL-MCNC: 108 MG/DL (ref 65–99)
HCT VFR BLD AUTO: 38.5 % (ref 42–52)
HGB BLD-MCNC: 12.5 G/DL (ref 14–18)
MCH RBC QN AUTO: 31.3 PG (ref 27–33)
MCHC RBC AUTO-ENTMCNC: 32.5 G/DL (ref 32.3–36.5)
MCV RBC AUTO: 96.5 FL (ref 81.4–97.8)
PLATELET # BLD AUTO: 200 K/UL (ref 164–446)
PMV BLD AUTO: 9 FL (ref 9–12.9)
POTASSIUM SERPL-SCNC: 4.4 MMOL/L (ref 3.6–5.5)
PROT SERPL-MCNC: 7 G/DL (ref 6–8.2)
RBC # BLD AUTO: 3.99 M/UL (ref 4.7–6.1)
SODIUM SERPL-SCNC: 140 MMOL/L (ref 135–145)
WBC # BLD AUTO: 7.2 K/UL (ref 4.8–10.8)

## 2023-12-17 PROCEDURE — A9270 NON-COVERED ITEM OR SERVICE: HCPCS | Performed by: EMERGENCY MEDICINE

## 2023-12-17 PROCEDURE — 36415 COLL VENOUS BLD VENIPUNCTURE: CPT

## 2023-12-17 PROCEDURE — 85027 COMPLETE CBC AUTOMATED: CPT

## 2023-12-17 PROCEDURE — 80053 COMPREHEN METABOLIC PANEL: CPT

## 2023-12-17 PROCEDURE — 99284 EMERGENCY DEPT VISIT MOD MDM: CPT

## 2023-12-17 PROCEDURE — 700102 HCHG RX REV CODE 250 W/ 637 OVERRIDE(OP): Performed by: EMERGENCY MEDICINE

## 2023-12-17 PROCEDURE — 93970 EXTREMITY STUDY: CPT

## 2023-12-17 RX ADMIN — RIVAROXABAN 15 MG: 15 TABLET, FILM COATED ORAL at 18:54

## 2023-12-17 ASSESSMENT — FIBROSIS 4 INDEX: FIB4 SCORE: 1.07

## 2023-12-17 NOTE — ED NOTES
PIV placed. Blood sent to lab.  Pt educated and acknowledged to have IV removed if he chooses to leave the lobby before being seen.

## 2023-12-17 NOTE — ED TRIAGE NOTES
"Chief Complaint   Patient presents with    Leg Swelling     69 yo male ambulates to triage with reports of bilateral leg swelling left greater than right for the past month after getting his chemo.  Patient reports he has hx of the same but the swelling usually goes away.  Patient reports he was sent to rule out blood clot.  Denies SOB or chest pain     BP (!) 171/99   Pulse (!) 101   Temp 36.9 °C (98.4 °F) (Temporal)   Resp 18   Ht 1.753 m (5' 9\")   Wt 109 kg (240 lb)   SpO2 94%   BMI 35.44 kg/m²    "

## 2023-12-18 NOTE — ED NOTES
Pt educated on new medications. Pt declines questions at this time. Pt provided with [facility] card provided to pt and educated that Case manger will follow up with them tomorrow. Pt aware of follow up care and ambulated out of ER.

## 2023-12-18 NOTE — ED NOTES
Medication history reviewed with pt. Med rec is complete.  Allergies reviewed, per pt  Interviewed pt with wife at bedside with permission from pt.    Pt reports that he finished his last chemo treatment from [facility] ([phone]) about 3 weeks ago.    Patient has not had any outpatient antibiotics in the last 30 days.    Pt is not on any anticoagulants

## 2023-12-18 NOTE — DISCHARGE PLANNING
Anticipated Discharge Disposition: Home    Action: RN voalte that RX for Xarelto but his Pharmacy is closed. RN will send RX card with him and ER CM can follow in am for authorization if needed. 12.18.23 24.00 copay    Barriers to Discharge: Not sure if auth needed or rx switch.    Plan: Will follow in am to ensure able to  get rx.

## 2023-12-18 NOTE — ED PROVIDER NOTES
ER Provider Note    Scribed for Camilo Alexandre D.O. by Brianna Melchor. 12/17/2023  4:10 PM    Primary Care Provider: Pcp Pt States None    CHIEF COMPLAINT  Chief Complaint   Patient presents with    Leg Swelling     69 yo male ambulates to triage with reports of bilateral leg swelling left greater than right for the past month after getting his chemo.  Patient reports he has hx of the same but the swelling usually goes away.  Patient reports he was sent to rule out blood clot.  Denies SOB or chest pain        HPI/ROS  Josh Lerner is a 68 y.o. male who presents to the Emergency Department for bilateral leg swelling onset a few weeks ago. His left leg is greater than his right leg. He noticed it after getting his chemotherapy for rectal cancer. Denies shortness of breath or chest pain. Patient has a history of these symptoms but adds that they usually goes away. Patient presents to concerns over a blood clot.      ROS as per HPI.    PAST MEDICAL HISTORY  Past Medical History:   Diagnosis Date    Cancer (HCC)     Rectal per patient       SURGICAL HISTORY  Past Surgical History:   Procedure Laterality Date    KY PART REMOVAL COLON W ANASTOMOSIS  5/19/2023    Procedure: SIGMOID COLON RESECTION;  Surgeon: Yakov Sewell M.D.;  Location: [facility];  Service: Gastroenterology    KY COLOSTOMY  5/19/2023    Procedure: REVISION, COLOSTOMY;  Surgeon: Yakov Sewell M.D.;  Location: [facility];  Service: Gastroenterology    KY LAP, SURG COLOSTOMY Left 5/16/2023    Procedure: LAPAROSCOPIC  COLOSTOMY CREATION;  Surgeon: Yakov Sewell M.D.;  Location: [facility];  Service: Gastroenterology    KY SIGMOIDOSCOPY,DIAGNOSTIC N/A 5/16/2023    Procedure: SIGMOIDOSCOPY, FLEXIBLE;  Surgeon: Yakov Sewell M.D.;  Location: [facility];  Service: Gastroenterology    CATH PLACEMENT Right 5/16/2023    Procedure: INSERTION, CATHETER;  Surgeon: Yakov Sewell M.D.;  [facility];  Service: Gastroenterology    INGUINAL HERNIA REPAIR BILATERAL Bilateral        FAMILY HISTORY  Family History   Problem Relation Age of Onset    Lung Cancer Father     Brain Cancer Maternal Uncle     Stomach Cancer Maternal Grandfather        SOCIAL HISTORY   reports that he has been smoking cigarettes. He has a 14.0 pack-year smoking history. He has never used smokeless tobacco. He reports that he does not currently use alcohol. He reports that he does not use drugs.    CURRENT MEDICATIONS  Previous Medications    ACETAMINOPHEN (TYLENOL) 500 MG TAB    Take 500-1,000 mg by mouth 3 times a day.    NAPROXEN (ANAPROX) 220 MG TABLET    Take 220 mg by mouth 3 times a day.    SILVER SULFADIAZINE (SILVADENE) 1 % CREAM    Apply 1/8 inch layer to affected area BID    WHEAT DEXTRIN (BENEFIBER PO)    Take  by mouth as needed.       ALLERGIES  Patient has no known allergies.    PHYSICAL EXAM  BP (!) 171/99   Pulse (!) 101   Temp 36.9 °C (98.4 °F) (Temporal)   Resp 18   Ht 1.753 m (5' 9\")   Wt 109 kg (240 lb)   SpO2 94%   BMI 35.44 kg/m²     General: No acute distress.  HENT: Normocephalic, Mucus membranes are moist.   Chest: Lungs have even and unlabored respirations, Clear to auscultation.   Cardiovascular: Regular rate and regular rhythm, No peripheral cyanosis.  Abdomen: Non distended.  Extremities: 4+ edema to the left lower extremity, 1+ edema to the right lower extremity, Pedal pulses present.   Neuro: Awake, Conversive, Able to relay recent events.  Psychiatric: Calm and cooperative.     INITIAL ASSESSMENT  Patient has leg swelling over the past three weeks. Denies shortness of breath or chest pain. There are concerns for DVT, liver or kidney problems. I will evaluate with labs and ultrasound.     ED Observation Status? No; the patient does not meet the criteria for ED Observation.     DIAGNOSTIC STUDIES    Labs:   Results for orders placed or performed during the hospital encounter of " 12/17/23   CBC WITHOUT DIFFERENTIAL   Result Value Ref Range    WBC 7.2 4.8 - 10.8 K/uL    RBC 3.99 (L) 4.70 - 6.10 M/uL    Hemoglobin 12.5 (L) 14.0 - 18.0 g/dL    Hematocrit 38.5 (L) 42.0 - 52.0 %    MCV 96.5 81.4 - 97.8 fL    MCH 31.3 27.0 - 33.0 pg    MCHC 32.5 32.3 - 36.5 g/dL    RDW 54.4 (H) 35.9 - 50.0 fL    Platelet Count 200 164 - 446 K/uL    MPV 9.0 9.0 - 12.9 fL   COMP METABOLIC PANEL   Result Value Ref Range    Sodium 140 135 - 145 mmol/L    Potassium 4.4 3.6 - 5.5 mmol/L    Chloride 105 96 - 112 mmol/L    Co2 25 20 - 33 mmol/L    Anion Gap 10.0 7.0 - 16.0    Glucose 108 (H) 65 - 99 mg/dL    Bun 18 8 - 22 mg/dL    Creatinine 0.87 0.50 - 1.40 mg/dL    Calcium 8.9 8.4 - 10.2 mg/dL    Correct Calcium 9.0 8.5 - 10.5 mg/dL    AST(SGOT) 18 12 - 45 U/L    ALT(SGPT) 22 2 - 50 U/L    Alkaline Phosphatase 151 (H) 30 - 99 U/L    Total Bilirubin 0.3 0.1 - 1.5 mg/dL    Albumin 3.9 3.2 - 4.9 g/dL    Total Protein 7.0 6.0 - 8.2 g/dL    Globulin 3.1 1.9 - 3.5 g/dL    A-G Ratio 1.3 g/dL   ESTIMATED GFR   Result Value Ref Range    GFR (CKD-EPI) 94 >60 mL/min/1.73 m 2      Radiology:   The attending emergency physician has independently interpreted the diagnostic imaging associated with this visit and am waiting the final reading from the radiologist.   Preliminary interpretation is as follows: DVT  Radiologist interpretation:   US-EXTREMITY VENOUS LOWER BILAT   Final Result      1.  Incompletely occlusive extensive LEFT lower extremity DVT extending from the common femoral through the leg veins   2.  No RIGHT lower extremity DVT      Findings were discussed with SUKHJINDER ARIAS on 12/17/2023 6:25 PM.           COURSE & MEDICAL DECISION MAKING     COURSE AND PLAN  4:10 PM - Patient seen and examined at bedside. Patient is a 68 year old male who presents to the ED with bilateral leg swelling onset three weeks ago. He has a history of these symptoms which usually resolve. Discussed plan of care, including a diagnostic work  up with labs and imaging. Patient agrees to the plan of care. Ordered for US-Extremity Venous Lower Bilat, Estimated GFR, CBC w/out Diff, CMP,  to evaluate his symptoms.     6:33 PM - Patient was reevaluated at bedside. Discussed radiology results with the patient and informed them that there was a blood clot in the left leg. Patient will be medicated with Xarelto 15 mg tablet. I informed the patient of the plan for discharge with an anticoagulant for six months. Advised him to follow up with his oncologists and the anticoagulation clinic to ensure that he has the correct does. He agrees to return for trouble breathing or chest pain. Patient agrees to the plan of care and was allowed to ask questions at this time.       ED Summary: Patient presented with complains of swelling left lower extremity.  He does have a history of rectal cancer and has finished his last chemotherapy.    He does have swelling of the left lower extremity ultrasound did confirm a DVT I did receive a call from radiologist with verbal report.    The patient was placed on Xarelto here, and referral has been placed for anticoagulation clinic so they can get appropriate outpatient follow-up.  Spoke with the patient concerning this he has not had any chest pain or shortness of breath he is not hypoxic I do not suspect pulmonary embolism      DISPOSITION AND DISCUSSIONS    Barriers to care at this time, including but not limited to: Patient does not have an established PCP.         The patient will return for new or worsening symptoms and is stable at the time of discharge.    The patient is referred to a primary physician for blood pressure management, diabetic screening, and for all other preventative health concerns.    DISPOSITION:  Patient will be discharged home in stable condition.    FOLLOW UP:  No follow-up provider specified.    OUTPATIENT MEDICATIONS:  Discharge Medication List as of 12/17/2023  7:00 PM           FINAL DIAGNOSIS  1. Acute  deep vein thrombosis (DVT) of femoral vein of left lower extremity (HCC)    2. Pain and swelling of left lower extremity      IBrianna (Scribe), am scribing for, and in the presence of, Camilo Alexandre D.O..    Electronically signed by: Brianna Melchor (Scribe), 12/17/2023    Camilo LEWIS D.O. personally performed the services described in this documentation, as scribed by Brianna Melchor in my presence, and it is both accurate and complete.     The note accurately reflects work and decisions made by me.  Camilo Alexandre D.O.  12/17/2023  9:19 PM

## 2023-12-18 NOTE — ED NOTES
Pt rounded on  Declines any needs and plan of care discussed with no questions. Family bedside and pt states understanding of call light.

## 2023-12-18 NOTE — DISCHARGE INSTRUCTIONS
You have a blood clot in the left lower extremity.  For this you are being placed on a blood thinning medication.  This will help prevent the clot from worsening leg body breakdown.  The biggest concern with this is concerns for clot to the lungs.  Return to the emergency room immediately for any chest pain or shortness of breath.    Referral has been placed to the coagulation clinic, they will follow you up to ensure that you get refills of your medication.  Otherwise continue follow-up with your oncologist.

## 2023-12-21 ENCOUNTER — TELEPHONE (OUTPATIENT)
Dept: VASCULAR LAB | Facility: MEDICAL CENTER | Age: 68
End: 2023-12-21
Payer: MEDICARE

## 2023-12-21 NOTE — TELEPHONE ENCOUNTER
[facility]     Received anticoagulation referral from ER on 12/17.     S/w pt - he will be managed by PCP and oncology and declined to establish care. Will close referral.    2nd call     Insurance: Medicare  PCP: External  Locations to be seen: [facility]    If no response by 1/17 OR 2 no shows/cancellations, will remove from referral list     [facility] at [phone], fax [phone]    Chary Oswald, PharmD

## 2023-12-27 ENCOUNTER — HOSPITAL ENCOUNTER (OUTPATIENT)
Dept: RADIOLOGY | Facility: MEDICAL CENTER | Age: 68
End: 2023-12-27
Attending: RADIOLOGY
Payer: MEDICARE

## 2023-12-27 DIAGNOSIS — C20 MALIGNANT NEOPLASM OF RECTUM (HCC): ICD-10-CM

## 2023-12-27 PROCEDURE — 700117 HCHG RX CONTRAST REV CODE 255: Performed by: RADIOLOGY

## 2023-12-27 PROCEDURE — 71260 CT THORAX DX C+: CPT

## 2023-12-27 PROCEDURE — A9579 GAD-BASE MR CONTRAST NOS,1ML: HCPCS | Performed by: RADIOLOGY

## 2023-12-27 PROCEDURE — 700111 HCHG RX REV CODE 636 W/ 250 OVERRIDE (IP): Mod: JZ,JG | Performed by: RADIOLOGY

## 2023-12-27 PROCEDURE — 74177 CT ABD & PELVIS W/CONTRAST: CPT

## 2023-12-27 PROCEDURE — 72197 MRI PELVIS W/O & W/DYE: CPT

## 2023-12-27 RX ADMIN — GLUCAGON 1 MG: 1 INJECTION, POWDER, LYOPHILIZED, FOR SOLUTION INTRAMUSCULAR; INTRAVENOUS at 14:18

## 2023-12-27 RX ADMIN — GADOTERIDOL 20 ML: 279.3 INJECTION, SOLUTION INTRAVENOUS at 15:13

## 2023-12-27 RX ADMIN — IOHEXOL 100 ML: 350 INJECTION, SOLUTION INTRAVENOUS at 13:36

## 2024-01-01 ENCOUNTER — TELEPHONE (OUTPATIENT)
Dept: HEALTH INFORMATION MANAGEMENT | Facility: OTHER | Age: 69
End: 2024-01-01

## 2024-01-05 ENCOUNTER — HOSPITAL ENCOUNTER (OUTPATIENT)
Dept: RADIATION ONCOLOGY | Facility: MEDICAL CENTER | Age: 69
End: 2024-01-31
Attending: RADIOLOGY
Payer: MEDICARE

## 2024-01-05 VITALS
OXYGEN SATURATION: 94 % | DIASTOLIC BLOOD PRESSURE: 90 MMHG | WEIGHT: 239.64 LBS | TEMPERATURE: 97.6 F | BODY MASS INDEX: 35.39 KG/M2 | HEART RATE: 106 BPM | SYSTOLIC BLOOD PRESSURE: 147 MMHG

## 2024-01-05 DIAGNOSIS — C20 MALIGNANT NEOPLASM OF RECTUM (HCC): ICD-10-CM

## 2024-01-05 PROCEDURE — 99213 OFFICE O/P EST LOW 20 MIN: CPT | Performed by: RADIOLOGY

## 2024-01-05 PROCEDURE — 99212 OFFICE O/P EST SF 10 MIN: CPT | Performed by: RADIOLOGY

## 2024-01-05 ASSESSMENT — FIBROSIS 4 INDEX: FIB4 SCORE: 1.3

## 2024-01-05 ASSESSMENT — PAIN SCALES - GENERAL: PAINLEVEL: NO PAIN

## 2024-01-05 NOTE — PROGRESS NOTES
Patient was seen today in clinic with Dr. Bustillos for follow up.  Vitals signs and weight were obtained and pain assessment was completed.  Allergies and medications were reviewed with the patient.      Vitals/Pain:  Vitals:    01/05/24 1259   BP: (!) 147/90   BP Location: Right arm   Patient Position: Sitting   BP Cuff Size: Adult   Pulse: (!) 106   Temp: 36.4 °C (97.6 °F)   TempSrc: Temporal   SpO2: 94%   Weight: 109 kg (239 lb 10.2 oz)   Pain Score: No pain        Allergies:   Patient has no known allergies.    Current Medications:  Current Outpatient Medications   Medication Sig Dispense Refill    rivaroxaban (XARELTO) 15 MG Tab tablet Take 1 Tablet by mouth 2 times a day for 21 days. 42 Tablet 0     No current facility-administered medications for this encounter.           Chloe Villagomez, Med Ass't

## 2024-01-05 NOTE — PROGRESS NOTES
RADIATION ONCOLOGY FOLLOW-UP    Patient name:  Josh Lerner    Primary Physician:  Christos Sandoval M.D. MRN: [id number]  [facility]: [id number]   Referring physician:  Yakov Sewell MD,*  : 1955, 68 y.o.     DATE OF SERVICE: 2024    IDENTIFICATION:   A 68 y.o. male with    Malignant neoplasm of rectum (HCC)  Staging form: Colon and Rectum, AJCC 8th Edition  - Clinical stage from 2023: Stage IIC (cT4b, cN0, cM0) - Signed by Luis Bustillos M.D. on 2023  Total positive nodes: 0  Histologic grade (G): G2  Histologic grading system: 4 grade system  Microsatellite instability (MSI): Stable      RADIATION SUMMARY:  Radiation Oncology          2023   Aria Course Treatment Dates   Course First Treatment Date 2023    Course Last Treatment Date 2023    Aria Treatment Summary   Rectum  Plan from Course C1_Rectal   Fraction  25 of 25   Elapsed Course Days  43 @    Prescribed Fraction Dose  180 cGy   Prescribed Total Dose  4,500 cGy   Rectum Bst  Plan from Course C1_Rectal   Fraction 4 of 5 5 of 5    5 of 5   Elapsed Course Days 42 @ [id number] 43 @ [id number]    43 @    Prescribed Fraction Dose 180 cGy 180 cGy    180 cGy   Prescribed Total Dose 900 cGy 900 cGy    900 cGy   Rectum  Reference Point from Course C1_Rectal   Elapsed Course Days  43 @    Session Dose  --   Total Dose  4,500 cGy   Rectum Bst  Reference Point from Course C1_Rectal   Elapsed Course Days 42 @ [id number] 43 @ [id number]    43 @    Session Dose 180 cGy 180 cGy    --   Total Dose 720 cGy 900 cGy    900 cGy   Rectum Bst CP  Reference Point from Course C1_Rectal   Elapsed Course Days 42 @ [id number] 43 @ [id number]    43 @    Session Dose 184 cGy 184 cGy    --   Total Dose 738 cGy 922 cGy    922 cGy   Rectum CP  Reference Point from Course C1_Rectal   Elapsed Course Days  43 @    Session Dose  --    Total Dose  4,629 cGy      Details          More values are hidden. Newest values shown. Go to activity for more data.                HISTORY OF PRESENT ILLNESS:  Subjective      Patient is a pleasant 67-year-old male with stools and intermittent bleeding for close to a year and then underwent colonoscopy March 13, 2023 by Dr. Larson which showed multilobulated mass 2 cm from anal verge encompassing 70% circumference of distal rectum with pathology showing invasive moderately differentiated adenocarcinoma mismatch repair proteins intact.  Patient underwent flexible sigmoidoscopy with endoscopic ultrasound April 4, 2023 by Dr. Ramirez which showed T4 mass 0 to 10 cm from anal verge.  Patient had a CT chest abdomen pelvis April 6, 2023 which showed rectal mass with no evidence of distant metastatic disease.  Patient had an MRI rectal protocol April 18, 2023 which showed a large circumferential low rectal tumor with extension into the prostate internal anal sphincter clinical T4BN0 with enhancing fluid collection adjacent.  Patient was seen by Dr. Kirkpatrick and Dr. Sewell who is planning for ostomy and Mediport placement.  CEA 47.6. Plan for diverting colostomy 5/16/23.      05/17/2023  Patient had diverting colostomy yesterday doing well we will plan for radiation mapping tomorrow  may 18 2023 with plan to start treatment May 29, 2023.     8/7/23  Patient completed chemoradiation therapy 54 Carnes in 30 fractions on July 12, 2023 on [facility] protocol randomized to FOLFOXIRI with plan to start Wednesday.  Patient had CEA 7/31/23 41.9 down from 48.6 2 months ago.  He has mild mucus secretion but no bill hematochezia.  Overall his rectal symptoms are improving.          INTERVAL HISTORY:    PROBLEM LIST:  Patient Active Problem List   Diagnosis    Malignant neoplasm of rectum (HCC)    COPD (chronic obstructive pulmonary disease) (HCC)    Colostomy in place (HCC)       CURRENT MEDICATIONS:  Current Outpatient  Medications   Medication Sig Dispense Refill    rivaroxaban (XARELTO) 15 MG Tab tablet Take 1 Tablet by mouth 2 times a day for 21 days. 42 Tablet 0     No current facility-administered medications for this encounter.       ALLERGIES:  Patient has no known allergies.    REVIEW OF SYSTEMS:    A complete review of system taken. Pertinent items in HPI.  All others negative.    PHYSICAL EXAM:  PERFORMANCE STATUS:       No data to display                   No data to display              BP (!) 147/90 (BP Location: Right arm, Patient Position: Sitting, BP Cuff Size: Adult)   Pulse (!) 106   Temp 36.4 °C (97.6 °F) (Temporal)   Wt 109 kg (239 lb 10.2 oz)   SpO2 94%   BMI 35.39 kg/m²   Physical Exam  Vitals reviewed.   HENT:      Head: Normocephalic.   Eyes:      Pupils: Pupils are equal, round, and reactive to light.   Pulmonary:      Effort: Pulmonary effort is normal.   Abdominal:      General: Abdomen is flat.   Musculoskeletal:         General: Normal range of motion.   Neurological:      General: No focal deficit present.   Psychiatric:         Mood and Affect: Mood normal.         LABORATORY DATA:   Lab Results   Component Value Date/Time    WBC 7.2 12/17/2023 03:54 PM    RBC 3.99 (L) 12/17/2023 03:54 PM    HEMOGLOBIN 12.5 (L) 12/17/2023 03:54 PM    HEMATOCRIT 38.5 (L) 12/17/2023 03:54 PM    MCV 96.5 12/17/2023 03:54 PM    MCH 31.3 12/17/2023 03:54 PM    MCHC 32.5 12/17/2023 03:54 PM    RDW 54.4 (H) 12/17/2023 03:54 PM    PLATELETCT 200 12/17/2023 03:54 PM    MPV 9.0 12/17/2023 03:54 PM    NEUTSPOLYS 72.70 (H) 04/24/2023 02:06 PM    LYMPHOCYTES 15.50 (L) 04/24/2023 02:06 PM    MONOCYTES 7.80 04/24/2023 02:06 PM    EOSINOPHILS 3.00 04/24/2023 02:06 PM    BASOPHILS 0.50 04/24/2023 02:06 PM      Lab Results   Component Value Date/Time    SODIUM 140 12/17/2023 03:54 PM    POTASSIUM 4.4 12/17/2023 03:54 PM    CHLORIDE 105 12/17/2023 03:54 PM    CO2 25 12/17/2023 03:54 PM    GLUCOSE 108 (H) 12/17/2023 03:54 PM    BUN  18 12/17/2023 03:54 PM    CREATININE 0.87 12/17/2023 03:54 PM         RADIOLOGY DATA:  CT-CHEST,ABDOMEN,PELVIS WITH    Result Date: 12/27/2023  1.  Pleural-based nodular opacities involving the right minor fissure in the right upper and middle lobes, possibly an infectious or inflammatory process. Short-term follow-up imaging is recommended to ensure resolution. 2.  Nonspecific borderline right hilar lymph node enlargement 3.  No CT evidence for metastatic disease in the abdomen. 4.  Low rectal tumor appears grossly decreased in size from the prior CT 5.  Status post left lower quadrant diverting colostomy with parastomal hernia. No secondary obstruction is identified 6.  Small hiatal hernia 7.  Cholelithiasis    US-EXTREMITY VENOUS LOWER BILAT    Result Date: 12/17/2023  1.  Incompletely occlusive extensive LEFT lower extremity DVT extending from the common femoral through the leg veins 2.  No RIGHT lower extremity DVT Findings were discussed with SUKHJINDER ARIAS on 12/17/2023 6:25 PM.    MR-RECTAL/PROSTATE PROTOCOL    Result Date: 12/27/2023  1.  Tumor regression grade 3 (moderate response with greater than 50% fibrosis but some persistent visible intermediate signal intensity). 2.  Tumor stage: T4b: Tumor is markedly decreased in size with minimal residual amount of residual tumor and abutment of the posterior prostate gland, internal sphincter and mesorectal fascia; N0      IMPRESSION:    A 68 y.o. with   Malignant neoplasm of rectum (HCC)  Staging form: Colon and Rectum, AJCC 8th Edition  - Clinical stage from 4/24/2023: Stage IIC (cT4b, cN0, cM0) - Signed by Lius Bustillos M.D. on 4/24/2023  Total positive nodes: 0  Histologic grade (G): G2  Histologic grading system: 4 grade system  Microsatellite instability (MSI): Stable      CANCER STATUS:  Disease Partial Response    RECOMMENDATIONS:   Patient completed chemoradiation therapy on JANUS protocol and was randomized to triple chemo arm. He had repeat  imaging which showed good response. Discussed needs to see colorectal surgery requesting to see Dr. Desai for repeat flexible sigmoidoscopy and likely APR possible combined prostatectomy depending on Dr. Desai and Dr. Hope assessment. Patient will return after surgical completion to review pathology. Ordered signatera testing.    Thank you for the opportunity to participate in his care.  If any questions or comments, please do not hesitate in calling.    No orders of the defined types were placed in this encounter.

## 2024-01-31 DIAGNOSIS — C20 MALIGNANT NEOPLASM OF RECTUM (HCC): ICD-10-CM

## 2024-02-07 ENCOUNTER — APPOINTMENT (OUTPATIENT)
Dept: ADMISSIONS | Facility: MEDICAL CENTER | Age: 69
DRG: 331 | End: 2024-02-07
Attending: SURGERY
Payer: MEDICARE

## 2024-02-16 ENCOUNTER — PRE-ADMISSION TESTING (OUTPATIENT)
Dept: ADMISSIONS | Facility: MEDICAL CENTER | Age: 69
DRG: 331 | End: 2024-02-16
Attending: SURGERY
Payer: MEDICARE

## 2024-02-16 RX ORDER — RIVAROXABAN 10 MG/1
10 TABLET, FILM COATED ORAL
COMMUNITY

## 2024-02-16 RX ORDER — COVID-19 ANTIGEN TEST
2 KIT MISCELLANEOUS
Status: ON HOLD | COMMUNITY
End: 2024-03-05

## 2024-02-20 ENCOUNTER — APPOINTMENT (OUTPATIENT)
Dept: ADMISSIONS | Facility: MEDICAL CENTER | Age: 69
DRG: 331 | End: 2024-02-20
Attending: SURGERY
Payer: MEDICARE

## 2024-02-20 ENCOUNTER — HOSPITAL ENCOUNTER (OUTPATIENT)
Dept: LAB | Facility: MEDICAL CENTER | Age: 69
End: 2024-02-20
Attending: RADIOLOGY
Payer: MEDICARE

## 2024-02-20 DIAGNOSIS — Z01.810 PRE-OPERATIVE CARDIOVASCULAR EXAMINATION: ICD-10-CM

## 2024-02-20 DIAGNOSIS — C20 MALIGNANT NEOPLASM OF RECTUM (HCC): ICD-10-CM

## 2024-02-20 LAB
ALBUMIN SERPL BCP-MCNC: 3.9 G/DL (ref 3.2–4.9)
ALBUMIN/GLOB SERPL: 1.3 G/DL
ALP SERPL-CCNC: 105 U/L (ref 30–99)
ALT SERPL-CCNC: 19 U/L (ref 2–50)
ANION GAP SERPL CALC-SCNC: 13 MMOL/L (ref 7–16)
AST SERPL-CCNC: 17 U/L (ref 12–45)
BASOPHILS # BLD AUTO: 0.6 % (ref 0–1.8)
BASOPHILS # BLD: 0.04 K/UL (ref 0–0.12)
BILIRUB SERPL-MCNC: 0.3 MG/DL (ref 0.1–1.5)
BUN SERPL-MCNC: 18 MG/DL (ref 8–22)
CALCIUM ALBUM COR SERPL-MCNC: 9.1 MG/DL (ref 8.5–10.5)
CALCIUM SERPL-MCNC: 9 MG/DL (ref 8.5–10.5)
CEA SERPL-MCNC: 11.4 NG/ML (ref 0–3)
CHLORIDE SERPL-SCNC: 103 MMOL/L (ref 96–112)
CO2 SERPL-SCNC: 22 MMOL/L (ref 20–33)
CREAT SERPL-MCNC: 0.82 MG/DL (ref 0.5–1.4)
EKG IMPRESSION: NORMAL
EOSINOPHIL # BLD AUTO: 0.47 K/UL (ref 0–0.51)
EOSINOPHIL NFR BLD: 6.8 % (ref 0–6.9)
ERYTHROCYTE [DISTWIDTH] IN BLOOD BY AUTOMATED COUNT: 43.4 FL (ref 35.9–50)
GFR SERPLBLD CREATININE-BSD FMLA CKD-EPI: 95 ML/MIN/1.73 M 2
GLOBULIN SER CALC-MCNC: 3.1 G/DL (ref 1.9–3.5)
GLUCOSE SERPL-MCNC: 95 MG/DL (ref 65–99)
HCT VFR BLD AUTO: 42.4 % (ref 42–52)
HGB BLD-MCNC: 13.8 G/DL (ref 14–18)
IMM GRANULOCYTES # BLD AUTO: 0.02 K/UL (ref 0–0.11)
IMM GRANULOCYTES NFR BLD AUTO: 0.3 % (ref 0–0.9)
LYMPHOCYTES # BLD AUTO: 1.01 K/UL (ref 1–4.8)
LYMPHOCYTES NFR BLD: 14.5 % (ref 22–41)
MCH RBC QN AUTO: 29.6 PG (ref 27–33)
MCHC RBC AUTO-ENTMCNC: 32.5 G/DL (ref 32.3–36.5)
MCV RBC AUTO: 90.8 FL (ref 81.4–97.8)
MONOCYTES # BLD AUTO: 0.58 K/UL (ref 0–0.85)
MONOCYTES NFR BLD AUTO: 8.3 % (ref 0–13.4)
NEUTROPHILS # BLD AUTO: 4.83 K/UL (ref 1.82–7.42)
NEUTROPHILS NFR BLD: 69.5 % (ref 44–72)
NRBC # BLD AUTO: 0 K/UL
NRBC BLD-RTO: 0 /100 WBC (ref 0–0.2)
PLATELET # BLD AUTO: 235 K/UL (ref 164–446)
PMV BLD AUTO: 10.2 FL (ref 9–12.9)
POTASSIUM SERPL-SCNC: 4.3 MMOL/L (ref 3.6–5.5)
PROT SERPL-MCNC: 7 G/DL (ref 6–8.2)
RBC # BLD AUTO: 4.67 M/UL (ref 4.7–6.1)
SODIUM SERPL-SCNC: 138 MMOL/L (ref 135–145)
WBC # BLD AUTO: 7 K/UL (ref 4.8–10.8)

## 2024-02-20 PROCEDURE — 36415 COLL VENOUS BLD VENIPUNCTURE: CPT

## 2024-02-20 PROCEDURE — 80053 COMPREHEN METABOLIC PANEL: CPT

## 2024-02-20 PROCEDURE — 93010 ELECTROCARDIOGRAM REPORT: CPT | Performed by: INTERNAL MEDICINE

## 2024-02-20 PROCEDURE — 93005 ELECTROCARDIOGRAM TRACING: CPT

## 2024-02-20 PROCEDURE — 82378 CARCINOEMBRYONIC ANTIGEN: CPT

## 2024-02-20 PROCEDURE — 85025 COMPLETE CBC W/AUTO DIFF WBC: CPT

## 2024-03-05 ENCOUNTER — ANESTHESIA (OUTPATIENT)
Dept: SURGERY | Facility: MEDICAL CENTER | Age: 69
DRG: 331 | End: 2024-03-05
Payer: MEDICARE

## 2024-03-05 ENCOUNTER — HOSPITAL ENCOUNTER (INPATIENT)
Facility: MEDICAL CENTER | Age: 69
LOS: 3 days | DRG: 331 | End: 2024-03-08
Attending: SURGERY | Admitting: SURGERY
Payer: MEDICARE

## 2024-03-05 ENCOUNTER — ANESTHESIA EVENT (OUTPATIENT)
Dept: SURGERY | Facility: MEDICAL CENTER | Age: 69
DRG: 331 | End: 2024-03-05
Payer: MEDICARE

## 2024-03-05 DIAGNOSIS — Z93.3 COLOSTOMY IN PLACE (HCC): ICD-10-CM

## 2024-03-05 DIAGNOSIS — C20 MALIGNANT NEOPLASM OF RECTUM (HCC): ICD-10-CM

## 2024-03-05 PROCEDURE — 160035 HCHG PACU - 1ST 60 MINS PHASE I: Performed by: SURGERY

## 2024-03-05 PROCEDURE — 160048 HCHG OR STATISTICAL LEVEL 1-5: Performed by: SURGERY

## 2024-03-05 PROCEDURE — 160002 HCHG RECOVERY MINUTES (STAT): Performed by: SURGERY

## 2024-03-05 PROCEDURE — 0DTQ4ZZ RESECTION OF ANUS, PERCUTANEOUS ENDOSCOPIC APPROACH: ICD-10-PCS | Performed by: SURGERY

## 2024-03-05 PROCEDURE — 88309 TISSUE EXAM BY PATHOLOGIST: CPT

## 2024-03-05 PROCEDURE — A9270 NON-COVERED ITEM OR SERVICE: HCPCS | Performed by: ANESTHESIOLOGY

## 2024-03-05 PROCEDURE — 700101 HCHG RX REV CODE 250: Performed by: SURGERY

## 2024-03-05 PROCEDURE — 88304 TISSUE EXAM BY PATHOLOGIST: CPT

## 2024-03-05 PROCEDURE — 700102 HCHG RX REV CODE 250 W/ 637 OVERRIDE(OP): Performed by: ANESTHESIOLOGY

## 2024-03-05 PROCEDURE — 160031 HCHG SURGERY MINUTES - 1ST 30 MINS LEVEL 5: Performed by: SURGERY

## 2024-03-05 PROCEDURE — 88341 IMHCHEM/IMCYTCHM EA ADD ANTB: CPT | Mod: 91

## 2024-03-05 PROCEDURE — 8E0W4CZ ROBOTIC ASSISTED PROCEDURE OF TRUNK REGION, PERCUTANEOUS ENDOSCOPIC APPROACH: ICD-10-PCS | Performed by: SURGERY

## 2024-03-05 PROCEDURE — 0D1N4Z4 BYPASS SIGMOID COLON TO CUTANEOUS, PERCUTANEOUS ENDOSCOPIC APPROACH: ICD-10-PCS | Performed by: SURGERY

## 2024-03-05 PROCEDURE — 0DTP4ZZ RESECTION OF RECTUM, PERCUTANEOUS ENDOSCOPIC APPROACH: ICD-10-PCS | Performed by: SURGERY

## 2024-03-05 PROCEDURE — 88342 IMHCHEM/IMCYTCHM 1ST ANTB: CPT

## 2024-03-05 PROCEDURE — 700111 HCHG RX REV CODE 636 W/ 250 OVERRIDE (IP): Performed by: ANESTHESIOLOGY

## 2024-03-05 PROCEDURE — 700105 HCHG RX REV CODE 258: Performed by: SURGERY

## 2024-03-05 PROCEDURE — 700102 HCHG RX REV CODE 250 W/ 637 OVERRIDE(OP)

## 2024-03-05 PROCEDURE — 502714 HCHG ROBOTIC SURGERY SERVICES: Performed by: SURGERY

## 2024-03-05 PROCEDURE — 700101 HCHG RX REV CODE 250: Performed by: ANESTHESIOLOGY

## 2024-03-05 PROCEDURE — A9270 NON-COVERED ITEM OR SERVICE: HCPCS

## 2024-03-05 PROCEDURE — 110371 HCHG SHELL REV 272: Performed by: SURGERY

## 2024-03-05 PROCEDURE — 88307 TISSUE EXAM BY PATHOLOGIST: CPT

## 2024-03-05 PROCEDURE — 160036 HCHG PACU - EA ADDL 30 MINS PHASE I: Performed by: SURGERY

## 2024-03-05 PROCEDURE — 770001 HCHG ROOM/CARE - MED/SURG/GYN PRIV*

## 2024-03-05 PROCEDURE — 160009 HCHG ANES TIME/MIN: Performed by: SURGERY

## 2024-03-05 PROCEDURE — 160042 HCHG SURGERY MINUTES - EA ADDL 1 MIN LEVEL 5: Performed by: SURGERY

## 2024-03-05 DEVICE — TISSUE STRATTICE FIRM 10X10: Type: IMPLANTABLE DEVICE | Site: ABDOMEN | Status: FUNCTIONAL

## 2024-03-05 RX ORDER — ACETAMINOPHEN 500 MG
1000 TABLET ORAL ONCE
Status: COMPLETED | OUTPATIENT
Start: 2024-03-05 | End: 2024-03-05

## 2024-03-05 RX ORDER — SODIUM CHLORIDE, SODIUM LACTATE, POTASSIUM CHLORIDE, CALCIUM CHLORIDE 600; 310; 30; 20 MG/100ML; MG/100ML; MG/100ML; MG/100ML
INJECTION, SOLUTION INTRAVENOUS CONTINUOUS
Status: DISCONTINUED | OUTPATIENT
Start: 2024-03-05 | End: 2024-03-05 | Stop reason: HOSPADM

## 2024-03-05 RX ORDER — OXYCODONE HYDROCHLORIDE 5 MG/1
5 TABLET ORAL
Status: DISCONTINUED | OUTPATIENT
Start: 2024-03-05 | End: 2024-03-08 | Stop reason: HOSPADM

## 2024-03-05 RX ORDER — OXYCODONE HYDROCHLORIDE 10 MG/1
10 TABLET ORAL
Status: DISCONTINUED | OUTPATIENT
Start: 2024-03-05 | End: 2024-03-08 | Stop reason: HOSPADM

## 2024-03-05 RX ORDER — DEXAMETHASONE SODIUM PHOSPHATE 4 MG/ML
INJECTION, SOLUTION INTRA-ARTICULAR; INTRALESIONAL; INTRAMUSCULAR; INTRAVENOUS; SOFT TISSUE PRN
Status: DISCONTINUED | OUTPATIENT
Start: 2024-03-05 | End: 2024-03-05 | Stop reason: SURG

## 2024-03-05 RX ORDER — ONDANSETRON 2 MG/ML
4 INJECTION INTRAMUSCULAR; INTRAVENOUS
Status: DISCONTINUED | OUTPATIENT
Start: 2024-03-05 | End: 2024-03-05 | Stop reason: HOSPADM

## 2024-03-05 RX ORDER — DIPHENHYDRAMINE HYDROCHLORIDE 50 MG/ML
12.5 INJECTION INTRAMUSCULAR; INTRAVENOUS
Status: DISCONTINUED | OUTPATIENT
Start: 2024-03-05 | End: 2024-03-05 | Stop reason: HOSPADM

## 2024-03-05 RX ORDER — DIPHENHYDRAMINE HYDROCHLORIDE 50 MG/ML
25 INJECTION INTRAMUSCULAR; INTRAVENOUS EVERY 6 HOURS PRN
Status: DISCONTINUED | OUTPATIENT
Start: 2024-03-05 | End: 2024-03-08 | Stop reason: HOSPADM

## 2024-03-05 RX ORDER — CALCIUM CARBONATE 500 MG/1
500 TABLET, CHEWABLE ORAL
Status: DISCONTINUED | OUTPATIENT
Start: 2024-03-05 | End: 2024-03-08 | Stop reason: HOSPADM

## 2024-03-05 RX ORDER — LABETALOL HYDROCHLORIDE 5 MG/ML
5 INJECTION, SOLUTION INTRAVENOUS
Status: DISCONTINUED | OUTPATIENT
Start: 2024-03-05 | End: 2024-03-05 | Stop reason: HOSPADM

## 2024-03-05 RX ORDER — BUPIVACAINE HYDROCHLORIDE AND EPINEPHRINE 5; 5 MG/ML; UG/ML
INJECTION, SOLUTION EPIDURAL; INTRACAUDAL; PERINEURAL
Status: DISCONTINUED | OUTPATIENT
Start: 2024-03-05 | End: 2024-03-05 | Stop reason: HOSPADM

## 2024-03-05 RX ORDER — SODIUM CHLORIDE, SODIUM LACTATE, POTASSIUM CHLORIDE, CALCIUM CHLORIDE 600; 310; 30; 20 MG/100ML; MG/100ML; MG/100ML; MG/100ML
INJECTION, SOLUTION INTRAVENOUS CONTINUOUS
Status: ACTIVE | OUTPATIENT
Start: 2024-03-05 | End: 2024-03-05

## 2024-03-05 RX ORDER — METOCLOPRAMIDE HYDROCHLORIDE 5 MG/ML
INJECTION INTRAMUSCULAR; INTRAVENOUS PRN
Status: DISCONTINUED | OUTPATIENT
Start: 2024-03-05 | End: 2024-03-05 | Stop reason: SURG

## 2024-03-05 RX ORDER — HALOPERIDOL 5 MG/ML
1 INJECTION INTRAMUSCULAR
Status: DISCONTINUED | OUTPATIENT
Start: 2024-03-05 | End: 2024-03-05 | Stop reason: HOSPADM

## 2024-03-05 RX ORDER — DIPHENHYDRAMINE HCL 25 MG
25 TABLET ORAL EVERY 6 HOURS PRN
Status: DISCONTINUED | OUTPATIENT
Start: 2024-03-05 | End: 2024-03-08 | Stop reason: HOSPADM

## 2024-03-05 RX ORDER — POLYETHYLENE GLYCOL 3350 17 G/17G
238 POWDER, FOR SOLUTION ORAL ONCE
Status: ON HOLD | COMMUNITY
Start: 2024-02-20 | End: 2024-03-05

## 2024-03-05 RX ORDER — MIDAZOLAM HYDROCHLORIDE 1 MG/ML
INJECTION INTRAMUSCULAR; INTRAVENOUS PRN
Status: DISCONTINUED | OUTPATIENT
Start: 2024-03-05 | End: 2024-03-05 | Stop reason: SURG

## 2024-03-05 RX ORDER — HYDROMORPHONE HYDROCHLORIDE 1 MG/ML
0.5 INJECTION, SOLUTION INTRAMUSCULAR; INTRAVENOUS; SUBCUTANEOUS
Status: DISCONTINUED | OUTPATIENT
Start: 2024-03-05 | End: 2024-03-08 | Stop reason: HOSPADM

## 2024-03-05 RX ORDER — HALOPERIDOL 5 MG/ML
1 INJECTION INTRAMUSCULAR EVERY 6 HOURS PRN
Status: DISCONTINUED | OUTPATIENT
Start: 2024-03-05 | End: 2024-03-08 | Stop reason: HOSPADM

## 2024-03-05 RX ORDER — NEOMYCIN SULFATE 500 MG/1
1000 TABLET ORAL 3 TIMES DAILY
Status: ON HOLD | COMMUNITY
End: 2024-03-05

## 2024-03-05 RX ORDER — BISACODYL 5 MG/1
10 TABLET, DELAYED RELEASE ORAL ONCE
Status: ON HOLD | COMMUNITY
Start: 2024-02-20 | End: 2024-03-08

## 2024-03-05 RX ORDER — ENOXAPARIN SODIUM 100 MG/ML
40 INJECTION SUBCUTANEOUS DAILY
Status: DISCONTINUED | OUTPATIENT
Start: 2024-03-06 | End: 2024-03-06

## 2024-03-05 RX ORDER — HYDROMORPHONE HYDROCHLORIDE 1 MG/ML
0.4 INJECTION, SOLUTION INTRAMUSCULAR; INTRAVENOUS; SUBCUTANEOUS
Status: DISCONTINUED | OUTPATIENT
Start: 2024-03-05 | End: 2024-03-05 | Stop reason: HOSPADM

## 2024-03-05 RX ORDER — LIDOCAINE HYDROCHLORIDE 20 MG/ML
INJECTION, SOLUTION EPIDURAL; INFILTRATION; INTRACAUDAL; PERINEURAL PRN
Status: DISCONTINUED | OUTPATIENT
Start: 2024-03-05 | End: 2024-03-05 | Stop reason: SURG

## 2024-03-05 RX ORDER — METRONIDAZOLE 500 MG/1
500 TABLET ORAL 3 TIMES DAILY
Status: ON HOLD | COMMUNITY
End: 2024-03-05

## 2024-03-05 RX ORDER — ACETAMINOPHEN 500 MG
1000 TABLET ORAL EVERY 6 HOURS PRN
COMMUNITY

## 2024-03-05 RX ORDER — KETOROLAC TROMETHAMINE 15 MG/ML
INJECTION, SOLUTION INTRAMUSCULAR; INTRAVENOUS PRN
Status: DISCONTINUED | OUTPATIENT
Start: 2024-03-05 | End: 2024-03-05 | Stop reason: SURG

## 2024-03-05 RX ORDER — SODIUM CHLORIDE, SODIUM LACTATE, POTASSIUM CHLORIDE, CALCIUM CHLORIDE 600; 310; 30; 20 MG/100ML; MG/100ML; MG/100ML; MG/100ML
INJECTION, SOLUTION INTRAVENOUS CONTINUOUS
Status: ACTIVE | OUTPATIENT
Start: 2024-03-05 | End: 2024-03-06

## 2024-03-05 RX ORDER — ACETAMINOPHEN 500 MG
1000 TABLET ORAL EVERY 6 HOURS PRN
Status: DISCONTINUED | OUTPATIENT
Start: 2024-03-11 | End: 2024-03-08 | Stop reason: HOSPADM

## 2024-03-05 RX ORDER — ACETAMINOPHEN 500 MG
1000 TABLET ORAL EVERY 6 HOURS
Status: DISCONTINUED | OUTPATIENT
Start: 2024-03-06 | End: 2024-03-08 | Stop reason: HOSPADM

## 2024-03-05 RX ORDER — OXYCODONE HCL 5 MG/5 ML
10 SOLUTION, ORAL ORAL
Status: COMPLETED | OUTPATIENT
Start: 2024-03-05 | End: 2024-03-05

## 2024-03-05 RX ORDER — CEFOTETAN DISODIUM 2 G/20ML
INJECTION, POWDER, FOR SOLUTION INTRAMUSCULAR; INTRAVENOUS PRN
Status: DISCONTINUED | OUTPATIENT
Start: 2024-03-05 | End: 2024-03-05 | Stop reason: SURG

## 2024-03-05 RX ORDER — TRAZODONE HYDROCHLORIDE 50 MG/1
50 TABLET ORAL NIGHTLY PRN
Status: DISCONTINUED | OUTPATIENT
Start: 2024-03-05 | End: 2024-03-08 | Stop reason: HOSPADM

## 2024-03-05 RX ORDER — ONDANSETRON 2 MG/ML
INJECTION INTRAMUSCULAR; INTRAVENOUS PRN
Status: DISCONTINUED | OUTPATIENT
Start: 2024-03-05 | End: 2024-03-05 | Stop reason: SURG

## 2024-03-05 RX ORDER — MAGNESIUM SULFATE HEPTAHYDRATE 40 MG/ML
INJECTION, SOLUTION INTRAVENOUS PRN
Status: DISCONTINUED | OUTPATIENT
Start: 2024-03-05 | End: 2024-03-05 | Stop reason: SURG

## 2024-03-05 RX ORDER — ONDANSETRON 2 MG/ML
4 INJECTION INTRAMUSCULAR; INTRAVENOUS EVERY 4 HOURS PRN
Status: DISCONTINUED | OUTPATIENT
Start: 2024-03-05 | End: 2024-03-08 | Stop reason: HOSPADM

## 2024-03-05 RX ORDER — CELECOXIB 200 MG/1
200 CAPSULE ORAL 2 TIMES DAILY PRN
Status: DISCONTINUED | OUTPATIENT
Start: 2024-03-10 | End: 2024-03-08 | Stop reason: HOSPADM

## 2024-03-05 RX ORDER — EPHEDRINE SULFATE 50 MG/ML
5 INJECTION, SOLUTION INTRAVENOUS
Status: DISCONTINUED | OUTPATIENT
Start: 2024-03-05 | End: 2024-03-05 | Stop reason: HOSPADM

## 2024-03-05 RX ORDER — HYDRALAZINE HYDROCHLORIDE 20 MG/ML
5 INJECTION INTRAMUSCULAR; INTRAVENOUS
Status: DISCONTINUED | OUTPATIENT
Start: 2024-03-05 | End: 2024-03-05 | Stop reason: HOSPADM

## 2024-03-05 RX ORDER — GABAPENTIN 300 MG/1
600 CAPSULE ORAL ONCE
Status: COMPLETED | OUTPATIENT
Start: 2024-03-05 | End: 2024-03-05

## 2024-03-05 RX ORDER — SCOLOPAMINE TRANSDERMAL SYSTEM 1 MG/1
1 PATCH, EXTENDED RELEASE TRANSDERMAL
Status: DISCONTINUED | OUTPATIENT
Start: 2024-03-05 | End: 2024-03-08 | Stop reason: HOSPADM

## 2024-03-05 RX ORDER — HYDROMORPHONE HYDROCHLORIDE 1 MG/ML
0.2 INJECTION, SOLUTION INTRAMUSCULAR; INTRAVENOUS; SUBCUTANEOUS
Status: DISCONTINUED | OUTPATIENT
Start: 2024-03-05 | End: 2024-03-05 | Stop reason: HOSPADM

## 2024-03-05 RX ORDER — CELECOXIB 200 MG/1
200 CAPSULE ORAL 2 TIMES DAILY
Status: DISCONTINUED | OUTPATIENT
Start: 2024-03-05 | End: 2024-03-08 | Stop reason: HOSPADM

## 2024-03-05 RX ORDER — HYDROMORPHONE HYDROCHLORIDE 1 MG/ML
0.1 INJECTION, SOLUTION INTRAMUSCULAR; INTRAVENOUS; SUBCUTANEOUS
Status: DISCONTINUED | OUTPATIENT
Start: 2024-03-05 | End: 2024-03-05 | Stop reason: HOSPADM

## 2024-03-05 RX ORDER — CELECOXIB 200 MG/1
200 CAPSULE ORAL ONCE
Status: COMPLETED | OUTPATIENT
Start: 2024-03-05 | End: 2024-03-05

## 2024-03-05 RX ORDER — PHENYLEPHRINE HYDROCHLORIDE 10 MG/ML
INJECTION, SOLUTION INTRAMUSCULAR; INTRAVENOUS; SUBCUTANEOUS PRN
Status: DISCONTINUED | OUTPATIENT
Start: 2024-03-05 | End: 2024-03-05 | Stop reason: SURG

## 2024-03-05 RX ORDER — OXYCODONE HCL 5 MG/5 ML
5 SOLUTION, ORAL ORAL
Status: COMPLETED | OUTPATIENT
Start: 2024-03-05 | End: 2024-03-05

## 2024-03-05 RX ADMIN — ROCURONIUM BROMIDE 20 MG: 10 INJECTION, SOLUTION INTRAVENOUS at 16:48

## 2024-03-05 RX ADMIN — OXYCODONE HYDROCHLORIDE 10 MG: 5 SOLUTION ORAL at 19:45

## 2024-03-05 RX ADMIN — SUGAMMADEX 200 MG: 100 INJECTION, SOLUTION INTRAVENOUS at 18:57

## 2024-03-05 RX ADMIN — ONDANSETRON 4 MG: 2 INJECTION INTRAMUSCULAR; INTRAVENOUS at 18:51

## 2024-03-05 RX ADMIN — Medication 25 MG: at 17:21

## 2024-03-05 RX ADMIN — CELECOXIB 200 MG: 200 CAPSULE ORAL at 12:14

## 2024-03-05 RX ADMIN — ACETAMINOPHEN 1000 MG: 500 TABLET ORAL at 23:46

## 2024-03-05 RX ADMIN — FENTANYL CITRATE 50 MCG: 50 INJECTION, SOLUTION INTRAMUSCULAR; INTRAVENOUS at 15:48

## 2024-03-05 RX ADMIN — SODIUM CHLORIDE, POTASSIUM CHLORIDE, SODIUM LACTATE AND CALCIUM CHLORIDE: 600; 310; 30; 20 INJECTION, SOLUTION INTRAVENOUS at 15:00

## 2024-03-05 RX ADMIN — PHENYLEPHRINE HYDROCHLORIDE 50 MCG: 10 INJECTION INTRAVENOUS at 15:16

## 2024-03-05 RX ADMIN — METOCLOPRAMIDE 10 MG: 5 INJECTION, SOLUTION INTRAMUSCULAR; INTRAVENOUS at 15:00

## 2024-03-05 RX ADMIN — GABAPENTIN 600 MG: 300 CAPSULE ORAL at 12:14

## 2024-03-05 RX ADMIN — ROCURONIUM BROMIDE 100 MG: 10 INJECTION, SOLUTION INTRAVENOUS at 15:05

## 2024-03-05 RX ADMIN — PHENYLEPHRINE HYDROCHLORIDE 50 MCG: 10 INJECTION INTRAVENOUS at 15:12

## 2024-03-05 RX ADMIN — PHENYLEPHRINE HYDROCHLORIDE 100 MCG: 10 INJECTION INTRAVENOUS at 15:20

## 2024-03-05 RX ADMIN — CEFOTETAN DISODIUM 2 G: 2 INJECTION, POWDER, FOR SOLUTION INTRAMUSCULAR; INTRAVENOUS at 15:00

## 2024-03-05 RX ADMIN — MIDAZOLAM HYDROCHLORIDE 2 MG: 1 INJECTION, SOLUTION INTRAMUSCULAR; INTRAVENOUS at 15:00

## 2024-03-05 RX ADMIN — SCOPOLAMINE 1 PATCH: 1.5 PATCH, EXTENDED RELEASE TRANSDERMAL at 12:15

## 2024-03-05 RX ADMIN — PROPOFOL 200 MG: 10 INJECTION, EMULSION INTRAVENOUS at 15:05

## 2024-03-05 RX ADMIN — FENTANYL CITRATE 125 MCG: 50 INJECTION, SOLUTION INTRAMUSCULAR; INTRAVENOUS at 15:03

## 2024-03-05 RX ADMIN — FENTANYL CITRATE 50 MCG: 50 INJECTION, SOLUTION INTRAMUSCULAR; INTRAVENOUS at 18:58

## 2024-03-05 RX ADMIN — KETOROLAC TROMETHAMINE 30 MG: 15 INJECTION, SOLUTION INTRAMUSCULAR; INTRAVENOUS at 15:00

## 2024-03-05 RX ADMIN — DEXAMETHASONE SODIUM PHOSPHATE 8 MG: 4 INJECTION INTRA-ARTICULAR; INTRALESIONAL; INTRAMUSCULAR; INTRAVENOUS; SOFT TISSUE at 15:00

## 2024-03-05 RX ADMIN — MAGNESIUM SULFATE HEPTAHYDRATE 4 G: 4 INJECTION, SOLUTION INTRAVENOUS at 15:30

## 2024-03-05 RX ADMIN — FENTANYL CITRATE 25 MCG: 50 INJECTION, SOLUTION INTRAMUSCULAR; INTRAVENOUS at 18:51

## 2024-03-05 RX ADMIN — ACETAMINOPHEN 1000 MG: 500 TABLET ORAL at 12:15

## 2024-03-05 RX ADMIN — CELECOXIB 200 MG: 200 CAPSULE ORAL at 23:45

## 2024-03-05 RX ADMIN — ROCURONIUM BROMIDE 20 MG: 10 INJECTION, SOLUTION INTRAVENOUS at 17:40

## 2024-03-05 RX ADMIN — LIDOCAINE HYDROCHLORIDE 100 MG: 20 INJECTION, SOLUTION EPIDURAL; INFILTRATION; INTRACAUDAL at 15:03

## 2024-03-05 ASSESSMENT — PAIN DESCRIPTION - PAIN TYPE
TYPE: ACUTE PAIN
TYPE: SURGICAL PAIN
TYPE: SURGICAL PAIN

## 2024-03-05 ASSESSMENT — COGNITIVE AND FUNCTIONAL STATUS - GENERAL
CLIMB 3 TO 5 STEPS WITH RAILING: A LOT
TOILETING: A LOT
DAILY ACTIVITIY SCORE: 18
WALKING IN HOSPITAL ROOM: A LOT
MOBILITY SCORE: 15
MOVING TO AND FROM BED TO CHAIR: A LITTLE
DRESSING REGULAR LOWER BODY CLOTHING: A LOT
SUGGESTED CMS G CODE MODIFIER DAILY ACTIVITY: CK
DRESSING REGULAR UPPER BODY CLOTHING: A LITTLE
SUGGESTED CMS G CODE MODIFIER MOBILITY: CK
TURNING FROM BACK TO SIDE WHILE IN FLAT BAD: A LITTLE
STANDING UP FROM CHAIR USING ARMS: A LOT
MOVING FROM LYING ON BACK TO SITTING ON SIDE OF FLAT BED: A LITTLE
HELP NEEDED FOR BATHING: A LITTLE

## 2024-03-05 ASSESSMENT — FIBROSIS 4 INDEX
FIB4 SCORE: 1.13
FIB4 SCORE: 1.13

## 2024-03-05 ASSESSMENT — PAIN SCALES - GENERAL: PAIN_LEVEL: 4
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
## 2024-03-05 NOTE — ANESTHESIA PROCEDURE NOTES
Airway    Date/Time: 3/5/2024 3:08 PM    Performed by: Josh Lewis M.D.  Authorized by: Josh Lewis M.D.    Location:  OR  Urgency:  Elective  Indications for Airway Management:  Anesthesia      Spontaneous Ventilation: absent    Sedation Level:  Deep  Preoxygenated: Yes    Patient Position:  Sniffing  Final Airway Type:  Endotracheal airway  Final Endotracheal Airway:  ETT  Cuffed: Yes    Technique Used for Successful ETT Placement:  Video laryngoscopy    Insertion Site:  Oral  Blade Type:  Glide  Laryngoscope Blade/Videolaryngoscope Blade Size:  4  ETT Size (mm):  7.5  Measured from:  Lips  ETT to Lips (cm):  22  Placement Verified by: auscultation and capnometry    Cormack-Lehane Classification:  Grade I - full view of glottis  Number of Attempts at Approach:  1   Airway placement was atraumatic x1 attempt

## 2024-03-05 NOTE — PROGRESS NOTES
Med Rec complete per pt  Allergies Reviewed    Pt reports taking anticoagulant in the last 14 days  Anticoagulant: XARELTO, Last dose: 2/29

## 2024-03-06 LAB
ANION GAP SERPL CALC-SCNC: 9 MMOL/L (ref 7–16)
BUN SERPL-MCNC: 22 MG/DL (ref 8–22)
CALCIUM SERPL-MCNC: 8.1 MG/DL (ref 8.5–10.5)
CHLORIDE SERPL-SCNC: 103 MMOL/L (ref 96–112)
CO2 SERPL-SCNC: 22 MMOL/L (ref 20–33)
CREAT SERPL-MCNC: 0.91 MG/DL (ref 0.5–1.4)
ERYTHROCYTE [DISTWIDTH] IN BLOOD BY AUTOMATED COUNT: 43.7 FL (ref 35.9–50)
GFR SERPLBLD CREATININE-BSD FMLA CKD-EPI: 91 ML/MIN/1.73 M 2
GLUCOSE SERPL-MCNC: 144 MG/DL (ref 65–99)
HCT VFR BLD AUTO: 38.2 % (ref 42–52)
HGB BLD-MCNC: 12.6 G/DL (ref 14–18)
MAGNESIUM SERPL-MCNC: 2.5 MG/DL (ref 1.5–2.5)
MCH RBC QN AUTO: 29.7 PG (ref 27–33)
MCHC RBC AUTO-ENTMCNC: 33 G/DL (ref 32.3–36.5)
MCV RBC AUTO: 90.1 FL (ref 81.4–97.8)
PATHOLOGY CONSULT NOTE: NORMAL
PLATELET # BLD AUTO: 191 K/UL (ref 164–446)
PMV BLD AUTO: 9.8 FL (ref 9–12.9)
POTASSIUM SERPL-SCNC: 4.9 MMOL/L (ref 3.6–5.5)
RBC # BLD AUTO: 4.24 M/UL (ref 4.7–6.1)
SODIUM SERPL-SCNC: 134 MMOL/L (ref 135–145)
WBC # BLD AUTO: 9.1 K/UL (ref 4.8–10.8)

## 2024-03-06 PROCEDURE — 700102 HCHG RX REV CODE 250 W/ 637 OVERRIDE(OP)

## 2024-03-06 PROCEDURE — 700111 HCHG RX REV CODE 636 W/ 250 OVERRIDE (IP): Performed by: SURGERY

## 2024-03-06 PROCEDURE — 700111 HCHG RX REV CODE 636 W/ 250 OVERRIDE (IP): Mod: JZ

## 2024-03-06 PROCEDURE — 85027 COMPLETE CBC AUTOMATED: CPT

## 2024-03-06 PROCEDURE — 80048 BASIC METABOLIC PNL TOTAL CA: CPT

## 2024-03-06 PROCEDURE — 83735 ASSAY OF MAGNESIUM: CPT

## 2024-03-06 PROCEDURE — 770001 HCHG ROOM/CARE - MED/SURG/GYN PRIV*

## 2024-03-06 PROCEDURE — A9270 NON-COVERED ITEM OR SERVICE: HCPCS

## 2024-03-06 PROCEDURE — 36415 COLL VENOUS BLD VENIPUNCTURE: CPT

## 2024-03-06 RX ORDER — ENOXAPARIN SODIUM 150 MG/ML
1 INJECTION SUBCUTANEOUS EVERY 12 HOURS
Status: DISCONTINUED | OUTPATIENT
Start: 2024-03-06 | End: 2024-03-08 | Stop reason: HOSPADM

## 2024-03-06 RX ADMIN — CELECOXIB 200 MG: 200 CAPSULE ORAL at 05:44

## 2024-03-06 RX ADMIN — ACETAMINOPHEN 1000 MG: 500 TABLET ORAL at 11:35

## 2024-03-06 RX ADMIN — CELECOXIB 200 MG: 200 CAPSULE ORAL at 17:19

## 2024-03-06 RX ADMIN — ENOXAPARIN SODIUM 40 MG: 100 INJECTION SUBCUTANEOUS at 08:14

## 2024-03-06 RX ADMIN — ENOXAPARIN SODIUM 120 MG: 150 INJECTION SUBCUTANEOUS at 17:18

## 2024-03-06 RX ADMIN — ACETAMINOPHEN 1000 MG: 500 TABLET ORAL at 05:43

## 2024-03-06 RX ADMIN — ACETAMINOPHEN 1000 MG: 500 TABLET ORAL at 17:18

## 2024-03-06 ASSESSMENT — LIFESTYLE VARIABLES
HAVE YOU EVER FELT YOU SHOULD CUT DOWN ON YOUR DRINKING: NO
TOTAL SCORE: 0
HAVE PEOPLE ANNOYED YOU BY CRITICIZING YOUR DRINKING: NO
DOES PATIENT WANT TO STOP DRINKING: NO
EVER FELT BAD OR GUILTY ABOUT YOUR DRINKING: NO
AVERAGE NUMBER OF DAYS PER WEEK YOU HAVE A DRINK CONTAINING ALCOHOL: 0
ALCOHOL_USE: NO
CONSUMPTION TOTAL: NEGATIVE
EVER HAD A DRINK FIRST THING IN THE MORNING TO STEADY YOUR NERVES TO GET RID OF A HANGOVER: NO
HOW MANY TIMES IN THE PAST YEAR HAVE YOU HAD 5 OR MORE DRINKS IN A DAY: 0
ON A TYPICAL DAY WHEN YOU DRINK ALCOHOL HOW MANY DRINKS DO YOU HAVE: 0
TOTAL SCORE: 0
TOTAL SCORE: 0

## 2024-03-06 ASSESSMENT — PAIN DESCRIPTION - PAIN TYPE
TYPE: ACUTE PAIN

## 2024-03-06 NOTE — CARE PLAN
Problem: Pain - Standard  Goal: Alleviation of pain or a reduction in pain to the patient’s comfort goal  Outcome: Progressing     Problem: Skin Care - Ostomy  Goal: Skin remains free from irritation  Outcome: Progressing     Problem: Fall Risk  Goal: Patient will remain free from falls  Outcome: Progressing   The patient is Stable - Low risk of patient condition declining or worsening    Shift Goals  Clinical Goals: pain mgmt; drain care; tolerate diet  Patient Goals: pain control; sleep    Progress made toward(s) clinical / shift goals:  yes

## 2024-03-06 NOTE — ANESTHESIA TIME REPORT
Anesthesia Start and Stop Event Times       Date Time Event    3/5/2024 1433 Ready for Procedure     1500 Anesthesia Start     1915 Anesthesia Stop          Responsible Staff  03/05/24      Name Role Begin End    Josh Lewis M.D. Anesth 1500 1713    Miguel Ro M.D. Anesth 1713 1915          Overtime Reason:  no overtime (within assigned shift)    Comments:

## 2024-03-06 NOTE — ANESTHESIA POSTPROCEDURE EVALUATION
Patient: Josh Lerner    Procedure Summary       Date: 03/05/24 Room / Location: [facility] 14 / SURGERY [facility]    Anesthesia Start: 1500 Anesthesia Stop: 1915    Procedures:       RESECTION, ABDOMINOPERINEAL, ROBOT-ASSISTED, LAPAROSCOPIC, USING DA BRIDGER XI (Anus)      CREATION, REVISION (Abdomen) Diagnosis: (MALIGNANT TUMOR OF RECTUM)    Surgeons: Zoran Desai M.D. Responsible Provider: Miguel Ro M.D.    Anesthesia Type: general, peripheral nerve block ASA Status: 3            Final Anesthesia Type: general, peripheral nerve block  Last vitals  BP   Blood Pressure : (!) 143/73    Temp   36.7 °C (98 °F)    Pulse   87   Resp   13    SpO2   94 %      Anesthesia Post Evaluation    Patient location during evaluation: PACU  Patient participation: complete - patient participated  Level of consciousness: awake and alert  Pain score: 4    Airway patency: patent  Anesthetic complications: no  Cardiovascular status: hemodynamically stable  Respiratory status: acceptable  Hydration status: euvolemic    PONV: none          There were no known notable events for this encounter.     Nurse Pain Score: 4 (NPRS)

## 2024-03-06 NOTE — PROGRESS NOTES
AxOx4. On 10L ERAS till 2 am.  Denies SOB/chest pain.  +BLE numbness and tingling.  Verbalizes 2/10 pain. declines pain regimen at this time. Pain medications available per MAR.  Skin per skin note. AMY to RLQ and AMY to glute/perineal area.   +void (collazo). LLQ ostomy. -flatus -BM LBM 3/5 PTA  Denies N/V. Tolerating regular diet.  Pt ambulates with x2 assist using a FWW.  SCDs on to RLE; DVT on LLE. Lovenox scheduled for VTE prophylaxis.

## 2024-03-06 NOTE — DISCHARGE PLANNING
0755  Per RN CM request  Agency/Facility Name: Local [facility]  Sent Referral per at: 0755 am    0937  Agency/Facility Name: [facility]  Spoke To: Mike  Outcome: Per Mike Pt needs to DC PRN Haldol prior to DC and will need PT/OT recs. Per Mike after Pt 3 midnight stay per Medicare guidelines.  RN CM notified.

## 2024-03-06 NOTE — DISCHARGE PLANNING
"Case Management Discharge Planning    Admission Date: 3/5/2024  GMLOS: 2.9  ALOS: 1    6-Clicks ADL Score: 18  6-Clicks Mobility Score: 15  PT and/or OT Eval ordered: Yes  Post-acute Referrals Ordered: No  Post-acute Choice Obtained: NA  Has referral(s) been sent to post-acute provider:  Yes      Anticipated Discharge Dispo: Discharge Disposition: D/T to home under HHA care in anticipation of covered skilled care (06)    DME Needed: No    Action(s) Taken: DC Assessment Complete (See below) and Referral(s) sent    Escalations Completed: None    Medically Clear: No    Next Steps: CM to follow up with IDT regarding discharge planning needs/barriers.     Barriers to Discharge: Medical clearance    Is the patient up for discharge tomorrow: No      Care Transition Team Assessment    Case management role discussed with patient; patient verbalized understanding of case management role  Demographics verified  Patient is a 68 year old male admitted to [facility] for abdominoperineal resection, colostomy revision, and pelvic floor exclusion with mesh  Patient states he lives in a single story home with his spouse and adult daughter; 1 step to enter the residence  Patient's preferred pharmacy is the [facility] on [address] in [place], [place]  Prior to admission, patient was independent with IADLS/ADLS; states he uses a cane \"at times\" to assist with ambulation, has a shower chair, and is on 3L O2 at noc (cannot recall O2 company)  Patient states he is amenable to home health ([facility]) if recommended; declining SNF placement and acute rehab    Through chart review, patient's home O2 is through [facility]     Information Source  Orientation Level: Oriented X4  Information Given By: Patient  Informant's Name: Josh  Who is responsible for making decisions for patient? : Patient    Readmission Evaluation  Is this a readmission?: No    Elopement Risk  Legal Hold: No  Ambulatory or Self Mobile in Wheelchair: Yes  Disoriented: " "No  Psychiatric Symptoms: None  History of Wandering: No  Elopement this Admit: No  Vocalizing Wanting to Leave: No  Displays Behaviors, Body Language Wanting to Leave: No-Not at Risk for Elopement  Elopement Risk: Not at Risk for Elopement    Interdisciplinary Discharge Planning  Does Admitting Nurse Feel This Could be a Complex Discharge?: No  Primary Care Physician: Christos Sandoval  Lives with - Patient's Self Care Capacity: Spouse  Patient or legal guardian wants to designate a caregiver: No  Support Systems: Children, Spouse / Significant Other  Housing / Facility: [address]  Do You Take your Prescribed Medications Regularly: Yes  Able to Return to Previous ADL's: Future Time w/Therapy  Mobility Issues: Yes  Prior Services: None, Home-Independent  Patient Prefers to be Discharged to:: home  Assistance Needed: Unknown at this Time  Durable Medical Equipment: Unknown    Discharge Preparedness  What is your plan after discharge?: Home health care  What are your discharge supports?: Child, Spouse  Prior Functional Level: Ambulatory, Drives Self, Independent with Activities of Daily Living, Independent with Medication Management, Uses Cane  Difficulity with ADLs: Walking (\" at times\")  Difficulity with IADLs: None    Functional Assesment  Prior Functional Level: Ambulatory, Drives Self, Independent with Activities of Daily Living, Independent with Medication Management, Uses Cane    Finances  Financial Barriers to Discharge: No  Prescription Coverage: Yes    Vision / Hearing Impairment  Vision Impairment : Yes  Right Eye Vision: Impaired, Wears Glasses  Left Eye Vision: Impaired, Wears Glasses  Hearing Impairment : No    Values / Beliefs / Concerns  Values / Beliefs Concerns : No    Advance Directive  Advance Directive?: None  Advance Directive offered?: AD Booklet refused    Domestic Abuse  Have you ever been the victim of abuse or violence?: No  Physical Abuse or Sexual Abuse: No  Verbal Abuse or Emotional " Abuse: No  Possible Abuse/Neglect Reported to:: Not Applicable    Psychological Assessment  History of Substance Abuse: None  History of Psychiatric Problems: No  Non-compliant with Treatment: No  Newly Diagnosed Illness: No    Discharge Risks or Barriers  Discharge risks or barriers?: Complex medical needs  Patient risk factors: Cognitive / sensory / physical deficit, Complex medical needs, Vulnerable adult    Anticipated Discharge Information  Discharge Disposition: D/T to home under A care in anticipation of covered skilled care (06)

## 2024-03-06 NOTE — PROGRESS NOTES
Report received from previous shift RN.  Assessment complete.  Patient resting in bed comfortably, even and unlabored breathing present.  Surgical incisions to abdomen with dermabond, CDI. LLQ ostomy to appliance, CDI.  All needs met at this time. Call light within reach. Hourly rounding in place.

## 2024-03-06 NOTE — PROGRESS NOTES
4 Eyes Skin Assessment Completed by MARITZA Moser and Mira RN.    Head WDL  Ears WDL  Nose WDL  Mouth WDL  Neck WDL  Breast/Chest WDL  Shoulder Blades WDL  Spine WDL  (R) Arm/Elbow/Hand WDL  (L) Arm/Elbow/Hand Scab/skin tear   Abdomen Incision; 3 lap sites, RLQ AMY and LLQ ostomy  Groin collazo; WDL  Scrotum/Coccyx/Buttocks and perineal glute AMY; sacrum and coccyx intact and blanching  (R) Leg Redness and Edema  (L) Leg Redness and Edema  (R) Heel/Foot/Toe Redness and Blanching; dry and flaky  (L) Heel/Foot/Toe Redness and Blanching; dry and flaky      Devices In Places Blood Pressure Cuff, Pulse Ox, Collazo, SCD's, and Oxy Mask      Interventions In Place Pillows, Q2 Turns, Low Air Loss Mattress, and Barrier Cream    Possible Skin Injury No    Pictures Uploaded Into Epic N/A

## 2024-03-06 NOTE — CARE PLAN
The patient is Watcher - Medium risk of patient condition declining or worsening    Shift Goals  Clinical Goals: pain control, oob activity  Patient Goals: pain control; sleep    Progress made toward(s) clinical / shift goals:  Patient denies pain. Patient up to chair for meals.    Problem: Pain - Standard  Goal: Alleviation of pain or a reduction in pain to the patient’s comfort goal  Description: Target End Date:  Prior to discharge or change in level of care    Document on Vitals flowsheet    1.  Document pain using the appropriate pain scale per order or unit policy  2.  Educate and implement non-pharmacologic comfort measures (i.e. relaxation, distraction, massage, cold/heat therapy, etc.)  3.  Pain management medications as ordered  4.  Reassess pain after pain med administration per policy  5.  If opiods administered assess patient's response to pain medication is appropriate per POSS sedation scale  6.  Follow pain management plan developed in collaboration with patient and interdisciplinary team (including palliative care or pain specialists if applicable)  Outcome: Progressing     Problem: Knowledge Deficit - Standard  Goal: Patient and family/care givers will demonstrate understanding of plan of care, disease process/condition, diagnostic tests and medications  Description: Target End Date:  1-3 days or as soon as patient condition allows    Document in Patient Education    1.  Patient and family/caregiver oriented to unit, equipment, visitation policy and means for communicating concern  2.  Complete/review Learning Assessment  3.  Assess knowledge level of disease process/condition, treatment plan, diagnostic tests and medications  4.  Explain disease process/condition, treatment plan, diagnostic tests and medications  Outcome: Progressing     Problem: Skin Care - Ostomy  Goal: Skin remains free from irritation  Description: Target End Date:  Prior to discharge or change in level of care    1.  Asses  pouch at least once per shift to ensure no leaking  2.  Change ostomy appliance immediately if leaking  3.  Empty ostomy pouch when 1/2 to 2/3 full  Outcome: Progressing

## 2024-03-06 NOTE — OP REPORT
DATE OF SERVICE:  03/05/2024     PREOPERATIVE DIAGNOSIS:  Rectal cancer.     POSTOPERATIVE DIAGNOSIS:  Rectal cancer.     PROCEDURES:  1.  Robotic abdominoperineal resection.  2.  Colostomy revision.  3.  Pelvic floor exclusion with mesh.     SURGEON:  Zoran Desai MD     ASSISTANT:  INES Roldan     ANESTHESIA:  General endotracheal anesthesia.     ESTIMATED BLOOD LOSS:  200 mL     SPECIMENS:  1.  Colostomy site.  2.  Sigmoid colon, rectum and anal canal.     COMPLICATIONS:  None.     CONDITION:  Stable.     INDICATIONS FOR PROCEDURE:  This is a 68-year-old male who presents with a   distal rectal cancer, status post treatment with neoadjuvant chemo and   radiation therapy. Tumor has had notable regression after treatment and is not   involving the prostate or bladder based on evaluation by myself and urology.   The tumor he has however involving the internal sphincter muscle; therefore,   the patient was consented for an abdominoperineal resection.  Risks, benefits   and alternatives of the proposed surgery were explained to the patient before   proceeding.     OPERATIVE FINDINGS:  Distal rectal cancer removed en bloc with the pelvic   floor and sphincter muscles along with the rectum, mesorectum and sigmoid   colon and MAGNOLIA pedicle. Colostomy removed and revised with tightening of the   fascia around the parastomal hernia and new end colostomy in the left lower   quadrant.  A 19-Austrian round drain left in the pelvis.  A 10-Austrian drain left   in the perineum.     OPERATIVE TECHNIQUE:  After informed consent was obtained, the patient was   taken to the operating room and placed in supine position.  After adequate   endotracheal anesthesia was achieved, the patient was switched to lithotomy   position.  The colostomy was sewn shut.  The rectum was washed out with   Betadine and the anal canal was sewn shut.  The abdomen and perineum were then   prepped and draped in sterile fashion.  Operation was  begun by making a   circular incision around the colostomy site, which was carried down with   cautery to the level of the subcutaneous tissues. All adhesions to the level   of the fascia and ____ were then removed.  We mobilized the end of the colon   over the operative field and divided the bowel at a healthy site.  The   previous colostomy was overly large and associated with a large parastomal   hernia.  Once the bowel was divided, we divided the mesentery with clamps and   0-Vicryl ties.  We then created a shelf above the fascia and reapproximated   the distal end of the fascia with figure-of-eight 0-PDS sutures.  We placed a   figure-of-eight 0-PDS suture proximally as well.  This created an   appropriate-sized fascial ring around the stoma site.  We then temporarily   closed the subcutaneous tissues with 3-0 Vicryl pops and placed a wet lap.    This allowed us to place an 8-mm robotic port into the abdomen and achieve   pneumoperitoneum.  We then placed 3 more 8-mm robotic ports and two 5-mm   assist ports into the abdomen and positioned the patient for a pelvic   approach.  The da Kylie Xi robotic system was then docked.     We began by taking down lateral attachments of the sigmoid colon.  We divided   the remaining mesentery between the planned colostomy site and the distal   remnant of the sigmoid.  Once the lateral adhesions were removed, we   identified the left ureter and left this in the retroperitoneum.  We then   opened the retroperitoneum on the right side, dissecting the presacral   bloodless plane.  After preserving the pelvic nerves, we isolated the MAGNOLIA   pedicle, which was ligated with the vessel-sealer device.  With this   completed, we then turned our attention toward the pelvis and followed the   presacral plane toward the pelvic floor. We took down right and left lateral   adhesions and attachments of the mesorectum. We ligated all middle rectal   vessel branches as well.  We continued our  dissection anteriorly along   Denonvilliers fascia until there was a segment distally where there was some   tight adhesions between the colon and prostate capsule.  We continued our   dissection posteriorly and laterally until we hit the pelvic floor.  This was   then partially divided robotically.  There was a great deal of edema and   inflammation during this dissection, owing to the prior radiation treatment.   With this dissection completed, we then undocked the da Kylie system and   positioned the patient in high-lithotomy position.     From our perineal approach, we made a circular incision around the anal canal.    We then secured a [place] retractor into place.  We then used the   vessel-sealer device to divide the subcutaneous tissues along with cautery.    We removed the sphincter complex en bloc with the anal canal and distal   rectum.  We continued our dissection proximally until we broke through   posteriorly to our previous dissection plane at the level of the coccyx.  We   then took down right and left pelvic floor attachments and continued our   dissection up anteriorly.  We then carefully peeled the anterior rectum off of   the prostate capsule, leaving the prostate capsule intact and removing all   tissue that was feasible to do so.  Once this was done, we removed some   remaining lateral adhesions, which completed attachments of surgical specimen.   We then removed the specimen from the perineal wound and irrigated the   incision.     We then placed several figure-of-eight 3-0 Vicryl sutures for hemostasis   anteriorly, we applied cautery as well.  We then began a pelvic floor   exclusion by cutting a 10x10 cm piece of Strattice mesh to size.  We then used   0-PDS sutures to affix this to the horseshoe-shaped pelvic floor remnant.    Once this was sewn in place, we took care to leave an opening gap inferiorly,   so as not distention of the prostate capsule.  Once the mesh was sewn into    completely in place, the pelvis was excluded.  We irrigated the wound and   noted hemostasis.  We placed a 10-Guinean round drain within the perineal wound   and then closed the skin with vertical mattress of 0-silk sutures.    Bacitracin ointment was applied.  The drain was sewn to skin with a 2-0 nylon   and hooked to bulb suction at the end of the case.     Laparoscopically, we then washed out and irrigated the pelvis.  We noted   hemostasis.  We placed a 19-Guinean round drain through one of the port sites   and coiled this in the pelvis. We then deinsufflated the abdomen and removed   our temporary attention very sutures from the colostomy site.  We delivered   the end of the colon onto the operative field.  We then sewed the drain to the   skin with 2-0 nylon and hooked to bulb suction and closed the sterile port   sites with 4-0 Monocryl and Dermabond.  We then opened the end of the colon   and matured this using 3-0 Vicryls with a Polly technique.  Ostomy appliance   was placed and the procedure was concluded.  The patient was then returned to   the PACU in stable condition.  All instrument counts were correct at the end   of the procedure.    Case required an assistant due to the complexity of surgery.  The assistant helped with trocar placement, camera operation, robotic operation, retraction, hemostatic maneuvers, specimen removal, wound closures, and operative decision making.          Rectal Cancer Synoptic Summary:     ASA Score: III  Case Status: Elective  Operation: APR  Modality: Robotic  Location of Tumor within Rectum: Low  Height of Lower Edge of Tumor from Anal Verge: 2cm  Mobilization of Splenic Flexure: No  Level of Ligation of Inferior Mesenteric Artery: MAGNOLIA  Level of Ligation of Inferior Mesenteric Vein: Low   Level of Rectal Transection Distal to Distal Edge of Tumor (Distal Margin): Anal Margin   Type of Reconstruction: None   Anastomotic Testing Method(s): None   Creation of Stoma:  Colostomy   En Bloc Resection: No   Metastectomy: No   Completeness of Tumor Resection: R0  Intraoperative Complications: No  Blood Transfusion: No  TME Photographed: Yes-In Pathology Report   MMR/MSI Status: unkown      Zoran Desai M.D.  [facility]  Colon and Rectal Surgeon  [phone]         ______________________________  Zoran Desai MD    [facility] [place] [place]    DD:  03/05/2024 19:05  DT:  03/05/2024 20:40    Job#:  [id number]

## 2024-03-06 NOTE — PROGRESS NOTES
"S:  68 y.o.male s/p Robotic APR with Colostomy Revision  POD# 1.  Patient doing well overnight, tolerating PO, pain controlled, denies any n/v at this time.  He is ambulating some post-op and feels like he has minimal abdominal pain so far.  Colostomy is starting to work with scant output so far.      O:  /68   Pulse 85   Temp 36.4 °C (97.5 °F) (Temporal)   Resp 17   Ht 1.778 m (5' 10\")   Wt 119 kg (263 lb 0.1 oz)   SpO2 90%   I/O last 3 completed shifts:  In: 1570 [P.O.:570; I.V.:1000]  Out: 945 [Urine:775; Drains:70]  Recent Labs     03/06/24  0453   SODIUM 134*   POTASSIUM 4.9   CHLORIDE 103   CO2 22   GLUCOSE 144*   BUN 22   CREATININE 0.91   CALCIUM 8.1*     Recent Labs     03/06/24  0453   WBC 9.1   RBC 4.24*   HEMOGLOBIN 12.6*   HEMATOCRIT 38.2*   MCV 90.1   MCH 29.7   MCHC 33.0   RDW 43.7   PLATELETCT 191   MPV 9.8       Alert and Oriented x3, No Acute Distress  Normal Respiratory Effort  Abdomen soft, appropriately tender  Incisions/Bandages clean/dry/intact  Extremities warm and well perfused  Ostomy pink and healthy, output scant stool  AMY Output Serosanguinous x2- 30/40cc    A/P:  Pain control with PO meds  Diet as tolerated  Fluids SLIV  Ambulate tid and ad aby  Continue AMY drains in place  Pt is going to need full anti-coagulation due to DVT hx.  Will adjust dose of Lovenox today.    "

## 2024-03-07 LAB
ANION GAP SERPL CALC-SCNC: 8 MMOL/L (ref 7–16)
BUN SERPL-MCNC: 28 MG/DL (ref 8–22)
CALCIUM SERPL-MCNC: 7.7 MG/DL (ref 8.5–10.5)
CHLORIDE SERPL-SCNC: 107 MMOL/L (ref 96–112)
CO2 SERPL-SCNC: 24 MMOL/L (ref 20–33)
CREAT SERPL-MCNC: 0.88 MG/DL (ref 0.5–1.4)
ERYTHROCYTE [DISTWIDTH] IN BLOOD BY AUTOMATED COUNT: 44.1 FL (ref 35.9–50)
GFR SERPLBLD CREATININE-BSD FMLA CKD-EPI: 93 ML/MIN/1.73 M 2
GLUCOSE SERPL-MCNC: 126 MG/DL (ref 65–99)
HCT VFR BLD AUTO: 32.9 % (ref 42–52)
HGB BLD-MCNC: 11.2 G/DL (ref 14–18)
MCH RBC QN AUTO: 30.4 PG (ref 27–33)
MCHC RBC AUTO-ENTMCNC: 34 G/DL (ref 32.3–36.5)
MCV RBC AUTO: 89.4 FL (ref 81.4–97.8)
PLATELET # BLD AUTO: 163 K/UL (ref 164–446)
PMV BLD AUTO: 9.2 FL (ref 9–12.9)
POTASSIUM SERPL-SCNC: 4.1 MMOL/L (ref 3.6–5.5)
RBC # BLD AUTO: 3.68 M/UL (ref 4.7–6.1)
SODIUM SERPL-SCNC: 139 MMOL/L (ref 135–145)
WBC # BLD AUTO: 6.8 K/UL (ref 4.8–10.8)

## 2024-03-07 PROCEDURE — 700102 HCHG RX REV CODE 250 W/ 637 OVERRIDE(OP)

## 2024-03-07 PROCEDURE — 770001 HCHG ROOM/CARE - MED/SURG/GYN PRIV*

## 2024-03-07 PROCEDURE — 85027 COMPLETE CBC AUTOMATED: CPT

## 2024-03-07 PROCEDURE — 700111 HCHG RX REV CODE 636 W/ 250 OVERRIDE (IP): Performed by: SURGERY

## 2024-03-07 PROCEDURE — A9270 NON-COVERED ITEM OR SERVICE: HCPCS

## 2024-03-07 PROCEDURE — 80048 BASIC METABOLIC PNL TOTAL CA: CPT

## 2024-03-07 RX ADMIN — CELECOXIB 200 MG: 200 CAPSULE ORAL at 04:26

## 2024-03-07 RX ADMIN — CELECOXIB 200 MG: 200 CAPSULE ORAL at 16:24

## 2024-03-07 RX ADMIN — ENOXAPARIN SODIUM 120 MG: 150 INJECTION SUBCUTANEOUS at 04:27

## 2024-03-07 RX ADMIN — ENOXAPARIN SODIUM 120 MG: 150 INJECTION SUBCUTANEOUS at 16:24

## 2024-03-07 RX ADMIN — ACETAMINOPHEN 1000 MG: 500 TABLET ORAL at 11:39

## 2024-03-07 RX ADMIN — ACETAMINOPHEN 1000 MG: 500 TABLET ORAL at 04:27

## 2024-03-07 RX ADMIN — ACETAMINOPHEN 1000 MG: 500 TABLET ORAL at 16:24

## 2024-03-07 NOTE — PROGRESS NOTES
"S:  68 y.o.male s/p Robotic APR  POD# 2.  Patient doing very well today.  Ambulating in hallways, tolerating solids PO, putting out semi-formed stool via his ostomy. AMY drains remain in place and are putting out serosanguinous fluid only.  He has not been having much pain post-op and feels like discomfort is well controlled.  He feels comfortable with ostomy care at this time.       O:  /69   Pulse 84   Temp 36.5 °C (97.7 °F) (Temporal)   Resp 17   Ht 1.778 m (5' 10\")   Wt 119 kg (263 lb 0.1 oz)   SpO2 91%   I/O last 3 completed shifts:  In: 2010 [P.O.:1010; I.V.:1000]  Out: 2505 [Urine:2225; Drains:155]  Recent Labs     03/06/24  0453 03/07/24  0126   SODIUM 134* 139   POTASSIUM 4.9 4.1   CHLORIDE 103 107   CO2 22 24   GLUCOSE 144* 126*   BUN 22 28*   CREATININE 0.91 0.88   CALCIUM 8.1* 7.7*     Recent Labs     03/06/24  0453 03/07/24  0126   WBC 9.1 6.8   RBC 4.24* 3.68*   HEMOGLOBIN 12.6* 11.2*   HEMATOCRIT 38.2* 32.9*   MCV 90.1 89.4   MCH 29.7 30.4   MCHC 33.0 34.0   RDW 43.7 44.1   PLATELETCT 191 163*   MPV 9.8 9.2       Alert and Oriented x3, No Acute Distress  Normal Respiratory Effort  Abdomen soft, appropriately tender  Incisions/Bandages clean/dry/intact  Extremities warm and well perfused  Ostomy pink and healthy, output semi-formed stool  AMY Output Serosanguinous x2- LLQ 15cc, Perineal 70cc    A/P:  Pain control with PO meds, minimize narcotics  Diet as tolerated  Fluids SLIV  Ambulate tid and ad aby  Home tomorrow am, AMY drains will need to remain in place until next week.  Sutures will stay for 2 weeks post-op  Will place referral to wound care clinic   "

## 2024-03-07 NOTE — CARE PLAN
The patient is Stable - Low risk of patient condition declining or worsening    Shift Goals  Clinical Goals: increase OOB activity  Patient Goals: sleep comfortably    Progress made toward(s) clinical / shift goals:  Patient up to bathroom and for ambulation multiple times throughout shift.     Patient is not progressing towards the following goals:

## 2024-03-07 NOTE — CARE PLAN
The patient is Stable - Low risk of patient condition declining or worsening    Shift Goals  Clinical Goals: ambulation  Patient Goals: sleep comfortably    Progress made toward(s) clinical / shift goals:  Patient educated on importance of mobility.    Problem: Pain - Standard  Goal: Alleviation of pain or a reduction in pain to the patient’s comfort goal  Description: Target End Date:  Prior to discharge or change in level of care    Document on Vitals flowsheet    1.  Document pain using the appropriate pain scale per order or unit policy  2.  Educate and implement non-pharmacologic comfort measures (i.e. relaxation, distraction, massage, cold/heat therapy, etc.)  3.  Pain management medications as ordered  4.  Reassess pain after pain med administration per policy  5.  If opiods administered assess patient's response to pain medication is appropriate per POSS sedation scale  6.  Follow pain management plan developed in collaboration with patient and interdisciplinary team (including palliative care or pain specialists if applicable)  Outcome: Progressing     Problem: Knowledge Deficit - Standard  Goal: Patient and family/care givers will demonstrate understanding of plan of care, disease process/condition, diagnostic tests and medications  Description: Target End Date:  1-3 days or as soon as patient condition allows    Document in Patient Education    1.  Patient and family/caregiver oriented to unit, equipment, visitation policy and means for communicating concern  2.  Complete/review Learning Assessment  3.  Assess knowledge level of disease process/condition, treatment plan, diagnostic tests and medications  4.  Explain disease process/condition, treatment plan, diagnostic tests and medications  Outcome: Progressing     Problem: Skin Care - Ostomy  Goal: Skin remains free from irritation  Description: Target End Date:  Prior to discharge or change in level of care    1.  Asses pouch at least once per shift to  ensure no leaking  2.  Change ostomy appliance immediately if leaking  3.  Empty ostomy pouch when 1/2 to 2/3 full  Outcome: Progressing

## 2024-03-07 NOTE — DISCHARGE PLANNING
Case Management Discharge Planning    Admission Date: 3/5/2024  GMLOS: 2.9  ALOS: 2    6-Clicks ADL Score: 18  6-Clicks Mobility Score: 15    Anticipated Discharge Dispo: Discharge Disposition: D/T to home under [facility] care in anticipation of covered skilled care (06)    Action(s) Taken: Chart review completed. Patient discussed during IDT rounds.     Per IDT, patient will need OP wound appt upon discharge.     1121: RNCM reached out to provider via Voalte re: OP wound clinic referral; awaiting response    Pending PT/OT evaluation for post-acute recommendations.     RNCM obtained choice for [facility]; choice form faxed to [facility] and placed into patient's media file; no HH order from provider at this time    1615: RNCM received OP wound clinic appt for patient; patient has an appt for Thursday, 3/14 at 10am at [facility] OP wound clinic.     Escalations Completed: None    Medically Clear: No    Next Steps: CM to follow up with IDT regarding discharge planning needs/barriers. CM to follow up with provider re: OP wound clinic referral    Barriers to Discharge: Medical clearance    Is the patient up for discharge tomorrow: No

## 2024-03-07 NOTE — PROGRESS NOTES
Report received from previous shift RN.  Assessment complete.  Patient resting in bed comfortably, even and unlabored breathing present.  Surgical incisions to abdomen with dermabond, CDI. LLQ ostomy to appliance, CDI.  All needs met at this time. Call light within reach. Hourly rounding in place

## 2024-03-07 NOTE — PROGRESS NOTES
"Bedside report received.  Assessment complete.  A&O x 4. Patient calls appropriately.  Patient ambulates with STBY assist. Bed alarm on.   Patient has 0/10 pain. Pain managed with prescribed medications.  Denies N&V. Tolerating regular diet.  Surgical site to abdomen, CDI.   + void, +output in ostomy  Patient denies SOB.  SCD's refused.  Patient calm and cooperative with staff and POC at this time.  Review plan with of care with patient. Call light and personal belongings within reach. Hourly rounding in place. All needs met at this time.    /61   Pulse 87   Temp 36.5 °C (97.7 °F) (Temporal)   Resp 16   Ht 1.778 m (5' 10\")   Wt 119 kg (263 lb 0.1 oz)   SpO2 90%   BMI 37.74 kg/m²     "

## 2024-03-08 VITALS
SYSTOLIC BLOOD PRESSURE: 132 MMHG | RESPIRATION RATE: 16 BRPM | WEIGHT: 263.01 LBS | DIASTOLIC BLOOD PRESSURE: 71 MMHG | HEART RATE: 83 BPM | HEIGHT: 70 IN | OXYGEN SATURATION: 91 % | BODY MASS INDEX: 37.65 KG/M2 | TEMPERATURE: 97.7 F

## 2024-03-08 LAB
ANION GAP SERPL CALC-SCNC: 8 MMOL/L (ref 7–16)
BUN SERPL-MCNC: 14 MG/DL (ref 8–22)
CALCIUM SERPL-MCNC: 8.1 MG/DL (ref 8.5–10.5)
CHLORIDE SERPL-SCNC: 107 MMOL/L (ref 96–112)
CO2 SERPL-SCNC: 25 MMOL/L (ref 20–33)
CREAT SERPL-MCNC: 0.83 MG/DL (ref 0.5–1.4)
ERYTHROCYTE [DISTWIDTH] IN BLOOD BY AUTOMATED COUNT: 45 FL (ref 35.9–50)
GFR SERPLBLD CREATININE-BSD FMLA CKD-EPI: 95 ML/MIN/1.73 M 2
GLUCOSE SERPL-MCNC: 108 MG/DL (ref 65–99)
HCT VFR BLD AUTO: 36 % (ref 42–52)
HGB BLD-MCNC: 11.8 G/DL (ref 14–18)
MCH RBC QN AUTO: 29.8 PG (ref 27–33)
MCHC RBC AUTO-ENTMCNC: 32.8 G/DL (ref 32.3–36.5)
MCV RBC AUTO: 90.9 FL (ref 81.4–97.8)
PLATELET # BLD AUTO: 177 K/UL (ref 164–446)
PMV BLD AUTO: 9.9 FL (ref 9–12.9)
POTASSIUM SERPL-SCNC: 4.1 MMOL/L (ref 3.6–5.5)
RBC # BLD AUTO: 3.96 M/UL (ref 4.7–6.1)
SODIUM SERPL-SCNC: 140 MMOL/L (ref 135–145)
WBC # BLD AUTO: 6.7 K/UL (ref 4.8–10.8)

## 2024-03-08 PROCEDURE — A9270 NON-COVERED ITEM OR SERVICE: HCPCS

## 2024-03-08 PROCEDURE — 700111 HCHG RX REV CODE 636 W/ 250 OVERRIDE (IP): Performed by: SURGERY

## 2024-03-08 PROCEDURE — 80048 BASIC METABOLIC PNL TOTAL CA: CPT

## 2024-03-08 PROCEDURE — 700102 HCHG RX REV CODE 250 W/ 637 OVERRIDE(OP)

## 2024-03-08 PROCEDURE — 85027 COMPLETE CBC AUTOMATED: CPT

## 2024-03-08 RX ORDER — GINSENG 100 MG
1 CAPSULE ORAL 2 TIMES DAILY
Qty: 28 G | Refills: 3 | Status: SHIPPED | OUTPATIENT
Start: 2024-03-08

## 2024-03-08 RX ADMIN — ACETAMINOPHEN 1000 MG: 500 TABLET ORAL at 04:26

## 2024-03-08 RX ADMIN — ENOXAPARIN SODIUM 120 MG: 150 INJECTION SUBCUTANEOUS at 04:26

## 2024-03-08 RX ADMIN — CELECOXIB 200 MG: 200 CAPSULE ORAL at 04:26

## 2024-03-08 ASSESSMENT — PAIN DESCRIPTION - PAIN TYPE: TYPE: ACUTE PAIN

## 2024-03-08 NOTE — PROGRESS NOTES
"Bedside report received.  Assessment complete.  A&O x 4. Patient calls appropriately.  Patient ambulates with STBY assist. Bed alarm on.   Patient has 0/10 pain. Pain managed with prescribed medications.  Denies N&V. Tolerating regular diet.  Surgical site to abdomen, 2x AMY drains 1x LLQ ostomy device in place.  + void, +output in ostomy  Patient denies SOB.  SCD's on.  Patient calm and cooperative with staff and POC at this time.  Review plan with of care with patient. Call light and personal belongings within reach. Hourly rounding in place. All needs met at this time.    /66   Pulse 82   Temp 36.5 °C (97.7 °F) (Temporal)   Resp 17   Ht 1.778 m (5' 10\")   Wt 119 kg (263 lb 0.1 oz)   SpO2 92%   BMI 37.74 kg/m²     "

## 2024-03-08 NOTE — PROGRESS NOTES
"S:  68 y.o.male s/p Robotic APR  POD# 3.  Patient doing well this morning, tolerating a regular diet, ambulating, pain controlled with PO medications.  Ostomy is functioning and he feels ready to go home today    O:  /71   Pulse 77   Temp 36.5 °C (97.7 °F) (Temporal)   Resp 16   Ht 1.778 m (5' 10\")   Wt 119 kg (263 lb 0.1 oz)   SpO2 91%   I/O last 3 completed shifts:  In: 360 [P.O.:360]  Out: 2620 [Urine:2250; Drains:145]  Recent Labs     03/06/24  0453 03/07/24  0126 03/08/24  0007   SODIUM 134* 139 140   POTASSIUM 4.9 4.1 4.1   CHLORIDE 103 107 107   CO2 22 24 25   GLUCOSE 144* 126* 108*   BUN 22 28* 14   CREATININE 0.91 0.88 0.83   CALCIUM 8.1* 7.7* 8.1*     Recent Labs     03/06/24 0453 03/07/24  0126 03/08/24  0007   WBC 9.1 6.8 6.7   RBC 4.24* 3.68* 3.96*   HEMOGLOBIN 12.6* 11.2* 11.8*   HEMATOCRIT 38.2* 32.9* 36.0*   MCV 90.1 89.4 90.9   MCH 29.7 30.4 29.8   MCHC 33.0 34.0 32.8   RDW 43.7 44.1 45.0   PLATELETCT 191 163* 177   MPV 9.8 9.2 9.9       Alert and Oriented x3, No Acute Distress  Normal Respiratory Effort  Abdomen soft, appropriately tender  Incisions/Bandages clean/dry/intact  Extremities warm and well perfused  Ostomy pink and healthy, output semiformed stool  AMY Output Serosanguinous x2- Abdominal 75, Perineal 50cc    A/P:  Pain control with PO meds  Diet as tolerated  Fluids SLIV  Ambulate tid and ad aby  Home today, comfortable with ostomy care, AMY drains to stay in place until follow up next week.    Ok to shower with perineal wound, apply neosporin and dry dressings prn    "

## 2024-03-08 NOTE — DISCHARGE INSTRUCTIONS
Diet as tolerated  May shower daily with wounds and drains uncovered  AMY drains to bulb suction- leave in place until follow up  No heavy lifting or straining for 6 weeks after surgery  Dry dressings and Bacitracin Ointment to Perineal Wound

## 2024-03-08 NOTE — CARE PLAN
The patient is Stable - Low risk of patient condition declining or worsening    Shift Goals  Clinical Goals: increase OOB activity, improve pulmonary hygiene.  Patient Goals: rest    Progress made toward(s) clinical / shift goals:  patient ambulating during shift and utilizing incentive spirometer.    Patient is not progressing towards the following goals:

## 2024-03-08 NOTE — PROGRESS NOTES
Report received from RN, assumed care at 0715  Pt is A0X4, and responds appropriately   Pt declines any SOB, chest pain, new onset of numbness/ tingling  Pt rates pain at 2/10, on a scale of 1-10, pt states he is uncomfortable but not in pain and declines pain meds  Pt is voiding adequately and without hesitancy via urinal 300 ml out  Pt has + flatus, + output to ostomy   Pt ambulates as a standby assist, patient advised walker would be beneficial, patient refuses  Pt is tolerating a diet, pt denies any nausea/vomiting  Plan of care discussed, all questions answered. Explained importance of calling before getting OOB and pt verbalizes understanding. Explained importance of oral care. Call light is within reach, treaded slipper socks on, bed in lowest/ locked position, hourly rounding in place, all needs met at this time

## 2024-03-08 NOTE — PROGRESS NOTES
Discharging Patient home per physician order.  Discharged with dressing supplies, extensive education education on wounds, heat packs, ice pack, plastic dressings/guards for showering and keeping wound clean. Demonstrated understanding of discharge instructions, follow up appointments, home medications, prescriptions, home care for surgical wound, and nursing care instructions for.   This RN spoke to wife via patients cell phone to explain dressing care as well. Ambulating without assistance, voiding without difficulty, pain well controlled, tolerating oral medications, oxygen saturation greater than 90% , tolerating diet. Educational handouts given and discussed.  Verbalized understanding of discharge instructions and educational handouts.  Stated several reasons why to return to ed or seek medical attention. All questions answered.  Belongings and dressing supplies with patient at time of discharge.

## 2024-03-08 NOTE — CARE PLAN
The patient is Stable - Low risk of patient condition declining or worsening    Shift Goals  Clinical Goals: comfort, education on ostomy and wounds, discharge  Patient Goals: comfort, DC    Progress made toward(s) clinical / shift goals:    Problem: Pain - Standard  Goal: Alleviation of pain or a reduction in pain to the patient’s comfort goal  Outcome: Met     Problem: Knowledge Deficit - Standard  Goal: Patient and family/care givers will demonstrate understanding of plan of care, disease process/condition, diagnostic tests and medications  Outcome: Met     Problem: Skin Care - Ostomy  Goal: Skin remains free from irritation  Outcome: Met     Problem: Knowledge Deficit - Ostomy  Goal: Patient will demonstrate ability to manage and maintain ostomy  Outcome: Met

## 2024-03-12 NOTE — DISCHARGE SUMMARY
Discharge Summary      DATE OF ADMISSION: 3/5/2024    DATE OF DISCHARGE: 3/8/2024    ADMISSION DIAGNOSIS (ES):  Rectal Cancer    DISCHARGE DIAGNOSIS (ES):  same    DISCHARGE CONDITION:  stable    CONSULTATIONS:  none    PROCEDURES:  Robotic Abdominoperineal Resection  Colostomy Revision  Pelvic Floor Exclusion with Mesh    BRIEF HPI:  67y M with locally advanced distal rectal cancer with pelvic floor involvement.  Pt presents for resection, prostate not involved on latest imaging.  He completed a bowel prep and has a diverting colostomy already in place.  This has a large associated parastomal hernia and will need to be revised.      HOSPITAL COURSE:  Pt underwent above surgery without complication.  He was admitted post-operatively for care and did well.  He had prompt return of bowel function and was able to ambulate, tolerate a general diet and take care of his colostomy.  His AMY drains were left in place with serosanguinous output and he was discharged home with these in place.  At the time of discharge home his wounds were healing well.      MEDS:   Current Outpatient Medications   Medication Sig Dispense Refill    bacitracin 500 UNIT/GM ointment Apply 1 application topically 2 times a day. Apply pea-sized amount to perineal wound twice daily 28 g 3    acetaminophen (TYLENOL) 500 MG Tab Take 1,000 mg by mouth every 6 hours as needed for Mild Pain.      XARELTO 10 MG Tab tablet Take 10 mg by mouth 2 times a day.         FOLLOW-UP:  Please call my office at [phone] to make an appointment in 1 weeks    DISCHARGE INSTRUCTIONS:

## 2024-03-14 ENCOUNTER — APPOINTMENT (OUTPATIENT)
Dept: WOUND CARE | Facility: MEDICAL CENTER | Age: 69
End: 2024-03-14
Payer: MEDICARE

## 2024-03-22 ENCOUNTER — HOSPITAL ENCOUNTER (OUTPATIENT)
Dept: RADIATION ONCOLOGY | Facility: MEDICAL CENTER | Age: 69
End: 2024-03-22
Attending: RADIOLOGY
Payer: MEDICARE

## 2024-03-22 ENCOUNTER — RESEARCH ENCOUNTER (OUTPATIENT)
Dept: HEMATOLOGY ONCOLOGY | Facility: MEDICAL CENTER | Age: 69
End: 2024-03-22
Payer: MEDICARE

## 2024-03-22 VITALS
HEART RATE: 91 BPM | SYSTOLIC BLOOD PRESSURE: 158 MMHG | TEMPERATURE: 97.6 F | BODY MASS INDEX: 35.02 KG/M2 | DIASTOLIC BLOOD PRESSURE: 77 MMHG | OXYGEN SATURATION: 94 % | WEIGHT: 244.05 LBS

## 2024-03-22 DIAGNOSIS — C20 MALIGNANT NEOPLASM OF RECTUM (HCC): ICD-10-CM

## 2024-03-22 PROCEDURE — 99212 OFFICE O/P EST SF 10 MIN: CPT | Performed by: RADIOLOGY

## 2024-03-22 PROCEDURE — 99214 OFFICE O/P EST MOD 30 MIN: CPT | Performed by: RADIOLOGY

## 2024-03-22 ASSESSMENT — FIBROSIS 4 INDEX: FIB4 SCORE: 1.5

## 2024-03-22 ASSESSMENT — PAIN SCALES - GENERAL: PAINLEVEL: NO PAIN

## 2024-03-22 NOTE — PROGRESS NOTES
Patient was seen today in clinic with Dr. Bustillos for follow up.  Vitals signs and weight were obtained and pain assessment was completed.  Allergies and medications were reviewed with the patient.      Vitals/Pain:  Vitals:    03/22/24 1310   BP: (!) 158/77   BP Location: Left arm   Patient Position: Sitting   BP Cuff Size: Adult   Pulse: 91   Temp: 36.4 °C (97.6 °F)   TempSrc: Temporal   SpO2: 94%   Weight: 111 kg (244 lb 0.8 oz)   Pain Score: No pain        Allergies:   Patient has no known allergies.    Current Medications:  Current Outpatient Medications   Medication Sig Dispense Refill    bacitracin 500 UNIT/GM ointment Apply 1 application topically 2 times a day. Apply pea-sized amount to perineal wound twice daily 28 g 3    acetaminophen (TYLENOL) 500 MG Tab Take 1,000 mg by mouth every 6 hours as needed for Mild Pain.      XARELTO 10 MG Tab tablet Take 10 mg by mouth 2 times a day.       No current facility-administered medications for this encounter.           Chloe Villagomez [facility]

## 2024-03-22 NOTE — PROGRESS NOTES
RADIATION ONCOLOGY FOLLOW-UP    Patient name:  Josh Lerner    Primary Physician:  Christos Sandoval M.D. MRN: [id number]  CSN: [id number]   Referring physician:  Yakov Sewell MD,*  : 1955, 68 y.o.     DATE OF SERVICE: 3/22/2024    IDENTIFICATION:   A 68 y.o. male with    Malignant neoplasm of rectum (HCC)  Staging form: Colon and Rectum, AJCC 8th Edition  - Clinical stage from 2023: Stage IIC (cT4b, cN0, cM0) - Signed by Luis Bustillos M.D. on 2023  Total positive nodes: 0  Histologic grade (G): G2  Histologic grading system: 4 grade system  Microsatellite instability (MSI): Stable      RADIATION SUMMARY:  Radiation Oncology          2023   Aria Course Treatment Dates   Course First Treatment Date 2023    Course Last Treatment Date 2023    Aria Treatment Summary   Rectum  Plan from Course C1_Rectal   Fraction  25 of 25   Elapsed Course Days  43 @    Prescribed Fraction Dose  180 cGy   Prescribed Total Dose  4,500 cGy   Rectum Bst  Plan from Course C1_Rectal   Fraction 4 of 5 5 of 5    5 of 5   Elapsed Course Days 42 @ [id number] 43 @     43 @    Prescribed Fraction Dose 180 cGy 180 cGy    180 cGy   Prescribed Total Dose 900 cGy 900 cGy    900 cGy   Rectum  Reference Point from Course C1_Rectal   Elapsed Course Days  43 @    Session Dose  --   Total Dose  4,500 cGy   Rectum Bst  Reference Point from Course C1_Rectal   Elapsed Course Days 42 @ [id number] 43 @ [id number]    43 @    Session Dose 180 cGy 180 cGy    --   Total Dose 720 cGy 900 cGy    900 cGy   Rectum Bst CP  Reference Point from Course C1_Rectal   Elapsed Course Days 42 @ [id number] 43 @ [id number]    43 @    Session Dose 184 cGy 184 cGy    --   Total Dose 738 cGy 922 cGy    922 cGy   Rectum CP  Reference Point from Course C1_Rectal   Elapsed Course Days  43 @    Session Dose  --    Total Dose  4,629 cGy      Details          More values are hidden. Newest values shown. Go to activity for more data.                HISTORY OF PRESENT ILLNESS:    Patient is a pleasant 67-year-old male with stools and intermittent bleeding for close to a year and then underwent colonoscopy March 13, 2023 by Dr. Larson which showed multilobulated mass 2 cm from anal verge encompassing 70% circumference of distal rectum with pathology showing invasive moderately differentiated adenocarcinoma mismatch repair proteins intact.  Patient underwent flexible sigmoidoscopy with endoscopic ultrasound April 4, 2023 by Dr. Ramirez which showed T4 mass 0 to 10 cm from anal verge.  Patient had a CT chest abdomen pelvis April 6, 2023 which showed rectal mass with no evidence of distant metastatic disease.  Patient had an MRI rectal protocol April 18, 2023 which showed a large circumferential low rectal tumor with extension into the prostate internal anal sphincter clinical T4BN0 with enhancing fluid collection adjacent.  Patient was seen by Dr. Kirkpatrick and Dr. Sewell who is planning for ostomy and Mediport placement.  CEA 47.6. Plan for diverting colostomy 5/16/23.      05/17/2023  Patient had diverting colostomy yesterday doing well we will plan for radiation mapping tomorrow  may 18 2023 with plan to start treatment May 29, 2023.     8/7/23  Patient completed chemoradiation therapy 54 Carnes in 30 fractions on July 12, 2023 on [facility] protocol randomized to FOLFOXIRI with plan to start Wednesday.  Patient had CEA 7/31/23 41.9 down from 48.6 2 months ago.  He has mild mucus secretion but no bill hematochezia.  Overall his rectal symptoms are improving.    INTERVAL HISTORY:  Preop signatera undetectable. Patient underwent surgery March 5, 2024 with robotic assisted abdominal perineal resection by Dr. Desai with pathology showing 3.3 cm invasive adenocarcinoma moderately differentiated with partial response score 2  treatment effect and positive radial margin 0/14 lymph nodes.  Healing appropriately after surgery.        PROBLEM LIST:  Patient Active Problem List   Diagnosis    Malignant neoplasm of rectum (HCC)    COPD (chronic obstructive pulmonary disease) (HCC)    Colostomy in place (HCC)       CURRENT MEDICATIONS:  Current Outpatient Medications   Medication Sig Dispense Refill    bacitracin 500 UNIT/GM ointment Apply 1 application topically 2 times a day. Apply pea-sized amount to perineal wound twice daily 28 g 3    acetaminophen (TYLENOL) 500 MG Tab Take 1,000 mg by mouth every 6 hours as needed for Mild Pain.      XARELTO 10 MG Tab tablet Take 10 mg by mouth 2 times a day.       No current facility-administered medications for this encounter.       ALLERGIES:  Patient has no known allergies.    REVIEW OF SYSTEMS:    A complete review of system taken. Pertinent items in HPI.  All others negative.    PHYSICAL EXAM:  PERFORMANCE STATUS:  ECOG0    BP (!) 158/77 (BP Location: Left arm, Patient Position: Sitting, BP Cuff Size: Adult)   Pulse 91   Temp 36.4 °C (97.6 °F) (Temporal)   Wt 111 kg (244 lb 0.8 oz)   SpO2 94%   BMI 35.02 kg/m²   Physical Exam  Vitals reviewed.   HENT:      Mouth/Throat:      Mouth: Mucous membranes are moist.   Eyes:      Pupils: Pupils are equal, round, and reactive to light.   Cardiovascular:      Rate and Rhythm: Normal rate.   Pulmonary:      Effort: Pulmonary effort is normal.   Abdominal:      General: Abdomen is flat.   Musculoskeletal:         General: Normal range of motion.   Neurological:      General: No focal deficit present.      Mental Status: He is alert.   Psychiatric:         Mood and Affect: Mood normal.         LABORATORY DATA:   Lab Results   Component Value Date/Time    WBC 6.7 03/08/2024 12:07 AM    RBC 3.96 (L) 03/08/2024 12:07 AM    HEMOGLOBIN 11.8 (L) 03/08/2024 12:07 AM    HEMATOCRIT 36.0 (L) 03/08/2024 12:07 AM    MCV 90.9 03/08/2024 12:07 AM    MCH 29.8 03/08/2024  12:07 AM    MCHC 32.8 03/08/2024 12:07 AM    RDW 45.0 03/08/2024 12:07 AM    PLATELETCT 177 03/08/2024 12:07 AM    MPV 9.9 03/08/2024 12:07 AM    NEUTSPOLYS 69.50 02/20/2024 10:22 AM    LYMPHOCYTES 14.50 (L) 02/20/2024 10:22 AM    MONOCYTES 8.30 02/20/2024 10:22 AM    EOSINOPHILS 6.80 02/20/2024 10:22 AM    BASOPHILS 0.60 02/20/2024 10:22 AM      Lab Results   Component Value Date/Time    SODIUM 140 03/08/2024 12:07 AM    POTASSIUM 4.1 03/08/2024 12:07 AM    CHLORIDE 107 03/08/2024 12:07 AM    CO2 25 03/08/2024 12:07 AM    GLUCOSE 108 (H) 03/08/2024 12:07 AM    BUN 14 03/08/2024 12:07 AM    CREATININE 0.83 03/08/2024 12:07 AM         RADIOLOGY DATA:  No results found.    IMPRESSION:    A 68 y.o. with   Malignant neoplasm of rectum (HCC)  Staging form: Colon and Rectum, AJCC 8th Edition  - Clinical stage from 4/24/2023: Stage IIC (cT4b, cN0, cM0) - Signed by Luis Bustillos M.D. on 4/24/2023  Total positive nodes: 0  Histologic grade (G): G2  Histologic grading system: 4 grade system  Microsatellite instability (MSI): Stable      CANCER STATUS:  Pending Work-up    RECOMMENDATIONS:   Patient was discussed in multidisciplinary tumor board and given positive radial margin consideration was for completion cystoprostatectomy.  I have ordered restaging CT chest abdomen pelvis for 6 months and per [facility] protocol will need CEA levels every 3 to 6 months for the 2 years.     Thank you for the opportunity to participate in his care.  If any questions or comments, please do not hesitate in calling.    Orders Placed This Encounter    CT-CHEST,ABDOMEN,PELVIS WITH    CEA

## 2024-03-22 NOTE — RESEARCH NOTE
Screening/Consent note:  Participation in the [id number] clinical trial was discussed with the patient today. All aspects of the study purpose and procedures were explained. They were given ample time to review the consent and all questions were answered to their satisfaction. Patient aware that the clinical trial is voluntary and they may withdraw consent at any time without affecting the level of care they receive.  Subject signed consent without coercion and undue influence and was given a copy of the signed consent. The patient is actively enrolled in the [id number] trial, and was required to reconsent today due to a new amendment to the study.

## 2024-04-19 DIAGNOSIS — C20 MALIGNANT NEOPLASM OF RECTUM (HCC): ICD-10-CM

## 2024-04-23 NOTE — PROGRESS NOTES
Report received from PACU RN. Pt transported via wheelchair. All belongings accounted for. Wife and son-in-law at bedside.  Assessment complete.  A&O x 4. Patient calls appropriately.  Patient ambulates with x1 assist.   Patient has 0/10 pain.  Denies N&V. Pt on regular diet. Given jello and coffee  R neck incision closed with dermabond, CDI. 2 lap sites closed with dermabond, CDI. LLQ ostomy with no output, stoma red.  - void since surgery  Patient denies SOB.  SCD's on.  Review plan with of care with patient. Call light and personal belongings within reach. Hourly rounding in place. All needs met at this time.    Attending Only

## 2024-05-23 ENCOUNTER — HOSPITAL ENCOUNTER (OUTPATIENT)
Dept: LAB | Facility: MEDICAL CENTER | Age: 69
End: 2024-05-23
Attending: UROLOGY
Payer: MEDICARE

## 2024-05-23 LAB
ABO + RH BLD: NORMAL
APPEARANCE UR: CLEAR
BASOPHILS # BLD AUTO: 0.5 % (ref 0–1.8)
BASOPHILS # BLD: 0.04 K/UL (ref 0–0.12)
BILIRUB UR QL STRIP.AUTO: NEGATIVE
COLOR UR: YELLOW
EOSINOPHIL # BLD AUTO: 0.44 K/UL (ref 0–0.51)
EOSINOPHIL NFR BLD: 5.5 % (ref 0–6.9)
ERYTHROCYTE [DISTWIDTH] IN BLOOD BY AUTOMATED COUNT: 48.8 FL (ref 35.9–50)
GLUCOSE UR STRIP.AUTO-MCNC: NEGATIVE MG/DL
HCT VFR BLD AUTO: 36.1 % (ref 42–52)
HGB BLD-MCNC: 10.9 G/DL (ref 14–18)
IMM GRANULOCYTES # BLD AUTO: 0.03 K/UL (ref 0–0.11)
IMM GRANULOCYTES NFR BLD AUTO: 0.4 % (ref 0–0.9)
INR PPP: 2.13 (ref 0.87–1.13)
KETONES UR STRIP.AUTO-MCNC: ABNORMAL MG/DL
LEUKOCYTE ESTERASE UR QL STRIP.AUTO: NEGATIVE
LYMPHOCYTES # BLD AUTO: 1.01 K/UL (ref 1–4.8)
LYMPHOCYTES NFR BLD: 12.6 % (ref 22–41)
MCH RBC QN AUTO: 28.3 PG (ref 27–33)
MCHC RBC AUTO-ENTMCNC: 30.2 G/DL (ref 32.3–36.5)
MCV RBC AUTO: 93.8 FL (ref 81.4–97.8)
MICRO URNS: ABNORMAL
MONOCYTES # BLD AUTO: 0.56 K/UL (ref 0–0.85)
MONOCYTES NFR BLD AUTO: 7 % (ref 0–13.4)
NEUTROPHILS # BLD AUTO: 5.95 K/UL (ref 1.82–7.42)
NEUTROPHILS NFR BLD: 74 % (ref 44–72)
NITRITE UR QL STRIP.AUTO: NEGATIVE
NRBC # BLD AUTO: 0 K/UL
NRBC BLD-RTO: 0 /100 WBC (ref 0–0.2)
PH UR STRIP.AUTO: 5.5 [PH] (ref 5–8)
PLATELET # BLD AUTO: 300 K/UL (ref 164–446)
PMV BLD AUTO: 9.8 FL (ref 9–12.9)
PROT UR QL STRIP: NEGATIVE MG/DL
PROTHROMBIN TIME: 24.1 SEC (ref 12–14.6)
RBC # BLD AUTO: 3.85 M/UL (ref 4.7–6.1)
RBC UR QL AUTO: NEGATIVE
SP GR UR STRIP.AUTO: 1.03
UROBILINOGEN UR STRIP.AUTO-MCNC: 1 MG/DL
WBC # BLD AUTO: 8 K/UL (ref 4.8–10.8)

## 2024-05-24 LAB
ALBUMIN SERPL BCP-MCNC: 3.6 G/DL (ref 3.2–4.9)
ALBUMIN/GLOB SERPL: 1.1 G/DL
ALP SERPL-CCNC: 147 U/L (ref 30–99)
ALT SERPL-CCNC: 21 U/L (ref 2–50)
ANION GAP SERPL CALC-SCNC: 13 MMOL/L (ref 7–16)
AST SERPL-CCNC: 20 U/L (ref 12–45)
BILIRUB SERPL-MCNC: 0.2 MG/DL (ref 0.1–1.5)
BUN SERPL-MCNC: 19 MG/DL (ref 8–22)
CALCIUM ALBUM COR SERPL-MCNC: 8.9 MG/DL (ref 8.5–10.5)
CALCIUM SERPL-MCNC: 8.6 MG/DL (ref 8.5–10.5)
CHLORIDE SERPL-SCNC: 106 MMOL/L (ref 96–112)
CO2 SERPL-SCNC: 22 MMOL/L (ref 20–33)
CREAT SERPL-MCNC: 0.72 MG/DL (ref 0.5–1.4)
GFR SERPLBLD CREATININE-BSD FMLA CKD-EPI: 99 ML/MIN/1.73 M 2
GLOBULIN SER CALC-MCNC: 3.2 G/DL (ref 1.9–3.5)
GLUCOSE SERPL-MCNC: 98 MG/DL (ref 65–99)
POTASSIUM SERPL-SCNC: 4.5 MMOL/L (ref 3.6–5.5)
PROT SERPL-MCNC: 6.8 G/DL (ref 6–8.2)
SODIUM SERPL-SCNC: 141 MMOL/L (ref 135–145)

## 2024-05-25 LAB
BACTERIA UR CULT: NORMAL
SIGNIFICANT IND 70042: NORMAL
SITE SITE: NORMAL
SOURCE SOURCE: NORMAL

## 2024-05-29 ENCOUNTER — HOSPITAL ENCOUNTER (OUTPATIENT)
Dept: LAB | Facility: MEDICAL CENTER | Age: 69
End: 2024-05-29
Attending: UROLOGY
Payer: MEDICARE

## 2024-05-29 LAB
ABO GROUP BLD: ABNORMAL
BLD GP AB INVEST PLASRBC-IMP: ABNORMAL
BLD GP AB SCN SERPL QL: ABNORMAL
DAT C3D-SP REAG RBC QL: ABNORMAL
DAT IGG-SP REAG RBC QL: ABNORMAL
RH BLD: ABNORMAL

## 2024-08-15 ENCOUNTER — HOSPITAL ENCOUNTER (OUTPATIENT)
Dept: RADIOLOGY | Facility: MEDICAL CENTER | Age: 69
End: 2024-08-15
Attending: RADIOLOGY
Payer: MEDICARE

## 2024-08-15 DIAGNOSIS — C20 MALIGNANT NEOPLASM OF RECTUM (HCC): ICD-10-CM

## 2024-08-15 LAB — CREAT BLD-MCNC: 1 MG/DL (ref 0.5–1.4)

## 2024-08-15 PROCEDURE — 82565 ASSAY OF CREATININE: CPT

## 2024-08-15 PROCEDURE — 700117 HCHG RX CONTRAST REV CODE 255: Performed by: RADIOLOGY

## 2024-08-15 PROCEDURE — 71260 CT THORAX DX C+: CPT

## 2024-08-15 RX ADMIN — IOHEXOL 100 ML: 350 INJECTION, SOLUTION INTRAVENOUS at 15:58

## 2024-08-19 ENCOUNTER — HOSPITAL ENCOUNTER (OUTPATIENT)
Dept: RADIATION ONCOLOGY | Facility: MEDICAL CENTER | Age: 69
End: 2024-08-19
Attending: RADIOLOGY
Payer: MEDICARE

## 2024-08-19 VITALS
BODY MASS INDEX: 32.8 KG/M2 | HEART RATE: 95 BPM | WEIGHT: 228.62 LBS | SYSTOLIC BLOOD PRESSURE: 144 MMHG | DIASTOLIC BLOOD PRESSURE: 72 MMHG | OXYGEN SATURATION: 95 % | TEMPERATURE: 98.4 F

## 2024-08-19 DIAGNOSIS — C20 MALIGNANT NEOPLASM OF RECTUM (HCC): ICD-10-CM

## 2024-08-19 PROCEDURE — 99214 OFFICE O/P EST MOD 30 MIN: CPT | Performed by: RADIOLOGY

## 2024-08-19 PROCEDURE — 99212 OFFICE O/P EST SF 10 MIN: CPT | Performed by: RADIOLOGY

## 2024-08-19 ASSESSMENT — FIBROSIS 4 INDEX: FIB4 SCORE: 1.003802295085564954

## 2024-08-19 NOTE — PROGRESS NOTES
RADIATION ONCOLOGY FOLLOW-UP    Patient name:  Josh Lerner    Primary Physician:  Christos Sandoval M.D. MRN: [id number]  [facility]: [id number]   Referring physician:  Yakov Sewell MD,*  : 1955, 69 y.o.     DATE OF SERVICE: 2024    IDENTIFICATION:   A 69 y.o. male with    Malignant neoplasm of rectum (HCC)  Staging form: Colon and Rectum, AJCC 8th Edition  - Clinical stage from 2023: Stage IIC (cT4b, cN0, cM0) - Signed by Luis Bustillos M.D. on 2023  Total positive nodes: 0  Histologic grade (G): G2  Histologic grading system: 4 grade system  Microsatellite instability (MSI): Stable      RADIATION SUMMARY:  Radiation Oncology          2023   Aria Course Treatment Dates   Course First Treatment Date 2023    Course Last Treatment Date 2023    Aria Treatment Summary   Rectum  Plan from Course C1_Rectal   Fraction  25 of 25   Elapsed Course Days  43 @    Prescribed Fraction Dose  180 cGy   Prescribed Total Dose  4,500 cGy   Rectum Bst  Plan from Course C1_Rectal   Fraction 4 of 5 5 of 5    5 of 5   Elapsed Course Days 42 @ [id number] 43 @ [id number]    43 @    Prescribed Fraction Dose 180 cGy 180 cGy    180 cGy   Prescribed Total Dose 900 cGy 900 cGy    900 cGy   Rectum  Reference Point from Course C1_Rectal   Elapsed Course Days  43 @    Session Dose  --   Total Dose  4,500 cGy   Rectum Bst  Reference Point from Course C1_Rectal   Elapsed Course Days 42 @ [id number] 43 @ [id number]    43 @    Session Dose 180 cGy 180 cGy    --   Total Dose 720 cGy 900 cGy    900 cGy   Rectum Bst CP  Reference Point from Course C1_Rectal   Elapsed Course Days 42 @ [id number] 43 @ [id number]    43 @    Session Dose 184 cGy 184 cGy    --   Total Dose 738 cGy 922 cGy    922 cGy   Rectum CP  Reference Point from Course C1_Rectal   Elapsed Course Days  43 @    Session Dose  --    Total Dose  4,629 cGy      Details          More values are hidden. Newest values shown. Go to activity for more data.                HISTORY OF PRESENT ILLNESS:  Patient is a pleasant 67-year-old male with stools and intermittent bleeding for close to a year and then underwent colonoscopy March 13, 2023 by Dr. Larson which showed multilobulated mass 2 cm from anal verge encompassing 70% circumference of distal rectum with pathology showing invasive moderately differentiated adenocarcinoma mismatch repair proteins intact.  Patient underwent flexible sigmoidoscopy with endoscopic ultrasound April 4, 2023 by Dr. Ramirez which showed T4 mass 0 to 10 cm from anal verge.  Patient had a CT chest abdomen pelvis April 6, 2023 which showed rectal mass with no evidence of distant metastatic disease.  Patient had an MRI rectal protocol April 18, 2023 which showed a large circumferential low rectal tumor with extension into the prostate internal anal sphincter clinical T4BN0 with enhancing fluid collection adjacent.  Patient was seen by Dr. Kirkpatrick and Dr. Sewell who is planning for ostomy and Mediport placement.  CEA 47.6. Plan for diverting colostomy 5/16/23.      05/17/2023  Patient had diverting colostomy yesterday doing well we will plan for radiation mapping tomorrow  may 18 2023 with plan to start treatment May 29, 2023.     8/7/23  Patient completed chemoradiation therapy 54 Carnes in 30 fractions on July 12, 2023 on [facility] protocol randomized to FOLFOXIRI with plan to start Wednesday.  Patient had CEA 7/31/23 41.9 down from 48.6 2 months ago.  He has mild mucus secretion but no bill hematochezia.  Overall his rectal symptoms are improving.     3/22/24  Preop signatera undetectable. Patient underwent surgery March 5, 2024 with robotic assisted abdominal perineal resection by Dr. Desai with pathology showing 3.3 cm invasive adenocarcinoma moderately differentiated with partial response score 2 treatment effect  and positive radial margin 0/14 lymph nodes.  Healing appropriately after surgery.    INTERVAL HISTORY:  Patient had completion cystoprostatectomy with Dr. Crystal with no residual disease.  Postoperative Signatera is rising now about 2 from July 2024.  Patient had repeat CT chest abdomen pelvis August 16, 2024 which shows some soft tissue thickening around the anal sphincter area but no other signs of distant metastatic disease.      PROBLEM LIST:  Patient Active Problem List   Diagnosis    Malignant neoplasm of rectum (HCC)    COPD (chronic obstructive pulmonary disease) (HCC)    Colostomy in place (HCC)       CURRENT MEDICATIONS:  Current Outpatient Medications   Medication Sig Dispense Refill    bacitracin 500 UNIT/GM ointment Apply 1 application topically 2 times a day. Apply pea-sized amount to perineal wound twice daily 28 g 3    acetaminophen (TYLENOL) 500 MG Tab Take 1,000 mg by mouth every 6 hours as needed for Mild Pain.      XARELTO 10 MG Tab tablet Take 10 mg by mouth 2 times a day.       No current facility-administered medications for this encounter.       ALLERGIES:  Patient has no known allergies.    REVIEW OF SYSTEMS:    A complete review of system taken. Pertinent items in HPI.  All others negative.    PHYSICAL EXAM:  PERFORMANCE STATUS:  ECOG0    BP (!) 144/72 (BP Location: Right arm, Patient Position: Sitting, BP Cuff Size: Adult long)   Pulse 95   Temp 36.9 °C (98.4 °F) (Temporal)   Wt 104 kg (228 lb 9.9 oz)   SpO2 95%   BMI 32.80 kg/m²   Physical Exam  Vitals reviewed.   Eyes:      Pupils: Pupils are equal, round, and reactive to light.   Cardiovascular:      Rate and Rhythm: Normal rate.   Pulmonary:      Effort: Pulmonary effort is normal.   Abdominal:      General: Abdomen is flat.   Musculoskeletal:         General: Normal range of motion.   Neurological:      General: No focal deficit present.      Mental Status: He is alert.   Psychiatric:         Mood and Affect: Mood normal.          LABORATORY DATA:   Lab Results   Component Value Date/Time    WBC 8.0 05/23/2024 10:12 AM    RBC 3.85 (L) 05/23/2024 10:12 AM    HEMOGLOBIN 10.9 (L) 05/23/2024 10:12 AM    HEMATOCRIT 36.1 (L) 05/23/2024 10:12 AM    MCV 93.8 05/23/2024 10:12 AM    MCH 28.3 05/23/2024 10:12 AM    MCHC 30.2 (L) 05/23/2024 10:12 AM    RDW 48.8 05/23/2024 10:12 AM    PLATELETCT 300 05/23/2024 10:12 AM    MPV 9.8 05/23/2024 10:12 AM    NEUTSPOLYS 74.00 (H) 05/23/2024 10:12 AM    LYMPHOCYTES 12.60 (L) 05/23/2024 10:12 AM    MONOCYTES 7.00 05/23/2024 10:12 AM    EOSINOPHILS 5.50 05/23/2024 10:12 AM    BASOPHILS 0.50 05/23/2024 10:12 AM      Lab Results   Component Value Date/Time    SODIUM 141 05/23/2024 10:12 AM    POTASSIUM 4.5 05/23/2024 10:12 AM    CHLORIDE 106 05/23/2024 10:12 AM    CO2 22 05/23/2024 10:12 AM    GLUCOSE 98 05/23/2024 10:12 AM    BUN 19 05/23/2024 10:12 AM    CREATININE 0.72 05/23/2024 10:12 AM    BUNCREATRAT 22.2 (H) 04/30/2024 11:06 AM         RADIOLOGY DATA:  CT-CHEST,ABDOMEN,PELVIS WITH    Result Date: 8/16/2024  1. At the expected location of the anal sphincter, there is an irregular area of soft tissue fullness measuring 5.2 x 2.1 cm. This could represent an area of scarring, sterile fluid collection, abscess, or malignancy. 2. No evidence of distant metastatic disease. 3. Cholelithiasis. 4. Simple 1.8 cm right adrenal cyst. 5. Increased 10 cm left lower quadrant parastomal hernia. 6. Old right rib fractures. 7. Coronary artery calcifications. 8. Severe emphysema and mild pulmonary fibrosis again noted.      IMPRESSION:    A 69 y.o. with   Malignant neoplasm of rectum (HCC)  Staging form: Colon and Rectum, AJCC 8th Edition  - Clinical stage from 4/24/2023: Stage IIC (cT4b, cN0, cM0) - Signed by Luis Bustillos M.D. on 4/24/2023  Total positive nodes: 0  Histologic grade (G): G2  Histologic grading system: 4 grade system  Microsatellite instability (MSI): Stable      CANCER STATUS:  Mixed  Response    RECOMMENDATIONS:   I explained that he has evidence of minimal residual disease and it is unclear if the CT scan shows that soft tissue thickening whether this disease abscess or postsurgical change.  I have ordered a PET/CT to evaluate soft tissue thickening seen on CT scan.  I have also referred patient to vascular medicine for DVT management.  I have also referred ophthalmology given right-sided vision issues.  I will see him back after PET CT scan.    Thank you for the opportunity to participate in his care.  If any questions or comments, please do not hesitate in calling.    Orders Placed This Encounter    [id number] BASE TO MID-THIGH    Referral to Ophthalmology    Referral to Vascular Medicine    Referral to Hematology Oncology

## 2024-08-27 ENCOUNTER — HOSPITAL ENCOUNTER (OUTPATIENT)
Dept: LAB | Facility: MEDICAL CENTER | Age: 69
End: 2024-08-27
Attending: UROLOGY
Payer: MEDICARE

## 2024-08-27 ENCOUNTER — HOSPITAL ENCOUNTER (OUTPATIENT)
Dept: RADIOLOGY | Facility: MEDICAL CENTER | Age: 69
End: 2024-08-27
Attending: RADIOLOGY
Payer: MEDICARE

## 2024-08-27 DIAGNOSIS — C20 MALIGNANT NEOPLASM OF RECTUM (HCC): ICD-10-CM

## 2024-08-27 LAB
ALBUMIN SERPL BCP-MCNC: 3.4 G/DL (ref 3.2–4.9)
ALBUMIN/GLOB SERPL: 0.8 G/DL
ALP SERPL-CCNC: 129 U/L (ref 30–99)
ALT SERPL-CCNC: 14 U/L (ref 2–50)
ANION GAP SERPL CALC-SCNC: 14 MMOL/L (ref 7–16)
AST SERPL-CCNC: 15 U/L (ref 12–45)
BILIRUB SERPL-MCNC: 0.2 MG/DL (ref 0.1–1.5)
BUN SERPL-MCNC: 16 MG/DL (ref 8–22)
CALCIUM ALBUM COR SERPL-MCNC: 9.7 MG/DL (ref 8.5–10.5)
CALCIUM SERPL-MCNC: 9.2 MG/DL (ref 8.5–10.5)
CHLORIDE SERPL-SCNC: 101 MMOL/L (ref 96–112)
CO2 SERPL-SCNC: 22 MMOL/L (ref 20–33)
CREAT SERPL-MCNC: 0.82 MG/DL (ref 0.5–1.4)
GFR SERPLBLD CREATININE-BSD FMLA CKD-EPI: 95 ML/MIN/1.73 M 2
GLOBULIN SER CALC-MCNC: 4.2 G/DL (ref 1.9–3.5)
GLUCOSE SERPL-MCNC: 99 MG/DL (ref 65–99)
POTASSIUM SERPL-SCNC: 4.4 MMOL/L (ref 3.6–5.5)
PROT SERPL-MCNC: 7.6 G/DL (ref 6–8.2)
SODIUM SERPL-SCNC: 137 MMOL/L (ref 135–145)

## 2024-08-27 PROCEDURE — 36415 COLL VENOUS BLD VENIPUNCTURE: CPT

## 2024-08-27 PROCEDURE — 80053 COMPREHEN METABOLIC PANEL: CPT

## 2024-08-27 PROCEDURE — A9552 F18 FDG: HCPCS

## 2024-08-30 ENCOUNTER — HOSPITAL ENCOUNTER (OUTPATIENT)
Dept: RADIATION ONCOLOGY | Facility: MEDICAL CENTER | Age: 69
End: 2024-08-30
Attending: RADIOLOGY
Payer: MEDICARE

## 2024-08-30 VITALS
TEMPERATURE: 99.3 F | HEART RATE: 100 BPM | SYSTOLIC BLOOD PRESSURE: 107 MMHG | OXYGEN SATURATION: 95 % | WEIGHT: 228.4 LBS | DIASTOLIC BLOOD PRESSURE: 68 MMHG | BODY MASS INDEX: 32.77 KG/M2

## 2024-08-30 ASSESSMENT — PAIN SCALES - GENERAL: PAINLEVEL: NO PAIN

## 2024-08-30 ASSESSMENT — FIBROSIS 4 INDEX: FIB4 SCORE: 0.92

## 2024-08-30 NOTE — PROGRESS NOTES
Patient was seen today in clinic with Dr. Bustillos for follow up.  Vitals signs and weight were obtained and pain assessment was completed.  Allergies and medications were reviewed with the patient.      Vitals/Pain:  Vitals:    08/30/24 1103   BP: 107/68   BP Location: Right arm   Patient Position: Sitting   BP Cuff Size: Adult long   Pulse: 100   Temp: 37.4 °C (99.3 °F)   TempSrc: Temporal   SpO2: 95%   Weight: 104 kg (228 lb 6.3 oz)   Pain Score: No pain        Allergies:   Patient has no known allergies.    Current Medications:  Current Outpatient Medications   Medication Sig Dispense Refill    bacitracin 500 UNIT/GM ointment Apply 1 application topically 2 times a day. Apply pea-sized amount to perineal wound twice daily 28 g 3    acetaminophen (TYLENOL) 500 MG Tab Take 1,000 mg by mouth every 6 hours as needed for Mild Pain.      XARELTO 10 MG Tab tablet Take 10 mg by mouth 2 times a day.       No current facility-administered medications for this encounter.           Chloe Villagomez Med Ass't

## 2024-08-30 NOTE — PROGRESS NOTES
RADIATION ONCOLOGY FOLLOW-UP    Patient name:  Josh Lerner    Primary Physician:  Christos Sandoval M.D. MRN: [id number]  [facility]: [id number]   Referring physician:  Yakov Sewell MD,*  : 1955, 69 y.o.     DATE OF SERVICE: 2024    IDENTIFICATION:   A 69 y.o. male with    Malignant neoplasm of rectum (HCC)  Staging form: Colon and Rectum, AJCC 8th Edition  - Clinical stage from 2023: Stage IIC (cT4b, cN0, cM0) - Signed by Luis Bustillos M.D. on 2023  Total positive nodes: 0  Histologic grade (G): G2  Histologic grading system: 4 grade system  Microsatellite instability (MSI): Stable      RADIATION SUMMARY:  Radiation Oncology          2023   Aria Course Treatment Dates   Course First Treatment Date 2023    Course Last Treatment Date 2023    Aria Treatment Summary   Rectum  Plan from Course C1_Rectal   Fraction  25 of 25   Elapsed Course Days  43 @    Prescribed Fraction Dose  180 cGy   Prescribed Total Dose  4,500 cGy   Rectum Bst  Plan from Course C1_Rectal   Fraction 4 of 5 5 of 5    5 of 5   Elapsed Course Days 42 @ [id number] 43 @ [id number]    43 @    Prescribed Fraction Dose 180 cGy 180 cGy    180 cGy   Prescribed Total Dose 900 cGy 900 cGy    900 cGy   Rectum  Reference Point from Course C1_Rectal   Elapsed Course Days  43 @    Session Dose  --   Total Dose  4,500 cGy   Rectum Bst  Reference Point from Course C1_Rectal   Elapsed Course Days 42 @ [id number] 43 @ [id number]    43 @    Session Dose 180 cGy 180 cGy    --   Total Dose 720 cGy 900 cGy    900 cGy   Rectum Bst CP  Reference Point from Course C1_Rectal   Elapsed Course Days 42 @ [id number] 43 @ [id number]    43 @    Session Dose 184 cGy 184 cGy    --   Total Dose 738 cGy 922 cGy    922 cGy   Rectum CP  Reference Point from Course C1_Rectal   Elapsed Course Days  43 @    Session Dose  --    Total Dose  4,629 cGy      Details          More values are hidden. Newest values shown. Go to activity for more data.                HISTORY OF PRESENT ILLNESS:  Patient is a pleasant 67-year-old male with stools and intermittent bleeding for close to a year and then underwent colonoscopy March 13, 2023 by Dr. Larson which showed multilobulated mass 2 cm from anal verge encompassing 70% circumference of distal rectum with pathology showing invasive moderately differentiated adenocarcinoma mismatch repair proteins intact.  Patient underwent flexible sigmoidoscopy with endoscopic ultrasound April 4, 2023 by Dr. Ramirez which showed T4 mass 0 to 10 cm from anal verge.  Patient had a CT chest abdomen pelvis April 6, 2023 which showed rectal mass with no evidence of distant metastatic disease.  Patient had an MRI rectal protocol April 18, 2023 which showed a large circumferential low rectal tumor with extension into the prostate internal anal sphincter clinical T4BN0 with enhancing fluid collection adjacent.  Patient was seen by Dr. Kirkpatrick and Dr. Sewell who is planning for ostomy and Mediport placement.  CEA 47.6. Plan for diverting colostomy 5/16/23.      05/17/2023  Patient had diverting colostomy yesterday doing well we will plan for radiation mapping tomorrow  may 18 2023 with plan to start treatment May 29, 2023.     8/7/23  Patient completed chemoradiation therapy 54 Carnes in 30 fractions on July 12, 2023 on [facility] protocol randomized to FOLFOXIRI with plan to start Wednesday.  Patient had CEA 7/31/23 41.9 down from 48.6 2 months ago.  He has mild mucus secretion but no bill hematochezia.  Overall his rectal symptoms are improving.     3/22/24  Preop signatera undetectable. Patient underwent surgery March 5, 2024 with robotic assisted abdominal perineal resection by Dr. Desai with pathology showing 3.3 cm invasive adenocarcinoma moderately differentiated with partial response score 2 treatment effect  and positive radial margin 0/14 lymph nodes.  Healing appropriately after surgery.     8/19/24  Patient had completion cystoprostatectomy with Dr. Crystal with no residual disease.  Postoperative Signatera is rising now about 2 from July 2024.  Patient had repeat CT chest abdomen pelvis August 16, 2024 which shows some soft tissue thickening around the anal sphincter area but no other signs of distant metastatic disease.    INTERVAL HISTORY:  Patient has been doing well had PET CT scan which shows uptake within the rectum and sigmoid colon.  No distant metastatic disease seen.  Does feel like he has some inflammation and pain rectal area.    PROBLEM LIST:  Patient Active Problem List   Diagnosis    Malignant neoplasm of rectum (HCC)    COPD (chronic obstructive pulmonary disease) (HCC)    Colostomy in place (HCC)       CURRENT MEDICATIONS:  Current Outpatient Medications   Medication Sig Dispense Refill    bacitracin 500 UNIT/GM ointment Apply 1 application topically 2 times a day. Apply pea-sized amount to perineal wound twice daily 28 g 3    acetaminophen (TYLENOL) 500 MG Tab Take 1,000 mg by mouth every 6 hours as needed for Mild Pain.      XARELTO 10 MG Tab tablet Take 10 mg by mouth 2 times a day.       No current facility-administered medications for this encounter.       ALLERGIES:  Patient has no known allergies.    REVIEW OF SYSTEMS:    A complete review of system taken. Pertinent items in HPI.  All others negative.    PHYSICAL EXAM:  PERFORMANCE STATUS:  ECOG0    /68 (BP Location: Right arm, Patient Position: Sitting, BP Cuff Size: Adult long)   Pulse 100   Temp 37.4 °C (99.3 °F) (Temporal)   Wt 104 kg (228 lb 6.3 oz)   SpO2 95%   BMI 32.77 kg/m²   Physical Exam  Vitals reviewed.   Eyes:      Pupils: Pupils are equal, round, and reactive to light.   Cardiovascular:      Rate and Rhythm: Normal rate.   Pulmonary:      Effort: Pulmonary effort is normal.   Abdominal:      General: Abdomen is flat.    Musculoskeletal:         General: Normal range of motion.   Neurological:      General: No focal deficit present.      Mental Status: He is alert.   Psychiatric:         Mood and Affect: Mood normal.         LABORATORY DATA:   Lab Results   Component Value Date/Time    WBC 8.0 05/23/2024 10:12 AM    RBC 3.85 (L) 05/23/2024 10:12 AM    HEMOGLOBIN 10.9 (L) 05/23/2024 10:12 AM    HEMATOCRIT 36.1 (L) 05/23/2024 10:12 AM    MCV 93.8 05/23/2024 10:12 AM    MCH 28.3 05/23/2024 10:12 AM    MCHC 30.2 (L) 05/23/2024 10:12 AM    RDW 48.8 05/23/2024 10:12 AM    PLATELETCT 300 05/23/2024 10:12 AM    MPV 9.8 05/23/2024 10:12 AM    NEUTSPOLYS 74.00 (H) 05/23/2024 10:12 AM    LYMPHOCYTES 12.60 (L) 05/23/2024 10:12 AM    MONOCYTES 7.00 05/23/2024 10:12 AM    EOSINOPHILS 5.50 05/23/2024 10:12 AM    BASOPHILS 0.50 05/23/2024 10:12 AM      Lab Results   Component Value Date/Time    SODIUM 137 08/27/2024 11:05 AM    POTASSIUM 4.4 08/27/2024 11:05 AM    CHLORIDE 101 08/27/2024 11:05 AM    CO2 22 08/27/2024 11:05 AM    GLUCOSE 99 08/27/2024 11:05 AM    BUN 16 08/27/2024 11:05 AM    CREATININE 0.82 08/27/2024 11:05 AM    BUNCREATRAT 22.2 (H) 04/30/2024 11:06 AM         RADIOLOGY DATA:  CT-CHEST,ABDOMEN,PELVIS WITH    Result Date: 8/16/2024  1. At the expected location of the anal sphincter, there is an irregular area of soft tissue fullness measuring 5.2 x 2.1 cm. This could represent an area of scarring, sterile fluid collection, abscess, or malignancy. 2. No evidence of distant metastatic disease. 3. Cholelithiasis. 4. Simple 1.8 cm right adrenal cyst. 5. Increased 10 cm left lower quadrant parastomal hernia. 6. Old right rib fractures. 7. Coronary artery calcifications. 8. Severe emphysema and mild pulmonary fibrosis again noted.    [id number] BASE TO MID-THIGH    Result Date: 8/27/2024  1. Increased uptake within the soft tissue thickening in the right posterior pelvis extending to the anus and perineum (SUVmax 25). This is  concerning for local recurrence. 2. Postsurgical change from right lower quadrant ostomy.      IMPRESSION:    A 69 y.o. with   Malignant neoplasm of rectum (HCC)  Staging form: Colon and Rectum, AJCC 8th Edition  - Clinical stage from 4/24/2023: Stage IIC (cT4b, cN0, cM0) - Signed by Luis Bustillos M.D. on 4/24/2023  Total positive nodes: 0  Histologic grade (G): G2  Histologic grading system: 4 grade system  Microsatellite instability (MSI): Stable      CANCER STATUS:  Pending Work-up    RECOMMENDATIONS:   I reviewed PET/CT scan with patient and discussed that I would recommend a colonoscopy with Dr. Desai to evaluate for recurrent disease vs infection/inflammation.  I can see him back after colonoscopy to determine next steps.    Thank you for the opportunity to participate in his care.  If any questions or comments, please do not hesitate in calling.    No orders of the defined types were placed in this encounter.

## 2024-09-06 ENCOUNTER — HOSPITAL ENCOUNTER (OUTPATIENT)
Dept: HEMATOLOGY ONCOLOGY | Facility: MEDICAL CENTER | Age: 69
End: 2024-09-06
Attending: STUDENT IN AN ORGANIZED HEALTH CARE EDUCATION/TRAINING PROGRAM
Payer: MEDICARE

## 2024-09-06 VITALS
TEMPERATURE: 98.8 F | WEIGHT: 229 LBS | HEIGHT: 70 IN | BODY MASS INDEX: 32.78 KG/M2 | DIASTOLIC BLOOD PRESSURE: 70 MMHG | OXYGEN SATURATION: 96 % | RESPIRATION RATE: 18 BRPM | HEART RATE: 96 BPM | SYSTOLIC BLOOD PRESSURE: 110 MMHG

## 2024-09-06 DIAGNOSIS — C20 MALIGNANT NEOPLASM OF RECTUM (HCC): ICD-10-CM

## 2024-09-06 DIAGNOSIS — I82.5Y2 CHRONIC DEEP VEIN THROMBOSIS (DVT) OF PROXIMAL VEIN OF LEFT LOWER EXTREMITY (HCC): ICD-10-CM

## 2024-09-06 PROCEDURE — 99212 OFFICE O/P EST SF 10 MIN: CPT | Performed by: STUDENT IN AN ORGANIZED HEALTH CARE EDUCATION/TRAINING PROGRAM

## 2024-09-06 PROCEDURE — 99205 OFFICE O/P NEW HI 60 MIN: CPT | Performed by: STUDENT IN AN ORGANIZED HEALTH CARE EDUCATION/TRAINING PROGRAM

## 2024-09-06 PROCEDURE — 700111 HCHG RX REV CODE 636 W/ 250 OVERRIDE (IP): Performed by: STUDENT IN AN ORGANIZED HEALTH CARE EDUCATION/TRAINING PROGRAM

## 2024-09-06 RX ADMIN — HEPARIN 500 UNITS: 100 SYRINGE at 09:00

## 2024-09-06 ASSESSMENT — ENCOUNTER SYMPTOMS
PALPITATIONS: 0
BACK PAIN: 0
DOUBLE VISION: 0
SHORTNESS OF BREATH: 0
BLURRED VISION: 0
DIARRHEA: 0
WEIGHT LOSS: 0
FEVER: 0
SORE THROAT: 0
ABDOMINAL PAIN: 0
VOMITING: 0
TINGLING: 0
NERVOUS/ANXIOUS: 0
COUGH: 0
ORTHOPNEA: 0
HEADACHES: 0
DIAPHORESIS: 0
SINUS PAIN: 0
NAUSEA: 0
HEARTBURN: 0
SPUTUM PRODUCTION: 0
BLOOD IN STOOL: 0
INSOMNIA: 0
CHILLS: 0
CONSTIPATION: 0
WHEEZING: 0
MYALGIAS: 0
WEAKNESS: 0
DIZZINESS: 0
BRUISES/BLEEDS EASILY: 0

## 2024-09-06 ASSESSMENT — FIBROSIS 4 INDEX: FIB4 SCORE: 0.92

## 2024-09-06 ASSESSMENT — PAIN SCALES - GENERAL: PAINLEVEL: 7=MODERATE-SEVERE PAIN

## 2024-09-06 NOTE — PROGRESS NOTES
Pt presented to the clinic to discuss treatment with Dr. Kirby. After discussion, Dr. Kirby requested that this RN and Jacqueline RN to access and flush the patient's port as the last time it was accessed was roughly eight months ago.     Port accessed in sterile manner. Brisk blood return obtained. Port flushed per [facility] policy and site covered with sterile gauze and paper tape.  Pt released ambulatory from clinic in no apparent distress.

## 2024-09-06 NOTE — ADDENDUM NOTE
Encounter addended by: Natalia Jolly R.N. on: 9/6/2024 9:38 AM   Actions taken: Visit diagnoses modified, Order list changed, Diagnosis association updated supervision

## 2024-09-07 ENCOUNTER — DOCUMENTATION (OUTPATIENT)
Dept: VASCULAR LAB | Facility: MEDICAL CENTER | Age: 69
End: 2024-09-07
Payer: MEDICARE

## 2024-09-07 NOTE — ADDENDUM NOTE
Encounter addended by: Kameron Kirby D.O. on: 9/6/2024 10:49 PM   Actions taken: Problem List reviewed, Allergies reviewed, Medication List reviewed, Clinical Note Signed, SmartForm saved, Level of Service modified, Follow-up modified
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
## 2024-09-08 NOTE — PROGRESS NOTES
Patient referred by Dr. Bustillos for e/m in vascular medicine clinic.   However, when reached by our schedulers, patient wanted to follow up with heme-onc first to determine whether or not she needs to see us.     As such, pending further patient contact we will defer all further care to pcp and heme-onc.    Michael Bloch, MD  Vascular Care    Cc  H Mirta Bustillos

## 2024-09-11 ENCOUNTER — APPOINTMENT (OUTPATIENT)
Dept: ADMISSIONS | Facility: MEDICAL CENTER | Age: 69
End: 2024-09-11
Attending: SURGERY
Payer: MEDICARE

## 2024-09-16 ENCOUNTER — PRE-ADMISSION TESTING (OUTPATIENT)
Dept: ADMISSIONS | Facility: MEDICAL CENTER | Age: 69
End: 2024-09-16
Attending: SURGERY
Payer: MEDICARE

## 2024-09-17 ENCOUNTER — APPOINTMENT (OUTPATIENT)
Dept: ADMISSIONS | Facility: MEDICAL CENTER | Age: 69
End: 2024-09-17
Attending: SURGERY
Payer: MEDICARE

## 2024-09-17 DIAGNOSIS — Z01.812 PRE-OPERATIVE LABORATORY EXAMINATION: ICD-10-CM

## 2024-09-17 DIAGNOSIS — Z01.810 PRE-OPERATIVE CARDIOVASCULAR EXAMINATION: ICD-10-CM

## 2024-09-17 LAB
ERYTHROCYTE [DISTWIDTH] IN BLOOD BY AUTOMATED COUNT: 51.2 FL (ref 35.9–50)
HCT VFR BLD AUTO: 36.1 % (ref 42–52)
HGB BLD-MCNC: 10.7 G/DL (ref 14–18)
MCH RBC QN AUTO: 23.9 PG (ref 27–33)
MCHC RBC AUTO-ENTMCNC: 29.6 G/DL (ref 32.3–36.5)
MCV RBC AUTO: 80.6 FL (ref 81.4–97.8)
PLATELET # BLD AUTO: 401 K/UL (ref 164–446)
PMV BLD AUTO: 9.4 FL (ref 9–12.9)
RBC # BLD AUTO: 4.48 M/UL (ref 4.7–6.1)
WBC # BLD AUTO: 9.6 K/UL (ref 4.8–10.8)

## 2024-09-17 PROCEDURE — 85027 COMPLETE CBC AUTOMATED: CPT

## 2024-09-17 PROCEDURE — 36415 COLL VENOUS BLD VENIPUNCTURE: CPT

## 2024-09-26 ENCOUNTER — HOSPITAL ENCOUNTER (OUTPATIENT)
Dept: HEMATOLOGY ONCOLOGY | Facility: MEDICAL CENTER | Age: 69
End: 2024-09-26
Attending: STUDENT IN AN ORGANIZED HEALTH CARE EDUCATION/TRAINING PROGRAM
Payer: MEDICARE

## 2024-09-26 VITALS
SYSTOLIC BLOOD PRESSURE: 110 MMHG | BODY MASS INDEX: 32.38 KG/M2 | TEMPERATURE: 98.1 F | HEART RATE: 105 BPM | OXYGEN SATURATION: 95 % | WEIGHT: 226.2 LBS | HEIGHT: 70 IN | DIASTOLIC BLOOD PRESSURE: 60 MMHG

## 2024-09-26 DIAGNOSIS — C20 MALIGNANT NEOPLASM OF RECTUM (HCC): ICD-10-CM

## 2024-09-26 PROCEDURE — 99212 OFFICE O/P EST SF 10 MIN: CPT | Performed by: STUDENT IN AN ORGANIZED HEALTH CARE EDUCATION/TRAINING PROGRAM

## 2024-09-26 ASSESSMENT — ENCOUNTER SYMPTOMS
SHORTNESS OF BREATH: 0
ORTHOPNEA: 0
DIARRHEA: 0
COUGH: 0
SORE THROAT: 0
BRUISES/BLEEDS EASILY: 0
NERVOUS/ANXIOUS: 0
BLURRED VISION: 0
FEVER: 0
HEADACHES: 0
MYALGIAS: 0
CONSTIPATION: 0
SINUS PAIN: 0
SPUTUM PRODUCTION: 0
HEARTBURN: 0
BACK PAIN: 0
TINGLING: 0
DIAPHORESIS: 0
CHILLS: 0
DIZZINESS: 0
WEIGHT LOSS: 0
DOUBLE VISION: 0
WEAKNESS: 0
BLOOD IN STOOL: 0
INSOMNIA: 0
NAUSEA: 0
ABDOMINAL PAIN: 0
VOMITING: 0
WHEEZING: 0
PALPITATIONS: 0

## 2024-09-26 ASSESSMENT — PAIN SCALES - GENERAL: PAINLEVEL: NO PAIN

## 2024-09-26 ASSESSMENT — FIBROSIS 4 INDEX: FIB4 SCORE: 0.69

## 2024-09-26 NOTE — PROGRESS NOTES
Follow Up Note:  Hematology/Oncology      Primary Care:  Christos Sandoval M.D.    Diagnosis: Rectal adenocarcinoma     Chief Complaint: On-treatment visit    Current Treatment: Plan for surgical resection    Prior Treatment: Capecitabine with concurrent XRT, FOLFOXIRI (per [facility] protocol), APR    Oncology History of Presenting Illness:  Josh Lerner is a 69 y.o.  man who presents to the clinic for transfer of care for ongoing management of rectal adenocarcinoma. He was originally diagnosed last year with [id number] stage IIC rectal adenocarcinoma, and underwent chemoRT with capecitabine followed by FOLFOXIRI chemotherapy per the [facility] protocol. He then underwent surgical resection, with undetectable pre-op Signatera. He underwent resection on 03/05/24 with final pathology showing positive radiatl margin, partial response to chemotherapy, and 0/14 lymph nodes positive. He then underwent a cystoprostatectomy with no residual disease, but post-operative ctDNA testing was positive. He had a repeat CT scan done which showed a soft-tissue thickening around the anal sphincter, and PET scan showed avidity in this area concerning for disease recurrence. He also was diagnosed with a LLE DVT and started on Xarelto. He was unhappy with his prior oncology care and therefore was referred here.     Treatment History:   See HPI    Interval History:  Patient is here for follow up visit. He has anal pain and is using Tylenol to help with the pain. He is due for surgery next week to evaluate this mass.    Allergies as of 09/26/2024    (No Known Allergies)         Current Outpatient Medications:     Probiotic Product (PROBIOTIC PO), Take  by mouth every day.   MEDICATION INSTRUCTIONS FOR SURGERY/PROCEDURE ON 10/01/2024:FOLLOW INSTRUCTIONS FROM SURGEON OR SPECIALIST, Disp: , Rfl:     rivaroxaban (XARELTO) 20 MG Tab tablet, Take 1 Tablet by mouth with dinner for 180 days. (Patient taking differently: Take 20 mg by mouth  "with dinner.   MEDICATION INSTRUCTIONS FOR SURGERY/PROCEDURE ON 10/01/2024:FOLLOW INSTRUCTIONS FROM SURGEON OR SPECIALIST), Disp: 30 Tablet, Rfl: 5    acetaminophen (TYLENOL) 500 MG Tab, Take 1,000 mg by mouth 2 (two) times a day.   MEDICATION INSTRUCTIONS FOR SURGERY/PROCEDURE ON 10/1/2024:CONTINUE TAKING MEDICATION AS PRESCRIBED., Disp: , Rfl:     bacitracin 500 UNIT/GM ointment, Apply 1 application topically 2 times a day. Apply pea-sized amount to perineal wound twice daily (Patient not taking: Reported on 9/16/2024), Disp: 28 g, Rfl: 3      Review of Systems:  Review of Systems   Constitutional:  Negative for chills, diaphoresis, fever, malaise/fatigue and weight loss.   HENT:  Negative for hearing loss, nosebleeds, sinus pain and sore throat.    Eyes:  Negative for blurred vision and double vision.   Respiratory:  Negative for cough, sputum production, shortness of breath and wheezing.    Cardiovascular:  Negative for chest pain, palpitations, orthopnea and leg swelling.   Gastrointestinal:  Negative for abdominal pain, blood in stool, constipation, diarrhea, heartburn, melena, nausea and vomiting.   Genitourinary:  Negative for dysuria, frequency, hematuria and urgency.   Musculoskeletal:  Negative for back pain, joint pain and myalgias.   Skin:  Negative for rash.   Neurological:  Negative for dizziness, tingling, weakness and headaches.   Endo/Heme/Allergies:  Does not bruise/bleed easily.   Psychiatric/Behavioral:  The patient is not nervous/anxious and does not have insomnia.          Physical Exam:  Vitals:    09/26/24 0922   BP: 110/60   Pulse: (!) 105   Temp: 36.7 °C (98.1 °F)   TempSrc: Temporal   SpO2: 95%   Weight: 103 kg (226 lb 3.2 oz)   Height: 1.778 m (5' 10\")       DESC; KARNOFSKY SCALE WITH ECOG EQUIVALENT: 100, Fully active, able to carry on all pre-disease performed without restriction (ECOG equivalent 0)    DISTRESS LEVEL: no apparent distress    Physical Exam  Vitals and nursing note " reviewed.   Constitutional:       General: He is awake. He is not in acute distress.     Appearance: Normal appearance. He is normal weight. He is not ill-appearing, toxic-appearing or diaphoretic.   HENT:      Head: Normocephalic and atraumatic.      Nose: Nose normal. No congestion.      Mouth/Throat:      Pharynx: Oropharynx is clear. No oropharyngeal exudate or posterior oropharyngeal erythema.   Eyes:      General: No scleral icterus.     Extraocular Movements: Extraocular movements intact.      Conjunctiva/sclera: Conjunctivae normal.      Pupils: Pupils are equal, round, and reactive to light.   Cardiovascular:      Rate and Rhythm: Normal rate and regular rhythm.      Pulses: Normal pulses.      Heart sounds: Normal heart sounds. No murmur heard.     No friction rub. No gallop.   Pulmonary:      Effort: Pulmonary effort is normal.      Breath sounds: Normal breath sounds. No decreased air movement. No wheezing, rhonchi or rales.   Abdominal:      General: Bowel sounds are normal. There is no distension.      Tenderness: There is no abdominal tenderness.   Musculoskeletal:         General: No deformity. Normal range of motion.      Cervical back: Normal range of motion and neck supple. No tenderness.      Right lower leg: No edema.      Left lower leg: No edema.   Lymphadenopathy:      Cervical: No cervical adenopathy.      Upper Body:      Right upper body: No axillary adenopathy.      Left upper body: No axillary adenopathy.      Lower Body: No right inguinal adenopathy. No left inguinal adenopathy.   Skin:     General: Skin is warm and dry.      Coloration: Skin is not jaundiced.      Findings: No erythema or rash.   Neurological:      General: No focal deficit present.      Mental Status: He is alert and oriented to person, place, and time.      Sensory: Sensation is intact.      Motor: Motor function is intact. No weakness.      Gait: Gait is intact.   Psychiatric:         Attention and Perception:  Attention normal.         Mood and Affect: Mood normal.         Behavior: Behavior normal. Behavior is cooperative.         Thought Content: Thought content normal.         Judgment: Judgment normal.           Labs:  No visits with results within 7 Day(s) from this visit.   Latest known visit with results is:   Appointment on 09/17/2024   Component Date Value Ref Range Status    WBC 09/17/2024 9.6  4.8 - 10.8 K/uL Final    RBC 09/17/2024 4.48 (L)  4.70 - 6.10 M/uL Final    Hemoglobin 09/17/2024 10.7 (L)  14.0 - 18.0 g/dL Final    Hematocrit 09/17/2024 36.1 (L)  42.0 - 52.0 % Final    MCV 09/17/2024 80.6 (L)  81.4 - 97.8 fL Final    MCH 09/17/2024 23.9 (L)  27.0 - 33.0 pg Final    MCHC 09/17/2024 29.6 (L)  32.3 - 36.5 g/dL Final    RDW 09/17/2024 51.2 (H)  35.9 - 50.0 fL Final    Platelet Count 09/17/2024 401  164 - 446 K/uL Final    MPV 09/17/2024 9.4  9.0 - 12.9 fL Final       Imaging:     All listed images below have been independently reviewed by me. I agree with the findings as summarized below:    [id number] BASE TO MID-THIGH    Result Date: 8/27/2024 8/27/2024 11:27 AM HISTORY/REASON FOR EXAM:  restaging rectal cancer TECHNIQUE/EXAM DESCRIPTION AND NUMBER OF VIEWS: PET body imaging. Initially, 10.4 mCi F-18 FDG was administered intravenously under standardized conditions. Approximately 45 minutes after FDG administration, the patient was placed in the supine position on the PET CT table. Blood glucose level was 102 mg/dL. Low dose spiral CT imaging was performed from the skull base to the mid thighs. PET imaging was then performed from the skull base to the mid thighs. CT images, PET images, and PET/CT fused images were reviewed on a PACS 3D workstation. The limited non-contrast CT data are used primarily for attenuation correction and anatomic correlation.  Evaluation of solid organs and bowel are especially limited utilizing this technique. A low dose CT was obtained of the same area without IV contrast  for attenuation correction and coregistration, not for a diagnostic scan. COMPARISON: CT 8/15/2024. FINDINGS: VISUALIZED BRAIN: Normal metabolic activity. HEAD AND NECK: Normal physiologic uptake. CHEST: Background blood pool activity shows average SUV of 1.9. Lungs show no hypermetabolic pulmonary nodules. There is no hypermetabolic hilar, mediastinal, or axillary lymphadenopathy. ABDOMEN AND PELVIS: Background liver activity shows average SUV of 2.5. There is normal, uniform metabolic activity in the liver, spleen, adrenal glands, kidneys. There is no hypermetabolic mesenteric, retroperitoneal, iliac, or inguinal lymphadenopathy. Increased uptake within the soft tissue thickening in the right posterior pelvis extending to the anus and perineum (SUVmax 25). Postsurgical change from right lower quadrant ostomy. VISUALIZED MUSCULOSKELETAL SYSTEM: No hypermetabolic activity to suggest osteolytic metastases or sclerosis to suggest osteoblastic metastases.     1. Increased uptake within the soft tissue thickening in the right posterior pelvis extending to the anus and perineum (SUVmax 25). This is concerning for local recurrence. 2. Postsurgical change from right lower quadrant ostomy.      Pathology:    ADDENDUM:     This case is being addended to report that the circumferential margin   is positive at the anterior aspect, and the proximal/distal margins are   negative for carcinoma.  The distal margin measures 3.1 cm from tumor   and the proximal margin is >10 cm from tumor.     Addendum Signed By:  Kal Crouch DO   Addendum Date:  3/18/24   Original Report Date:  3/8/24     FINAL DIAGNOSIS:     A. Colostomy site:          Benign ostomy site          Negative for metastatic carcinoma in three lymph nodes (0/3)   B. Sigmoid colon, rectum, anal canal:          Residual invasive adenocarcinoma, moderately differentiated with           maximum depth of invasion extending through the wall           MMR proficient           Radial margin of resection is positive for adenocarcinoma,           remaining margins are negative          Negative for metastatic carcinoma in fourteen lymph nodes (0/14)          See synoptic report for details     COLON AND RECTUM: Resection     SPECIMEN    Procedure:  Abdominoperineal resection     Macroscopic Evaluation of Mesorectum:  Complete   TUMOR    Tumor Site:       Rectum     Histologic Type:  Adenocarcinoma     Histologic Grade:  G2, moderately differentiated     Tumor Size:  Greatest dimension (Centimeters) - 3.3 cm     Tumor Extent:  Invades through muscularis propria into the     pericolonic or perirectal tissue     Macroscopic Tumor Perforation:  Not identified     Lymphatic and / or Vascular Invasion:  Not identified     Perineural Invasion:  Not identified     Tumor Budding Score:  Low (0-4)     Treatment Effect:  Present, with residual cancer showing evident     tumor regression, but more than single cells or rare small groups of     cancer cells (partial response, score 2)   MARGINS    Margin Status for Invasive Carcinoma:  Invasive carcinoma present at     margin       Margin(s) Involved by Invasive Carcinoma:  Radial (circumferential)     Margin Status for Non-Invasive Tumor:  All margins negative for     high-grade dysplasia / intramucosal carcinoma and low-grade dysplasia   REGIONAL LYMPH NODES     Regional Lymph Node Status:  All regional lymph nodes negative for     tumor       Number of Lymph Nodes Examined:  14     Tumor Deposits:  Not identified   pTNM CLASSIFICATION (AJCC 8th Edition)   Reporting of pT, pN, and (when applicable) pM categories is based on   information available to the pathologist at the time the report is   issued. As per the AJCC (Chapter 1, 8th Ed.) it is the managing   physician's responsibility to establish the final pathologic stage   based upon all pertinent information, including but potentially not   limited to this pathology report.     Modified  Classification:  y     pT Category:  pT3     pN Category:  pN0            Comment: There is sufficient tissue for ancillary studies in           block B5 if requested.                                         Diagnosis performed by:                                       TAMARA GOMEZ,      Assessment & Plan:  1. Malignant neoplasm of rectum (HCC)          This is a 69 year old  man with rectal adenocarcinoma, [id number] stage IIC disease at diagnosis, s/p chemoRT with capecitabine and FOLFOXIRI per [facility] protocol, and then surgical resection. He had positive margins so cystoprostatectomy was performed as well. He now is suspected of having a local recurrence near the anal sphincter.      Current Diagnosis and Staging: Rectal adenocarcinoma, [id number] stage IIC disease ([id number] stage IIA disease), pMMR    Update: Patient is scheduled for surgery next week. Will follow up in 1 month to review path and plan of care.      Treatment Plan: Surgical resection of anal soft tissue mass, follow up pathology     Treatment Citation: NCCN     Plan of Care:     Primary Therapy: Surgical resection  Supportive Therapy: Advised patient should be on rivaroxaban 20 mg PO daily with food. New script sent in. Medical management per primary.   Toxicity: Patient is getting antineoplastic therapy and needs monitoring of blood counts, hepatic function, and renal function due to potential for organ dysfunction.   Labs: CBC with diff, CMP, CEA, ctDNA monitoring  Imaging: PET scan reviewed  Treatment Planning: Patient needs biopsy to confirm local recurrence. Given proximity to prior systemic therapy, would favor locoregional therapy first.   Consultations: Colorectal surgery (Dr. Desai); Radiation oncology (Dr. Bustillos)  Code Status: Full  Miscellaneous: NA  Return for Follow Up: 4 weeks    Any questions and concerns raised by the patient were answered to the best of my ability. Thank you for allowing me to participate in the care for  this patient. Please feel free to contact me for any questions or concerns.       Total time spent on chart review, clinic encounter, and documentation: 26 minutes.

## 2024-09-26 NOTE — ADDENDUM NOTE
Encounter addended by: Monica Kumar, Med Ass't on: 9/26/2024 11:30 AM   Actions taken: Charge Capture section accepted

## 2024-09-30 ENCOUNTER — ANESTHESIA EVENT (OUTPATIENT)
Dept: SURGERY | Facility: MEDICAL CENTER | Age: 69
End: 2024-09-30
Payer: MEDICARE

## 2024-10-01 ENCOUNTER — HOSPITAL ENCOUNTER (OUTPATIENT)
Facility: MEDICAL CENTER | Age: 69
End: 2024-10-01
Attending: SURGERY | Admitting: SURGERY
Payer: MEDICARE

## 2024-10-01 ENCOUNTER — ANESTHESIA (OUTPATIENT)
Dept: SURGERY | Facility: MEDICAL CENTER | Age: 69
End: 2024-10-01
Payer: MEDICARE

## 2024-10-01 VITALS
DIASTOLIC BLOOD PRESSURE: 78 MMHG | HEIGHT: 72 IN | OXYGEN SATURATION: 93 % | RESPIRATION RATE: 16 BRPM | WEIGHT: 218.92 LBS | SYSTOLIC BLOOD PRESSURE: 133 MMHG | BODY MASS INDEX: 29.65 KG/M2 | HEART RATE: 78 BPM | TEMPERATURE: 96.3 F

## 2024-10-01 DIAGNOSIS — G89.18 POST-OP PAIN: ICD-10-CM

## 2024-10-01 DIAGNOSIS — K65.1 PELVIC ABSCESS IN MALE (HCC): ICD-10-CM

## 2024-10-01 LAB
EKG IMPRESSION: NORMAL
GRAM STN SPEC: NORMAL
PATHOLOGY CONSULT NOTE: NORMAL
SIGNIFICANT IND 70042: NORMAL
SITE SITE: NORMAL
SOURCE SOURCE: NORMAL

## 2024-10-01 PROCEDURE — 160039 HCHG SURGERY MINUTES - EA ADDL 1 MIN LEVEL 3: Performed by: SURGERY

## 2024-10-01 PROCEDURE — 160002 HCHG RECOVERY MINUTES (STAT): Performed by: SURGERY

## 2024-10-01 PROCEDURE — A9270 NON-COVERED ITEM OR SERVICE: HCPCS | Performed by: ANESTHESIOLOGY

## 2024-10-01 PROCEDURE — 160028 HCHG SURGERY MINUTES - 1ST 30 MINS LEVEL 3: Performed by: SURGERY

## 2024-10-01 PROCEDURE — 700101 HCHG RX REV CODE 250: Performed by: SURGERY

## 2024-10-01 PROCEDURE — 87070 CULTURE OTHR SPECIMN AEROBIC: CPT

## 2024-10-01 PROCEDURE — C1729 CATH, DRAINAGE: HCPCS | Performed by: SURGERY

## 2024-10-01 PROCEDURE — 93010 ELECTROCARDIOGRAM REPORT: CPT | Performed by: INTERNAL MEDICINE

## 2024-10-01 PROCEDURE — 88305 TISSUE EXAM BY PATHOLOGIST: CPT

## 2024-10-01 PROCEDURE — 87076 CULTURE ANAEROBE IDENT EACH: CPT | Mod: 91

## 2024-10-01 PROCEDURE — 160035 HCHG PACU - 1ST 60 MINS PHASE I: Performed by: SURGERY

## 2024-10-01 PROCEDURE — 700105 HCHG RX REV CODE 258: Performed by: SURGERY

## 2024-10-01 PROCEDURE — 700111 HCHG RX REV CODE 636 W/ 250 OVERRIDE (IP): Performed by: ANESTHESIOLOGY

## 2024-10-01 PROCEDURE — 87075 CULTR BACTERIA EXCEPT BLOOD: CPT

## 2024-10-01 PROCEDURE — 160046 HCHG PACU - 1ST 60 MINS PHASE II: Performed by: SURGERY

## 2024-10-01 PROCEDURE — 160009 HCHG ANES TIME/MIN: Performed by: SURGERY

## 2024-10-01 PROCEDURE — 700101 HCHG RX REV CODE 250: Performed by: ANESTHESIOLOGY

## 2024-10-01 PROCEDURE — 160025 RECOVERY II MINUTES (STATS): Performed by: SURGERY

## 2024-10-01 PROCEDURE — 87205 SMEAR GRAM STAIN: CPT

## 2024-10-01 PROCEDURE — 700102 HCHG RX REV CODE 250 W/ 637 OVERRIDE(OP): Performed by: ANESTHESIOLOGY

## 2024-10-01 PROCEDURE — 93005 ELECTROCARDIOGRAM TRACING: CPT | Performed by: SURGERY

## 2024-10-01 PROCEDURE — 160048 HCHG OR STATISTICAL LEVEL 1-5: Performed by: SURGERY

## 2024-10-01 RX ORDER — ONDANSETRON 2 MG/ML
INJECTION INTRAMUSCULAR; INTRAVENOUS PRN
Status: DISCONTINUED | OUTPATIENT
Start: 2024-10-01 | End: 2024-10-01 | Stop reason: SURG

## 2024-10-01 RX ORDER — SODIUM CHLORIDE, SODIUM LACTATE, POTASSIUM CHLORIDE, CALCIUM CHLORIDE 600; 310; 30; 20 MG/100ML; MG/100ML; MG/100ML; MG/100ML
INJECTION, SOLUTION INTRAVENOUS CONTINUOUS
Status: DISCONTINUED | OUTPATIENT
Start: 2024-10-01 | End: 2024-10-01 | Stop reason: HOSPADM

## 2024-10-01 RX ORDER — DEXAMETHASONE SODIUM PHOSPHATE 4 MG/ML
INJECTION, SOLUTION INTRA-ARTICULAR; INTRALESIONAL; INTRAMUSCULAR; INTRAVENOUS; SOFT TISSUE PRN
Status: DISCONTINUED | OUTPATIENT
Start: 2024-10-01 | End: 2024-10-01 | Stop reason: SURG

## 2024-10-01 RX ORDER — ONDANSETRON 2 MG/ML
4 INJECTION INTRAMUSCULAR; INTRAVENOUS
Status: DISCONTINUED | OUTPATIENT
Start: 2024-10-01 | End: 2024-10-01 | Stop reason: HOSPADM

## 2024-10-01 RX ORDER — OXYCODONE AND ACETAMINOPHEN 5; 325 MG/1; MG/1
1-2 TABLET ORAL EVERY 4 HOURS PRN
Qty: 40 TABLET | Refills: 0 | Status: SHIPPED | OUTPATIENT
Start: 2024-10-01 | End: 2024-10-08

## 2024-10-01 RX ORDER — CEFOTETAN DISODIUM 2 G/20ML
INJECTION, POWDER, FOR SOLUTION INTRAMUSCULAR; INTRAVENOUS PRN
Status: DISCONTINUED | OUTPATIENT
Start: 2024-10-01 | End: 2024-10-01 | Stop reason: SURG

## 2024-10-01 RX ORDER — BUPIVACAINE HYDROCHLORIDE AND EPINEPHRINE 5; 5 MG/ML; UG/ML
INJECTION, SOLUTION EPIDURAL; INTRACAUDAL; PERINEURAL
Status: DISCONTINUED | OUTPATIENT
Start: 2024-10-01 | End: 2024-10-01 | Stop reason: HOSPADM

## 2024-10-01 RX ORDER — OXYCODONE HCL 5 MG/5 ML
5 SOLUTION, ORAL ORAL
Status: COMPLETED | OUTPATIENT
Start: 2024-10-01 | End: 2024-10-01

## 2024-10-01 RX ORDER — DIPHENHYDRAMINE HYDROCHLORIDE 50 MG/ML
12.5 INJECTION INTRAMUSCULAR; INTRAVENOUS
Status: DISCONTINUED | OUTPATIENT
Start: 2024-10-01 | End: 2024-10-01 | Stop reason: HOSPADM

## 2024-10-01 RX ORDER — HYDROMORPHONE HYDROCHLORIDE 1 MG/ML
0.2 INJECTION, SOLUTION INTRAMUSCULAR; INTRAVENOUS; SUBCUTANEOUS
Status: DISCONTINUED | OUTPATIENT
Start: 2024-10-01 | End: 2024-10-01 | Stop reason: HOSPADM

## 2024-10-01 RX ORDER — POLYETHYLENE GLYCOL 3350 17 G/17G
17 POWDER, FOR SOLUTION ORAL PRN
Qty: 510 G | Status: SHIPPED | OUTPATIENT
Start: 2024-10-01

## 2024-10-01 RX ORDER — HYDROMORPHONE HYDROCHLORIDE 1 MG/ML
0.1 INJECTION, SOLUTION INTRAMUSCULAR; INTRAVENOUS; SUBCUTANEOUS
Status: DISCONTINUED | OUTPATIENT
Start: 2024-10-01 | End: 2024-10-01 | Stop reason: HOSPADM

## 2024-10-01 RX ORDER — OXYCODONE HCL 5 MG/5 ML
10 SOLUTION, ORAL ORAL
Status: COMPLETED | OUTPATIENT
Start: 2024-10-01 | End: 2024-10-01

## 2024-10-01 RX ORDER — ROCURONIUM BROMIDE 10 MG/ML
INJECTION, SOLUTION INTRAVENOUS PRN
Status: DISCONTINUED | OUTPATIENT
Start: 2024-10-01 | End: 2024-10-01 | Stop reason: SURG

## 2024-10-01 RX ORDER — HYDROMORPHONE HYDROCHLORIDE 1 MG/ML
0.4 INJECTION, SOLUTION INTRAMUSCULAR; INTRAVENOUS; SUBCUTANEOUS
Status: DISCONTINUED | OUTPATIENT
Start: 2024-10-01 | End: 2024-10-01 | Stop reason: HOSPADM

## 2024-10-01 RX ADMIN — FENTANYL CITRATE 100 MCG: 50 INJECTION, SOLUTION INTRAMUSCULAR; INTRAVENOUS at 09:15

## 2024-10-01 RX ADMIN — SUGAMMADEX 200 MG: 100 INJECTION, SOLUTION INTRAVENOUS at 09:51

## 2024-10-01 RX ADMIN — ROCURONIUM BROMIDE 50 MG: 50 INJECTION, SOLUTION INTRAVENOUS at 09:19

## 2024-10-01 RX ADMIN — SODIUM CHLORIDE, POTASSIUM CHLORIDE, SODIUM LACTATE AND CALCIUM CHLORIDE: 600; 310; 30; 20 INJECTION, SOLUTION INTRAVENOUS at 08:56

## 2024-10-01 RX ADMIN — DEXAMETHASONE SODIUM PHOSPHATE 8 MG: 4 INJECTION INTRA-ARTICULAR; INTRALESIONAL; INTRAMUSCULAR; INTRAVENOUS; SOFT TISSUE at 09:23

## 2024-10-01 RX ADMIN — CEFOTETAN DISODIUM 2 G: 2 INJECTION, POWDER, FOR SOLUTION INTRAMUSCULAR; INTRAVENOUS at 09:25

## 2024-10-01 RX ADMIN — ONDANSETRON 8 MG: 2 INJECTION INTRAMUSCULAR; INTRAVENOUS at 09:51

## 2024-10-01 RX ADMIN — PROPOFOL 150 MG: 10 INJECTION, EMULSION INTRAVENOUS at 09:18

## 2024-10-01 RX ADMIN — OXYCODONE HYDROCHLORIDE 10 MG: 5 SOLUTION ORAL at 10:05

## 2024-10-01 ASSESSMENT — PAIN DESCRIPTION - PAIN TYPE
TYPE: SURGICAL PAIN
TYPE: SURGICAL PAIN

## 2024-10-01 ASSESSMENT — FIBROSIS 4 INDEX: FIB4 SCORE: 0.69

## 2024-10-01 ASSESSMENT — PAIN SCALES - GENERAL: PAIN_LEVEL: 0

## 2024-10-04 LAB
BACTERIA SPEC ANAEROBE CULT: ABNORMAL
BACTERIA WND AEROBE CULT: NORMAL
GRAM STN SPEC: NORMAL
SIGNIFICANT IND 70042: ABNORMAL
SIGNIFICANT IND 70042: NORMAL
SITE SITE: ABNORMAL
SITE SITE: NORMAL
SOURCE SOURCE: ABNORMAL
SOURCE SOURCE: NORMAL

## 2024-11-07 DIAGNOSIS — C20 MALIGNANT NEOPLASM OF RECTUM (HCC): ICD-10-CM

## 2024-11-07 DIAGNOSIS — I82.5Y2 CHRONIC DEEP VEIN THROMBOSIS (DVT) OF PROXIMAL VEIN OF LEFT LOWER EXTREMITY (HCC): ICD-10-CM

## 2024-11-13 ENCOUNTER — HOSPITAL ENCOUNTER (OUTPATIENT)
Dept: HEMATOLOGY ONCOLOGY | Facility: MEDICAL CENTER | Age: 69
End: 2024-11-13
Attending: STUDENT IN AN ORGANIZED HEALTH CARE EDUCATION/TRAINING PROGRAM
Payer: MEDICARE

## 2024-11-13 VITALS
OXYGEN SATURATION: 97 % | TEMPERATURE: 97.8 F | SYSTOLIC BLOOD PRESSURE: 130 MMHG | BODY MASS INDEX: 30.92 KG/M2 | HEART RATE: 88 BPM | HEIGHT: 72 IN | WEIGHT: 228.3 LBS | DIASTOLIC BLOOD PRESSURE: 78 MMHG

## 2024-11-13 DIAGNOSIS — C20 MALIGNANT NEOPLASM OF RECTUM (HCC): ICD-10-CM

## 2024-11-13 PROCEDURE — 99214 OFFICE O/P EST MOD 30 MIN: CPT | Performed by: STUDENT IN AN ORGANIZED HEALTH CARE EDUCATION/TRAINING PROGRAM

## 2024-11-13 PROCEDURE — 99212 OFFICE O/P EST SF 10 MIN: CPT | Performed by: STUDENT IN AN ORGANIZED HEALTH CARE EDUCATION/TRAINING PROGRAM

## 2024-11-13 ASSESSMENT — ENCOUNTER SYMPTOMS
ORTHOPNEA: 0
FEVER: 0
CHILLS: 0
SPUTUM PRODUCTION: 0
MYALGIAS: 0
WEIGHT LOSS: 0
HEADACHES: 0
NERVOUS/ANXIOUS: 0
DIARRHEA: 0
SORE THROAT: 0
ROS GI COMMENTS: RECTAL PAIN
PALPITATIONS: 0
BLURRED VISION: 0
DOUBLE VISION: 0
BACK PAIN: 0
WEAKNESS: 0
DIAPHORESIS: 0
BRUISES/BLEEDS EASILY: 0
TINGLING: 0
WHEEZING: 0
DIZZINESS: 0
VOMITING: 0
COUGH: 0
INSOMNIA: 0
HEARTBURN: 0
CONSTIPATION: 0
BLOOD IN STOOL: 0
NAUSEA: 0
SINUS PAIN: 0
SHORTNESS OF BREATH: 0
ABDOMINAL PAIN: 0

## 2024-11-13 ASSESSMENT — FIBROSIS 4 INDEX: FIB4 SCORE: 0.69

## 2024-11-13 NOTE — Clinical Note
Vicki saw this donna in clinic today, seems to be doing okay. Luis did you get his most recent Signatera? If it's still positive I don't think I'd treat yet anyway, given that he recurred not long after chemotherapy, but might give more of an indication to do so. Otherwise I'm just going to keep him on surveillance and if something comes up on next scans will reinstitute systemic therapy at that point.

## 2024-11-14 NOTE — PROGRESS NOTES
Follow Up Note:  Hematology/Oncology      Primary Care:  Christos Sandoval M.D.    Diagnosis: Rectal adenocarcinoma     Chief Complaint: Surveillance visit    Current Treatment: Surveillance    Prior Treatment: Capecitabine with concurrent XRT, FOLFOXIRI (per [facility] protocol), APR; Surgical resection for recurrence    Oncology History of Presenting Illness:  Josh Lerner is a 69 y.o.  man who presents to the clinic for transfer of care for ongoing management of rectal adenocarcinoma. He was originally diagnosed last year with [id number] stage IIC rectal adenocarcinoma, and underwent chemoRT with capecitabine followed by FOLFOXIRI chemotherapy per the [facility] protocol. He then underwent surgical resection, with undetectable pre-op Signatera. He underwent resection on 03/05/24 with final pathology showing positive radiatl margin, partial response to chemotherapy, and 0/14 lymph nodes positive. He then underwent a cystoprostatectomy with no residual disease, but post-operative ctDNA testing was positive. He had a repeat CT scan done which showed a soft-tissue thickening around the anal sphincter, and PET scan showed avidity in this area concerning for disease recurrence. He also was diagnosed with a LLE DVT and started on Xarelto. He was unhappy with his prior oncology care and therefore was referred here.     Treatment History:   See HPI    Interval History:  Patient is here for follow up visit. He has a drain in place to manage the perianal area that was excised. He was on antibiotics but was having problems with them causing significant adverse reactions, so he was taken off them. He is doing better from that standpoint but still has significant pain. He will see Dr. Desai again next week.     Allergies as of 11/13/2024    (No Known Allergies)         Current Outpatient Medications:     rivaroxaban (XARELTO) 20 MG Tab tablet, Take 1 Tablet by mouth with dinner for 180 days., Disp: 90 Tablet, Rfl: 0     "amoxicillin-clavulanate (AUGMENTIN) 875-125 MG Tab, Take 1 Tablet by mouth 2 times a day., Disp: 28 Tablet, Rfl: 0    polyethylene glycol 3350 (MIRALAX) 17 GM/SCOOP Powder, Take 17 g by mouth as needed (constipation)., Disp: 510 g, Rfl: prn    Probiotic Product (PROBIOTIC PO), Take 1 Tablet by mouth every day. , Disp: , Rfl:     acetaminophen (TYLENOL) 500 MG Tab, Take 1,000 mg by mouth 3 times a day as needed for Moderate Pain., Disp: , Rfl:       Review of Systems:  Review of Systems   Constitutional:  Positive for malaise/fatigue. Negative for chills, diaphoresis, fever and weight loss.   HENT:  Negative for hearing loss, nosebleeds, sinus pain and sore throat.    Eyes:  Negative for blurred vision and double vision.   Respiratory:  Negative for cough, sputum production, shortness of breath and wheezing.    Cardiovascular:  Negative for chest pain, palpitations, orthopnea and leg swelling.   Gastrointestinal:  Negative for abdominal pain, blood in stool, constipation, diarrhea, heartburn, melena, nausea and vomiting.        Rectal pain   Genitourinary:  Negative for dysuria, frequency, hematuria and urgency.   Musculoskeletal:  Negative for back pain, joint pain and myalgias.   Skin:  Negative for rash.   Neurological:  Negative for dizziness, tingling, weakness and headaches.   Endo/Heme/Allergies:  Does not bruise/bleed easily.   Psychiatric/Behavioral:  The patient is not nervous/anxious and does not have insomnia.          Physical Exam:  Vitals:    11/13/24 1438   BP: 130/78   Pulse: 88   Temp: 36.6 °C (97.8 °F)   TempSrc: Temporal   SpO2: 97%   Weight: 104 kg (228 lb 4.8 oz)   Height: 1.829 m (6' 0.01\")       DESC; KARNOFSKY SCALE WITH ECOG EQUIVALENT: 100, Fully active, able to carry on all pre-disease performed without restriction (ECOG equivalent 0)    DISTRESS LEVEL: no apparent distress    Physical Exam  Vitals and nursing note reviewed.   Constitutional:       General: He is awake. He is not in acute " distress.     Appearance: Normal appearance. He is obese. He is not ill-appearing, toxic-appearing or diaphoretic.   HENT:      Head: Normocephalic and atraumatic.      Nose: Nose normal. No congestion.      Mouth/Throat:      Pharynx: Oropharynx is clear. No oropharyngeal exudate or posterior oropharyngeal erythema.   Eyes:      General: No scleral icterus.     Extraocular Movements: Extraocular movements intact.      Conjunctiva/sclera: Conjunctivae normal.      Pupils: Pupils are equal, round, and reactive to light.   Cardiovascular:      Rate and Rhythm: Normal rate and regular rhythm.      Pulses: Normal pulses.      Heart sounds: Normal heart sounds. No murmur heard.     No friction rub. No gallop.   Pulmonary:      Effort: Pulmonary effort is normal.      Breath sounds: Normal breath sounds. No decreased air movement. No wheezing, rhonchi or rales.   Abdominal:      General: Bowel sounds are normal. There is no distension.      Tenderness: There is no abdominal tenderness.   Musculoskeletal:         General: No deformity. Normal range of motion.      Cervical back: Normal range of motion and neck supple. No tenderness.      Right lower leg: No edema.      Left lower leg: No edema.   Lymphadenopathy:      Cervical: No cervical adenopathy.      Upper Body:      Right upper body: No axillary adenopathy.      Left upper body: No axillary adenopathy.      Lower Body: No right inguinal adenopathy. No left inguinal adenopathy.   Skin:     General: Skin is warm and dry.      Coloration: Skin is not jaundiced.      Findings: No erythema or rash.   Neurological:      General: No focal deficit present.      Mental Status: He is alert and oriented to person, place, and time.      Sensory: Sensation is intact.      Motor: Motor function is intact. No weakness.      Gait: Gait is intact.   Psychiatric:         Attention and Perception: Attention normal.         Mood and Affect: Mood normal.         Behavior: Behavior  normal. Behavior is cooperative.         Thought Content: Thought content normal.         Judgment: Judgment normal.           Labs:  No visits with results within 7 Day(s) from this visit.   Latest known visit with results is:   Admission on 10/01/2024, Discharged on 10/01/2024   Component Date Value Ref Range Status    Report 10/01/2024    Final                    Value:[facility]    Test Date:  2024-10-01  Pt Name:    KETURAH GRANADOS             Department: [facility]  MRN:        [id number]                      Room:       [facility]  Gender:     Male                         Technician: SILAS  :        [id number]                   Requested By:MAREK JUSTIN  Order #:    [id number]                    Reading MD: Wilmer Ruiz MD    Measurements  Intervals                                Axis  Rate:       97                           P:          32  DC:         194                          QRS:        -41  QRSD:       99                           T:          16  QT:         359  QTc:        456    Interpretive Statements  Sinus rhythm  BORDERLINE IVCD WITH LAD  RSR' in V1 or V2  Borderline T abnormalities, anterior leads  Prolonged QT  Compared to ECG 2024 10:54:05  No significant changes  Electronically Signed On 10- 09:22:42 PDT by Wilmer Ruiz MD      Pathology Request 10/01/2024 Sent to Histo   Final    Significant Indicator 10/01/2024 NEG   Final    Source 10/01/2024 WND   Final    Site 10/01/2024 Pelvic Abscess   Final    Culture Result 10/01/2024 No growth at 72 hours.   Final    Gram Stain Result 10/01/2024    Final                    Value:Moderate WBCs.  Few Gram positive cocci.      Significant Indicator 10/01/2024 POS (POS)   Final    Source 10/01/2024 WND   Final    Site 10/01/2024 Pelvic Abscess   Final    Culture Result 10/01/2024 - (A)   Final    Culture Result 10/01/2024  (A)   Final                    Value:Anaerobic cocci  Moderate growth  Peptoniphilus species  Organism identified  by [employer] research use only library.      Culture Result 10/01/2024  (A)   Final                    Value:Finegoldia magna  Moderate growth      Significant Indicator 10/01/2024 .   Final    Source 10/01/2024 WND   Final    Site 10/01/2024 Pelvic Abscess   Final    Gram Stain Result 10/01/2024    Final                    Value:Moderate WBCs.  Few Gram positive cocci.       Imaging:     All listed images below have been independently reviewed by me. I agree with the findings as summarized below:    [id number] BASE TO MID-THIGH    Result Date: 8/27/2024 8/27/2024 11:27 AM HISTORY/REASON FOR EXAM:  restaging rectal cancer TECHNIQUE/EXAM DESCRIPTION AND NUMBER OF VIEWS: PET body imaging. Initially, 10.4 mCi F-18 FDG was administered intravenously under standardized conditions. Approximately 45 minutes after FDG administration, the patient was placed in the supine position on the PET CT table. Blood glucose level was 102 mg/dL. Low dose spiral CT imaging was performed from the skull base to the mid thighs. PET imaging was then performed from the skull base to the mid thighs. CT images, PET images, and PET/CT fused images were reviewed on a PACS 3D workstation. The limited non-contrast CT data are used primarily for attenuation correction and anatomic correlation.  Evaluation of solid organs and bowel are especially limited utilizing this technique. A low dose CT was obtained of the same area without IV contrast for attenuation correction and coregistration, not for a diagnostic scan. COMPARISON: CT 8/15/2024. FINDINGS: VISUALIZED BRAIN: Normal metabolic activity. HEAD AND NECK: Normal physiologic uptake. CHEST: Background blood pool activity shows average SUV of 1.9. Lungs show no hypermetabolic pulmonary nodules. There is no hypermetabolic hilar, mediastinal, or axillary lymphadenopathy. ABDOMEN AND PELVIS: Background liver activity shows average SUV of 2.5. There is normal, uniform metabolic activity in the  liver, spleen, adrenal glands, kidneys. There is no hypermetabolic mesenteric, retroperitoneal, iliac, or inguinal lymphadenopathy. Increased uptake within the soft tissue thickening in the right posterior pelvis extending to the anus and perineum (SUVmax 25). Postsurgical change from right lower quadrant ostomy. VISUALIZED MUSCULOSKELETAL SYSTEM: No hypermetabolic activity to suggest osteolytic metastases or sclerosis to suggest osteoblastic metastases.     1. Increased uptake within the soft tissue thickening in the right posterior pelvis extending to the anus and perineum (SUVmax 25). This is concerning for local recurrence. 2. Postsurgical change from right lower quadrant ostomy.      Pathology:    Perianal mass 10/01/24:    FINAL DIAGNOSIS:     A. Perianal mass:          Moderately differentiated adenocarcinoma present on cauterized           edge.     Comment: Patient has had previous rectal adenocarcinoma with positive   radial margin on resection; please refer to previous pathology report   [id number], dated 3/5/24.  There is adequate tumor present in all three   blocks for additional studies if clinically indicated.                                         Diagnosis performed by:                                       LAVERNE HENDRICKSON MD     ADDENDUM:     This case is being addended to report that the circumferential margin   is positive at the anterior aspect, and the proximal/distal margins are   negative for carcinoma.  The distal margin measures 3.1 cm from tumor   and the proximal margin is >10 cm from tumor.     Addendum Signed By:  Kal Crouch DO   Addendum Date:  3/18/24   Original Report Date:  3/8/24     FINAL DIAGNOSIS:     A. Colostomy site:          Benign ostomy site          Negative for metastatic carcinoma in three lymph nodes (0/3)   B. Sigmoid colon, rectum, anal canal:          Residual invasive adenocarcinoma, moderately differentiated with           maximum depth of invasion extending  through the wall           MMR proficient          Radial margin of resection is positive for adenocarcinoma,           remaining margins are negative          Negative for metastatic carcinoma in fourteen lymph nodes (0/14)          See synoptic report for details     COLON AND RECTUM: Resection     SPECIMEN    Procedure:  Abdominoperineal resection     Macroscopic Evaluation of Mesorectum:  Complete   TUMOR    Tumor Site:       Rectum     Histologic Type:  Adenocarcinoma     Histologic Grade:  G2, moderately differentiated     Tumor Size:  Greatest dimension (Centimeters) - 3.3 cm     Tumor Extent:  Invades through muscularis propria into the     pericolonic or perirectal tissue     Macroscopic Tumor Perforation:  Not identified     Lymphatic and / or Vascular Invasion:  Not identified     Perineural Invasion:  Not identified     Tumor Budding Score:  Low (0-4)     Treatment Effect:  Present, with residual cancer showing evident     tumor regression, but more than single cells or rare small groups of     cancer cells (partial response, score 2)   MARGINS    Margin Status for Invasive Carcinoma:  Invasive carcinoma present at     margin       Margin(s) Involved by Invasive Carcinoma:  Radial (circumferential)     Margin Status for Non-Invasive Tumor:  All margins negative for     high-grade dysplasia / intramucosal carcinoma and low-grade dysplasia   REGIONAL LYMPH NODES     Regional Lymph Node Status:  All regional lymph nodes negative for     tumor       Number of Lymph Nodes Examined:  14     Tumor Deposits:  Not identified   pTNM CLASSIFICATION (AJCC 8th Edition)   Reporting of pT, pN, and (when applicable) pM categories is based on   information available to the pathologist at the time the report is   issued. As per the AJCC (Chapter 1, 8th Ed.) it is the managing   physician's responsibility to establish the final pathologic stage   based upon all pertinent information, including but potentially not   limited  to this pathology report.     Modified Classification:  y     pT Category:  pT3     pN Category:  pN0            Comment: There is sufficient tissue for ancillary studies in           block B5 if requested.                                         Diagnosis performed by:                                       TAMARA GOMEZ DO     Assessment & Plan:  1. Malignant neoplasm of rectum (HCC)          This is a 69 year old  man with rectal adenocarcinoma, [id number] stage IIC disease at diagnosis, s/p chemoRT with capecitabine and FOLFOXIRI per [facility] protocol, and then surgical resection. He had positive margins so cystoprostatectomy was performed as well. He was found to have local recurrence of disease and is s/p resection.      Current Diagnosis and Staging: Rectal adenocarcinoma, [id number] stage IIC disease ([id number] stage IIA disease), pMMR; Disease recurrence.     Update: Patient is recovering well and being managed by colorectal surgery. He had ctDNA drawn a few weeks ago, which we will follow up. He is otherwise doing okay. Continue surveillance for now and get scans in 3 months.     Treatment Plan: Surveillance, local management for recurrence with colorectal surgery     Treatment Citation: NCCN     Plan of Care:     Primary Therapy: Surgical resection  Supportive Therapy: Continue rivaroxaban. Medical management per primary. Local management of perianal mass per colorectal surgery.   Toxicity: Patient is getting antineoplastic therapy and needs monitoring of blood counts, hepatic function, and renal function due to potential for organ dysfunction.   Labs: CBC with diff, CMP, CEA, ctDNA monitoring  Imaging: CT c/a/p in 3 months  Treatment Planning: Patient has local recurrence s/p surgical excision. Monitor for now with labs, exams, and scans.   Consultations: Colorectal surgery (Dr. Desai); Radiation oncology (Dr. Bustillos)  Code Status: Full  Miscellaneous: NA  Return for Follow Up: 3 months    Any questions  and concerns raised by the patient were answered to the best of my ability. Thank you for allowing me to participate in the care for this patient. Please feel free to contact me for any questions or concerns.       Total time spent on chart review, clinic encounter, and documentation: 31 minutes.

## 2024-12-22 ENCOUNTER — APPOINTMENT (OUTPATIENT)
Dept: RADIOLOGY | Facility: MEDICAL CENTER | Age: 69
End: 2024-12-22
Attending: STUDENT IN AN ORGANIZED HEALTH CARE EDUCATION/TRAINING PROGRAM
Payer: MEDICARE

## 2024-12-22 ENCOUNTER — HOSPITAL ENCOUNTER (INPATIENT)
Facility: MEDICAL CENTER | Age: 69
LOS: 8 days | End: 2024-12-30
Attending: STUDENT IN AN ORGANIZED HEALTH CARE EDUCATION/TRAINING PROGRAM | Admitting: STUDENT IN AN ORGANIZED HEALTH CARE EDUCATION/TRAINING PROGRAM
Payer: MEDICARE

## 2024-12-22 DIAGNOSIS — R09.02 HYPOXIA: ICD-10-CM

## 2024-12-22 DIAGNOSIS — N39.0 URINARY TRACT INFECTION WITHOUT HEMATURIA, SITE UNSPECIFIED: ICD-10-CM

## 2024-12-22 DIAGNOSIS — Z93.3 COLOSTOMY IN PLACE (HCC): ICD-10-CM

## 2024-12-22 DIAGNOSIS — R65.20 SEVERE SEPSIS (HCC): ICD-10-CM

## 2024-12-22 DIAGNOSIS — C20 MALIGNANT NEOPLASM OF RECTUM (HCC): ICD-10-CM

## 2024-12-22 DIAGNOSIS — Z71.89 COORDINATION OF COMPLEX CARE: ICD-10-CM

## 2024-12-22 DIAGNOSIS — J96.01 ACUTE HYPOXEMIC RESPIRATORY FAILURE (HCC): ICD-10-CM

## 2024-12-22 DIAGNOSIS — R78.81 BACTEREMIA: ICD-10-CM

## 2024-12-22 DIAGNOSIS — A41.9 SEVERE SEPSIS (HCC): ICD-10-CM

## 2024-12-22 DIAGNOSIS — A41.9 SEPSIS WITH CRITICAL ILLNESS MYOPATHY WITHOUT SEPTIC SHOCK, DUE TO UNSPECIFIED ORGANISM (HCC): ICD-10-CM

## 2024-12-22 DIAGNOSIS — I82.5Y9 CHRONIC DEEP VEIN THROMBOSIS (DVT) OF PROXIMAL VEIN OF LOWER EXTREMITY, UNSPECIFIED LATERALITY (HCC): ICD-10-CM

## 2024-12-22 DIAGNOSIS — G72.81 SEPSIS WITH CRITICAL ILLNESS MYOPATHY WITHOUT SEPTIC SHOCK, DUE TO UNSPECIFIED ORGANISM (HCC): ICD-10-CM

## 2024-12-22 DIAGNOSIS — R65.20 SEPSIS WITH CRITICAL ILLNESS MYOPATHY WITHOUT SEPTIC SHOCK, DUE TO UNSPECIFIED ORGANISM (HCC): ICD-10-CM

## 2024-12-22 PROBLEM — I82.409 DVT (DEEP VENOUS THROMBOSIS) (HCC): Status: ACTIVE | Noted: 2024-12-22

## 2024-12-22 PROBLEM — J44.9 COPD (CHRONIC OBSTRUCTIVE PULMONARY DISEASE) (HCC): Chronic | Status: RESOLVED | Noted: 2023-05-25 | Resolved: 2024-12-22

## 2024-12-22 LAB
ALBUMIN SERPL BCP-MCNC: 2.9 G/DL (ref 3.2–4.9)
ALBUMIN/GLOB SERPL: 0.6 G/DL
ALP SERPL-CCNC: 184 U/L (ref 30–99)
ALT SERPL-CCNC: 33 U/L (ref 2–50)
AMORPHOUS CRYSTALS 1764: PRESENT /HPF
ANION GAP SERPL CALC-SCNC: 18 MMOL/L (ref 7–16)
APPEARANCE UR: ABNORMAL
AST SERPL-CCNC: 31 U/L (ref 12–45)
BACTERIA #/AREA URNS HPF: ABNORMAL /HPF
BASOPHILS # BLD AUTO: 0 % (ref 0–1.8)
BASOPHILS # BLD: 0 K/UL (ref 0–0.12)
BILIRUB SERPL-MCNC: 0.5 MG/DL (ref 0.1–1.5)
BILIRUB UR QL STRIP.AUTO: NEGATIVE
BUN SERPL-MCNC: 19 MG/DL (ref 8–22)
CALCIUM ALBUM COR SERPL-MCNC: 9 MG/DL (ref 8.5–10.5)
CALCIUM SERPL-MCNC: 8.1 MG/DL (ref 8.5–10.5)
CASTS URNS QL MICRO: ABNORMAL /LPF (ref 0–2)
CHLORIDE SERPL-SCNC: 101 MMOL/L (ref 96–112)
CO2 SERPL-SCNC: 17 MMOL/L (ref 20–33)
COLOR UR: ABNORMAL
CREAT SERPL-MCNC: 0.94 MG/DL (ref 0.5–1.4)
EOSINOPHIL # BLD AUTO: 0.01 K/UL (ref 0–0.51)
EOSINOPHIL NFR BLD: 0.3 % (ref 0–6.9)
EPITHELIAL CELLS 1715: ABNORMAL /HPF (ref 0–5)
ERYTHROCYTE [DISTWIDTH] IN BLOOD BY AUTOMATED COUNT: 51.3 FL (ref 35.9–50)
FLUAV RNA SPEC QL NAA+PROBE: NEGATIVE
FLUBV RNA SPEC QL NAA+PROBE: NEGATIVE
GFR SERPLBLD CREATININE-BSD FMLA CKD-EPI: 88 ML/MIN/1.73 M 2
GLOBULIN SER CALC-MCNC: 4.5 G/DL (ref 1.9–3.5)
GLUCOSE SERPL-MCNC: 137 MG/DL (ref 65–99)
GLUCOSE UR STRIP.AUTO-MCNC: NEGATIVE MG/DL
HCT VFR BLD AUTO: 36 % (ref 42–52)
HGB BLD-MCNC: 11.2 G/DL (ref 14–18)
IMM GRANULOCYTES # BLD AUTO: 0.01 K/UL (ref 0–0.11)
IMM GRANULOCYTES NFR BLD AUTO: 0.3 % (ref 0–0.9)
KETONES UR STRIP.AUTO-MCNC: ABNORMAL MG/DL
LACTATE SERPL-SCNC: 2.7 MMOL/L (ref 0.5–2)
LACTATE SERPL-SCNC: 3 MMOL/L (ref 0.5–2)
LACTATE SERPL-SCNC: 4 MMOL/L (ref 0.5–2)
LEUKOCYTE ESTERASE UR QL STRIP.AUTO: ABNORMAL
LIPASE SERPL-CCNC: 9 U/L (ref 11–82)
LYMPHOCYTES # BLD AUTO: 0.19 K/UL (ref 1–4.8)
LYMPHOCYTES NFR BLD: 5.7 % (ref 22–41)
MCH RBC QN AUTO: 26 PG (ref 27–33)
MCHC RBC AUTO-ENTMCNC: 31.1 G/DL (ref 32.3–36.5)
MCV RBC AUTO: 83.7 FL (ref 81.4–97.8)
MICRO URNS: ABNORMAL
MONOCYTES # BLD AUTO: 0.01 K/UL (ref 0–0.85)
MONOCYTES NFR BLD AUTO: 0.3 % (ref 0–13.4)
NEUTROPHILS # BLD AUTO: 3.12 K/UL (ref 1.82–7.42)
NEUTROPHILS NFR BLD: 93.4 % (ref 44–72)
NITRITE UR QL STRIP.AUTO: NEGATIVE
NRBC # BLD AUTO: 0 K/UL
NRBC BLD-RTO: 0 /100 WBC (ref 0–0.2)
PH UR STRIP.AUTO: 6.5 [PH] (ref 5–8)
PLATELET # BLD AUTO: 277 K/UL (ref 164–446)
PMV BLD AUTO: 9.3 FL (ref 9–12.9)
POTASSIUM SERPL-SCNC: 4.2 MMOL/L (ref 3.6–5.5)
PROT SERPL-MCNC: 7.4 G/DL (ref 6–8.2)
PROT UR QL STRIP: 100 MG/DL
RBC # BLD AUTO: 4.3 M/UL (ref 4.7–6.1)
RBC # URNS HPF: ABNORMAL /HPF (ref 0–2)
RBC UR QL AUTO: ABNORMAL
RSV RNA SPEC QL NAA+PROBE: NEGATIVE
SARS-COV-2 RNA RESP QL NAA+PROBE: NOTDETECTED
SODIUM SERPL-SCNC: 136 MMOL/L (ref 135–145)
SP GR UR STRIP.AUTO: 1.02
UROBILINOGEN UR STRIP.AUTO-MCNC: 1 EU/DL
WBC # BLD AUTO: 3.3 K/UL (ref 4.8–10.8)
WBC #/AREA URNS HPF: ABNORMAL /HPF

## 2024-12-22 PROCEDURE — 0241U HCHG SARS-COV-2 COVID-19 NFCT DS RESP RNA 4 TRGT ED POC: CPT

## 2024-12-22 PROCEDURE — 700111 HCHG RX REV CODE 636 W/ 250 OVERRIDE (IP): Mod: JZ | Performed by: STUDENT IN AN ORGANIZED HEALTH CARE EDUCATION/TRAINING PROGRAM

## 2024-12-22 PROCEDURE — 36415 COLL VENOUS BLD VENIPUNCTURE: CPT

## 2024-12-22 PROCEDURE — 99285 EMERGENCY DEPT VISIT HI MDM: CPT

## 2024-12-22 PROCEDURE — 85025 COMPLETE CBC W/AUTO DIFF WBC: CPT

## 2024-12-22 PROCEDURE — 87077 CULTURE AEROBIC IDENTIFY: CPT

## 2024-12-22 PROCEDURE — 87040 BLOOD CULTURE FOR BACTERIA: CPT | Mod: 91

## 2024-12-22 PROCEDURE — 83605 ASSAY OF LACTIC ACID: CPT | Mod: 91

## 2024-12-22 PROCEDURE — 83690 ASSAY OF LIPASE: CPT

## 2024-12-22 PROCEDURE — 770020 HCHG ROOM/CARE - TELE (206)

## 2024-12-22 PROCEDURE — 96365 THER/PROPH/DIAG IV INF INIT: CPT

## 2024-12-22 PROCEDURE — 700101 HCHG RX REV CODE 250: Performed by: STUDENT IN AN ORGANIZED HEALTH CARE EDUCATION/TRAINING PROGRAM

## 2024-12-22 PROCEDURE — 71045 X-RAY EXAM CHEST 1 VIEW: CPT

## 2024-12-22 PROCEDURE — 87086 URINE CULTURE/COLONY COUNT: CPT

## 2024-12-22 PROCEDURE — 74177 CT ABD & PELVIS W/CONTRAST: CPT

## 2024-12-22 PROCEDURE — 81001 URINALYSIS AUTO W/SCOPE: CPT

## 2024-12-22 PROCEDURE — 99497 ADVNCD CARE PLAN 30 MIN: CPT | Mod: 25 | Performed by: STUDENT IN AN ORGANIZED HEALTH CARE EDUCATION/TRAINING PROGRAM

## 2024-12-22 PROCEDURE — 80053 COMPREHEN METABOLIC PANEL: CPT

## 2024-12-22 PROCEDURE — 700105 HCHG RX REV CODE 258: Performed by: STUDENT IN AN ORGANIZED HEALTH CARE EDUCATION/TRAINING PROGRAM

## 2024-12-22 PROCEDURE — 96375 TX/PRO/DX INJ NEW DRUG ADDON: CPT

## 2024-12-22 PROCEDURE — 87150 DNA/RNA AMPLIFIED PROBE: CPT

## 2024-12-22 PROCEDURE — 99223 1ST HOSP IP/OBS HIGH 75: CPT | Mod: 25 | Performed by: STUDENT IN AN ORGANIZED HEALTH CARE EDUCATION/TRAINING PROGRAM

## 2024-12-22 PROCEDURE — 96376 TX/PRO/DX INJ SAME DRUG ADON: CPT

## 2024-12-22 PROCEDURE — 700117 HCHG RX CONTRAST REV CODE 255: Performed by: STUDENT IN AN ORGANIZED HEALTH CARE EDUCATION/TRAINING PROGRAM

## 2024-12-22 PROCEDURE — 71275 CT ANGIOGRAPHY CHEST: CPT

## 2024-12-22 PROCEDURE — 87076 CULTURE ANAEROBE IDENT EACH: CPT

## 2024-12-22 RX ORDER — MORPHINE SULFATE 4 MG/ML
4 INJECTION INTRAVENOUS ONCE
Status: COMPLETED | OUTPATIENT
Start: 2024-12-22 | End: 2024-12-22

## 2024-12-22 RX ORDER — CEFEPIME HYDROCHLORIDE 2 G/1
2 INJECTION, POWDER, FOR SOLUTION INTRAVENOUS ONCE
Status: COMPLETED | OUTPATIENT
Start: 2024-12-22 | End: 2024-12-22

## 2024-12-22 RX ORDER — SODIUM CHLORIDE 9 MG/ML
1000 INJECTION, SOLUTION INTRAVENOUS ONCE
Status: DISCONTINUED | OUTPATIENT
Start: 2024-12-22 | End: 2024-12-22

## 2024-12-22 RX ORDER — AZITHROMYCIN 500 MG/5ML
500 INJECTION, POWDER, LYOPHILIZED, FOR SOLUTION INTRAVENOUS ONCE
Status: COMPLETED | OUTPATIENT
Start: 2024-12-22 | End: 2024-12-22

## 2024-12-22 RX ORDER — SODIUM CHLORIDE 9 MG/ML
1000 INJECTION, SOLUTION INTRAVENOUS ONCE
Status: COMPLETED | OUTPATIENT
Start: 2024-12-22 | End: 2024-12-22

## 2024-12-22 RX ORDER — OXYCODONE HYDROCHLORIDE 5 MG/1
5 TABLET ORAL
Status: DISCONTINUED | OUTPATIENT
Start: 2024-12-22 | End: 2024-12-28

## 2024-12-22 RX ORDER — ONDANSETRON 2 MG/ML
4 INJECTION INTRAMUSCULAR; INTRAVENOUS EVERY 4 HOURS PRN
Status: DISCONTINUED | OUTPATIENT
Start: 2024-12-22 | End: 2024-12-30 | Stop reason: HOSPADM

## 2024-12-22 RX ORDER — SODIUM CHLORIDE 9 MG/ML
INJECTION, SOLUTION INTRAVENOUS CONTINUOUS
Status: DISCONTINUED | OUTPATIENT
Start: 2024-12-22 | End: 2024-12-23

## 2024-12-22 RX ORDER — HYDROMORPHONE HYDROCHLORIDE 1 MG/ML
0.25 INJECTION, SOLUTION INTRAMUSCULAR; INTRAVENOUS; SUBCUTANEOUS
Status: DISCONTINUED | OUTPATIENT
Start: 2024-12-22 | End: 2024-12-28

## 2024-12-22 RX ORDER — OXYCODONE HYDROCHLORIDE 5 MG/1
2.5 TABLET ORAL
Status: DISCONTINUED | OUTPATIENT
Start: 2024-12-22 | End: 2024-12-28

## 2024-12-22 RX ORDER — SODIUM CHLORIDE 9 MG/ML
500 INJECTION, SOLUTION INTRAVENOUS ONCE
Status: COMPLETED | OUTPATIENT
Start: 2024-12-22 | End: 2024-12-22

## 2024-12-22 RX ORDER — ONDANSETRON 4 MG/1
4 TABLET, ORALLY DISINTEGRATING ORAL EVERY 4 HOURS PRN
Status: DISCONTINUED | OUTPATIENT
Start: 2024-12-22 | End: 2024-12-30 | Stop reason: HOSPADM

## 2024-12-22 RX ORDER — LABETALOL HYDROCHLORIDE 5 MG/ML
10 INJECTION, SOLUTION INTRAVENOUS EVERY 4 HOURS PRN
Status: DISCONTINUED | OUTPATIENT
Start: 2024-12-22 | End: 2024-12-30 | Stop reason: HOSPADM

## 2024-12-22 RX ORDER — ACETAMINOPHEN 325 MG/1
650 TABLET ORAL EVERY 6 HOURS PRN
Status: DISCONTINUED | OUTPATIENT
Start: 2024-12-22 | End: 2024-12-30 | Stop reason: HOSPADM

## 2024-12-22 RX ADMIN — SODIUM CHLORIDE 1000 ML: 9 INJECTION, SOLUTION INTRAVENOUS at 19:30

## 2024-12-22 RX ADMIN — CEFEPIME 2 G: 2 INJECTION, POWDER, FOR SOLUTION INTRAVENOUS at 21:41

## 2024-12-22 RX ADMIN — MORPHINE SULFATE 4 MG: 4 INJECTION, SOLUTION INTRAMUSCULAR; INTRAVENOUS at 22:53

## 2024-12-22 RX ADMIN — SODIUM CHLORIDE 1000 ML: 0.9 INJECTION, SOLUTION INTRAVENOUS at 21:42

## 2024-12-22 RX ADMIN — SODIUM CHLORIDE 500 ML: 9 INJECTION, SOLUTION INTRAVENOUS at 23:30

## 2024-12-22 RX ADMIN — MORPHINE SULFATE 4 MG: 4 INJECTION, SOLUTION INTRAMUSCULAR; INTRAVENOUS at 19:44

## 2024-12-22 RX ADMIN — AMPICILLIN AND SULBACTAM 3 G: 1; 2 INJECTION, POWDER, FOR SOLUTION INTRAMUSCULAR; INTRAVENOUS at 19:49

## 2024-12-22 RX ADMIN — AZITHROMYCIN 500 MG: 500 INJECTION, POWDER, LYOPHILIZED, FOR SOLUTION INTRAVENOUS at 20:47

## 2024-12-22 RX ADMIN — IOHEXOL 100 ML: 350 INJECTION, SOLUTION INTRAVENOUS at 21:00

## 2024-12-22 ASSESSMENT — ENCOUNTER SYMPTOMS
EYES NEGATIVE: 1
CHILLS: 1
RESPIRATORY NEGATIVE: 1
GASTROINTESTINAL NEGATIVE: 1
PSYCHIATRIC NEGATIVE: 1
FEVER: 1
NEUROLOGICAL NEGATIVE: 1
CARDIOVASCULAR NEGATIVE: 1
BACK PAIN: 1

## 2024-12-22 ASSESSMENT — FIBROSIS 4 INDEX: FIB4 SCORE: 0.69

## 2024-12-22 ASSESSMENT — PAIN DESCRIPTION - PAIN TYPE
TYPE: ACUTE PAIN

## 2024-12-23 LAB
ALBUMIN SERPL BCP-MCNC: 2.5 G/DL (ref 3.2–4.9)
ALBUMIN/GLOB SERPL: 0.6 G/DL
ALP SERPL-CCNC: 160 U/L (ref 30–99)
ALT SERPL-CCNC: 27 U/L (ref 2–50)
ANION GAP SERPL CALC-SCNC: 11 MMOL/L (ref 7–16)
ANISOCYTOSIS BLD QL SMEAR: ABNORMAL
AST SERPL-CCNC: 30 U/L (ref 12–45)
B PARAP IS1001 DNA NPH QL NAA+NON-PROBE: NOT DETECTED
B PERT.PT PRMT NPH QL NAA+NON-PROBE: NOT DETECTED
BASOPHILS # BLD AUTO: 0 % (ref 0–1.8)
BASOPHILS # BLD AUTO: 0.2 % (ref 0–1.8)
BASOPHILS # BLD: 0 K/UL (ref 0–0.12)
BASOPHILS # BLD: 0.03 K/UL (ref 0–0.12)
BILIRUB SERPL-MCNC: 0.4 MG/DL (ref 0.1–1.5)
BUN SERPL-MCNC: 18 MG/DL (ref 8–22)
C PNEUM DNA NPH QL NAA+NON-PROBE: NOT DETECTED
CALCIUM ALBUM COR SERPL-MCNC: 8.6 MG/DL (ref 8.5–10.5)
CALCIUM SERPL-MCNC: 7.4 MG/DL (ref 8.5–10.5)
CHLORIDE SERPL-SCNC: 104 MMOL/L (ref 96–112)
CO2 SERPL-SCNC: 16 MMOL/L (ref 20–33)
COMMENT NL1176: NORMAL
CREAT SERPL-MCNC: 0.96 MG/DL (ref 0.5–1.4)
EOSINOPHIL # BLD AUTO: 0 K/UL (ref 0–0.51)
EOSINOPHIL # BLD AUTO: 0.08 K/UL (ref 0–0.51)
EOSINOPHIL NFR BLD: 0 % (ref 0–6.9)
EOSINOPHIL NFR BLD: 0.6 % (ref 0–6.9)
ERYTHROCYTE [DISTWIDTH] IN BLOOD BY AUTOMATED COUNT: 50.5 FL (ref 35.9–50)
ERYTHROCYTE [DISTWIDTH] IN BLOOD BY AUTOMATED COUNT: 51.8 FL (ref 35.9–50)
FLUAV RNA NPH QL NAA+NON-PROBE: NOT DETECTED
FLUBV RNA NPH QL NAA+NON-PROBE: NOT DETECTED
GFR SERPLBLD CREATININE-BSD FMLA CKD-EPI: 85 ML/MIN/1.73 M 2
GLOBULIN SER CALC-MCNC: 3.9 G/DL (ref 1.9–3.5)
GLUCOSE SERPL-MCNC: 133 MG/DL (ref 65–99)
HADV DNA NPH QL NAA+NON-PROBE: NOT DETECTED
HCOV 229E RNA NPH QL NAA+NON-PROBE: NOT DETECTED
HCOV HKU1 RNA NPH QL NAA+NON-PROBE: NOT DETECTED
HCOV NL63 RNA NPH QL NAA+NON-PROBE: NOT DETECTED
HCOV OC43 RNA NPH QL NAA+NON-PROBE: NOT DETECTED
HCT VFR BLD AUTO: 28.2 % (ref 42–52)
HCT VFR BLD AUTO: 30.5 % (ref 42–52)
HGB BLD-MCNC: 9 G/DL (ref 14–18)
HGB BLD-MCNC: 9.3 G/DL (ref 14–18)
HMPV RNA NPH QL NAA+NON-PROBE: NOT DETECTED
HPIV1 RNA NPH QL NAA+NON-PROBE: NOT DETECTED
HPIV2 RNA NPH QL NAA+NON-PROBE: NOT DETECTED
HPIV3 RNA NPH QL NAA+NON-PROBE: NOT DETECTED
HPIV4 RNA NPH QL NAA+NON-PROBE: NOT DETECTED
HYPOCHROMIA BLD QL SMEAR: ABNORMAL
IMM GRANULOCYTES # BLD AUTO: 0.17 K/UL (ref 0–0.11)
IMM GRANULOCYTES NFR BLD AUTO: 1.2 % (ref 0–0.9)
LACTATE SERPL-SCNC: 1.5 MMOL/L (ref 0.5–2)
LYMPHOCYTES # BLD AUTO: 0.18 K/UL (ref 1–4.8)
LYMPHOCYTES # BLD AUTO: 0.61 K/UL (ref 1–4.8)
LYMPHOCYTES NFR BLD: 0.9 % (ref 22–41)
LYMPHOCYTES NFR BLD: 4.2 % (ref 22–41)
M PNEUMO DNA NPH QL NAA+NON-PROBE: NOT DETECTED
MACROCYTES BLD QL SMEAR: ABNORMAL
MANUAL DIFF BLD: NORMAL
MCH RBC QN AUTO: 25.5 PG (ref 27–33)
MCH RBC QN AUTO: 26.8 PG (ref 27–33)
MCHC RBC AUTO-ENTMCNC: 30.5 G/DL (ref 32.3–36.5)
MCHC RBC AUTO-ENTMCNC: 31.9 G/DL (ref 32.3–36.5)
MCV RBC AUTO: 83.6 FL (ref 81.4–97.8)
MCV RBC AUTO: 83.9 FL (ref 81.4–97.8)
MICROCYTES BLD QL SMEAR: ABNORMAL
MONOCYTES # BLD AUTO: 0.51 K/UL (ref 0–0.85)
MONOCYTES # BLD AUTO: 0.75 K/UL (ref 0–0.85)
MONOCYTES NFR BLD AUTO: 2.6 % (ref 0–13.4)
MONOCYTES NFR BLD AUTO: 5.2 % (ref 0–13.4)
MORPHOLOGY BLD-IMP: NORMAL
NEUTROPHILS # BLD AUTO: 12.85 K/UL (ref 1.82–7.42)
NEUTROPHILS # BLD AUTO: 19.01 K/UL (ref 1.82–7.42)
NEUTROPHILS NFR BLD: 88.6 % (ref 44–72)
NEUTROPHILS NFR BLD: 96.5 % (ref 44–72)
NRBC # BLD AUTO: 0 K/UL
NRBC # BLD AUTO: 0 K/UL
NRBC BLD-RTO: 0 /100 WBC (ref 0–0.2)
NRBC BLD-RTO: 0 /100 WBC (ref 0–0.2)
PLATELET # BLD AUTO: 182 K/UL (ref 164–446)
PLATELET # BLD AUTO: 228 K/UL (ref 164–446)
PLATELET BLD QL SMEAR: NORMAL
PMV BLD AUTO: 9.3 FL (ref 9–12.9)
PMV BLD AUTO: 9.4 FL (ref 9–12.9)
POTASSIUM SERPL-SCNC: 3.7 MMOL/L (ref 3.6–5.5)
PROT SERPL-MCNC: 6.4 G/DL (ref 6–8.2)
RBC # BLD AUTO: 3.36 M/UL (ref 4.7–6.1)
RBC # BLD AUTO: 3.65 M/UL (ref 4.7–6.1)
RBC BLD AUTO: PRESENT
RSV RNA NPH QL NAA+NON-PROBE: NOT DETECTED
RV+EV RNA NPH QL NAA+NON-PROBE: NOT DETECTED
SARS-COV-2 RNA NPH QL NAA+NON-PROBE: NOTDETECTED
SODIUM SERPL-SCNC: 131 MMOL/L (ref 135–145)
WBC # BLD AUTO: 14.5 K/UL (ref 4.8–10.8)
WBC # BLD AUTO: 19.7 K/UL (ref 4.8–10.8)
WBC TOXIC VACUOLES BLD QL SMEAR: NORMAL

## 2024-12-23 PROCEDURE — 0202U NFCT DS 22 TRGT SARS-COV-2: CPT

## 2024-12-23 PROCEDURE — 36415 COLL VENOUS BLD VENIPUNCTURE: CPT

## 2024-12-23 PROCEDURE — 700102 HCHG RX REV CODE 250 W/ 637 OVERRIDE(OP): Performed by: INTERNAL MEDICINE

## 2024-12-23 PROCEDURE — A9270 NON-COVERED ITEM OR SERVICE: HCPCS | Performed by: STUDENT IN AN ORGANIZED HEALTH CARE EDUCATION/TRAINING PROGRAM

## 2024-12-23 PROCEDURE — 83605 ASSAY OF LACTIC ACID: CPT

## 2024-12-23 PROCEDURE — 85007 BL SMEAR W/DIFF WBC COUNT: CPT

## 2024-12-23 PROCEDURE — A9270 NON-COVERED ITEM OR SERVICE: HCPCS | Performed by: INTERNAL MEDICINE

## 2024-12-23 PROCEDURE — 770020 HCHG ROOM/CARE - TELE (206)

## 2024-12-23 PROCEDURE — 700102 HCHG RX REV CODE 250 W/ 637 OVERRIDE(OP): Performed by: STUDENT IN AN ORGANIZED HEALTH CARE EDUCATION/TRAINING PROGRAM

## 2024-12-23 PROCEDURE — 700105 HCHG RX REV CODE 258: Performed by: STUDENT IN AN ORGANIZED HEALTH CARE EDUCATION/TRAINING PROGRAM

## 2024-12-23 PROCEDURE — 80053 COMPREHEN METABOLIC PANEL: CPT

## 2024-12-23 PROCEDURE — 99233 SBSQ HOSP IP/OBS HIGH 50: CPT | Mod: GC | Performed by: INTERNAL MEDICINE

## 2024-12-23 PROCEDURE — 700111 HCHG RX REV CODE 636 W/ 250 OVERRIDE (IP): Mod: JZ | Performed by: STUDENT IN AN ORGANIZED HEALTH CARE EDUCATION/TRAINING PROGRAM

## 2024-12-23 PROCEDURE — 85027 COMPLETE CBC AUTOMATED: CPT

## 2024-12-23 PROCEDURE — 97535 SELF CARE MNGMENT TRAINING: CPT

## 2024-12-23 PROCEDURE — 97166 OT EVAL MOD COMPLEX 45 MIN: CPT

## 2024-12-23 PROCEDURE — 85025 COMPLETE CBC W/AUTO DIFF WBC: CPT

## 2024-12-23 RX ORDER — ALBUTEROL SULFATE 90 UG/1
2 INHALANT RESPIRATORY (INHALATION)
Status: DISCONTINUED | OUTPATIENT
Start: 2024-12-23 | End: 2024-12-23

## 2024-12-23 RX ORDER — ALBUTEROL SULFATE 90 UG/1
2 INHALANT RESPIRATORY (INHALATION) EVERY 4 HOURS
Status: DISCONTINUED | OUTPATIENT
Start: 2024-12-23 | End: 2024-12-30 | Stop reason: HOSPADM

## 2024-12-23 RX ADMIN — OXYCODONE 2.5 MG: 5 TABLET ORAL at 04:18

## 2024-12-23 RX ADMIN — CEFEPIME 2 G: 2 INJECTION, POWDER, FOR SOLUTION INTRAVENOUS at 18:50

## 2024-12-23 RX ADMIN — CEFEPIME 2 G: 2 INJECTION, POWDER, FOR SOLUTION INTRAVENOUS at 05:51

## 2024-12-23 RX ADMIN — SODIUM CHLORIDE: 0.9 INJECTION, SOLUTION INTRAVENOUS at 01:40

## 2024-12-23 RX ADMIN — ACETAMINOPHEN 650 MG: 325 TABLET ORAL at 01:34

## 2024-12-23 RX ADMIN — RIVAROXABAN 20 MG: 20 TABLET, FILM COATED ORAL at 18:49

## 2024-12-23 RX ADMIN — ALBUTEROL SULFATE 2 PUFF: 90 AEROSOL, METERED RESPIRATORY (INHALATION) at 18:49

## 2024-12-23 RX ADMIN — SODIUM CHLORIDE: 0.9 INJECTION, SOLUTION INTRAVENOUS at 00:04

## 2024-12-23 SDOH — ECONOMIC STABILITY: TRANSPORTATION INSECURITY
IN THE PAST 12 MONTHS, HAS THE LACK OF TRANSPORTATION KEPT YOU FROM MEDICAL APPOINTMENTS OR FROM GETTING MEDICATIONS?: NO

## 2024-12-23 SDOH — ECONOMIC STABILITY: TRANSPORTATION INSECURITY
IN THE PAST 12 MONTHS, HAS LACK OF RELIABLE TRANSPORTATION KEPT YOU FROM MEDICAL APPOINTMENTS, MEETINGS, WORK OR FROM GETTING THINGS NEEDED FOR DAILY LIVING?: NO

## 2024-12-23 ASSESSMENT — ENCOUNTER SYMPTOMS
PSYCHIATRIC NEGATIVE: 1
FEVER: 1
CHILLS: 1
NEUROLOGICAL NEGATIVE: 1
EYES NEGATIVE: 1
BACK PAIN: 1
GASTROINTESTINAL NEGATIVE: 1
RESPIRATORY NEGATIVE: 1
CARDIOVASCULAR NEGATIVE: 1

## 2024-12-23 ASSESSMENT — PAIN DESCRIPTION - PAIN TYPE
TYPE: CHRONIC PAIN;SURGICAL PAIN
TYPE: ACUTE PAIN
TYPE: ACUTE PAIN

## 2024-12-23 ASSESSMENT — FIBROSIS 4 INDEX
FIB4 SCORE: 1.75
FIB4 SCORE: 1.34
FIB4 SCORE: 1.34

## 2024-12-23 ASSESSMENT — COGNITIVE AND FUNCTIONAL STATUS - GENERAL
DAILY ACTIVITIY SCORE: 17
SUGGESTED CMS G CODE MODIFIER DAILY ACTIVITY: CK
PERSONAL GROOMING: A LITTLE
DRESSING REGULAR UPPER BODY CLOTHING: A LITTLE
TOILETING: A LITTLE
DRESSING REGULAR LOWER BODY CLOTHING: A LOT
HELP NEEDED FOR BATHING: A LOT

## 2024-12-23 ASSESSMENT — ACTIVITIES OF DAILY LIVING (ADL): TOILETING: INDEPENDENT

## 2024-12-23 ASSESSMENT — LIFESTYLE VARIABLES
HAVE PEOPLE ANNOYED YOU BY CRITICIZING YOUR DRINKING: NO
DOES PATIENT WANT TO STOP DRINKING: NO
EVER FELT BAD OR GUILTY ABOUT YOUR DRINKING: NO
TOTAL SCORE: 0
TOTAL SCORE: 0
EVER HAD A DRINK FIRST THING IN THE MORNING TO STEADY YOUR NERVES TO GET RID OF A HANGOVER: NO
CONSUMPTION TOTAL: NEGATIVE
HOW MANY TIMES IN THE PAST YEAR HAVE YOU HAD 5 OR MORE DRINKS IN A DAY: 0
TOTAL SCORE: 0
ON A TYPICAL DAY WHEN YOU DRINK ALCOHOL HOW MANY DRINKS DO YOU HAVE: 0
AVERAGE NUMBER OF DAYS PER WEEK YOU HAVE A DRINK CONTAINING ALCOHOL: 0
HAVE YOU EVER FELT YOU SHOULD CUT DOWN ON YOUR DRINKING: NO
ALCOHOL_USE: NO

## 2024-12-23 ASSESSMENT — PATIENT HEALTH QUESTIONNAIRE - PHQ9
1. LITTLE INTEREST OR PLEASURE IN DOING THINGS: NOT AT ALL
2. FEELING DOWN, DEPRESSED, IRRITABLE, OR HOPELESS: NOT AT ALL
SUM OF ALL RESPONSES TO PHQ9 QUESTIONS 1 AND 2: 0

## 2024-12-23 ASSESSMENT — SOCIAL DETERMINANTS OF HEALTH (SDOH)
WITHIN THE LAST YEAR, HAVE YOU BEEN KICKED, HIT, SLAPPED, OR OTHERWISE PHYSICALLY HURT BY YOUR PARTNER OR EX-PARTNER?: NO
WITHIN THE PAST 12 MONTHS, YOU WORRIED THAT YOUR FOOD WOULD RUN OUT BEFORE YOU GOT THE MONEY TO BUY MORE: NEVER TRUE
IN THE PAST 12 MONTHS, HAS THE ELECTRIC, GAS, OIL, OR WATER COMPANY THREATENED TO SHUT OFF SERVICE IN YOUR HOME?: NO
WITHIN THE LAST YEAR, HAVE TO BEEN RAPED OR FORCED TO HAVE ANY KIND OF SEXUAL ACTIVITY BY YOUR PARTNER OR EX-PARTNER?: NO
WITHIN THE PAST 12 MONTHS, THE FOOD YOU BOUGHT JUST DIDN'T LAST AND YOU DIDN'T HAVE MONEY TO GET MORE: NEVER TRUE
WITHIN THE LAST YEAR, HAVE YOU BEEN AFRAID OF YOUR PARTNER OR EX-PARTNER?: NO
WITHIN THE LAST YEAR, HAVE YOU BEEN HUMILIATED OR EMOTIONALLY ABUSED IN OTHER WAYS BY YOUR PARTNER OR EX-PARTNER?: NO

## 2024-12-23 NOTE — CARE PLAN
The patient is Stable - Low risk of patient condition declining or worsening    Shift Goals  Clinical Goals: Remain safe and free from falls, I&O's, continue antibiotics    Problem: Pain - Standard  Goal: Alleviation of pain or a reduction in pain to the patient’s comfort goal  Outcome: Progressing     Problem: Respiratory  Goal: Patient will achieve/maintain optimum respiratory ventilation and gas exchange  Outcome: Progressing     Problem: Fall Risk  Goal: Patient will remain free from falls  Outcome: Progressing   Patient Goals: Rest  Family Goals: DIDIER    Progress made toward(s) clinical / shift goals:          Patient is not progressing towards the following goals:      Problem: Knowledge Deficit - Standard  Goal: Patient and family/care givers will demonstrate understanding of plan of care, disease process/condition, diagnostic tests and medications  Outcome: Not Met

## 2024-12-23 NOTE — ED NOTES
Bedside report received from off going RN/tech: Madalyn SALAS, assumed care of patient.  POC discussed with patient. Call light within reach, all needs addressed at this time. Family at bedside.      Fall risk interventions in place: Move the patient closer to the nurse's station, Patient's personal possessions are with in their safe reach, Place socks on patient, Place fall risk sign on patient's door, Give patient urinal if applicable, and Keep floor surfaces clean and dry (all applicable per [place] Fall risk assessment)   Continuous monitoring: Cardiac Leads, Pulse Ox, or Blood Pressure  IVF/IV medications: Not Applicable   Oxygen: 2L NC  Bedside sitter: Not Applicable   Isolation: Not Applicable

## 2024-12-23 NOTE — THERAPY
"Physical Therapy Contact Note    Patient Name: Josh Lerner  Age:  69 y.o., Sex:  male  Medical Record #: [id number]  Today's Date: 12/23/2024    PT consult received. Met with pt face-to-face at bedside for attempted eval. Pt reported that he had just returned to bed and did not have the energy to get up again. Also reported that he has been walking in the room with nursing. Educated pt about purpose of acute PT but pt continued to decline, \"no baby, I'm moving just fine.\" Pt agreeable to PT checking back tomorrow.   "

## 2024-12-23 NOTE — PROGRESS NOTES
4 Eyes Skin Assessment Completed by MARITZA Allen and MARITZA Ayala.    Head WDL  Ears WDL  Nose WDL  Mouth WDL  Neck WDL  Breast/Chest WDL  Shoulder Blades WDL  Spine WDL  (R) Arm/Elbow/Hand WDL  (L) Arm/Elbow/Hand Scab  Abdomen Redness and Blanching, below ostomy  Groin WDL  Scrotum/Coccyx/Buttocks Redness and Blanching  (R) Leg WDL  (L) Leg WDL  (R) Heel/Foot/Toe Redness and Blanching  (L) Heel/Foot/Toe Redness and Blanching    Devices In Places Tele Box and Pulse Ox      Interventions In Place Gray Ear Foams, Heel Mepilex, Sacral Mepilex, Pillows, Q2 Turns, and Barrier Cream    Possible Skin Injury Yes    Pictures Uploaded Into Epic Yes  Wound Consult Placed Yes  RN Wound Prevention Protocol Ordered Yes

## 2024-12-23 NOTE — CARE PLAN
The patient is Stable - Low risk of patient condition declining or worsening    Shift Goals  Clinical Goals: fluids, monitor VS, infection, abx, pain conrol, skin breakdown prevention  Patient Goals: rest, pain control, drink fluids  Family Goals: DIDIER    Progress made toward(s) clinical / shift goals:    Problem: Hemodynamics  Goal: Patient's hemodynamics, fluid balance and neurologic status will be stable or improve  Description: Target End Date:  Prior to discharge or change in level of care    Document on Assessment and I/O flowsheet templates    1.  Monitor vital signs, pulse oximetry and cardiac monitor per provider order and/or policy  2.  Maintain blood pressure per provider order  3.  Hemodynamic monitoring per provider order  4.  Manage IV fluids and IV infusions  5.  Monitor intake and output  6.  Daily weights per unit policy or provider order  7.  Assess peripheral pulses and capillary refill  8.  Assess color and body temperature  9.  Position patient for maximum circulation/cardiac output  10. Monitor for signs/symptoms of excessive bleeding  11. Assess mental status, restlessness and changes in level of consciousness  12. Monitor temperature and report fever or hypothermia to provider immediately. Consideration of targeted temperature management.  Outcome: Progressing  Note: Pt hemodynamically stable after receiving 2.5 L NS boli in ED. NS continuously IV T 125mL/hr. SpO2 95% with 3 L NC.     Problem: Infection  Goal: Will remain free from infection  Outcome: Progressing  Note: Lacteate decreased to 1.5 (from 4.0 initially). Pt arrived with temp of 102.4 F on Unit, pt a febrile after tylenol administration. Pt receiving cefepime IV.

## 2024-12-23 NOTE — H&P
Hospital Medicine History & Physical Note    Date of Service  12/22/2024    Primary Care Physician  Christos Sandoval M.D.    Consultants  None    Code Status  Full Code    Chief Complaint  Chief Complaint   Patient presents with    Flu Like Symptoms     Cough (unproductive), fever, nausea for past 3 days.    Hx rectal cancer, has urostomy and colostomy.     93% on 2L, 139 HR.     Blood in Urine     Red tinged urine    Abdominal Pain     Denies pain currently, low abd pain earlier today. Given 100mcg fentanyl        History of Presenting Illness  Josh Lerenr is a 69 y.o. male who presented 12/22/2024 with sepsis.    Patient has a history of distal rectal cancer status post colostomy and urostomy.  He has received chemotherapy and radiation in the past and resection, however unfortunately has recurrence and is following with hematology oncology and general surgery outpatient.     For the last few days, patient noted to have progressive worsening weakness.  Patient was noted to have chills, subjective fever, back pain.  Today, he was more lethargic than usual, and he was complaining of dizziness and lightheadedness.  Concern for this, he was brought to the ER for further evaluation.    In ER, patient tachycardic and hypoxic.  Pertinent labs include mild neutropenia, lactic acid 4 -> 2.7.  UA positive for UTI.  COVID/flu/RSV negative.  CTPA showing no pulmonary embolism, possible GERD, emphysematous changes noted, greater in the apices.  CT abdomen pelvis showing complex mass in the pelvis, compatible with recurrent neoplastic disease, cirrhosis, hepatomegaly.    I discussed the plan of care with patient.    Review of Systems  Review of Systems   Constitutional:  Positive for chills and fever.   HENT: Negative.     Eyes: Negative.    Respiratory: Negative.     Cardiovascular: Negative.    Gastrointestinal: Negative.    Genitourinary: Negative.    Musculoskeletal:  Positive for back pain.   Skin: Negative.     Neurological: Negative.    Endo/Heme/Allergies: Negative.    Psychiatric/Behavioral: Negative.         Past Medical History   has a past medical history of Blood clotting disorder ([facility]), Blood plasma poisoning, Bowel habit changes, Cancer (HCC), Dental disorder, Paralysis (HCC) (1972), Pneumonia, and Stroke ([facility]).    Surgical History   has a past surgical history that includes inguinal hernia repair bilateral (Bilateral); pr lap, surg colostomy (Left, 05/16/2023); pr sigmoidoscopy,diagnostic (N/A, 05/16/2023); cath placement (Right, 05/16/2023); pr part removal colon w anastomosis (05/19/2023); pr colostomy (05/19/2023); other; pr colostomy (03/05/2024); low anterior resection robotic xi (03/05/2024); other (06/2024); and pr biopsy of rectum (10/1/2024).     Family History  family history includes Brain Cancer in his maternal uncle; Cancer in his father and maternal grandfather; Lung Cancer in his father; Stomach Cancer in his maternal grandfather.   Family history reviewed with patient. There is no family history that is pertinent to the chief complaint.     Social History   reports that he quit smoking about 18 months ago. His smoking use included cigarettes. He has a 14 pack-year smoking history. He has never used smokeless tobacco. He reports that he does not currently use alcohol. He reports that he does not currently use drugs.    Allergies  No Known Allergies    Medications  Prior to Admission Medications   Prescriptions Last Dose Informant Patient Reported? Taking?   rivaroxaban (XARELTO) 20 MG Tab tablet 12/22/2024 Morning  No Yes   Sig: Take 1 Tablet by mouth with dinner for 180 days.      Facility-Administered Medications: None       Physical Exam  Temp:  [36.6 °C (97.8 °F)] 36.6 °C (97.8 °F)  Pulse:  [116-135] 124  Resp:  [16-20] 20  BP: (101-150)/(54-71) 150/71  SpO2:  [88 %-92 %] 90 %  Blood Pressure : (!) 150/71   Temperature: 36.6 °C (97.8 °F)   Pulse: (!) 124   Respiration: 20   Pulse Oximetry: 90  "%       Physical Exam  Constitutional:       Appearance: Normal appearance. He is obese.   HENT:      Head: Normocephalic.      Nose: Nose normal.      Mouth/Throat:      Mouth: Mucous membranes are moist.   Eyes:      Pupils: Pupils are equal, round, and reactive to light.   Cardiovascular:      Rate and Rhythm: Regular rhythm. Tachycardia present.      Pulses: Normal pulses.   Pulmonary:      Effort: Pulmonary effort is normal.      Breath sounds: Normal breath sounds.   Abdominal:      General: Abdomen is flat. Bowel sounds are normal.      Palpations: Abdomen is soft.      Comments: Right lower quadrant urostomy bag noted  Left lower quadrant colostomy bag noted   Musculoskeletal:         General: Normal range of motion.      Cervical back: Neck supple.   Skin:     General: Skin is warm.   Neurological:      General: No focal deficit present.      Mental Status: He is alert and oriented to person, place, and time. Mental status is at baseline.   Psychiatric:         Mood and Affect: Mood normal.         Behavior: Behavior normal.         Thought Content: Thought content normal.         Judgment: Judgment normal.         Laboratory:  Recent Labs     12/22/24  1645   WBC 3.3*   RBC 4.30*   HEMOGLOBIN 11.2*   HEMATOCRIT 36.0*   MCV 83.7   MCH 26.0*   MCHC 31.1*   RDW 51.3*   PLATELETCT 277   MPV 9.3     Recent Labs     12/22/24  1645   SODIUM 136   POTASSIUM 4.2   CHLORIDE 101   CO2 17*   GLUCOSE 137*   BUN 19   CREATININE 0.94   CALCIUM 8.1*     Recent Labs     12/22/24  1645   ALTSGPT 33   ASTSGOT 31   ALKPHOSPHAT 184*   TBILIRUBIN 0.5   LIPASE 9*   GLUCOSE 137*         No results for input(s): \"NTPROBNP\" in the last 72 hours.      No results for input(s): \"TROPONINT\" in the last 72 hours.    Imaging:  CT-ABDOMEN-PELVIS WITH   Final Result         1.  Complex mass in the pelvis, appearance most compatible with recurrent neoplastic disease.   2.  Hepatomegaly   3.  Irregular hepatic contour favoring changes of " cirrhosis   4.  Cholelithiasis   5.  Small fat-containing bilateral inguinal hernias   6.  Atherosclerosis and atherosclerotic coronary artery disease      CT-CTA CHEST PULMONARY ARTERY W/ RECONS   Final Result         1.  No pulmonary embolus appreciated.   2.  Fluid-filled distention of esophagus, consider gastroesophageal reflux or esophageal dysmotility. Could be followed up with esophageal swallow study.   3.  Emphysematous changes, greatest in the apices.      DX-CHEST-PORTABLE (1 VIEW)   Final Result         Hazy left basilar opacity, likely atelectasis.          X-Ray:  I have personally reviewed the images and compared with prior images.    Assessment/Plan:  Justification for Admission Status  I anticipate this patient will require at least two midnights for appropriate medical management, necessitating inpatient admission because patient has sepsis    Patient will need a Telemetry bed on MEDICAL service .  The need is secondary to sepsis.    * Sepsis (HCC)- (present on admission)  Assessment & Plan  This is Sepsis Present on admission  SIRS criteria identified on my evaluation include: Tachycardia, with heart rate greater than 90 BPM, Tachypnea, with respirations greater than 20 per minute, and Leukopenia, with WBC less than 4,000  Clinical indicators of end organ dysfunction include Lactic Acid greater than 2  Source is UTI  Sepsis protocol initiated  Crystalloid Fluid Administration: Fluid resuscitation ordered per standard protocol - 30 mL/kg per current or ideal body weight  IV antibiotics as appropriate for source of sepsis  Reassessment: I have reassessed the patient's hemodynamic status    Given 2 L of fluids, patient still tachycardic at heart rate 130s and MAP 70  I will give another 500 mL bolus, followed by maintenance fluids  Trend lactic acid  Cefepime    ACP (advance care planning)  Assessment & Plan  16 minutes spent discussing goals of care with patient and wife at bedside.  I explained to  them the processes of CPR, which can include chest compression and intubation.  Patient states that he would like to be full code and to have everything done.    DVT (deep venous thrombosis) (HCC)  Assessment & Plan  Continue Xarelto    Hypoxia  Assessment & Plan  Because of hypoxia not clear at this time, patient tachypneic but lungs are clear on auscultation  Emphysema noted, patient had a heavy smoking history but quit a year ago  COVID swab negative, CTPA negative for pneumonia and pulmonary embolism  Send viral respiratory panel, on cefepime   Echocardiogram  Continue oxygen as needed    Malignant neoplasm of rectum (HCC)- (present on admission)  Assessment & Plan  Following with oncology and general surgery outpatient  Has received chemotherapy and radiation in the past and cystoprostatectomy with urostomy and colostomy  Still has mass noted, not on treatment at this time  Next appointment with general surgery on January 8        VTE prophylaxis: therapeutic anticoagulation with Xarelto

## 2024-12-23 NOTE — HOSPITAL COURSE
Josh Lerner is a 69 y.o. male who presented 12/22/2024 with sepsis at that moment believed to be due to UTI.     Patient has a history of distal rectal adenocarcinoma stage II C s/p chemoRT and surgical resection (cystoprostatectomy) with colostomy and urostomy.  Unfortunately has recurrence and is following with hematology oncology and general surgery outpatient. Recently started seeing Dr Kirby with [facility]. In the ED, patient had lactic acidosis with neutropenia. CTPA negative, however with emphysema. CT abd noted with complex mass likely recurrent neoplasm. Completed fluid resuscitation, cefepime, then switched to unasyn before noting actinomyces in blood culture, ID consulted and swiched to pen G + metronidazole.     Consulted colorectal surgery Dr. Sewell, Infectious disease Dr. Butler, and Dr. Kirby oncologist, interventional radiology Dr. Graham. Dr. Sewell advised against surgical washout, and under impression that mass is malignant abscess given lack of margins in previous surgery. Dr. Kirby has advised that it was possible for patient to return to [facility], however with likely poor outcome with poor quality of life, and Dr. Graham also advised against IR guided drain placement given loculated fluid collections and proximity to likely recurrent colorectal cancer leading to high risk of hemorrhage.  Patient requested second opinion with Dr. Desai who also suggested no surgery.  Patient understood to current state of health and decided to be DNR/DNI and transition to hospice.  Antibiotic duration and administration were discussed with infectious disease and ultimately recommended only p.o. antibiotic with Augmentin for months and to continue with Flagyl for 2 weeks.  Antibiotic treatment would likely not be enough and is not a standard for actinomyces bacteremia but per patient wishes will go home with this treatment and no source control or surgeries will be done.

## 2024-12-23 NOTE — ED NOTES
Physician Taylor at bedside. Patient medicated per MAR. NAD. VSS. Family at bedside and call light within reach. Waqar locked in lowest position.

## 2024-12-23 NOTE — ED NOTES
2nd Lactic collected and sent to lab, covid swab conducted and processing, call light within reach, family at bedside

## 2024-12-23 NOTE — THERAPY
Occupational Therapy   Initial Evaluation     Patient Name: Josh Lerner  Age:  69 y.o., Sex:  male  Medical Record #: [id number]  Today's Date: 12/23/2024     Precautions: Fall Risk  Comments: Colostomy and urostomy    Assessment  Patient is 69 y.o. male admitted with flu like symptoms, hematuria, and abdominal pain. UA positive for UTI. PMHx of distal rectal cancer s/p colostomy and urostomy.  Pt seen for OT eval and tx. Pt currently resides with his wife in 1-story house and was independent with ADLs and IADLs.    During OT eval, pt presented with deficits in self-care tasks, balance, functional mobility, strength, and activity tolerance. He required CGA-min A to complete ADLs, functional mobility, and txfs using FWW. Pt was provided education on role of acute OT, home safety, compensatory strategies to safely complete ADLs, and importance of OOB ADLs to reduce risk of deconditioning. Currently recommend home health for further OT services with increased support from family. Will continue to follow for ongoing acute OT services.    Plan    Occupational Therapy Initial Treatment Plan   Treatment Interventions: Self Care / Activities of Daily Living, Adaptive Equipment, Therapeutic Exercises, Therapeutic Activity, Family / Caregiver Training  Treatment Frequency: 4 Times per Week  Duration: Until Therapy Goals Met    DC Equipment Recommendations: Unable to determine at this time  Discharge Recommendations: Recommend home health for continued occupational therapy services (With increased support from family)      Objective    Prior Living Situation   Prior Services Home-Independent   Housing / Facility 1 Story [place]   Steps Into Home 1   Steps In Home 0   Bathroom Set up Bathtub / Shower Combination;Shower Chair   Equipment Owned Front-Wheel Walker;Single Point Cane;Tub / Shower Seat   Lives with - Patient's Self Care Capacity Spouse   Comments Pt reports having a very supportive family who are able to provide  assist as needed.   Prior Level of ADL Function   Self Feeding Independent   Grooming / Hygiene Independent   Bathing Independent   Dressing Independent   Toileting Independent   Prior Level of IADL Function   Medication Management Independent   Laundry Independent   Kitchen Mobility Independent   Finances Independent   Home Management Independent   Shopping Independent   Prior Level Of Mobility Independent Without Device in Community;Independent Without Device in Home   Driving / Transportation Driving Independent   Precautions   Precautions Fall Risk   Comments Colostomy and urostomy   Vitals   O2 (LPM) 2.5   O2 Delivery Device Silicone Nasal Cannula   Vitals Comments Denies having O2 needs at baseline   Pain 0 - 10 Group   Therapist Pain Assessment Post Activity Pain Same as Prior to Activity;Nurse Notified  (Not rated, reported an increase in pain in buttocks with activity)   Cognition    Comments Pleasant and cooperative; eager to return home   Active ROM Upper Body   Active ROM Upper Body  WDL   Dominant Hand Right   Comments Observed during functional tasks   Strength Upper Body   Upper Body Strength  X   Gross Strength Generalized Weakness, Equal Bilaterally.    Balance Assessment   Sitting Balance (Static) Fair   Sitting Balance (Dynamic) Fair   Standing Balance (Static) Fair -   Standing Balance (Dynamic) Fair -   Weight Shift Sitting Fair   Weight Shift Standing Fair   Comments w/FWW   Bed Mobility    Supine to Sit Minimal Assist   Sit to Supine   (Up to chair post)   Scooting Contact Guard Assist   Comments HOB elevated   ADL Assessment   Eating Supervision   Grooming Contact Guard Assist;Standing   Upper Body Dressing Minimal Assist  (Gown change)   Lower Body Dressing Maximal Assist   Toileting Contact Guard Assist  (Assist with balance while emptying urostomy bag at toilet)   How much help from another person does the patient currently need...   6 Clicks Daily Activity Score 17   Functional Mobility    Sit to Stand Contact Guard Assist   Bed, Chair, Wheelchair Transfer Contact Guard Assist   Mobility Functional mobility in room w/FWW   Activity Tolerance   Sitting in Chair Up to chair post   Sitting Edge of Bed 6 min   Standing 6 min   Patient / Family Goals   Patient / Family Goal #1 To go home   Short Term Goals   Short Term Goal # 1 Pt will complete ADL txfs with supv   Short Term Goal # 2 Pt will complete LB dressing with supv using AE PRN   Short Term Goal # 3 Pt will complete standing g/h routine with supv   Education Group   Education Provided Home Safety;Role of Occupational Therapist;Activities of Daily Living;Pathology of bedrest   Role of Occupational Therapist Patient Response Patient;Acceptance;Explanation;Verbal Demonstration   Home Safety Patient Response Patient;Acceptance;Explanation;Verbal Demonstration;Reinforcement Needed   ADL Patient Response Patient;Acceptance;Explanation;Verbal Demonstration;Action Demonstration;Reinforcement Needed   Pathology of Bedrest Patient Response Patient;Acceptance;Explanation;Verbal Demonstration;Action Demonstration;Reinforcement Needed

## 2024-12-23 NOTE — ED NOTES
Emu RN @ bedside to take pt to floor. NAD. VSS. Respirations equal and unlabored. All belongings with patient.

## 2024-12-23 NOTE — PROGRESS NOTES
Ostomy care offered but patient refused. Patient stated he wants to wait until his wife to be here. Offered norman bottle to help but patient stated he wants to wait till his wife comes because he wants to use his supplies.   Offered a graduated cylinder to help to process and patient also refused.   Patient will call nursing staff when wife arrives.
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
## 2024-12-23 NOTE — PROGRESS NOTES
Cardiac rhythm: SR/ST  Rate range (shift):     Ectopy: none      RI: 0.15  QRS: 0.09  QT:  0.32

## 2024-12-23 NOTE — ED NOTES
Med rec updated and complete. Allergies reviewed.    Interviewed family ( wife) at bedside.    No outpatient antibiotic use in last 30 days at home.      Last rivaroxaban dose 12/22/24. Pt takes dose in the morning not the evening.      Preferred pharmacy  [facility] = [phone]  [facility] = [phone]

## 2024-12-23 NOTE — PROGRESS NOTES
[facility] Internal Medicine Daily Progress Note    Date of Service  12/23/2024    [facility] Team: R JESSICA Carnes Team   Attending: Sidney Ramires M.d.  Senior Resident: Dr. Montenegro  Intern:  Dr. Mejia  Contact Number: [phone]    Chief Complaint  Josh Lerner is a 69 y.o. male admitted 12/22/2024 with sepsis UTI    Hospital Course  Josh Lerner is a 69 y.o. male who presented 12/22/2024 with sepsis due to ongoing UTI.     Patient has a history of distal rectal adenocarcinoma stage II C s/p chemoRT and surgical resection (cystoprostatectomy) with colostomy and urostomy.  Unfortunately has recurrence and is following with hematology oncology and general surgery outpatient. Recently started seeing Dr Kirby with [facility], called an updated him of this current hospitalization.     In ER, patient tachycardic and hypoxic.  Pertinent labs include mild neutropenia, lactic acid 4 -> 2.7.  UA positive for UTI.  COVID/flu/RSV negative.  CTPA showing no pulmonary embolism, possible GERD, emphysematous changes noted, greater in the apices.  CT abdomen pelvis showing complex mass in the pelvis, compatible with recurrent neoplastic disease, cirrhosis, hepatomegaly.    Patient was fluid resuscitated, started on cefepime, inhalers and pain management therapy.    Interval Problem Update  Patient was tachycardic, tachypneic and with fever overnight, although blood pressure has been stable. Patient mentioned that he is feeling better than before coming to the hospital. Found to be sweaty on my examination. He was complaining of having his colostomy bag full, but refused the nurse's help and was waiting on his wife to come to the hospital with the supplies to change it.    Patient denies urinary symptoms as he has a urostomy bag draining yellow urine.    WBC went up from 3.3 to 19, we will continue to monitor and adjust treatment accordingly. Lactic acidosis resolved. I let Dr. Kirby know about the patient being hospitalized.  Discontinued IVF due to patient euvolemic. Started on albuterol and patient is on 2.5 L O2.    I have discussed this patient's plan of care and discharge plan at IDT rounds today with Case Management, Nursing, Nursing leadership, and other members of the IDT team.    Consultants/Specialty  None    Code Status  Full Code    Disposition  The patient is not medically cleared for discharge to home or a post-acute facility.      I have placed the appropriate orders for post-discharge needs.    Review of Systems  Review of Systems   Constitutional:  Positive for chills and fever.   HENT: Negative.     Eyes: Negative.    Respiratory: Negative.     Cardiovascular: Negative.    Gastrointestinal: Negative.    Genitourinary: Negative.    Musculoskeletal:  Positive for back pain.   Skin: Negative.    Neurological: Negative.    Endo/Heme/Allergies: Negative.    Psychiatric/Behavioral: Negative.          Physical Exam  Temp:  [35.9 °C (96.6 °F)-39.1 °C (102.4 °F)] 36.6 °C (97.9 °F)  Pulse:  [] 85  Resp:  [13-22] 18  BP: ()/(45-71) 90/45  SpO2:  [88 %-94 %] 93 %    Physical Exam  Constitutional:       Appearance: Normal appearance. He is obese.   HENT:      Head: Normocephalic.      Nose: Nose normal.      Mouth/Throat:      Mouth: Mucous membranes are moist.      Comments: Poor dentition  Mallampati II  Eyes:      Pupils: Pupils are equal, round, and reactive to light.   Cardiovascular:      Rate and Rhythm: Regular rhythm. Tachycardia present.      Pulses: Normal pulses.   Pulmonary:      Effort: Pulmonary effort is normal.      Breath sounds: Normal breath sounds.   Abdominal:      General: Abdomen is flat. Bowel sounds are normal.      Palpations: Abdomen is soft.      Comments: Right lower quadrant urostomy bag noted  Left lower quadrant colostomy bag noted   Musculoskeletal:         General: Normal range of motion.      Cervical back: Neck supple.   Skin:     General: Skin is warm.   Neurological:      General:  No focal deficit present.      Mental Status: He is alert and oriented to person, place, and time. Mental status is at baseline.   Psychiatric:         Mood and Affect: Mood normal.         Behavior: Behavior normal.         Thought Content: Thought content normal.         Judgment: Judgment normal.         Fluids    Intake/Output Summary (Last 24 hours) at 12/23/2024 1326  Last data filed at 12/23/2024 0427  Gross per 24 hour   Intake 2500 ml   Output 900 ml   Net 1600 ml       Laboratory  Recent Labs     12/22/24  1645 12/23/24  0255   WBC 3.3* 19.7*   RBC 4.30* 3.65*   HEMOGLOBIN 11.2* 9.3*   HEMATOCRIT 36.0* 30.5*   MCV 83.7 83.6   MCH 26.0* 25.5*   MCHC 31.1* 30.5*   RDW 51.3* 51.8*   PLATELETCT 277 228   MPV 9.3 9.3     Recent Labs     12/22/24  1645 12/23/24  0255   SODIUM 136 131*   POTASSIUM 4.2 3.7   CHLORIDE 101 104   CO2 17* 16*   GLUCOSE 137* 133*   BUN 19 18   CREATININE 0.94 0.96   CALCIUM 8.1* 7.4*                   Imaging  CT-ABDOMEN-PELVIS WITH   Final Result         1.  Complex mass in the pelvis, appearance most compatible with recurrent neoplastic disease.   2.  Hepatomegaly   3.  Irregular hepatic contour favoring changes of cirrhosis   4.  Cholelithiasis   5.  Small fat-containing bilateral inguinal hernias   6.  Atherosclerosis and atherosclerotic coronary artery disease      CT-CTA CHEST PULMONARY ARTERY W/ RECONS   Final Result         1.  No pulmonary embolus appreciated.   2.  Fluid-filled distention of esophagus, consider gastroesophageal reflux or esophageal dysmotility. Could be followed up with esophageal swallow study.   3.  Emphysematous changes, greatest in the apices.      DX-CHEST-PORTABLE (1 VIEW)   Final Result         Hazy left basilar opacity, likely atelectasis.      EC-ECHOCARDIOGRAM COMPLETE W/O CONT    (Results Pending)         Assessment/Plan  Problem Representation:    69 years old man admitted with UTI sepsis. PMHx of rectal adenocarcinoma stage IIC s/p chemoRT and  surgical resection with urostomy and colostomy bags, unfortunately with local recurrence and [facility]. Currently on cefepime and clinically improving. Given hypoxia will work up for CHF.    * Sepsis (HCC)- (present on admission)  Assessment & Plan  This is Sepsis Present on admission  SIRS criteria identified on my evaluation include: Tachycardia, with heart rate greater than 90 BPM, Tachypnea, with respirations greater than 20 per minute, and Leukopenia, with WBC less than 4,000  Clinical indicators of end organ dysfunction include Lactic Acid greater than 2  Source is UTI  Sepsis protocol initiated  Crystalloid Fluid Administration: Fluid resuscitation ordered per standard protocol - 30 mL/kg per current or ideal body weight  IV antibiotics as appropriate for source of sepsis  Reassessment: I have reassessed the patient's hemodynamic status    Patient was fluid ressucitated  Lactic acid trended 4.0 > 3.0 > 1.5  Antibiotic: Cefepime started given comorbidity    ACP (advance care planning)  Assessment & Plan  16 minutes spent discussing goals of care with patient and wife at bedside.  I explained to them the processes of CPR, which can include chest compression and intubation.  Patient states that he would like to be full code and to have everything done.    DVT (deep venous thrombosis) ([facility])  Assessment & Plan  History of DVT LLE.  Continue Xarelto    Hypoxia  Assessment & Plan  Because of hypoxia not clear at this time, patient tachypneic but lungs are clear on auscultation  Emphysema noted, patient had a heavy smoking history but quit a year ago  COVID swab negative, CTPA negative for pneumonia and pulmonary embolism  Viral respiratory panel negative, on cefepime for UTI  Echocardiogram pending  Continue oxygen as needed    Malignant neoplasm of rectum (HCC)- (present on admission)  Assessment & Plan  Following with oncology and general surgery outpatient  Has received  chemotherapy and radiation in the past and cystoprostatectomy with urostomy and colostomy  Still has mass noted, not on treatment at this time. Patient wearing diaper and having a dark discharge (feces/bleeding?)  Next appointment with general surgery on January 8  Dr Kirby aware of current hospitalization         VTE prophylaxis: Xarelto 10 mg daily as prophylaxis    I have performed a physical exam and reviewed and updated ROS and Plan today (12/23/2024). In review of yesterday's note (12/22/2024), there are no changes except as documented above.

## 2024-12-24 ENCOUNTER — APPOINTMENT (OUTPATIENT)
Dept: CARDIOLOGY | Facility: MEDICAL CENTER | Age: 69
End: 2024-12-24
Attending: STUDENT IN AN ORGANIZED HEALTH CARE EDUCATION/TRAINING PROGRAM
Payer: MEDICARE

## 2024-12-24 LAB
ALBUMIN SERPL BCP-MCNC: 2.5 G/DL (ref 3.2–4.9)
ALBUMIN/GLOB SERPL: 0.7 G/DL
ALP SERPL-CCNC: 136 U/L (ref 30–99)
ALT SERPL-CCNC: 28 U/L (ref 2–50)
ANION GAP SERPL CALC-SCNC: 10 MMOL/L (ref 7–16)
AST SERPL-CCNC: 23 U/L (ref 12–45)
BACTERIA UR CULT: NORMAL
BASOPHILS # BLD AUTO: 0.2 % (ref 0–1.8)
BASOPHILS # BLD: 0.03 K/UL (ref 0–0.12)
BILIRUB SERPL-MCNC: 0.2 MG/DL (ref 0.1–1.5)
BUN SERPL-MCNC: 19 MG/DL (ref 8–22)
CALCIUM ALBUM COR SERPL-MCNC: 9.1 MG/DL (ref 8.5–10.5)
CALCIUM SERPL-MCNC: 7.9 MG/DL (ref 8.5–10.5)
CHLORIDE SERPL-SCNC: 108 MMOL/L (ref 96–112)
CO2 SERPL-SCNC: 20 MMOL/L (ref 20–33)
CREAT SERPL-MCNC: 0.87 MG/DL (ref 0.5–1.4)
EOSINOPHIL # BLD AUTO: 0.2 K/UL (ref 0–0.51)
EOSINOPHIL NFR BLD: 1.4 % (ref 0–6.9)
ERYTHROCYTE [DISTWIDTH] IN BLOOD BY AUTOMATED COUNT: 51.5 FL (ref 35.9–50)
GFR SERPLBLD CREATININE-BSD FMLA CKD-EPI: 93 ML/MIN/1.73 M 2
GLOBULIN SER CALC-MCNC: 3.7 G/DL (ref 1.9–3.5)
GLUCOSE SERPL-MCNC: 116 MG/DL (ref 65–99)
HCT VFR BLD AUTO: 29.4 % (ref 42–52)
HGB BLD-MCNC: 8.8 G/DL (ref 14–18)
IMM GRANULOCYTES # BLD AUTO: 0.17 K/UL (ref 0–0.11)
IMM GRANULOCYTES NFR BLD AUTO: 1.1 % (ref 0–0.9)
LV EJECT FRACT MOD 2C 99903: 52.49
LV EJECT FRACT MOD 4C 99902: 59.03
LV EJECT FRACT MOD BP 99901: 56.06
LYMPHOCYTES # BLD AUTO: 0.72 K/UL (ref 1–4.8)
LYMPHOCYTES NFR BLD: 4.9 % (ref 22–41)
MCH RBC QN AUTO: 25.6 PG (ref 27–33)
MCHC RBC AUTO-ENTMCNC: 29.9 G/DL (ref 32.3–36.5)
MCV RBC AUTO: 85.5 FL (ref 81.4–97.8)
MONOCYTES # BLD AUTO: 0.75 K/UL (ref 0–0.85)
MONOCYTES NFR BLD AUTO: 5.1 % (ref 0–13.4)
NEUTROPHILS # BLD AUTO: 12.94 K/UL (ref 1.82–7.42)
NEUTROPHILS NFR BLD: 87.3 % (ref 44–72)
NRBC # BLD AUTO: 0 K/UL
NRBC BLD-RTO: 0 /100 WBC (ref 0–0.2)
PLATELET # BLD AUTO: 188 K/UL (ref 164–446)
PMV BLD AUTO: 9.7 FL (ref 9–12.9)
POTASSIUM SERPL-SCNC: 4.1 MMOL/L (ref 3.6–5.5)
PROT SERPL-MCNC: 6.2 G/DL (ref 6–8.2)
RBC # BLD AUTO: 3.44 M/UL (ref 4.7–6.1)
SIGNIFICANT IND 70042: NORMAL
SITE SITE: NORMAL
SODIUM SERPL-SCNC: 138 MMOL/L (ref 135–145)
SOURCE SOURCE: NORMAL
WBC # BLD AUTO: 14.8 K/UL (ref 4.8–10.8)

## 2024-12-24 PROCEDURE — A9270 NON-COVERED ITEM OR SERVICE: HCPCS

## 2024-12-24 PROCEDURE — 93306 TTE W/DOPPLER COMPLETE: CPT | Mod: 26 | Performed by: STUDENT IN AN ORGANIZED HEALTH CARE EDUCATION/TRAINING PROGRAM

## 2024-12-24 PROCEDURE — 770020 HCHG ROOM/CARE - TELE (206)

## 2024-12-24 PROCEDURE — 93306 TTE W/DOPPLER COMPLETE: CPT

## 2024-12-24 PROCEDURE — 700105 HCHG RX REV CODE 258: Performed by: STUDENT IN AN ORGANIZED HEALTH CARE EDUCATION/TRAINING PROGRAM

## 2024-12-24 PROCEDURE — 80053 COMPREHEN METABOLIC PANEL: CPT

## 2024-12-24 PROCEDURE — 700102 HCHG RX REV CODE 250 W/ 637 OVERRIDE(OP): Performed by: STUDENT IN AN ORGANIZED HEALTH CARE EDUCATION/TRAINING PROGRAM

## 2024-12-24 PROCEDURE — 85025 COMPLETE CBC W/AUTO DIFF WBC: CPT

## 2024-12-24 PROCEDURE — 700111 HCHG RX REV CODE 636 W/ 250 OVERRIDE (IP): Mod: JZ | Performed by: STUDENT IN AN ORGANIZED HEALTH CARE EDUCATION/TRAINING PROGRAM

## 2024-12-24 PROCEDURE — 97535 SELF CARE MNGMENT TRAINING: CPT

## 2024-12-24 PROCEDURE — 99233 SBSQ HOSP IP/OBS HIGH 50: CPT | Mod: GC | Performed by: INTERNAL MEDICINE

## 2024-12-24 PROCEDURE — 700102 HCHG RX REV CODE 250 W/ 637 OVERRIDE(OP)

## 2024-12-24 PROCEDURE — 97162 PT EVAL MOD COMPLEX 30 MIN: CPT

## 2024-12-24 PROCEDURE — 36415 COLL VENOUS BLD VENIPUNCTURE: CPT

## 2024-12-24 PROCEDURE — A9270 NON-COVERED ITEM OR SERVICE: HCPCS | Performed by: STUDENT IN AN ORGANIZED HEALTH CARE EDUCATION/TRAINING PROGRAM

## 2024-12-24 RX ORDER — CALCIUM CARBONATE 500 MG/1
500 TABLET, CHEWABLE ORAL 4 TIMES DAILY PRN
Status: DISCONTINUED | OUTPATIENT
Start: 2024-12-24 | End: 2024-12-30 | Stop reason: HOSPADM

## 2024-12-24 RX ORDER — CALCIUM CARBONATE 500 MG/1
1000 TABLET, CHEWABLE ORAL 4 TIMES DAILY PRN
Status: DISCONTINUED | OUTPATIENT
Start: 2024-12-24 | End: 2024-12-24

## 2024-12-24 RX ADMIN — RIVAROXABAN 20 MG: 20 TABLET, FILM COATED ORAL at 17:17

## 2024-12-24 RX ADMIN — OXYCODONE 5 MG: 5 TABLET ORAL at 15:52

## 2024-12-24 RX ADMIN — ALBUTEROL SULFATE 2 PUFF: 90 AEROSOL, METERED RESPIRATORY (INHALATION) at 17:17

## 2024-12-24 RX ADMIN — ALBUTEROL SULFATE 2 PUFF: 90 AEROSOL, METERED RESPIRATORY (INHALATION) at 20:40

## 2024-12-24 RX ADMIN — ANTACID TABLETS 500 MG: 500 TABLET, CHEWABLE ORAL at 21:10

## 2024-12-24 RX ADMIN — ONDANSETRON 4 MG: 2 INJECTION INTRAMUSCULAR; INTRAVENOUS at 22:30

## 2024-12-24 RX ADMIN — CEFEPIME 2 G: 2 INJECTION, POWDER, FOR SOLUTION INTRAVENOUS at 04:27

## 2024-12-24 RX ADMIN — ALBUTEROL SULFATE 2 PUFF: 90 AEROSOL, METERED RESPIRATORY (INHALATION) at 15:22

## 2024-12-24 RX ADMIN — CEFEPIME 2 G: 2 INJECTION, POWDER, FOR SOLUTION INTRAVENOUS at 17:19

## 2024-12-24 RX ADMIN — ALBUTEROL SULFATE 2 PUFF: 90 AEROSOL, METERED RESPIRATORY (INHALATION) at 11:21

## 2024-12-24 RX ADMIN — OXYCODONE 5 MG: 5 TABLET ORAL at 04:21

## 2024-12-24 ASSESSMENT — ENCOUNTER SYMPTOMS
GASTROINTESTINAL NEGATIVE: 1
NEUROLOGICAL NEGATIVE: 1
CARDIOVASCULAR NEGATIVE: 1
RESPIRATORY NEGATIVE: 1
CHILLS: 1
PSYCHIATRIC NEGATIVE: 1
FEVER: 1
EYES NEGATIVE: 1
BACK PAIN: 1

## 2024-12-24 ASSESSMENT — GAIT ASSESSMENTS
DISTANCE (FEET): 400
DEVIATION: BRADYKINETIC;SHUFFLED GAIT
GAIT LEVEL OF ASSIST: STANDBY ASSIST

## 2024-12-24 ASSESSMENT — COGNITIVE AND FUNCTIONAL STATUS - GENERAL
PERSONAL GROOMING: A LITTLE
DRESSING REGULAR LOWER BODY CLOTHING: A LOT
DRESSING REGULAR UPPER BODY CLOTHING: A LITTLE
TOILETING: A LITTLE
DAILY ACTIVITIY SCORE: 17
CLIMB 3 TO 5 STEPS WITH RAILING: A LITTLE
STANDING UP FROM CHAIR USING ARMS: A LOT
MOBILITY SCORE: 15
SUGGESTED CMS G CODE MODIFIER MOBILITY: CJ
STANDING UP FROM CHAIR USING ARMS: A LITTLE
MOVING TO AND FROM BED TO CHAIR: A LITTLE
MOVING FROM LYING ON BACK TO SITTING ON SIDE OF FLAT BED: A LITTLE
CLIMB 3 TO 5 STEPS WITH RAILING: A LOT
SUGGESTED CMS G CODE MODIFIER MOBILITY: CK
TURNING FROM BACK TO SIDE WHILE IN FLAT BAD: A LITTLE
WALKING IN HOSPITAL ROOM: A LITTLE
MOVING TO AND FROM BED TO CHAIR: A LITTLE
SUGGESTED CMS G CODE MODIFIER DAILY ACTIVITY: CK
HELP NEEDED FOR BATHING: A LOT
WALKING IN HOSPITAL ROOM: A LOT
MOBILITY SCORE: 20

## 2024-12-24 ASSESSMENT — FIBROSIS 4 INDEX: FIB4 SCORE: 1.6

## 2024-12-24 ASSESSMENT — PAIN DESCRIPTION - PAIN TYPE
TYPE: ACUTE PAIN

## 2024-12-24 NOTE — CARE PLAN
The patient is Stable - Low risk of patient condition declining or worsening    Shift Goals  Clinical Goals: wound care consult, comfort, abx, wound/skin care  Patient Goals: rest, update POC, comfort  Family Goals: DIDIER    Progress made toward(s) clinical / shift goals:    Problem: Pain - Standard  Goal: Alleviation of pain or a reduction in pain to the patient’s comfort goal  Description: Target End Date:  Prior to discharge or change in level of care    Document on Vitals flowsheet    1.  Document pain using the appropriate pain scale per order or unit policy  2.  Educate and implement non-pharmacologic comfort measures (i.e. relaxation, distraction, massage, cold/heat therapy, etc.)  3.  Pain management medications as ordered  4.  Reassess pain after pain med administration per policy  5.  If opiods administered assess patient's response to pain medication is appropriate per POSS sedation scale  6.  Follow pain management plan developed in collaboration with patient and interdisciplinary team (including palliative care or pain specialists if applicable)  Outcome: Progressing  Note: Assess pain per unit standard and as needed  Provide comfort measures as appropriate; see flowsheet  Administer medications as ordered       Problem: Hemodynamics  Goal: Patient's hemodynamics, fluid balance and neurologic status will be stable or improve  Description: Target End Date:  Prior to discharge or change in level of care    Document on Assessment and I/O flowsheet templates    1.  Monitor vital signs, pulse oximetry and cardiac monitor per provider order and/or policy  2.  Maintain blood pressure per provider order  3.  Hemodynamic monitoring per provider order  4.  Manage IV fluids and IV infusions  5.  Monitor intake and output  6.  Daily weights per unit policy or provider order  7.  Assess peripheral pulses and capillary refill  8.  Assess color and body temperature  9.  Position patient for maximum circulation/cardiac  output  10. Monitor for signs/symptoms of excessive bleeding  11. Assess mental status, restlessness and changes in level of consciousness  12. Monitor temperature and report fever or hypothermia to provider immediately. Consideration of targeted temperature management.  Outcome: Progressing  Note: Monitor VS, respiratory status, and I&O  Assess circulation in all extremities  Assess LOC  Report abnormal findings to provider       Problem: Fluid Volume  Goal: Fluid volume balance will be maintained  Description: Target End Date:  Prior to discharge or change in level of care    Document on I/O flowsheet    1.  Monitor intake and output as ordered  2.  Promote oral intake as appropriate  3.  Report inadequate intake or output to physician  4.  Administer IV therapy as ordered  5.  Weights per provider order  6.  Assess for signs and symptoms of bleeding  7.  Monitor for signs of fluid overload (respiratory changes, edema, weight gain, increased abdominal girth)  8.  Monitor of signs for inadequate fluid volume (poor skin turgor, dry mucous membranes)  9.  Instruct patient on adherence to fluid restrictions  Outcome: Progressing  Note: I&O monitored and documented per unit standard and orders  Daily weight documented  Lower extremities elevated       Problem: Fall Risk  Goal: Patient will remain free from falls  Description: Target End Date:  Prior to discharge or change in level of care    Document interventions on the [facility]giovanna Morris Fall Risk Assessment    1.  Assess for fall risk factors  2.  Implement fall precautions  Outcome: Progressing  Note: Fall risk assessment   Fall precautions in place per unit standard and protocol; see flowsheet       Problem: Knowledge Deficit - Standard  Goal: Patient and family/care givers will demonstrate understanding of plan of care, disease process/condition, diagnostic tests and medications  Description: Target End Date:  1-3 days or as soon as patient condition allows    Document  in Patient Education    1.  Patient and family/caregiver oriented to unit, equipment, visitation policy and means for communicating concern  2.  Complete/review Learning Assessment  3.  Assess knowledge level of disease process/condition, treatment plan, diagnostic tests and medications  4.  Explain disease process/condition, treatment plan, diagnostic tests and medications  Outcome: Progressing  Note: Discuss POC with patient  Address questions and concerns, escalate as appropriate  Check for pt understanding of POC  Reinforce education as needed     Problem: Infection  Goal: Will remain free from infection  Outcome: Progressing  Note: Standard precautions in place  Hand hygiene performed per unit standard and as needed         Patient is not progressing towards the following goals:

## 2024-12-24 NOTE — DISCHARGE PLANNING
Care Transition Team Assessment    LMSW met with pt at bedside. Pt was willing to participate in assessment.  LMSW Introduced self and department roles. Pt A&Ox4 and able to verify the information on the face sheet. Pt lives with his wife in a single-story house that has one step to enter. Prior to this hospitalization pt was independent at home with most ADLs and most IADLs. PCP is Christos Sandoval MD and preferred pharmacy [facility] to beds.  Pt does not use any DME at baseline and states his wife and adult children are his support system. Pt has had home oxygen therapy but does not recall the provider.  Pt is retired and receives SSI monthly deposit pt could not recall amount. Pt denies any SA or MH concerns. Pt does not have an advance directive. Pt family will transport pt home.     Information Source  Orientation Level: Oriented X4  Information Given By: Patient  Informant's Name: Su Lerner  Who is responsible for making decisions for patient? : Patient    Readmission Evaluation  Is this a readmission?: No    Elopement Risk  Legal Hold: No  Ambulatory or Self Mobile in Wheelchair: No-Not an Elopement Risk  Elopement Risk: Not at Risk for Elopement    Interdisciplinary Discharge Planning  Lives with - Patient's Self Care Capacity: Spouse  Patient or legal guardian wants to designate a caregiver: No  Support Systems: Spouse / Significant Other, Family Member(s)  Housing / Facility: 1 Story House  Prior Services: Home-Independent    Discharge Preparedness  What is your plan after discharge?: Home with help  What are your discharge supports?: Spouse, Child  Prior Functional Level: Ambulatory  Difficulity with ADLs: None  Difficulity with IADLs: Driving, Keeping track of finances    Functional Assesment  Prior Functional Level: Ambulatory    Finances  Financial Barriers to Discharge: No  Prescription Coverage: Yes    Vision / Hearing Impairment  Vision Impairment : Yes  Right Eye Vision: Impaired, Other  (Comments) (cataract)  Hearing Impairment : Yes  Does Pt Need Special Equipment for the Hearing Impaired?: No    Advance Directive  Advance Directive?: None    Domestic Abuse  Have you ever been the victim of abuse or violence?: No  Possible Abuse/Neglect Reported to:: Not Applicable    Psychological Assessment  History of Substance Abuse: None  History of Psychiatric Problems: No    Discharge Risks or Barriers  Discharge risks or barriers?: Complex medical needs  Patient risk factors: Vulnerable adult    Anticipated Discharge Information  Discharge Disposition: D/T to home under HHA care in anticipation of covered skilled care (06)  Discharge Address: [address] [place] [id number]

## 2024-12-24 NOTE — CARE PLAN
The patient is Stable - Low risk of patient condition declining or worsening    Shift Goals  Clinical Goals: ABX  Patient Goals: rest  Family Goals: DIDIER    Progress made toward(s) clinical / shift goals:    Problem: Urinary - Renal Perfusion  Goal: Ability to achieve and maintain adequate renal perfusion and functioning will improve  Description: Target End Date:  Prior to discharge or change in level of care    Document on I/O and Assessment flowsheet    1.  Urine output will remain greater than 0.5ml/Kg/HR  2.  Monitor amount and/or characteristics of urine per order/policy. Specific gravity per order/policy  3.  Assess signs and symptoms of renal dysfunction  Outcome: Progressing  Note: Pt with urostomy in place draining to clear drainage bag. Clear yellow urine noted, no foul smell.

## 2024-12-24 NOTE — PROGRESS NOTES
[facility] Internal Medicine Daily Progress Note    Date of Service  12/24/2024    R Team: R JESSICA Carnes Team   Attending: Jayant Starks M.d.  Senior Resident: Dr. Montenegro  Intern:  Dr. Mejia  Contact Number: [phone]    Chief Complaint  Josh Lerner is a 69 y.o. male admitted 12/22/2024 with sepsis UTI    Hospital Course  Josh Lerner is a 69 y.o. male who presented 12/22/2024 with sepsis due to ongoing UTI.     Patient has a history of distal rectal adenocarcinoma stage II C s/p chemoRT and surgical resection (cystoprostatectomy) with colostomy and urostomy.  Unfortunately has recurrence and is following with hematology oncology and general surgery outpatient. Recently started seeing Dr Kirby with [facility], called an updated him of this current hospitalization.     In ER, patient tachycardic and hypoxic.  Pertinent labs include mild neutropenia, lactic acid 4 -> 2.7.  UA positive for UTI.  COVID/flu/RSV negative.  CTPA showing no pulmonary embolism, possible GERD, emphysematous changes noted, greater in the apices.  CT abdomen pelvis showing complex mass in the pelvis, compatible with recurrent neoplastic disease, cirrhosis, hepatomegaly.    Patient was fluid resuscitated, started on cefepime, inhalers and pain management therapy. Given concerns of patient having another primary infection in the GI tract/pelvis with the unclear CT abdomen/pelvis results and an ongoing possible fistula with discharge of serosanguinous/fecal matter contacted Dr Kirby again for better understanding of current oncologic treatment. Patient is on maintenance chemotherapy, but recommended not to have abdominal surgery as bevacizumab impacts on wound healing unless absolutely necessary. Although WBC trending down patient still requires oxygen supplementation. Goals of care defer as patient currently on treatment for cancer.    Interval Problem Update  No overnight events.  Vital has been stable although the  patient is still on 2.5 L of oxygen nasal cannula.  Urine bag shows clear urine much better than yesterday.  Encouraged patient to work with the physical therapy team and to work with the nurses for ostomy cares. Encouraged to do incentive spirometry every hour.    Patient does not feel short of breath, does not have chest pain or palpitations.  The patient has no concerns.  Blood cultures are positive for Staphylococcus species.  Sensitivity pending.    Because of concerns of the patient having another primary infection in the GI tract as he has been having discharge of serosanguineous/fecal matter near the anus and CT scan is unclear if the patient has ongoing fluid collection we contacted Dr. Kirby for further guidance with regards to possible treatments and prognosis.  He mentioned that because the patient is on bevacizumab he would not recommend optional abdominal surgeries because of the impact on wound healing.  He mentioned that he is on maintenance chemotherapy as he has been responding well in the past.      I have discussed this patient's plan of care and discharge plan at IDT rounds today with Case Management, Nursing, Nursing leadership, and other members of the IDT team.    Consultants/Specialty  Oncology    Code Status  Full Code    Disposition  The patient is not medically cleared for discharge to home or a post-acute facility.      I have placed the appropriate orders for post-discharge needs.    Review of Systems  Review of Systems   Constitutional:  Positive for chills and fever.   HENT: Negative.     Eyes: Negative.    Respiratory: Negative.     Cardiovascular: Negative.    Gastrointestinal: Negative.    Genitourinary: Negative.    Musculoskeletal:  Positive for back pain.   Skin: Negative.    Neurological: Negative.    Endo/Heme/Allergies: Negative.    Psychiatric/Behavioral: Negative.          Physical Exam  Temp:  [36.5 °C (97.7 °F)-36.6 °C (97.9 °F)] 36.5 °C (97.7 °F)  Pulse:  [80-91]  90  Resp:  [17-18] 18  BP: ()/(54-68) 115/65  SpO2:  [92 %-98 %] 92 %    Physical Exam  Constitutional:       Appearance: Normal appearance. He is obese.   HENT:      Head: Normocephalic.      Nose: Nose normal.      Mouth/Throat:      Mouth: Mucous membranes are moist.      Comments: Poor dentition  Mallampati II  Eyes:      Pupils: Pupils are equal, round, and reactive to light.   Cardiovascular:      Rate and Rhythm: Normal rate and regular rhythm.      Pulses: Normal pulses.   Pulmonary:      Effort: Pulmonary effort is normal.      Breath sounds: Normal breath sounds.   Abdominal:      General: Abdomen is flat. Bowel sounds are normal.      Palpations: Abdomen is soft.      Comments: Right lower quadrant urostomy bag noted  Left lower quadrant colostomy bag noted   Musculoskeletal:         General: Normal range of motion.      Cervical back: Neck supple.   Skin:     General: Skin is warm.   Neurological:      General: No focal deficit present.      Mental Status: He is alert and oriented to person, place, and time. Mental status is at baseline.   Psychiatric:         Mood and Affect: Mood normal.         Behavior: Behavior normal.         Thought Content: Thought content normal.         Judgment: Judgment normal.         Fluids    Intake/Output Summary (Last 24 hours) at 12/24/2024 1551  Last data filed at 12/24/2024 1500  Gross per 24 hour   Intake 920 ml   Output 2325 ml   Net -1405 ml       Laboratory  Recent Labs     12/23/24  0255 12/23/24  1758 12/24/24  0022   WBC 19.7* 14.5* 14.8*   RBC 3.65* 3.36* 3.44*   HEMOGLOBIN 9.3* 9.0* 8.8*   HEMATOCRIT 30.5* 28.2* 29.4*   MCV 83.6 83.9 85.5   MCH 25.5* 26.8* 25.6*   MCHC 30.5* 31.9* 29.9*   RDW 51.8* 50.5* 51.5*   PLATELETCT 228 182 188   MPV 9.3 9.4 9.7     Recent Labs     12/22/24  1645 12/23/24  0255 12/24/24  0022   SODIUM 136 131* 138   POTASSIUM 4.2 3.7 4.1   CHLORIDE 101 104 108   CO2 17* 16* 20   GLUCOSE 137* 133* 116*   BUN 19 18 19   CREATININE  0.94 0.96 0.87   CALCIUM 8.1* 7.4* 7.9*                   Imaging  CT-ABDOMEN-PELVIS WITH   Final Result         1.  Complex mass in the pelvis, appearance most compatible with recurrent neoplastic disease.   2.  Hepatomegaly   3.  Irregular hepatic contour favoring changes of cirrhosis   4.  Cholelithiasis   5.  Small fat-containing bilateral inguinal hernias   6.  Atherosclerosis and atherosclerotic coronary artery disease      CT-CTA CHEST PULMONARY ARTERY W/ RECONS   Final Result         1.  No pulmonary embolus appreciated.   2.  Fluid-filled distention of esophagus, consider gastroesophageal reflux or esophageal dysmotility. Could be followed up with esophageal swallow study.   3.  Emphysematous changes, greatest in the apices.      DX-CHEST-PORTABLE (1 VIEW)   Final Result         Hazy left basilar opacity, likely atelectasis.      EC-ECHOCARDIOGRAM COMPLETE W/O CONT    (Results Pending)         Assessment/Plan  Problem Representation:    * Sepsis (HCC)- (present on admission)  Assessment & Plan  This is Sepsis Present on admission  SIRS criteria identified on my evaluation include: Tachycardia, with heart rate greater than 90 BPM, Tachypnea, with respirations greater than 20 per minute, and Leukopenia, with WBC less than 4,000  Clinical indicators of end organ dysfunction include Lactic Acid greater than 2  Source is UTI  Sepsis protocol initiated  Crystalloid Fluid Administration: Fluid resuscitation ordered per standard protocol - 30 mL/kg per current or ideal body weight  IV antibiotics as appropriate for source of sepsis  Reassessment: I have reassessed the patient's hemodynamic status    Patient was fluid ressucitated  Lactic acid trended 4.0 > 3.0 > 1.5  Antibiotic: Cefepime started given comorbidity  -Sepsis resolved    ACP (advance care planning)  Assessment & Plan  16 minutes spent discussing goals of care with patient and wife at bedside.  I explained to them the processes of CPR, which can include  chest compression and intubation.  Patient states that he would like to be full code and to have everything done.    Patient ongoing maintenance chemo as outpatient.      DVT (deep venous thrombosis) (HCC)  Assessment & Plan  History of DVT LLE.  Continue Xarelto    Hypoxia  Assessment & Plan  Because of hypoxia not clear at this time, patient tachypneic but lungs are clear on auscultation  Emphysema noted, patient had a heavy smoking history but quit a year ago  COVID swab negative, CTPA negative for pneumonia and pulmonary embolism  Viral respiratory panel negative, on cefepime for UTI  Echocardiogram pending  Continue oxygen as needed    Malignant neoplasm of rectum (HCC)- (present on admission)  Assessment & Plan  Following with oncology and general surgery outpatient  Has received chemotherapy and radiation in the past and cystoprostatectomy with urostomy and colostomy  Still has mass noted, not on treatment at this time. Patient wearing diaper and having a dark discharge (feces/bleeding?)  Next appointment with general surgery on January 8  Dr Kirby aware of current hospitalization. He mentioned that because the patient is on bevacizumab he would not recommend optional abdominal surgeries because of the impact on wound healing.  He mentioned that he is on maintenance chemotherapy as he has been responding well in the past.         VTE prophylaxis: Xarelto 10 mg daily as prophylaxis    I have performed a physical exam and reviewed and updated ROS and Plan today (12/24/2024). In review of yesterday's note (12/23/2024), there are no changes except as documented above.

## 2024-12-24 NOTE — DISCHARGE PLANNING
Case Management Discharge Planning    Admission Date: 12/22/2024  GMLOS: 3.5  ALOS: 1    6-Clicks ADL Score: 17  6-Clicks Mobility Score:    PT and/or OT Eval ordered: Yes  Post-acute Referrals Ordered: No  Post-acute Choice Obtained: NA  Has referral(s) been sent to post-acute provider:  NA    Anticipated Discharge Dispo:      DME Needed: Yes    DME Ordered: No    Action(s) Taken: OTHER    Case discussed in IDT rounds pt will need Home 02, choice faxed to [facility] need order.    Escalations Completed: None    Medically Clear: No    Next Steps: Home 02 order and delivery    Barriers to Discharge: Medical clearance, home 02

## 2024-12-24 NOTE — THERAPY
"Physical Therapy   Initial Evaluation     Patient Name: Josh Lerner  Age:  69 y.o., Sex:  male  Medical Record #: [id number]  Today's Date: 12/24/2024     Precautions  Precautions: Fall Risk  Comments: colostomy and urostomy    Assessment  Patient is 69 y.o. male presenting with progressive worsening weakness. Pt found to have sepsis 2/2 UTI. Pt with PMH including distal rectal cancer s/p colostomy and urostomy, chemotherapy. Pt is independent with functional mobility at baseline using no AD. Lives in [place] with wife who can help as needed. Pt also endorsed 5 dtrs who live locally and can help.     During current session, pt presents near functional baseline requiring overall SBA-SPV for mobility as detailed below. Able to walk 400 ft with no AD, mildly unsteady but no LOB. Anticipate that this is pt's baseline gait. Educated pt about benefits of using AD as below, pt receptive but politely declined to trial one. Encouraged pt to continue ambulating to the toilet and in the hallway with nursing as tolerated. Recommend d/c home with HHPT. Pt denies any mobility concerns with d/c home. Patient will not be actively followed for physical therapy services at this time, however may be seen if requested by physician for 1 more visit within 30 days to address any discharge or equipment needs.    Plan    Physical Therapy Initial Treatment Plan   Duration: Discharge Needs Only    DC Equipment Recommendations: None  Discharge Recommendations: Recommend home health for continued physical therapy services       Subjective    Pt received resting in bed, agreeable to participate. \"Sorry for being obstinate yesterday.\"     Objective       12/24/24 0906   Initial Contact Note    Initial Contact Note Order Received and Verified, Evaluation Only - Patient Does Not Require Further Acute Physical Therapy at this Time.  However, May Benefit from Post Acute Therapy for Higher Level Functional Deficits.   Precautions   Precautions Fall " Risk   Comments colostomy and urostomy   Vitals   O2 Delivery Device None - Room Air   Vitals Comments Pt satting 92-94% on RA after ambulation, no SOB. RN notified. Pt denied O2 use at baseline   Pain 0 - 10 Group   Therapist Pain Assessment During Activity;Post Activity Pain Same as Prior to Activity;Nurse Notified  (c/o increased abdominal pain in sitting)   Prior Living Situation   Prior Services Home-Independent   Housing / Facility 1 Story House   Steps Into Home 1   Equipment Owned Front-Wheel Walker;Single Point Cane   Lives with - Patient's Self Care Capacity Spouse   Comments Lives in [place] with wife who can help as needed. Pt also endorsed 5 dtrs who live locally and can help   Prior Level of Functional Mobility   Bed Mobility Independent   Transfer Status Independent   Ambulation Independent   Ambulation Distance community   Assistive Devices Used None   Stairs Independent   Cognition    Cognition / Consciousness WDL   Level of Consciousness Alert   Comments pleasant and cooperative, set in his ways but agreeable   Passive ROM Lower Body   Passive ROM Lower Body WDL   Active ROM Lower Body    Active ROM Lower Body  WDL   Strength Lower Body   Lower Body Strength  X   Gross Strength Generalized Weakness, Equal Bilaterally   Comments functional for ambulation   Sensation Lower Body   Comments no c/o altered sensation BLE   Coordination Lower Body    Coordination Lower Body  X   Comments slow but functional   Balance Assessment   Sitting Balance (Static) Fair +   Sitting Balance (Dynamic) Fair   Standing Balance (Static) Fair   Standing Balance (Dynamic) Fair   Weight Shift Sitting Fair   Weight Shift Standing Fair   Comments no AD   Bed Mobility    Supine to Sit Supervised   Sit to Supine Supervised   Scooting Supervised   Rolling Supervised   Comments HOBE   Gait Analysis   Gait Level Of Assist Standby Assist   Assistive Device None   Distance (Feet) 400   # of Times Distance was Traveled 1   Deviation  "Bradykinetic;Shuffled Gait   # of Stairs Climbed 0   Comments mildly unsteady, pt endorsed that this is his normal gait since being \"paralyzed\" by [place]SW in [place]. Pt declined to trial an AD for improved balance despite edu   Functional Mobility   Sit to Stand Supervised   Bed, Chair, Wheelchair Transfer Supervised   Transfer Method Stand Step   Mobility bed>up in hallway no AD>bed   6 Clicks Assessment - How much HELP from from another person do you currently need... (If the patient hasn't done an activity recently, how much help from another person do you think he/she would need if he/she tried?)   Turning from your back to your side while in a flat bed without using bedrails? 4   Moving from lying on your back to sitting on the side of a flat bed without using bedrails? 4   Moving to and from a bed to a chair (including a wheelchair)? 3   Standing up from a chair using your arms (e.g., wheelchair, or bedside chair)? 3   Walking in hospital room? 3   Climbing 3-5 steps with a railing? 3   6 clicks Mobility Score 20   Education Group   Education Provided Role of Physical Therapist;Use of Assistive Device   Role of Physical Therapist Patient Response Patient;Acceptance;Explanation;Demonstration;Verbal Demonstration;Action Demonstration   Use of Assistive Device Patient Response Patient;Acceptance;Explanation;Demonstration;Verbal Demonstration;Action Demonstration   Additional Comments educated pt about benefits of using AD as above, pt receptive but politely declined   Physical Therapy Initial Treatment Plan    Duration Discharge Needs Only   Problem List    Problems None   Anticipated Discharge Equipment and Recommendations   DC Equipment Recommendations None   Discharge Recommendations Recommend home health for continued physical therapy services   Interdisciplinary Plan of Care Collaboration   IDT Collaboration with  Nursing   Patient Position at End of Therapy In Bed;Bed Alarm On;Call Light within Reach;Tray " Table within Reach;Phone within Reach   Collaboration Comments RN updated   Session Information   Date / Session Number  12/24- d/c needs only

## 2024-12-25 PROBLEM — R78.81 BACTEREMIA: Status: ACTIVE | Noted: 2024-12-25

## 2024-12-25 LAB
ANION GAP SERPL CALC-SCNC: 11 MMOL/L (ref 7–16)
BACTERIA BLD CULT: NORMAL
BASOPHILS # BLD AUTO: 0.2 % (ref 0–1.8)
BASOPHILS # BLD: 0.03 K/UL (ref 0–0.12)
BUN SERPL-MCNC: 12 MG/DL (ref 8–22)
CALCIUM SERPL-MCNC: 8.2 MG/DL (ref 8.5–10.5)
CHLORIDE SERPL-SCNC: 102 MMOL/L (ref 96–112)
CO2 SERPL-SCNC: 21 MMOL/L (ref 20–33)
CREAT SERPL-MCNC: 0.59 MG/DL (ref 0.5–1.4)
EOSINOPHIL # BLD AUTO: 0.24 K/UL (ref 0–0.51)
EOSINOPHIL NFR BLD: 1.9 % (ref 0–6.9)
ERYTHROCYTE [DISTWIDTH] IN BLOOD BY AUTOMATED COUNT: 49.7 FL (ref 35.9–50)
GFR SERPLBLD CREATININE-BSD FMLA CKD-EPI: 105 ML/MIN/1.73 M 2
GLUCOSE SERPL-MCNC: 126 MG/DL (ref 65–99)
HCT VFR BLD AUTO: 30.2 % (ref 42–52)
HGB BLD-MCNC: 9.5 G/DL (ref 14–18)
IMM GRANULOCYTES # BLD AUTO: 0.35 K/UL (ref 0–0.11)
IMM GRANULOCYTES NFR BLD AUTO: 2.8 % (ref 0–0.9)
LYMPHOCYTES # BLD AUTO: 0.87 K/UL (ref 1–4.8)
LYMPHOCYTES NFR BLD: 7 % (ref 22–41)
MCH RBC QN AUTO: 26 PG (ref 27–33)
MCHC RBC AUTO-ENTMCNC: 31.5 G/DL (ref 32.3–36.5)
MCV RBC AUTO: 82.5 FL (ref 81.4–97.8)
MONOCYTES # BLD AUTO: 0.59 K/UL (ref 0–0.85)
MONOCYTES NFR BLD AUTO: 4.7 % (ref 0–13.4)
NEUTROPHILS # BLD AUTO: 10.42 K/UL (ref 1.82–7.42)
NEUTROPHILS NFR BLD: 83.4 % (ref 44–72)
NRBC # BLD AUTO: 0 K/UL
NRBC BLD-RTO: 0 /100 WBC (ref 0–0.2)
PLATELET # BLD AUTO: 228 K/UL (ref 164–446)
PMV BLD AUTO: 9.9 FL (ref 9–12.9)
POTASSIUM SERPL-SCNC: 3.8 MMOL/L (ref 3.6–5.5)
RBC # BLD AUTO: 3.66 M/UL (ref 4.7–6.1)
SIGNIFICANT IND 70042: NORMAL
SITE SITE: NORMAL
SODIUM SERPL-SCNC: 134 MMOL/L (ref 135–145)
SOURCE SOURCE: NORMAL
WBC # BLD AUTO: 12.5 K/UL (ref 4.8–10.8)

## 2024-12-25 PROCEDURE — 700102 HCHG RX REV CODE 250 W/ 637 OVERRIDE(OP): Performed by: STUDENT IN AN ORGANIZED HEALTH CARE EDUCATION/TRAINING PROGRAM

## 2024-12-25 PROCEDURE — 700111 HCHG RX REV CODE 636 W/ 250 OVERRIDE (IP): Mod: JZ | Performed by: STUDENT IN AN ORGANIZED HEALTH CARE EDUCATION/TRAINING PROGRAM

## 2024-12-25 PROCEDURE — 85025 COMPLETE CBC W/AUTO DIFF WBC: CPT

## 2024-12-25 PROCEDURE — 700105 HCHG RX REV CODE 258: Performed by: INTERNAL MEDICINE

## 2024-12-25 PROCEDURE — 700105 HCHG RX REV CODE 258: Performed by: STUDENT IN AN ORGANIZED HEALTH CARE EDUCATION/TRAINING PROGRAM

## 2024-12-25 PROCEDURE — 700111 HCHG RX REV CODE 636 W/ 250 OVERRIDE (IP): Mod: JZ | Performed by: INTERNAL MEDICINE

## 2024-12-25 PROCEDURE — 87040 BLOOD CULTURE FOR BACTERIA: CPT | Mod: 91

## 2024-12-25 PROCEDURE — 36415 COLL VENOUS BLD VENIPUNCTURE: CPT

## 2024-12-25 PROCEDURE — 80048 BASIC METABOLIC PNL TOTAL CA: CPT

## 2024-12-25 PROCEDURE — A9270 NON-COVERED ITEM OR SERVICE: HCPCS | Performed by: STUDENT IN AN ORGANIZED HEALTH CARE EDUCATION/TRAINING PROGRAM

## 2024-12-25 PROCEDURE — 770001 HCHG ROOM/CARE - MED/SURG/GYN PRIV*

## 2024-12-25 RX ADMIN — ALBUTEROL SULFATE 2 PUFF: 90 AEROSOL, METERED RESPIRATORY (INHALATION) at 22:37

## 2024-12-25 RX ADMIN — OXYCODONE 5 MG: 5 TABLET ORAL at 17:18

## 2024-12-25 RX ADMIN — OXYCODONE 5 MG: 5 TABLET ORAL at 07:10

## 2024-12-25 RX ADMIN — CEFEPIME 2 G: 2 INJECTION, POWDER, FOR SOLUTION INTRAVENOUS at 17:25

## 2024-12-25 RX ADMIN — CEFEPIME 2 G: 2 INJECTION, POWDER, FOR SOLUTION INTRAVENOUS at 04:50

## 2024-12-25 RX ADMIN — RIVAROXABAN 20 MG: 20 TABLET, FILM COATED ORAL at 17:18

## 2024-12-25 RX ADMIN — AMPICILLIN AND SULBACTAM 3 G: 1; 2 INJECTION, POWDER, FOR SOLUTION INTRAMUSCULAR; INTRAVENOUS at 22:42

## 2024-12-25 RX ADMIN — ALBUTEROL SULFATE 2 PUFF: 90 AEROSOL, METERED RESPIRATORY (INHALATION) at 17:18

## 2024-12-25 RX ADMIN — ALBUTEROL SULFATE 2 PUFF: 90 AEROSOL, METERED RESPIRATORY (INHALATION) at 09:25

## 2024-12-25 RX ADMIN — ALBUTEROL SULFATE 2 PUFF: 90 AEROSOL, METERED RESPIRATORY (INHALATION) at 13:32

## 2024-12-25 RX ADMIN — ALBUTEROL SULFATE 2 PUFF: 90 AEROSOL, METERED RESPIRATORY (INHALATION) at 02:11

## 2024-12-25 RX ADMIN — ALBUTEROL SULFATE 2 PUFF: 90 AEROSOL, METERED RESPIRATORY (INHALATION) at 04:42

## 2024-12-25 ASSESSMENT — ENCOUNTER SYMPTOMS
FEVER: 1
RESPIRATORY NEGATIVE: 1
PSYCHIATRIC NEGATIVE: 1
BACK PAIN: 1
GASTROINTESTINAL NEGATIVE: 1
EYES NEGATIVE: 1
CHILLS: 1
NEUROLOGICAL NEGATIVE: 1
CARDIOVASCULAR NEGATIVE: 1

## 2024-12-25 ASSESSMENT — PAIN DESCRIPTION - PAIN TYPE
TYPE: ACUTE PAIN

## 2024-12-25 ASSESSMENT — FIBROSIS 4 INDEX: FIB4 SCORE: 1.32

## 2024-12-25 NOTE — ASSESSMENT & PLAN NOTE
Blood cultures are positive: Growth detected by automated blood culture system 12/23/2024 for Actinomyces turicensis and Peptoniphilus, Solobacterium    -Source initially thought to be urinary but given no growth on urine culture unlikely.  -The patient has a pelvic mass/collection of fluid with ongoing drainage of serosanguineous/pus/feces.  -Patient underwent an extensive surgery when they remove the prostate, bladder, rectum and part of GI tract.  -CT abdomen pelvis shows a complex mass in the pelvis (neoplastic).  Unclear if there is some pus collection.  -Echocardiogram did not show vegetations.  -The patient was started on cefepime on 12/22/2024 and currently WBC trending down. Transitioned to penicillin + flagyl on 12/26 once the pathogen was determined.  -Per ID recommendations: After discharge plan to transition to ertapenem 1 gram IV daily to complete course of IV treatment.  -Stop date IV antibiotics 6 weeks from negative blood cultures, followed by PO Augmentin for the actinomycete-usually 6-12 months   -Unclear duration given concurrent malignancy   -Patient will need a PICC line before discharge    
Emphysema noted, patient had a heavy smoking history but quit a year ago  COVID swab negative, CTPA negative for pneumonia and pulmonary embolism. Viral respiratory panel negative  Echocardiogram showing normal ventricular function with EF 60%  -Patient saturating well on room air  -Continue with incentive spirometry and [employer]  
Following with oncology and general surgery outpatient  Has received chemotherapy and radiation in the past and cystoprostatectomy with urostomy and colostomy  Still has mass noted, not on treatment at this time. Patient wearing diaper and having a dark discharge (feces/bleeding?)  Next appointment with general surgery on January 8  Dr Kirby aware of current hospitalization.  
History of DVT LLE.  Continue Xarelto  
Patient stated that he was still continue to be full code during this admission.  Patient ongoing maintenance chemo as outpatient followed by Dr. Kirby and next appointment is in February 2025  Patient is being followed by Dr. Desai, colorectal surgeon    Patient has bacteremia initially on cefepime and then transitioned to penicillin and flagyl due to positivity for actinomyces and anaerobes. Down trending WBC. Appreciate infectious disease for recommendations on duration of therapy.  We believe the source of the bacteremia is coming from the complex pelvic mass because of ongoing drainage from that area.    -Coordination of care with infectious disease, interventional radiology, oncology and colorectal surgery.  -Source control to be determined  -Dr. Sewell has advised that he is not a surgical candidate as the margins were not clear and he will continue to advance with his cancer.   -Infectious disease standpoint is that it would be best to drain the abscess / fluid collection in the pelvis however there is not a clear collection separate from the cancerous mass and therefore IR does not feel they can successfully drain either, and he would be at high risk of bleeding or other complications from this.   -Continue the IV antibiotics per ID and if he remains in hospital until Monday he can get a second colo-rectal opinion from a different surgeon, Dr Desai.   -Palliative care consult placed.     
This is Sepsis Present on admission  SIRS criteria identified on my evaluation include: Tachycardia, with heart rate greater than 90 BPM, Tachypnea, with respirations greater than 20 per minute, and Leukopenia, with WBC less than 4,000  Clinical indicators of end organ dysfunction include Lactic Acid greater than 2  Patient was fluid resuscitated. Lactic acid trended 4.0 > 3.0 > 1.5  Antibiotic: Broad spectrum antibiotic  Initially thought to be due to UTI but urine cultures negative.  Patient has bacteremia positive for actinomyces and anaerobes.  Source likely to be abdominal/pelvic mass  -Sepsis resolved  
adenopathy. Does not bruise/bleed easily. Psychiatric/Behavioral: Negative for agitation, behavioral problems, confusion, decreased concentration, sleep disturbance and suicidal ideas. The patient is not nervous/anxious. Objective:   Physical Exam   Constitutional: She is oriented to person, place, and time. She appears well-developed and well-nourished. HENT:   Head: Normocephalic and atraumatic. Right Ear: External ear normal.   Left Ear: External ear normal.   Nose: Nose normal.   Mouth/Throat: Oropharynx is clear and moist. No oropharyngeal exudate. Eyes: Pupils are equal, round, and reactive to light. Conjunctivae and EOM are normal. Right eye exhibits no discharge. Left eye exhibits no discharge. No scleral icterus. Neck: Normal range of motion. Neck supple. No JVD present. No tracheal deviation present. No thyromegaly present. Cardiovascular: Normal rate, regular rhythm, normal heart sounds and intact distal pulses. Exam reveals no gallop and no friction rub. No murmur heard. Pulmonary/Chest: Effort normal and breath sounds normal. No stridor. No respiratory distress. She has no wheezes. She has no rales. She exhibits no tenderness. Abdominal: Soft. Bowel sounds are normal. She exhibits no distension and no mass. There is no tenderness. There is no rebound and no guarding. Musculoskeletal: Normal range of motion. She exhibits no edema, tenderness or deformity. Lymphadenopathy:     She has no cervical adenopathy. Neurological: She is alert and oriented to person, place, and time. She displays normal reflexes. No cranial nerve deficit. She exhibits normal muscle tone. Coordination normal.   Skin: Skin is warm and dry. No rash noted. No erythema. Psychiatric: She has a normal mood and affect. Her behavior is normal. Thought content normal.       Assessment:       Diagnosis Orders   1.  Type 2 diabetes mellitus without complication, without long-term current use of insulin ([address]

## 2024-12-25 NOTE — PROGRESS NOTES
"Cardiac rhythm: normal sinus  and 1 degree AV block    Ectopy: premature ventricular contractions (unifocal), bigeminy, and trigeminy  Frequency: rare, occasional    DE: 0.22  QRS: 0.09  QT: 0.38    Rate range (shift): 78-92            Summary of \"[facility] Telemetry Monitor 2 Hour flow Sheet\" report sent to unit from [facility].    "

## 2024-12-25 NOTE — PROGRESS NOTES
[facility] Internal Medicine Daily Progress Note    Date of Service  12/25/2024    R Team: R JESSICA Carnes Team   Attending: Jayant Starks M.d.  Senior Resident: Dr. Montenegro  Intern:  Dr. Mejia  Contact Number: [phone]    Chief Complaint  Josh Lerner is a 69 y.o. male admitted 12/22/2024 with Sepsis    Hospital Course  Josh Lerner is a 69 y.o. male who presented 12/22/2024 with sepsis due to ongoing UTI.     Patient has a history of distal rectal adenocarcinoma stage II C s/p chemoRT and surgical resection (cystoprostatectomy) with colostomy and urostomy.  Unfortunately has recurrence and is following with hematology oncology and general surgery outpatient. Recently started seeing Dr Kirby with [facility], called an updated him of this current hospitalization.     In ER, patient tachycardic and hypoxic.  Pertinent labs include mild neutropenia, lactic acid 4 -> 2.7.  UA positive for UTI.  COVID/flu/RSV negative.  CTPA showing no pulmonary embolism, possible GERD, emphysematous changes noted, greater in the apices.  CT abdomen pelvis showing complex mass in the pelvis, compatible with recurrent neoplastic disease, cirrhosis, hepatomegaly.    Patient was fluid resuscitated, started on cefepime, inhalers and pain management therapy. Given concerns of patient having another primary infection in the GI tract/pelvis with the unclear CT abdomen/pelvis results and an ongoing possible fistula with discharge of serosanguinous/fecal matter contacted Dr Kirby again for better understanding of current oncologic treatment. Patient is on maintenance chemotherapy, but recommended not to have abdominal surgery as bevacizumab impacts on wound healing unless absolutely necessary. Although WBC trending down patient still requires oxygen supplementation. Goals of care defer as patient currently on treatment for cancer.    Interval Problem Update  No overnight event.  Patient seen at bedside with normal vitals  without oxygen and breathing comfortably on room air.  No new complaints and patient feels fine.  The patient has been using the inhaler and is breathing much better.  He has been cooperating with physical therapy.  Discussed with the patient about current pelvic mass/infection and ongoing bacteremia.  Discussed that likely the source of infection is coming from the pelvic mass rather than urine as the urine did not grow anything and he now has bacteremia positive for staph species.  We also mentioned that we need coordination of care with oncology, infectious disease and surgery in order to determine plan as the patient might benefit of drainage but currently on treatment with bevacizumab as mentioned previously by Dr. Kirby and not recommending any intervention at this point due to decreased healing capacities.    Patient understand and agreed to stay longer in the hospital with a prolonged antibiotic course currently on cefepime, pending sensitivities, and responding well with downtrending white blood count.  The patient has an appointment with Dr. Desai on January 8 which is day colorectal surgeon and with Dr. Kirby in February.    I have discussed this patient's plan of care and discharge plan at IDT rounds today with Case Management, Nursing, Nursing leadership, and other members of the IDT team.    Consultants/Specialty  oncology    Code Status  Full Code    Disposition  The patient is not medically cleared for discharge to home or a post-acute facility.      I have placed the appropriate orders for post-discharge needs.    Review of Systems  Review of Systems   Constitutional:  Positive for chills and fever.   HENT: Negative.     Eyes: Negative.    Respiratory: Negative.     Cardiovascular: Negative.    Gastrointestinal: Negative.    Genitourinary: Negative.    Musculoskeletal:  Positive for back pain.   Skin: Negative.    Neurological: Negative.    Endo/Heme/Allergies: Negative.    Psychiatric/Behavioral:  Negative.          Physical Exam  Temp:  [36 °C (96.8 °F)-36.6 °C (97.9 °F)] 36.4 °C (97.5 °F)  Pulse:  [87-93] 91  Resp:  [18-20] 20  BP: (101-123)/(61-75) 114/61  SpO2:  [91 %-96 %] 96 %    Physical Exam  Constitutional:       Appearance: Normal appearance. He is obese.   HENT:      Head: Normocephalic.      Nose: Nose normal.      Mouth/Throat:      Mouth: Mucous membranes are moist.      Comments: Poor dentition  Mallampati II  Eyes:      Pupils: Pupils are equal, round, and reactive to light.   Cardiovascular:      Rate and Rhythm: Normal rate and regular rhythm.      Pulses: Normal pulses.   Pulmonary:      Effort: Pulmonary effort is normal.      Breath sounds: Normal breath sounds.   Abdominal:      General: Abdomen is flat. Bowel sounds are normal.      Palpations: Abdomen is soft.      Comments: Right lower quadrant urostomy bag noted  Left lower quadrant colostomy bag noted   Musculoskeletal:         General: Normal range of motion.      Cervical back: Neck supple.   Skin:     General: Skin is warm.      Findings: Lesion (There is an open wound in the pelvic area draining serosanguineous/pus/feces believed to be a fistula were the patient's anus used to be) present.   Neurological:      General: No focal deficit present.      Mental Status: He is alert and oriented to person, place, and time. Mental status is at baseline.   Psychiatric:         Mood and Affect: Mood normal.         Behavior: Behavior normal.         Thought Content: Thought content normal.         Judgment: Judgment normal.         Fluids    Intake/Output Summary (Last 24 hours) at 12/25/2024 1311  Last data filed at 12/25/2024 0800  Gross per 24 hour   Intake 1700 ml   Output 2005 ml   Net -305 ml       Laboratory  Recent Labs     12/23/24  1758 12/24/24  0022 12/25/24  0024   WBC 14.5* 14.8* 12.5*   RBC 3.36* 3.44* 3.66*   HEMOGLOBIN 9.0* 8.8* 9.5*   HEMATOCRIT 28.2* 29.4* 30.2*   MCV 83.9 85.5 82.5   MCH 26.8* 25.6* 26.0*   MCHC 31.9*  29.9* 31.5*   RDW 50.5* 51.5* 49.7   PLATELETCT 182 188 228   MPV 9.4 9.7 9.9     Recent Labs     12/23/24  0255 12/24/24  0022 12/25/24  0024   SODIUM 131* 138 134*   POTASSIUM 3.7 4.1 3.8   CHLORIDE 104 108 102   CO2 16* 20 21   GLUCOSE 133* 116* 126*   BUN 18 19 12   CREATININE 0.96 0.87 0.59   CALCIUM 7.4* 7.9* 8.2*                   Imaging  EC-ECHOCARDIOGRAM COMPLETE W/O CONT   Final Result      CT-ABDOMEN-PELVIS WITH   Final Result         1.  Complex mass in the pelvis, appearance most compatible with recurrent neoplastic disease.   2.  Hepatomegaly   3.  Irregular hepatic contour favoring changes of cirrhosis   4.  Cholelithiasis   5.  Small fat-containing bilateral inguinal hernias   6.  Atherosclerosis and atherosclerotic coronary artery disease      CT-CTA CHEST PULMONARY ARTERY W/ RECONS   Final Result         1.  No pulmonary embolus appreciated.   2.  Fluid-filled distention of esophagus, consider gastroesophageal reflux or esophageal dysmotility. Could be followed up with esophageal swallow study.   3.  Emphysematous changes, greatest in the apices.      DX-CHEST-PORTABLE (1 VIEW)   Final Result         Hazy left basilar opacity, likely atelectasis.            Assessment/Plan  Problem Representation:    69-year-old male admitted with sepsis and thought to be due to UTI at that moment.  The patient has a past medical history of rectal adenocarcinoma stage IIc status post chemo and radiotherapy and surgical resection with urostomy and colostomy bags, unfortunately with local recurrence and followed by Dr. Kirby with [facility].  Patient currently on cefepime and, clinically improving and white blood count downtrending.  Blood cultures are positive for staph species and urine culture is negative and likelihood of source being UTI is low.  Patient has mass/fluid collection in the pelvic area with what seems to be a fistula draining serosanguineous/feces.  Working out on coordination of care with  infectious disease, oncology colorectal surgery and internal medicine teams.    * Sepsis (HCC)- (present on admission)  Assessment & Plan  This is Sepsis Present on admission  SIRS criteria identified on my evaluation include: Tachycardia, with heart rate greater than 90 BPM, Tachypnea, with respirations greater than 20 per minute, and Leukopenia, with WBC less than 4,000  Clinical indicators of end organ dysfunction include Lactic Acid greater than 2  Source is UTI  Sepsis protocol initiated  Crystalloid Fluid Administration: Fluid resuscitation ordered per standard protocol - 30 mL/kg per current or ideal body weight  IV antibiotics as appropriate for source of sepsis  Reassessment: I have reassessed the patient's hemodynamic status    Patient was fluid ressucitated  Lactic acid trended 4.0 > 3.0 > 1.5  Antibiotic: Cefepime started given comorbidity  Initially thought to be due to UTI but urine cultures negative.  Patient has bacteremia positive for staph species.  Source likely to be abdominal/pelvic mass  -Sepsis resolved    Bacteremia  Assessment & Plan  Blood cultures are positive: Growth detected by automated blood culture system 12/23/2024. Gram Stain: Gram positive cocci: Possible Staphylococcus sp. Negative for Staphylococcus aureus and MRSA by PCR.   -Source initially thought to be urinary but given no growth on urine culture unlikely.  -The patient has a pelvic mass/collection of fluid with ongoing drainage of serosanguineous/pus/feces.  -Patient underwent an extensive surgery when they remove the prostate, bladder, rectum and part of GI tract.  -CT abdomen pelvis shows a complex mass in the pelvis (neoplastic).  Unclear if there is some pus collection.  -The patient was started on cefepime on 12/22/2024 and currently white blood cell count trending down.  -Echocardiogram did not show vegetations.  -Sensitivities pending.  Will determine the next course of antibiotics after getting the sensitivities and  will consult ID.    Coordination of complex care  Assessment & Plan  Patient stated that he was still continue to be full code during this admission.  Patient ongoing maintenance chemo as outpatient followed by Dr. Kirby and next appointment is in February 2025  Patient is being followed by Dr. Desai, colorectal surgeon    Patient has bacteremia currently on cefepime and responding well with downtrending white blood cell counts.  Once we get the sensitivities back we will tailor down the antibiotic treatment and will consult infectious disease for recommendations on duration of therapy.  We believe the source of the bacteremia is coming from the pelvic mass because of ongoing drainage from that area.  Unclear if the patient needs a drainage place or a procedure to be done.  According to Dr. Martin the patient is on treatment with bevacizumab which intervenes with wound healing and does not recommend an extensive surgery at this point, unless absolutely necessary..    -Will coordinate care with infectious disease, oncology and colorectal surgery      DVT (deep venous thrombosis) (HCC)  Assessment & Plan  History of DVT LLE.  Continue Xarelto    Hypoxia  Assessment & Plan  Because of hypoxia not clear at this time, patient tachypneic but lungs are clear on auscultation  Emphysema noted, patient had a heavy smoking history but quit a year ago  COVID swab negative, CTPA negative for pneumonia and pulmonary embolism  Viral respiratory panel negative, on cefepime for UTI  Echocardiogram showing normal ventricular function with EF 60%  Continue oxygen as needed  -Continue with incentive spirometry and Aerobika    Malignant neoplasm of rectum (HCC)- (present on admission)  Assessment & Plan  Following with oncology and general surgery outpatient  Has received chemotherapy and radiation in the past and cystoprostatectomy with urostomy and colostomy  Still has mass noted, not on treatment at this time. Patient wearing diaper  and having a dark discharge (feces/bleeding?)  Next appointment with general surgery on January 8  Dr Kirby aware of current hospitalization. He mentioned that because the patient is on bevacizumab he would not recommend optional abdominal surgeries because of the impact on wound healing.  He mentioned that he is on maintenance chemotherapy as he has been responding well in the past.         VTE prophylaxis: therapeutic anticoagulation with xarelto    I have performed a physical exam and reviewed and updated ROS and Plan today (12/25/2024). In review of yesterday's note (12/24/2024), there are no changes except as documented above.

## 2024-12-25 NOTE — CARE PLAN
The patient is Stable - Low risk of patient condition declining or worsening    Shift Goals  Clinical Goals: monitor o2, labs, pain control  Patient Goals: pain control, home, rest  Family Goals: updates    Progress made toward(s) clinical / shift goals:  Patient continues to verbalize needs and understanding of POC. No other questions or concerns at this time. RN to continue purposeful rounding.    Patient is not progressing towards the following goals:

## 2024-12-25 NOTE — CARE PLAN
The patient is Stable - Low risk of patient condition declining or worsening    Shift Goals  Clinical Goals: Turns  Patient Goals: Going home  Family Goals: DIDIER    Progress made toward(s) clinical / shift goals:    Problem: Pain - Standard  Goal: Alleviation of pain or a reduction in pain to the patient’s comfort goal  Outcome: Progressing     Problem: Hemodynamics  Goal: Patient's hemodynamics, fluid balance and neurologic status will be stable or improve  Outcome: Progressing     Problem: Fluid Volume  Goal: Fluid volume balance will be maintained  Outcome: Progressing     Problem: Urinary - Renal Perfusion  Goal: Ability to achieve and maintain adequate renal perfusion and functioning will improve  Outcome: Progressing     Problem: Respiratory  Goal: Patient will achieve/maintain optimum respiratory ventilation and gas exchange  Outcome: Progressing     Problem: Mechanical Ventilation  Goal: Safe management of artificial airway and ventilation  Outcome: Progressing       Patient is not progressing towards the following goals:

## 2024-12-26 LAB
BACTERIA BLD CULT: ABNORMAL
ERYTHROCYTE [DISTWIDTH] IN BLOOD BY AUTOMATED COUNT: 50.9 FL (ref 35.9–50)
HCT VFR BLD AUTO: 31.9 % (ref 42–52)
HGB BLD-MCNC: 9.8 G/DL (ref 14–18)
MCH RBC QN AUTO: 25.8 PG (ref 27–33)
MCHC RBC AUTO-ENTMCNC: 30.7 G/DL (ref 32.3–36.5)
MCV RBC AUTO: 83.9 FL (ref 81.4–97.8)
PLATELET # BLD AUTO: 246 K/UL (ref 164–446)
PMV BLD AUTO: 10.1 FL (ref 9–12.9)
RBC # BLD AUTO: 3.8 M/UL (ref 4.7–6.1)
SIGNIFICANT IND 70042: ABNORMAL
SIGNIFICANT IND 70042: ABNORMAL
SITE SITE: ABNORMAL
SITE SITE: ABNORMAL
SOURCE SOURCE: ABNORMAL
SOURCE SOURCE: ABNORMAL
WBC # BLD AUTO: 9.7 K/UL (ref 4.8–10.8)

## 2024-12-26 PROCEDURE — 99223 1ST HOSP IP/OBS HIGH 75: CPT | Performed by: INTERNAL MEDICINE

## 2024-12-26 PROCEDURE — A9270 NON-COVERED ITEM OR SERVICE: HCPCS | Performed by: STUDENT IN AN ORGANIZED HEALTH CARE EDUCATION/TRAINING PROGRAM

## 2024-12-26 PROCEDURE — 700111 HCHG RX REV CODE 636 W/ 250 OVERRIDE (IP): Performed by: INTERNAL MEDICINE

## 2024-12-26 PROCEDURE — 770001 HCHG ROOM/CARE - MED/SURG/GYN PRIV*

## 2024-12-26 PROCEDURE — 700105 HCHG RX REV CODE 258: Performed by: INTERNAL MEDICINE

## 2024-12-26 PROCEDURE — 700102 HCHG RX REV CODE 250 W/ 637 OVERRIDE(OP): Performed by: STUDENT IN AN ORGANIZED HEALTH CARE EDUCATION/TRAINING PROGRAM

## 2024-12-26 PROCEDURE — 97535 SELF CARE MNGMENT TRAINING: CPT

## 2024-12-26 PROCEDURE — 700102 HCHG RX REV CODE 250 W/ 637 OVERRIDE(OP): Performed by: INTERNAL MEDICINE

## 2024-12-26 PROCEDURE — 85027 COMPLETE CBC AUTOMATED: CPT

## 2024-12-26 PROCEDURE — A9270 NON-COVERED ITEM OR SERVICE: HCPCS | Performed by: INTERNAL MEDICINE

## 2024-12-26 PROCEDURE — 700111 HCHG RX REV CODE 636 W/ 250 OVERRIDE (IP): Mod: JZ | Performed by: INTERNAL MEDICINE

## 2024-12-26 PROCEDURE — 99233 SBSQ HOSP IP/OBS HIGH 50: CPT | Mod: GC | Performed by: INTERNAL MEDICINE

## 2024-12-26 PROCEDURE — 36415 COLL VENOUS BLD VENIPUNCTURE: CPT

## 2024-12-26 RX ORDER — METRONIDAZOLE 500 MG/1
500 TABLET ORAL EVERY 8 HOURS
Status: DISCONTINUED | OUTPATIENT
Start: 2024-12-26 | End: 2024-12-30 | Stop reason: HOSPADM

## 2024-12-26 RX ADMIN — RIVAROXABAN 20 MG: 20 TABLET, FILM COATED ORAL at 17:07

## 2024-12-26 RX ADMIN — SODIUM CHLORIDE 3 MILLION UNITS: 9 INJECTION, SOLUTION INTRAVENOUS at 21:25

## 2024-12-26 RX ADMIN — ALBUTEROL SULFATE 2 PUFF: 90 AEROSOL, METERED RESPIRATORY (INHALATION) at 03:18

## 2024-12-26 RX ADMIN — ALBUTEROL SULFATE 2 PUFF: 90 AEROSOL, METERED RESPIRATORY (INHALATION) at 09:42

## 2024-12-26 RX ADMIN — AMPICILLIN AND SULBACTAM 3 G: 1; 2 INJECTION, POWDER, FOR SOLUTION INTRAMUSCULAR; INTRAVENOUS at 06:03

## 2024-12-26 RX ADMIN — AMPICILLIN AND SULBACTAM 3 G: 1; 2 INJECTION, POWDER, FOR SOLUTION INTRAMUSCULAR; INTRAVENOUS at 13:22

## 2024-12-26 RX ADMIN — OXYCODONE 5 MG: 5 TABLET ORAL at 15:30

## 2024-12-26 RX ADMIN — SODIUM CHLORIDE 3 MILLION UNITS: 9 INJECTION, SOLUTION INTRAVENOUS at 17:08

## 2024-12-26 RX ADMIN — ALBUTEROL SULFATE 2 PUFF: 90 AEROSOL, METERED RESPIRATORY (INHALATION) at 06:03

## 2024-12-26 RX ADMIN — ALBUTEROL SULFATE 2 PUFF: 90 AEROSOL, METERED RESPIRATORY (INHALATION) at 17:07

## 2024-12-26 RX ADMIN — OXYCODONE 2.5 MG: 5 TABLET ORAL at 03:17

## 2024-12-26 RX ADMIN — ALBUTEROL SULFATE 2 PUFF: 90 AEROSOL, METERED RESPIRATORY (INHALATION) at 21:26

## 2024-12-26 RX ADMIN — METRONIDAZOLE 500 MG: 500 TABLET ORAL at 17:07

## 2024-12-26 RX ADMIN — ALBUTEROL SULFATE 2 PUFF: 90 AEROSOL, METERED RESPIRATORY (INHALATION) at 13:19

## 2024-12-26 ASSESSMENT — PAIN DESCRIPTION - PAIN TYPE
TYPE: ACUTE PAIN
TYPE: ACUTE PAIN

## 2024-12-26 ASSESSMENT — FIBROSIS 4 INDEX: FIB4 SCORE: 1.22

## 2024-12-26 ASSESSMENT — ENCOUNTER SYMPTOMS
CARDIOVASCULAR NEGATIVE: 1
BACK PAIN: 1
FEVER: 0
CHILLS: 0
NEUROLOGICAL NEGATIVE: 1
PSYCHIATRIC NEGATIVE: 1
EYES NEGATIVE: 1
RESPIRATORY NEGATIVE: 1
GASTROINTESTINAL NEGATIVE: 1

## 2024-12-26 ASSESSMENT — COGNITIVE AND FUNCTIONAL STATUS - GENERAL
TOILETING: A LITTLE
SUGGESTED CMS G CODE MODIFIER DAILY ACTIVITY: CI
DAILY ACTIVITIY SCORE: 23

## 2024-12-26 NOTE — CARE PLAN
The patient is Stable - Low risk of patient condition declining or worsening    Shift Goals  Clinical Goals: pain management, abx, ambulation  Patient Goals: ammbulation, pain management  Family Goals: martine    Progress made toward(s) clinical / shift goals:    Problem: Pain - Standard  Goal: Alleviation of pain or a reduction in pain to the patient’s comfort goal  Outcome: Progressing  Note: Patient assessed per numeric scale patient declines pain after being medicated per MAR earlier in the day. RN educated that if patient has increased pain to let RN so pain can be addressed. Nonpharmalogical interventions in place such as blankets, pillows and repositioning,      Problem: Knowledge Deficit - Standard  Goal: Patient and family/care givers will demonstrate understanding of plan of care, disease process/condition, diagnostic tests and medications  Outcome: Progressing  Note: Patient educated on plan for new blood cultures to determine bacteria growing in blood to best treat. Patient educated on the possibility of a picc line to receive long term iv abx to fight infection. Patient is eager to get better and to get pain under control        Patient is not progressing towards the following goals:

## 2024-12-26 NOTE — CARE PLAN
The patient is Watcher - Medium risk of patient condition declining or worsening    Shift Goals  Clinical Goals: pain management, ambulation, antibiotics  Patient Goals: pain management, home  Family Goals: updates    Progress made toward(s) clinical / shift goals:  Patient continues to verbalize needs and understanding of POC. RN to continue to encourage patient ambulation and ensure patient safety. RN to continue purposeful rounding.    Patient is not progressing towards the following goals:

## 2024-12-26 NOTE — PROGRESS NOTES
[facility] Internal Medicine Daily Progress Note    Date of Service  12/26/2024    R Team: R JESSICA Carnes Team   Attending: Jayant Starks M.d.  Senior Resident: Dr. Montenegro  Intern:  Dr. Mejia  Contact Number: [phone]    Chief Complaint  Josh Lerner is a 69 y.o. male admitted 12/22/2024 with Sepsis    Hospital Course  Josh Lerner is a 69 y.o. male who presented 12/22/2024 with sepsis at that moment believed to be due to UTI.     Patient has a history of distal rectal adenocarcinoma stage II C s/p chemoRT and surgical resection (cystoprostatectomy) with colostomy and urostomy.  Unfortunately has recurrence and is following with hematology oncology and general surgery outpatient. Recently started seeing Dr Kirby with [facility], called an updated him of this current hospitalization.     In ER, patient tachycardic and hypoxic.  Pertinent labs include mild neutropenia, lactic acid 4 -> 2.7.  UA positive for UTI.  COVID/flu/RSV negative.  CTPA showing no pulmonary embolism, possible GERD, emphysematous changes noted, greater in the apices.  CT abdomen pelvis showing complex mass in the pelvis, compatible with recurrent neoplastic disease, cirrhosis, hepatomegaly.    Patient was fluid resuscitated, started on cefepime, inhalers and pain management therapy. Given concerns of patient having another primary infection in the GI tract/pelvis with the unclear CT abdomen/pelvis results and an ongoing possible fistula with discharge of serosanguinous/fecal matter. Although WBC trending down patient still requires oxygen supplementation. Goals of care defer as patient currently on treatment for cancer.    Interval Problem Update  No overnight event.  Patient seen at bedside with normal vitals without oxygen and breathing comfortably on room air.  No new complaints and patient feels fine.  The patient has been using the inhaler and is breathing much better.  He has been cooperating with physical  therapy.    Blood cultures x 2 positive for Actinomyces turicensis and Peptoniphilus, Solobacterium. ID Consulted and patient was started on penicillin and metronidazole. Appreciate recommendations.    Further coordination of care with ID, IR, Colorectal surgery and Oncology is required to determine:  -Source control  -Antibiotic therapy duration  -Need for surgical intervention  -Antineoplastic treatment  -Goals of care    I have discussed this patient's plan of care and discharge plan at IDT rounds today with Case Management, Nursing, Nursing leadership, and other members of the IDT team.    Consultants/Specialty  Infectious Disease  Oncology  Colorectal Surgery    Code Status  Full Code    Disposition  The patient is not medically cleared for discharge to home or a post-acute facility.      I have placed the appropriate orders for post-discharge needs.    Review of Systems  Review of Systems   Constitutional:  Negative for chills and fever.   HENT: Negative.     Eyes: Negative.    Respiratory: Negative.     Cardiovascular: Negative.    Gastrointestinal: Negative.    Genitourinary: Negative.    Musculoskeletal:  Positive for back pain.   Skin: Negative.    Neurological: Negative.    Endo/Heme/Allergies: Negative.    Psychiatric/Behavioral: Negative.          Physical Exam  Temp:  [36.1 °C (97 °F)-36.5 °C (97.7 °F)] 36.4 °C (97.5 °F)  Pulse:  [] 85  Resp:  [17-18] 17  BP: (110-119)/(56-69) 119/68  SpO2:  [90 %-91 %] 90 %    Physical Exam  Constitutional:       Appearance: Normal appearance. He is obese.   HENT:      Head: Normocephalic.      Nose: Nose normal.      Mouth/Throat:      Mouth: Mucous membranes are moist.      Comments: Poor dentition  Mallampati II  Eyes:      Pupils: Pupils are equal, round, and reactive to light.   Cardiovascular:      Rate and Rhythm: Normal rate and regular rhythm.      Pulses: Normal pulses.   Pulmonary:      Effort: Pulmonary effort is normal.      Breath sounds: Normal  breath sounds.   Abdominal:      General: Abdomen is flat. Bowel sounds are normal.      Palpations: Abdomen is soft.      Comments: Right lower quadrant urostomy bag noted  Left lower quadrant colostomy bag noted   Musculoskeletal:         General: Normal range of motion.      Cervical back: Neck supple.   Skin:     General: Skin is warm.      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   Neurological:      General: No focal deficit present.      Mental Status: He is alert and oriented to person, place, and time. Mental status is at baseline.   Psychiatric:         Mood and Affect: Mood normal.         Behavior: Behavior normal.         Thought Content: Thought content normal.         Judgment: Judgment normal.         Fluids    Intake/Output Summary (Last 24 hours) at 12/26/2024 1614  Last data filed at 12/26/2024 1200  Gross per 24 hour   Intake 360 ml   Output 2100 ml   Net -1740 ml       Laboratory  Recent Labs     12/24/24  0022 12/25/24  0024 12/26/24  0044   WBC 14.8* 12.5* 9.7   RBC 3.44* 3.66* 3.80*   HEMOGLOBIN 8.8* 9.5* 9.8*   HEMATOCRIT 29.4* 30.2* 31.9*   MCV 85.5 82.5 83.9   MCH 25.6* 26.0* 25.8*   MCHC 29.9* 31.5* 30.7*   RDW 51.5* 49.7 50.9*   PLATELETCT 188 228 246   MPV 9.7 9.9 10.1     Recent Labs     12/24/24  0022 12/25/24  0024   SODIUM 138 134*   POTASSIUM 4.1 3.8   CHLORIDE 108 102   CO2 20 21   GLUCOSE 116* 126*   BUN 19 12   CREATININE 0.87 0.59   CALCIUM 7.9* 8.2*                   Imaging  EC-ECHOCARDIOGRAM COMPLETE W/O CONT   Final Result      CT-ABDOMEN-PELVIS WITH   Final Result         1.  Complex mass in the pelvis, appearance most compatible with recurrent neoplastic disease.   2.  Hepatomegaly   3.  Irregular hepatic contour favoring changes of cirrhosis   4.  Cholelithiasis   5.  Small fat-containing bilateral inguinal hernias   6.  Atherosclerosis and atherosclerotic coronary artery  disease      CT-CTA CHEST PULMONARY ARTERY W/ RECONS   Final Result         1.  No pulmonary embolus appreciated.   2.  Fluid-filled distention of esophagus, consider gastroesophageal reflux or esophageal dysmotility. Could be followed up with esophageal swallow study.   3.  Emphysematous changes, greatest in the apices.      DX-CHEST-PORTABLE (1 VIEW)   Final Result         Hazy left basilar opacity, likely atelectasis.            Assessment/Plan  Problem Representation:    69-year-old male admitted with sepsis and thought to be due to UTI at that moment.  The patient has a past medical history of rectal adenocarcinoma stage IIc status post chemo and radiotherapy and surgical resection with urostomy and colostomy bags, unfortunately with local recurrence and followed by Dr. Kirby with [facility].  Patient initially on cefepime and, clinically improving and white blood count downtrending.  Blood cultures are positive for Actinomyces turicensis and Peptoniphilus, Solobacterium and urine culture is negative and likelihood of source being UTI is low.  Patient has mass/fluid collection in the pelvic area with what seems to be a fistula draining serosanguineous/feces.  Working out on coordination of care with infectious disease, oncology colorectal surgery and internal medicine teams.     * Bacteremia  Assessment & Plan  Blood cultures are positive: Growth detected by automated blood culture system 12/23/2024 for Actinomyces turicensis and Peptoniphilus, Solobacterium    -Source initially thought to be urinary but given no growth on urine culture unlikely.  -The patient has a pelvic mass/collection of fluid with ongoing drainage of serosanguineous/pus/feces.  -Patient underwent an extensive surgery when they remove the prostate, bladder, rectum and part of GI tract.  -CT abdomen pelvis shows a complex mass in the pelvis (neoplastic).  Unclear if there is some pus collection.  -Echocardiogram did not show  vegetations.  -The patient was started on cefepime on 12/22/2024 and currently WBC trending down. Transitioned to penicillin + flagyl on 12/26 once the pathogen was determined.  -Will need to determine the duration of therapy and way of administration once the source is under control.       Coordination of complex care  Assessment & Plan  Patient stated that he was still continue to be full code during this admission.  Patient ongoing maintenance chemo as outpatient followed by Dr. Kirby and next appointment is in February 2025  Patient is being followed by Dr. Desai, colorectal surgeon    Patient has bacteremia initially on cefepime and then transitioned to penicillin and flagyl due to positivity for actinomyces and anaerobes. Down trending WBC. Appreciate infectious disease for recommendations on duration of therapy.  We believe the source of the bacteremia is coming from the pelvic mass because of ongoing drainage from that area.  Unclear if the patient needs a drainage place or a procedure to be done.    -Will coordinate care with infectious disease, interventional radiology, oncology and colorectal surgery.  -Source control to be determined      Malignant neoplasm of rectum (HCC)- (present on admission)  Assessment & Plan  Following with oncology and general surgery outpatient  Has received chemotherapy and radiation in the past and cystoprostatectomy with urostomy and colostomy  Still has mass noted, not on treatment at this time. Patient wearing diaper and having a dark discharge (feces/bleeding?)  Next appointment with general surgery on January 8  Dr Kirby aware of current hospitalization.    DVT (deep venous thrombosis) (HCC)  Assessment & Plan  History of DVT LLE.  Continue Xarelto    Hypoxia  Assessment & Plan  Emphysema noted, patient had a heavy smoking history but quit a year ago  COVID swab negative, CTPA negative for pneumonia and pulmonary embolism. Viral respiratory panel negative  Echocardiogram  showing normal ventricular function with EF 60%  -Patient saturating well on room air  -Continue with incentive spirometry and Aerobika    Sepsis (HCC)- (present on admission)  Assessment & Plan  This is Sepsis Present on admission  SIRS criteria identified on my evaluation include: Tachycardia, with heart rate greater than 90 BPM, Tachypnea, with respirations greater than 20 per minute, and Leukopenia, with WBC less than 4,000  Clinical indicators of end organ dysfunction include Lactic Acid greater than 2  Patient was fluid resuscitated. Lactic acid trended 4.0 > 3.0 > 1.5  Antibiotic: Broad spectrum antibiotic  Initially thought to be due to UTI but urine cultures negative.  Patient has bacteremia positive for actinomyces and anaerobes.  Source likely to be abdominal/pelvic mass  -Sepsis resolved         VTE prophylaxis: Xarelto 10 mg daily as prophylaxis    I have performed a physical exam and reviewed and updated ROS and Plan today (12/26/2024). In review of yesterday's note (12/25/2024), there are no changes except as documented above.
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
## 2024-12-26 NOTE — THERAPY
"Occupational Therapy  Discharge      Patient Name: Josh Lerner  Age:  69 y.o., Sex:  male  Medical Record #: [id number]  Today's Date: 12/26/2024     Precautions: Fall Risk  Comments: colostomy and urostomy    Assessment  Pt was seen for OT tx, pt demonstrated ability to complete seated and standing ADL's. Pt will need further nursing edu on ostomy/urostomy management. Pt anxious to mobilize and would benefit from daily ambulation w/nsg staff. At this time acute OT goals have been met, chart does indicate extension of hospitalization to address infection and possible surgical intervention. If pts functional status declines please re-consult. However at this time no further acute OT needs recommend HH    Plan  Reason for Discharge From Therapy: Discharge Secondary to Goals Met  Patient will not be actively followed for occupational therapy services at this time, however may be seen if requested by physician for 1 more visit within 30 days to address any discharge or equipment needs.     DC Equipment Recommendations: Unable to determine at this time  Discharge Recommendations: Recommend home health for continued occupational therapy services    Subjective    \"I just want to go for a walk\"      Objective       12/26/24 1147   Precautions   Precautions Fall Risk   Comments colostomy and urostomy   Pain 0 - 10 Group   Location Buttock   Location Orientation Mid   Therapist Pain Assessment During Activity;Nurse Notified;4   Cognition    Cognition / Consciousness WDL   Level of Consciousness Alert   Passive ROM Upper Body   Passive ROM Upper Body WDL   Active ROM Upper Body   Active ROM Upper Body  WDL   Strength Upper Body   Upper Body Strength  WDL   Other Treatments   Other Treatments Provided Reviewed importance of daily OOB activity; coordinating w/nursing staff regarding mobility and review of functional status   Balance   Sitting Balance (Static) Fair +   Sitting Balance (Dynamic) Fair   Standing Balance " (Static) Fair   Standing Balance (Dynamic) Fair   Weight Shift Sitting Fair   Weight Shift Standing Fair   Skilled Intervention Verbal Cuing   Comments no AD   Bed Mobility    Supine to Sit Supervised   Sit to Supine Supervised   Scooting Supervised   Rolling Supervised   Activities of Daily Living   Eating Modified Independent   Grooming Supervision;Standing   Upper Body Dressing Supervision   Lower Body Dressing Supervision   Toileting   (drained urostomy in bed)   Skilled Intervention Verbal Cuing;Tactile Cuing;Facilitation;Compensatory Strategies   How much help from another person does the patient currently need...   6 Clicks Daily Activity Score 23   Functional Mobility   Sit to Stand Standby Assist   Bed, Chair, Wheelchair Transfer Standby Assist   Mobility walking in room and hallway no AD   Skilled Intervention Verbal Cuing;Tactile Cuing;Facilitation   Activity Tolerance   Comments mild c/o fatigue   Patient / Family Goals   Patient / Family Goal #1 To go home   Goal #1 Outcome Goal not met   Short Term Goals   Short Term Goal # 1 Pt will complete ADL txfs with supv   Goal Outcome # 1 Goal met   Short Term Goal # 2 Pt will complete LB dressing with supv using AE PRN   Goal Outcome # 2 Goal met   Short Term Goal # 3 Pt will complete standing g/h routine with supv   Goal Outcome # 3 Goal met   Education Group   Role of Occupational Therapist Patient Response Patient;Acceptance;Explanation;Verbal Demonstration   ADL Patient Response Patient;Acceptance;Explanation;Verbal Demonstration;Action Demonstration;Reinforcement Needed   Occupational Therapy Treatment Plan    O.T. Treatment Plan Modify Current Treatment Plan   Reason For Discharge Discharge Secondary to Goals Met   Anticipated Discharge Equipment and Recommendations   DC Equipment Recommendations Unable to determine at this time   Discharge Recommendations Recommend home health for continued occupational therapy services   Interdisciplinary Plan of Care  Collaboration   IDT Collaboration with  Nursing   Patient Position at End of Therapy In Bed;Bed Alarm On;Call Light within Reach;Tray Table within Reach;Phone within Reach   Collaboration Comments RN aware of OT tx and pts efforts   Session Information   Date / Session Number  12/26 #2 goals met

## 2024-12-27 ENCOUNTER — HOME HEALTH ADMISSION (OUTPATIENT)
Dept: HOME HEALTH SERVICES | Facility: HOME HEALTHCARE | Age: 69
End: 2024-12-27
Payer: MEDICARE

## 2024-12-27 LAB
ANION GAP SERPL CALC-SCNC: 12 MMOL/L (ref 7–16)
BASOPHILS # BLD AUTO: 0.3 % (ref 0–1.8)
BASOPHILS # BLD: 0.03 K/UL (ref 0–0.12)
BUN SERPL-MCNC: 10 MG/DL (ref 8–22)
CALCIUM SERPL-MCNC: 8.5 MG/DL (ref 8.5–10.5)
CHLORIDE SERPL-SCNC: 101 MMOL/L (ref 96–112)
CO2 SERPL-SCNC: 22 MMOL/L (ref 20–33)
CREAT SERPL-MCNC: 0.57 MG/DL (ref 0.5–1.4)
EOSINOPHIL # BLD AUTO: 0.23 K/UL (ref 0–0.51)
EOSINOPHIL NFR BLD: 2.3 % (ref 0–6.9)
ERYTHROCYTE [DISTWIDTH] IN BLOOD BY AUTOMATED COUNT: 50.2 FL (ref 35.9–50)
GFR SERPLBLD CREATININE-BSD FMLA CKD-EPI: 106 ML/MIN/1.73 M 2
GLUCOSE SERPL-MCNC: 164 MG/DL (ref 65–99)
HCT VFR BLD AUTO: 31.9 % (ref 42–52)
HGB BLD-MCNC: 9.8 G/DL (ref 14–18)
IMM GRANULOCYTES # BLD AUTO: 0.09 K/UL (ref 0–0.11)
IMM GRANULOCYTES NFR BLD AUTO: 0.9 % (ref 0–0.9)
LYMPHOCYTES # BLD AUTO: 1.25 K/UL (ref 1–4.8)
LYMPHOCYTES NFR BLD: 12.3 % (ref 22–41)
MCH RBC QN AUTO: 25.2 PG (ref 27–33)
MCHC RBC AUTO-ENTMCNC: 30.7 G/DL (ref 32.3–36.5)
MCV RBC AUTO: 82 FL (ref 81.4–97.8)
MONOCYTES # BLD AUTO: 0.65 K/UL (ref 0–0.85)
MONOCYTES NFR BLD AUTO: 6.4 % (ref 0–13.4)
NEUTROPHILS # BLD AUTO: 7.88 K/UL (ref 1.82–7.42)
NEUTROPHILS NFR BLD: 77.8 % (ref 44–72)
NRBC # BLD AUTO: 0 K/UL
NRBC BLD-RTO: 0 /100 WBC (ref 0–0.2)
PLATELET # BLD AUTO: 305 K/UL (ref 164–446)
PMV BLD AUTO: 9.9 FL (ref 9–12.9)
POTASSIUM SERPL-SCNC: 3.8 MMOL/L (ref 3.6–5.5)
RBC # BLD AUTO: 3.89 M/UL (ref 4.7–6.1)
SODIUM SERPL-SCNC: 135 MMOL/L (ref 135–145)
WBC # BLD AUTO: 10.1 K/UL (ref 4.8–10.8)

## 2024-12-27 PROCEDURE — 80048 BASIC METABOLIC PNL TOTAL CA: CPT

## 2024-12-27 PROCEDURE — 700111 HCHG RX REV CODE 636 W/ 250 OVERRIDE (IP): Performed by: INTERNAL MEDICINE

## 2024-12-27 PROCEDURE — 36415 COLL VENOUS BLD VENIPUNCTURE: CPT

## 2024-12-27 PROCEDURE — 700102 HCHG RX REV CODE 250 W/ 637 OVERRIDE(OP): Performed by: STUDENT IN AN ORGANIZED HEALTH CARE EDUCATION/TRAINING PROGRAM

## 2024-12-27 PROCEDURE — 700102 HCHG RX REV CODE 250 W/ 637 OVERRIDE(OP): Performed by: INTERNAL MEDICINE

## 2024-12-27 PROCEDURE — 770001 HCHG ROOM/CARE - MED/SURG/GYN PRIV*

## 2024-12-27 PROCEDURE — 700105 HCHG RX REV CODE 258: Performed by: INTERNAL MEDICINE

## 2024-12-27 PROCEDURE — A9270 NON-COVERED ITEM OR SERVICE: HCPCS | Performed by: INTERNAL MEDICINE

## 2024-12-27 PROCEDURE — 700102 HCHG RX REV CODE 250 W/ 637 OVERRIDE(OP)

## 2024-12-27 PROCEDURE — 85025 COMPLETE CBC W/AUTO DIFF WBC: CPT

## 2024-12-27 PROCEDURE — A9270 NON-COVERED ITEM OR SERVICE: HCPCS | Performed by: STUDENT IN AN ORGANIZED HEALTH CARE EDUCATION/TRAINING PROGRAM

## 2024-12-27 PROCEDURE — A9270 NON-COVERED ITEM OR SERVICE: HCPCS

## 2024-12-27 RX ADMIN — ALBUTEROL SULFATE 2 PUFF: 90 AEROSOL, METERED RESPIRATORY (INHALATION) at 17:37

## 2024-12-27 RX ADMIN — SODIUM CHLORIDE 3 MILLION UNITS: 9 INJECTION, SOLUTION INTRAVENOUS at 17:41

## 2024-12-27 RX ADMIN — SODIUM CHLORIDE 3 MILLION UNITS: 9 INJECTION, SOLUTION INTRAVENOUS at 01:12

## 2024-12-27 RX ADMIN — ALBUTEROL SULFATE 2 PUFF: 90 AEROSOL, METERED RESPIRATORY (INHALATION) at 14:50

## 2024-12-27 RX ADMIN — OXYCODONE 5 MG: 5 TABLET ORAL at 11:28

## 2024-12-27 RX ADMIN — SODIUM CHLORIDE 3 MILLION UNITS: 9 INJECTION, SOLUTION INTRAVENOUS at 10:37

## 2024-12-27 RX ADMIN — OXYCODONE 5 MG: 5 TABLET ORAL at 06:06

## 2024-12-27 RX ADMIN — ALBUTEROL SULFATE 2 PUFF: 90 AEROSOL, METERED RESPIRATORY (INHALATION) at 10:38

## 2024-12-27 RX ADMIN — SODIUM CHLORIDE 3 MILLION UNITS: 9 INJECTION, SOLUTION INTRAVENOUS at 14:49

## 2024-12-27 RX ADMIN — METRONIDAZOLE 500 MG: 500 TABLET ORAL at 05:57

## 2024-12-27 RX ADMIN — ALBUTEROL SULFATE 2 PUFF: 90 AEROSOL, METERED RESPIRATORY (INHALATION) at 01:10

## 2024-12-27 RX ADMIN — RIVAROXABAN 20 MG: 20 TABLET, FILM COATED ORAL at 17:37

## 2024-12-27 RX ADMIN — SODIUM CHLORIDE 3 MILLION UNITS: 9 INJECTION, SOLUTION INTRAVENOUS at 21:50

## 2024-12-27 RX ADMIN — METRONIDAZOLE 500 MG: 500 TABLET ORAL at 21:40

## 2024-12-27 RX ADMIN — METRONIDAZOLE 500 MG: 500 TABLET ORAL at 14:49

## 2024-12-27 RX ADMIN — SODIUM CHLORIDE 3 MILLION UNITS: 9 INJECTION, SOLUTION INTRAVENOUS at 06:00

## 2024-12-27 RX ADMIN — ALBUTEROL SULFATE 2 PUFF: 90 AEROSOL, METERED RESPIRATORY (INHALATION) at 21:41

## 2024-12-27 RX ADMIN — ALBUTEROL SULFATE 2 PUFF: 90 AEROSOL, METERED RESPIRATORY (INHALATION) at 06:00

## 2024-12-27 ASSESSMENT — PAIN DESCRIPTION - PAIN TYPE
TYPE: ACUTE PAIN

## 2024-12-27 ASSESSMENT — ENCOUNTER SYMPTOMS
EYES NEGATIVE: 1
RESPIRATORY NEGATIVE: 1
FEVER: 0
BACK PAIN: 1
GASTROINTESTINAL NEGATIVE: 1
PSYCHIATRIC NEGATIVE: 1
NEUROLOGICAL NEGATIVE: 1
CARDIOVASCULAR NEGATIVE: 1
CHILLS: 0

## 2024-12-27 NOTE — THERAPY
Physical Therapy Contact Note    Patient Name: Josh Lerner  Age:  69 y.o., Sex:  male  Medical Record #: [id number]  Today's Date: 12/27/2024    Received re-evaluation order for pt. Per chart review, discussion with OT, and discussion with RN, pt is mobilizing at the same level as prior evaluation on 12/24. He is able to mobilize hallway distances with no AD. Defer recommendation to initial evaluation, which is HHPT follow up to address higher level deficits. Acute PT not indicated at this time.

## 2024-12-27 NOTE — DISCHARGE PLANNING
ATTN: Case Management  RE: Referral for Home Health    As of 12/27/2024, we have accepted the Home Health referral for the patient listed above.    [facility]  will contact the patient within 48 hours. If you have any questions or concerns regarding the patient's transition to Home Health, please do not hesitate to contact us at [phone].      We look forward to collaborating with you,  [facility] Team

## 2024-12-27 NOTE — FACE TO FACE
Face to Face Supporting Documentation - Home Health    The encounter with this patient was in whole or in part the primary reason for home health admission.    Date of encounter:   Patient:                    MRN:                       YOB: 2024  Josh Lerner  [id number]  1955     Home health to see patient for:  Physical Therapy evaluation and treatment    Skilled need for:  Recent Deterioration of Health Status Recurrent colorectal cancer    Skilled nursing interventions to include:  Comment: PT/OT    Homebound status evidenced by:  Need the aid of supportive devices such as crutches, canes, wheelchairs or walkers. Leaving home requires a considerable and taxing effort. There is a normal inability to leave the home.    Community Physician to provide follow up care: Christos Sandoval M.D.     Optional Interventions? No      I certify the face to face encounter for this home health care referral meets the CMS requirements and the encounter/clinical assessment with the patient was, in whole, or in part, for the medical condition(s) listed above, which is the primary reason for home health care. Based on my clinical findings: the service(s) are medically necessary, support the need for home health care, and the homebound criteria are met.  I certify that this patient has had a face to face encounter by myself.  Samy Montenegro M.D. - NPI: [id number]

## 2024-12-27 NOTE — PROGRESS NOTES
[facility] Internal Medicine Daily Progress Note    Date of Service  12/27/2024    [facility] Team: R JESSICA [facility] Team   Attending: Jayant Starks M.d.  Senior Resident: Dr. Montenegro  Intern:  Dr. Mejia  Contact Number: [phone]    Chief Complaint  Josh Lerner is a 69 y.o. male admitted 12/22/2024 with Sepsis    Hospital Course  Josh Lerner is a 69 y.o. male who presented 12/22/2024 with sepsis at that moment believed to be due to UTI.     Patient has a history of distal rectal adenocarcinoma stage II C s/p chemoRT and surgical resection (cystoprostatectomy) with colostomy and urostomy.  Unfortunately has recurrence and is following with hematology oncology and general surgery outpatient. Recently started seeing Dr Kirby with [facility]. In the ED, patient had lactic acidosis with neutropenia. CTPA negative, however with emphysema. CT abd noted with complex mass likely recurrent neoplasm. Completed fluid resuscitation, cefepime, then switched to unasyn before noting actinomyces in blood culture, ID consulted and swiched to pen G + metronidazole.     Consulted colorectal surgery Dr. Sewell, Infectious disease Dr. Butler, and Dr. Kirby oncologist, interventional radiology Dr. Graham. Dr. Sewell advised against surgical washout, and under impression that mass is malignant abscess given lack of margins in previous surgery. Dr. Kirby has advised that it was possible for patient to return to [facility], however with likely poor outcome with poor quality of life, and Dr. Graham also advised against IR guided drain placement given loculated fluid collections and proximity to likely recurrent colorectal cancer leading to high risk of hemorrhage.     Goals of care discussion was started, and advised patient of lack of concrete pathways for management. Patient has understood the options (however on day of this conversation, we have not talked about IR's decline in placing guided drain yet) and wanted to think  about it, however was agreeable for DNR/DNI.     Interval Problem Update  No overnight event.  Consulted colorectal surgery Dr. Sewell, Infectious disease Dr. Butler, and Dr. Kirby oncologist, interventional radiology Dr. Graham. Dr. Sewell advised against surgical washout, and under impression that mass is malignant abscess given lack of margins in previous surgery. Dr. Kirby has advised that it was possible for patient to return to [facility], however with likely poor outcome with poor quality of life, and Dr. Graham also advised against IR guided drain placement given loculated fluid collections and proximity to likely recurrent colorectal cancer leading to high risk of hemorrhage.     Goals of care discussion was started, and advised patient of lack of concrete pathways for management. Patient has understood the options (however on day of this conversation, we have not talked about IR's decline in placing guided drain yet) and wanted to think about it, however was agreeable for DNR/DNI    I have discussed this patient's plan of care and discharge plan at IDT rounds today with Case Management, Nursing, Nursing leadership, and other members of the IDT team.    Consultants/Specialty  Infectious Disease: Dr. butler  Oncology: Dr. Kirby  Colorectal Surgery: Dr. Sewell, Dr. Desai  IR: Dr. Graham    Code Status  DNAR/DNI    Disposition  The patient is not medically cleared for discharge to home or a post-acute facility.      I have placed the appropriate orders for post-discharge needs.    Review of Systems  Review of Systems   Constitutional:  Negative for chills and fever.   HENT: Negative.     Eyes: Negative.    Respiratory: Negative.     Cardiovascular: Negative.    Gastrointestinal: Negative.    Genitourinary: Negative.    Musculoskeletal:  Positive for back pain.   Skin: Negative.    Neurological: Negative.    Endo/Heme/Allergies: Negative.    Psychiatric/Behavioral: Negative.          Physical Exam  Temp:   [36.3 °C (97.3 °F)-36.6 °C (97.9 °F)] 36.3 °C (97.3 °F)  Pulse:  [] 92  Resp:  [16-17] 16  BP: (120-144)/(70-85) 128/74  SpO2:  [86 %-93 %] 92 %    Physical Exam  Constitutional:       Appearance: Normal appearance. He is obese.   HENT:      Head: Normocephalic.      Nose: Nose normal.      Mouth/Throat:      Mouth: Mucous membranes are moist.      Comments: Poor dentition  Mallampati II  Eyes:      Pupils: Pupils are equal, round, and reactive to light.   Cardiovascular:      Rate and Rhythm: Normal rate and regular rhythm.      Pulses: Normal pulses.   Pulmonary:      Effort: Pulmonary effort is normal.      Breath sounds: Normal breath sounds.   Abdominal:      General: Abdomen is flat. Bowel sounds are normal.      Palpations: Abdomen is soft.      Comments: Right lower quadrant urostomy bag noted  Left lower quadrant colostomy bag noted   Musculoskeletal:         General: Normal range of motion.      Cervical back: Neck supple.   Skin:     General: Skin is warm.      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   Neurological:      General: No focal deficit present.      Mental Status: He is alert and oriented to person, place, and time. Mental status is at baseline.   Psychiatric:         Mood and Affect: Mood normal.         Behavior: Behavior normal.         Thought Content: Thought content normal.         Judgment: Judgment normal.         Fluids    Intake/Output Summary (Last 24 hours) at 12/27/2024 1416  Last data filed at 12/27/2024 1200  Gross per 24 hour   Intake 680 ml   Output 2590 ml   Net -1910 ml       Laboratory  Recent Labs     12/25/24  0024 12/26/24  0044 12/27/24  0141   WBC 12.5* 9.7 10.1   RBC 3.66* 3.80* 3.89*   HEMOGLOBIN 9.5* 9.8* 9.8*   HEMATOCRIT 30.2* 31.9* 31.9*   MCV 82.5 83.9 82.0   MCH 26.0* 25.8* 25.2*   MCHC 31.5* 30.7* 30.7*   RDW 49.7 50.9* 50.2*   PLATELETCT 228 246 305   MPV  9.9 10.1 9.9     Recent Labs     12/25/24  0024 12/27/24  0141   SODIUM 134* 135   POTASSIUM 3.8 3.8   CHLORIDE 102 101   CO2 21 22   GLUCOSE 126* 164*   BUN 12 10   CREATININE 0.59 0.57   CALCIUM 8.2* 8.5                   Imaging  EC-ECHOCARDIOGRAM COMPLETE W/O CONT   Final Result      CT-ABDOMEN-PELVIS WITH   Final Result         1.  Complex mass in the pelvis, appearance most compatible with recurrent neoplastic disease.   2.  Hepatomegaly   3.  Irregular hepatic contour favoring changes of cirrhosis   4.  Cholelithiasis   5.  Small fat-containing bilateral inguinal hernias   6.  Atherosclerosis and atherosclerotic coronary artery disease      CT-CTA CHEST PULMONARY ARTERY W/ RECONS   Final Result         1.  No pulmonary embolus appreciated.   2.  Fluid-filled distention of esophagus, consider gastroesophageal reflux or esophageal dysmotility. Could be followed up with esophageal swallow study.   3.  Emphysematous changes, greatest in the apices.      DX-CHEST-PORTABLE (1 VIEW)   Final Result         Hazy left basilar opacity, likely atelectasis.            Assessment/Plan  Problem Representation:    69-year-old male admitted with sepsis and thought to be due to UTI at that moment.  The patient has a past medical history of rectal adenocarcinoma stage IIc status post chemo and radiotherapy and surgical resection with urostomy and colostomy bags, unfortunately with local recurrence and followed by Dr. Kirby with [facility].  Patient initially on cefepime and, clinically improving and white blood count downtrending.  Blood cultures are positive for Actinomyces turicensis and Peptoniphilus, Solobacterium and urine culture is negative and likelihood of source being UTI is low.  Patient has mass/fluid collection in the pelvic area with what seems to be a fistula draining serosanguineous/feces.  Working out on coordination of care with infectious disease, oncology colorectal surgery and internal medicine teams.      * Bacteremia  Assessment & Plan  Blood cultures are positive: Growth detected by automated blood culture system 12/23/2024 for Actinomyces turicensis and Peptoniphilus, Solobacterium    -Source initially thought to be urinary but given no growth on urine culture unlikely.  -The patient has a pelvic mass/collection of fluid with ongoing drainage of serosanguineous/pus/feces.  -Patient underwent an extensive surgery when they remove the prostate, bladder, rectum and part of GI tract.  -CT abdomen pelvis shows a complex mass in the pelvis (neoplastic).  Unclear if there is some pus collection.  -Echocardiogram did not show vegetations.  -The patient was started on cefepime on 12/22/2024 and currently WBC trending down. Transitioned to penicillin + flagyl on 12/26 once the pathogen was determined.  -Will need to determine the duration of therapy and way of administration once the source is under control.       Coordination of complex care  Assessment & Plan  Patient stated that he was still continue to be full code during this admission.  Patient ongoing maintenance chemo as outpatient followed by Dr. Kirby and next appointment is in February 2025  Patient is being followed by Dr. Desai, colorectal surgeon    Patient has bacteremia initially on cefepime and then transitioned to penicillin and flagyl due to positivity for actinomyces and anaerobes. Down trending WBC. Appreciate infectious disease for recommendations on duration of therapy.  We believe the source of the bacteremia is coming from the pelvic mass because of ongoing drainage from that area.  Unclear if the patient needs a drainage place or a procedure to be done.    -Will coordinate care with infectious disease, interventional radiology, oncology and colorectal surgery.  -Source control to be determined      DVT (deep venous thrombosis) (HCC)  Assessment & Plan  History of DVT LLE.  Continue Xarelto    Hypoxia  Assessment & Plan  Emphysema noted,  patient had a heavy smoking history but quit a year ago  COVID swab negative, CTPA negative for pneumonia and pulmonary embolism. Viral respiratory panel negative  Echocardiogram showing normal ventricular function with EF 60%  -Patient saturating well on room air  -Continue with incentive spirometry and Aerobika    Sepsis (HCC)- (present on admission)  Assessment & Plan  This is Sepsis Present on admission  SIRS criteria identified on my evaluation include: Tachycardia, with heart rate greater than 90 BPM, Tachypnea, with respirations greater than 20 per minute, and Leukopenia, with WBC less than 4,000  Clinical indicators of end organ dysfunction include Lactic Acid greater than 2  Patient was fluid resuscitated. Lactic acid trended 4.0 > 3.0 > 1.5  Antibiotic: Broad spectrum antibiotic  Initially thought to be due to UTI but urine cultures negative.  Patient has bacteremia positive for actinomyces and anaerobes.  Source likely to be abdominal/pelvic mass  -Sepsis resolved    Malignant neoplasm of rectum (HCC)- (present on admission)  Assessment & Plan  Following with oncology and general surgery outpatient  Has received chemotherapy and radiation in the past and cystoprostatectomy with urostomy and colostomy  Still has mass noted, not on treatment at this time. Patient wearing diaper and having a dark discharge (feces/bleeding?)  Next appointment with general surgery on January 8  Dr Kirby aware of current hospitalization.         VTE prophylaxis: Xarelto 10 mg daily as prophylaxis    I have performed a physical exam and reviewed and updated ROS and Plan today (12/27/2024). In review of yesterday's note (12/26/2024), there are no changes except as documented above.

## 2024-12-28 LAB
ALBUMIN SERPL BCP-MCNC: 3 G/DL (ref 3.2–4.9)
ALBUMIN/GLOB SERPL: 0.7 G/DL
ALP SERPL-CCNC: 145 U/L (ref 30–99)
ALT SERPL-CCNC: 50 U/L (ref 2–50)
ANION GAP SERPL CALC-SCNC: 11 MMOL/L (ref 7–16)
AST SERPL-CCNC: 30 U/L (ref 12–45)
BASOPHILS # BLD AUTO: 0.5 % (ref 0–1.8)
BASOPHILS # BLD: 0.06 K/UL (ref 0–0.12)
BILIRUB SERPL-MCNC: 0.2 MG/DL (ref 0.1–1.5)
BUN SERPL-MCNC: 11 MG/DL (ref 8–22)
CALCIUM ALBUM COR SERPL-MCNC: 9.4 MG/DL (ref 8.5–10.5)
CALCIUM SERPL-MCNC: 8.6 MG/DL (ref 8.5–10.5)
CHLORIDE SERPL-SCNC: 101 MMOL/L (ref 96–112)
CO2 SERPL-SCNC: 21 MMOL/L (ref 20–33)
CREAT SERPL-MCNC: 0.43 MG/DL (ref 0.5–1.4)
EOSINOPHIL # BLD AUTO: 0.28 K/UL (ref 0–0.51)
EOSINOPHIL NFR BLD: 2.4 % (ref 0–6.9)
ERYTHROCYTE [DISTWIDTH] IN BLOOD BY AUTOMATED COUNT: 49.6 FL (ref 35.9–50)
GFR SERPLBLD CREATININE-BSD FMLA CKD-EPI: 115 ML/MIN/1.73 M 2
GLOBULIN SER CALC-MCNC: 4.4 G/DL (ref 1.9–3.5)
GLUCOSE SERPL-MCNC: 131 MG/DL (ref 65–99)
HCT VFR BLD AUTO: 32.8 % (ref 42–52)
HGB BLD-MCNC: 10.1 G/DL (ref 14–18)
IMM GRANULOCYTES # BLD AUTO: 0.13 K/UL (ref 0–0.11)
IMM GRANULOCYTES NFR BLD AUTO: 1.1 % (ref 0–0.9)
LYMPHOCYTES # BLD AUTO: 1.15 K/UL (ref 1–4.8)
LYMPHOCYTES NFR BLD: 9.8 % (ref 22–41)
MCH RBC QN AUTO: 25.2 PG (ref 27–33)
MCHC RBC AUTO-ENTMCNC: 30.8 G/DL (ref 32.3–36.5)
MCV RBC AUTO: 81.8 FL (ref 81.4–97.8)
MONOCYTES # BLD AUTO: 0.9 K/UL (ref 0–0.85)
MONOCYTES NFR BLD AUTO: 7.7 % (ref 0–13.4)
NEUTROPHILS # BLD AUTO: 9.21 K/UL (ref 1.82–7.42)
NEUTROPHILS NFR BLD: 78.5 % (ref 44–72)
NRBC # BLD AUTO: 0 K/UL
NRBC BLD-RTO: 0 /100 WBC (ref 0–0.2)
PLATELET # BLD AUTO: 350 K/UL (ref 164–446)
PMV BLD AUTO: 9.4 FL (ref 9–12.9)
POTASSIUM SERPL-SCNC: 4.2 MMOL/L (ref 3.6–5.5)
PROT SERPL-MCNC: 7.4 G/DL (ref 6–8.2)
RBC # BLD AUTO: 4.01 M/UL (ref 4.7–6.1)
SODIUM SERPL-SCNC: 133 MMOL/L (ref 135–145)
WBC # BLD AUTO: 11.7 K/UL (ref 4.8–10.8)

## 2024-12-28 PROCEDURE — 99233 SBSQ HOSP IP/OBS HIGH 50: CPT | Performed by: INTERNAL MEDICINE

## 2024-12-28 PROCEDURE — 770001 HCHG ROOM/CARE - MED/SURG/GYN PRIV*

## 2024-12-28 PROCEDURE — 700111 HCHG RX REV CODE 636 W/ 250 OVERRIDE (IP): Mod: JZ | Performed by: INTERNAL MEDICINE

## 2024-12-28 PROCEDURE — 85025 COMPLETE CBC W/AUTO DIFF WBC: CPT

## 2024-12-28 PROCEDURE — 80053 COMPREHEN METABOLIC PANEL: CPT

## 2024-12-28 PROCEDURE — 700102 HCHG RX REV CODE 250 W/ 637 OVERRIDE(OP): Performed by: STUDENT IN AN ORGANIZED HEALTH CARE EDUCATION/TRAINING PROGRAM

## 2024-12-28 PROCEDURE — 99232 SBSQ HOSP IP/OBS MODERATE 35: CPT | Mod: GC | Performed by: INTERNAL MEDICINE

## 2024-12-28 PROCEDURE — 700102 HCHG RX REV CODE 250 W/ 637 OVERRIDE(OP): Performed by: INTERNAL MEDICINE

## 2024-12-28 PROCEDURE — A9270 NON-COVERED ITEM OR SERVICE: HCPCS | Performed by: INTERNAL MEDICINE

## 2024-12-28 PROCEDURE — A9270 NON-COVERED ITEM OR SERVICE: HCPCS | Performed by: STUDENT IN AN ORGANIZED HEALTH CARE EDUCATION/TRAINING PROGRAM

## 2024-12-28 PROCEDURE — 700102 HCHG RX REV CODE 250 W/ 637 OVERRIDE(OP)

## 2024-12-28 PROCEDURE — 700111 HCHG RX REV CODE 636 W/ 250 OVERRIDE (IP): Mod: JZ | Performed by: STUDENT IN AN ORGANIZED HEALTH CARE EDUCATION/TRAINING PROGRAM

## 2024-12-28 PROCEDURE — A9270 NON-COVERED ITEM OR SERVICE: HCPCS

## 2024-12-28 PROCEDURE — 700105 HCHG RX REV CODE 258: Performed by: INTERNAL MEDICINE

## 2024-12-28 PROCEDURE — 36415 COLL VENOUS BLD VENIPUNCTURE: CPT

## 2024-12-28 RX ORDER — OXYCODONE HYDROCHLORIDE 5 MG/1
5 TABLET ORAL
Status: DISCONTINUED | OUTPATIENT
Start: 2024-12-28 | End: 2024-12-30 | Stop reason: HOSPADM

## 2024-12-28 RX ORDER — ACETAMINOPHEN 325 MG/1
650 TABLET ORAL EVERY 6 HOURS PRN
Status: DISCONTINUED | OUTPATIENT
Start: 2025-01-02 | End: 2024-12-30 | Stop reason: HOSPADM

## 2024-12-28 RX ORDER — OXYCODONE HYDROCHLORIDE 5 MG/1
2.5 TABLET ORAL
Status: DISCONTINUED | OUTPATIENT
Start: 2024-12-28 | End: 2024-12-30 | Stop reason: HOSPADM

## 2024-12-28 RX ORDER — AMOXICILLIN 250 MG
2 CAPSULE ORAL EVERY EVENING
Status: DISCONTINUED | OUTPATIENT
Start: 2024-12-28 | End: 2024-12-30 | Stop reason: HOSPADM

## 2024-12-28 RX ORDER — MORPHINE SULFATE 4 MG/ML
2 INJECTION INTRAVENOUS
Status: DISCONTINUED | OUTPATIENT
Start: 2024-12-28 | End: 2024-12-30 | Stop reason: HOSPADM

## 2024-12-28 RX ORDER — POLYETHYLENE GLYCOL 3350 17 G/17G
1 POWDER, FOR SOLUTION ORAL
Status: DISCONTINUED | OUTPATIENT
Start: 2024-12-28 | End: 2024-12-30 | Stop reason: HOSPADM

## 2024-12-28 RX ORDER — PROCHLORPERAZINE MALEATE 5 MG/1
5 TABLET ORAL EVERY 6 HOURS PRN
Status: DISCONTINUED | OUTPATIENT
Start: 2024-12-28 | End: 2024-12-28

## 2024-12-28 RX ORDER — ACETAMINOPHEN 325 MG/1
650 TABLET ORAL EVERY 6 HOURS
Status: DISCONTINUED | OUTPATIENT
Start: 2024-12-28 | End: 2024-12-30 | Stop reason: HOSPADM

## 2024-12-28 RX ORDER — PROCHLORPERAZINE EDISYLATE 5 MG/ML
10 INJECTION INTRAMUSCULAR; INTRAVENOUS EVERY 6 HOURS PRN
Status: DISCONTINUED | OUTPATIENT
Start: 2024-12-28 | End: 2024-12-30 | Stop reason: HOSPADM

## 2024-12-28 RX ADMIN — ALBUTEROL SULFATE 2 PUFF: 90 AEROSOL, METERED RESPIRATORY (INHALATION) at 18:01

## 2024-12-28 RX ADMIN — METRONIDAZOLE 500 MG: 500 TABLET ORAL at 06:27

## 2024-12-28 RX ADMIN — PROCHLORPERAZINE EDISYLATE 10 MG: 5 INJECTION INTRAMUSCULAR; INTRAVENOUS at 15:35

## 2024-12-28 RX ADMIN — SODIUM CHLORIDE 3 MILLION UNITS: 9 INJECTION, SOLUTION INTRAVENOUS at 15:00

## 2024-12-28 RX ADMIN — ALBUTEROL SULFATE 2 PUFF: 90 AEROSOL, METERED RESPIRATORY (INHALATION) at 06:27

## 2024-12-28 RX ADMIN — SODIUM CHLORIDE 3 MILLION UNITS: 9 INJECTION, SOLUTION INTRAVENOUS at 17:58

## 2024-12-28 RX ADMIN — POLYETHYLENE GLYCOL 3350 1 PACKET: 17 POWDER, FOR SOLUTION ORAL at 17:53

## 2024-12-28 RX ADMIN — ONDANSETRON 4 MG: 2 INJECTION INTRAMUSCULAR; INTRAVENOUS at 07:27

## 2024-12-28 RX ADMIN — SENNOSIDES AND DOCUSATE SODIUM 2 TABLET: 50; 8.6 TABLET ORAL at 17:52

## 2024-12-28 RX ADMIN — MAGNESIUM HYDROXIDE 30 ML: 1200 LIQUID ORAL at 19:47

## 2024-12-28 RX ADMIN — ALBUTEROL SULFATE 2 PUFF: 90 AEROSOL, METERED RESPIRATORY (INHALATION) at 02:15

## 2024-12-28 RX ADMIN — ALBUTEROL SULFATE 2 PUFF: 90 AEROSOL, METERED RESPIRATORY (INHALATION) at 21:54

## 2024-12-28 RX ADMIN — SODIUM CHLORIDE 3 MILLION UNITS: 9 INJECTION, SOLUTION INTRAVENOUS at 21:57

## 2024-12-28 RX ADMIN — METRONIDAZOLE 500 MG: 500 TABLET ORAL at 15:01

## 2024-12-28 RX ADMIN — ALBUTEROL SULFATE 2 PUFF: 90 AEROSOL, METERED RESPIRATORY (INHALATION) at 15:02

## 2024-12-28 RX ADMIN — OXYCODONE 2.5 MG: 5 TABLET ORAL at 02:27

## 2024-12-28 RX ADMIN — SODIUM CHLORIDE 3 MILLION UNITS: 9 INJECTION, SOLUTION INTRAVENOUS at 02:19

## 2024-12-28 RX ADMIN — ANTACID TABLETS 500 MG: 500 TABLET, CHEWABLE ORAL at 13:12

## 2024-12-28 RX ADMIN — METRONIDAZOLE 500 MG: 500 TABLET ORAL at 21:49

## 2024-12-28 RX ADMIN — RIVAROXABAN 20 MG: 20 TABLET, FILM COATED ORAL at 17:53

## 2024-12-28 RX ADMIN — ALBUTEROL SULFATE 2 PUFF: 90 AEROSOL, METERED RESPIRATORY (INHALATION) at 10:44

## 2024-12-28 RX ADMIN — SODIUM CHLORIDE 3 MILLION UNITS: 9 INJECTION, SOLUTION INTRAVENOUS at 06:31

## 2024-12-28 RX ADMIN — SODIUM CHLORIDE 3 MILLION UNITS: 9 INJECTION, SOLUTION INTRAVENOUS at 10:43

## 2024-12-28 RX ADMIN — OXYCODONE 5 MG: 5 TABLET ORAL at 07:27

## 2024-12-28 ASSESSMENT — ENCOUNTER SYMPTOMS
SHORTNESS OF BREATH: 0
FEVER: 0
COUGH: 0
NAUSEA: 0
VOMITING: 0

## 2024-12-28 ASSESSMENT — PAIN DESCRIPTION - PAIN TYPE
TYPE: CHRONIC PAIN
TYPE: ACUTE PAIN

## 2024-12-28 ASSESSMENT — FIBROSIS 4 INDEX: FIB4 SCORE: 0.84

## 2024-12-28 NOTE — PROGRESS NOTES
[facility] Internal Medicine Daily Progress Note    Date of Service  12/28/2024    [facility] Team: R JESS[facility] Team   Attending: Jayant Starks M.d.  Senior Resident: Dr. Montenegro  Intern:  Dr. Mejia  Contact Number: [phone]    Chief Complaint  Josh Lerner is a 69 y.o. male admitted 12/22/2024 with Sepsis    Hospital Course  Josh Lerner is a 69 y.o. male who presented 12/22/2024 with sepsis at that moment believed to be due to UTI.     Patient has a history of distal rectal adenocarcinoma stage II C s/p chemoRT and surgical resection (cystoprostatectomy) with colostomy and urostomy.  Unfortunately has recurrence and is following with hematology oncology and general surgery outpatient. Recently started seeing Dr Kirby with [facility]. In the ED, patient had lactic acidosis with neutropenia. CTPA negative, however with emphysema. CT abd noted with complex mass likely recurrent neoplasm. Completed fluid resuscitation, cefepime, then switched to unasyn before noting actinomyces in blood culture, ID consulted and swiched to pen G + metronidazole.     Consulted colorectal surgery Dr. Sewell, Infectious disease Dr. Butler, and Dr. Kirby oncologist, interventional radiology Dr. Graham. Dr. Sewell advised against surgical washout, and under impression that mass is malignant abscess given lack of margins in previous surgery. Dr. Kirby has advised that it was possible for patient to return to [facility], however with likely poor outcome with poor quality of life, and Dr. Graham also advised against IR guided drain placement given loculated fluid collections and proximity to likely recurrent colorectal cancer leading to high risk of hemorrhage.     Goals of care discussion was started, and advised patient of lack of concrete pathways for management. Patient has understood the options (however on day of this conversation, we have not talked about IR's decline in placing guided drain yet) and wanted to think  about it, however was agreeable for DNR/DNI.     Interval Problem Update  No overnight events  Vitals stable, patient breathing comfortably on room air. Required oxycodone 5 mg for pain. Patient not draining much on the perineal fistula.    Explained with detailed about the coordination of care and the different opinions the specialist have. Patient wants to have a second opinion with Dr. Desai on Monday. He is determined that he is DNR now and interested in palliative Care. Consult placed.    At this point the surgeon suggests not to do surgery, IR recommends not draining because there are risks and there's uncertainty that this will help him clear everything. ID recommends undetermined duration of IV penicillin and flagyl.    I have discussed this patient's plan of care and discharge plan at IDT rounds today with Case Management, Nursing, Nursing leadership, and other members of the IDT team.    Consultants/Specialty  Infectious Disease: Dr. serrano  Oncology: Dr. Kirby  Colorectal Surgery: Dr. Sewell, Dr. Desai  IR: Dr. Graham    Code Status  DNAR/DNI    Disposition  The patient is not medically cleared for discharge to home or a post-acute facility.      I have placed the appropriate orders for post-discharge needs.    Review of Systems  ROS     Physical Exam  Temp:  [36.3 °C (97.3 °F)-36.5 °C (97.7 °F)] 36.5 °C (97.7 °F)  Pulse:  [] 86  Resp:  [16-18] 17  BP: (114-139)/(70-75) 123/70  SpO2:  [82 %-92 %] 90 %    Physical Exam    Fluids    Intake/Output Summary (Last 24 hours) at 12/28/2024 1349  Last data filed at 12/28/2024 1200  Gross per 24 hour   Intake 2007.24 ml   Output 1775 ml   Net 232.24 ml       Laboratory  Recent Labs     12/26/24  0044 12/27/24  0141 12/28/24  0058   WBC 9.7 10.1 11.7*   RBC 3.80* 3.89* 4.01*   HEMOGLOBIN 9.8* 9.8* 10.1*   HEMATOCRIT 31.9* 31.9* 32.8*   MCV 83.9 82.0 81.8   MCH 25.8* 25.2* 25.2*   MCHC 30.7* 30.7* 30.8*   RDW 50.9* 50.2* 49.6   PLATELETCT 246 305 350   MPV  10.1 9.9 9.4     Recent Labs     12/27/24  0141 12/28/24  0058   SODIUM 135 133*   POTASSIUM 3.8 4.2   CHLORIDE 101 101   CO2 22 21   GLUCOSE 164* 131*   BUN 10 11   CREATININE 0.57 0.43*   CALCIUM 8.5 8.6                   Imaging  EC-ECHOCARDIOGRAM COMPLETE W/O CONT   Final Result      CT-ABDOMEN-PELVIS WITH   Final Result         1.  Complex mass in the pelvis, appearance most compatible with recurrent neoplastic disease.   2.  Hepatomegaly   3.  Irregular hepatic contour favoring changes of cirrhosis   4.  Cholelithiasis   5.  Small fat-containing bilateral inguinal hernias   6.  Atherosclerosis and atherosclerotic coronary artery disease      CT-CTA CHEST PULMONARY ARTERY W/ RECONS   Final Result         1.  No pulmonary embolus appreciated.   2.  Fluid-filled distention of esophagus, consider gastroesophageal reflux or esophageal dysmotility. Could be followed up with esophageal swallow study.   3.  Emphysematous changes, greatest in the apices.      DX-CHEST-PORTABLE (1 VIEW)   Final Result         Hazy left basilar opacity, likely atelectasis.            Assessment/Plan  Problem Representation:    69-year-old male admitted with sepsis and thought to be due to UTI at that moment.  The patient has a past medical history of rectal adenocarcinoma stage IIc status post chemo and radiotherapy and surgical resection with urostomy and colostomy bags, unfortunately with local recurrence.  Patient initially on cefepime and, clinically improving and white blood count downtrending.  Blood cultures are positive for Actinomyces turicensis and Peptoniphilus, Solobacterium and urine culture was negative and likelihood of source being UTI is low.  Patient has mass/fluid collection in the pelvic area with what seems to be a fistula draining serosanguineous/feces. Working out on coordination of care with infectious disease, oncology colorectal surgery and internal medicine teams. Surgery and IR recommends no procedure to be  done due to risk outweighs benefits. Patient transitioned to DNR and will see palliative care.    * Coordination of complex care  Assessment & Plan  Patient stated that he was still continue to be full code during this admission.  Patient ongoing maintenance chemo as outpatient followed by Dr. Kirby and next appointment is in February 2025  Patient is being followed by Dr. Desai, colorectal surgeon    Patient has bacteremia initially on cefepime and then transitioned to penicillin and flagyl due to positivity for actinomyces and anaerobes. Down trending WBC. Appreciate infectious disease for recommendations on duration of therapy.  We believe the source of the bacteremia is coming from the complex pelvic mass because of ongoing drainage from that area.    -Coordination of care with infectious disease, interventional radiology, oncology and colorectal surgery.  -Source control to be determined  -Dr. Sewell has advised that he is not a surgical candidate as the margins were not clear and he will continue to advance with his cancer.   -Infectious disease standpoint is that it would be best to drain the abscess / fluid collection in the pelvis however there is not a clear collection separate from the cancerous mass and therefore IR does not feel they can successfully drain either, and he would be at high risk of bleeding or other complications from this.   -Continue the IV antibiotics per ID and if he remains in hospital until Monday he can get a second colo-rectal opinion from a different surgeon, Dr Desai.   -Palliative care consult placed.       Bacteremia  Assessment & Plan  Blood cultures are positive: Growth detected by automated blood culture system 12/23/2024 for Actinomyces turicensis and Peptoniphilus, Solobacterium    -Source initially thought to be urinary but given no growth on urine culture unlikely.  -The patient has a pelvic mass/collection of fluid with ongoing drainage of  serosanguineous/pus/feces.  -Patient underwent an extensive surgery when they remove the prostate, bladder, rectum and part of GI tract.  -CT abdomen pelvis shows a complex mass in the pelvis (neoplastic).  Unclear if there is some pus collection.  -Echocardiogram did not show vegetations.  -The patient was started on cefepime on 12/22/2024 and currently WBC trending down. Transitioned to penicillin + flagyl on 12/26 once the pathogen was determined.  -Will need to determine the duration of therapy and way of administration once the source is under control.       Malignant neoplasm of rectum (HCC)- (present on admission)  Assessment & Plan  Following with oncology and general surgery outpatient  Has received chemotherapy and radiation in the past and cystoprostatectomy with urostomy and colostomy  Still has mass noted, not on treatment at this time. Patient wearing diaper and having a dark discharge (feces/bleeding?)  Next appointment with general surgery on January 8  Dr Kirby aware of current hospitalization.    DVT (deep venous thrombosis) ([facility])  Assessment & Plan  History of DVT LLE.  Continue Xarelto    Hypoxia  Assessment & Plan  Emphysema noted, patient had a heavy smoking history but quit a year ago  COVID swab negative, CTPA negative for pneumonia and pulmonary embolism. Viral respiratory panel negative  Echocardiogram showing normal ventricular function with EF 60%  -Patient saturating well on room air  -Continue with incentive spirometry and Aerobika    Sepsis ([facility])- (present on admission)  Assessment & Plan  This is Sepsis Present on admission  SIRS criteria identified on my evaluation include: Tachycardia, with heart rate greater than 90 BPM, Tachypnea, with respirations greater than 20 per minute, and Leukopenia, with WBC less than 4,000  Clinical indicators of end organ dysfunction include Lactic Acid greater than 2  Patient was fluid resuscitated. Lactic acid trended 4.0 > 3.0 > 1.5  Antibiotic:  Broad spectrum antibiotic  Initially thought to be due to UTI but urine cultures negative.  Patient has bacteremia positive for actinomyces and anaerobes.  Source likely to be abdominal/pelvic mass  -Sepsis resolved         VTE prophylaxis: Xarelto 10 mg daily as prophylaxis    I have performed a physical exam and reviewed and updated ROS and Plan today (12/28/2024). In review of yesterday's note (12/27/2024), there are no changes except as documented above.

## 2024-12-28 NOTE — PROGRESS NOTES
Infectious Disease Progress Note    Author: Zohra Estevez M.D. Date & Time of service: 2024  12:04 PM    Chief Complaint:  Polymicrobial bacteremia    Interval History:   69 y.o. 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    Review of Systems:  Review of Systems   Constitutional:  Negative for fever.   Respiratory:  Negative for cough and shortness of breath.    Gastrointestinal:  Negative for nausea and vomiting.   All other systems reviewed and are negative.      Hemodynamics:  Temp (24hrs), Av.4 °C (97.6 °F), Min:36.3 °C (97.3 °F), Max:36.5 °C (97.7 °F)  Temperature: 36.5 °C (97.7 °F), Monitored Temp: 36.5 °C (97.7 °F)  Pulse  Av.2  Min: 47  Max: 135   Blood Pressure : 123/70       Physical Exam:  Physical Exam  Vitals and nursing note reviewed.   Constitutional:       General: He is not in acute distress.     Appearance: He is not ill-appearing, toxic-appearing or diaphoretic.   Eyes:      General: No scleral icterus.     Extraocular Movements: Extraocular movements intact.      Pupils: Pupils are equal, round, and reactive to light.   Cardiovascular:      Rate and Rhythm: Normal rate.   Pulmonary:      Effort: Pulmonary effort is normal. No respiratory distress.      Breath sounds: No stridor.   Skin:     Coloration: Skin is not jaundiced.   Neurological:      General: No focal deficit present.      Mental Status: He is alert.         Meds:    Current Facility-Administered Medications:     Pharmacy Consult Request    acetaminophen **FOLLOWED BY** [START ON 2025] acetaminophen    oxyCODONE immediate-release **OR** oxyCODONE immediate-release  **OR** morphine injection    penicillin g    metroNIDAZOLE    calcium carbonate    albuterol    acetaminophen    ondansetron    ondansetron    labetalol    rivaroxaban    Labs:  Recent Labs     24  0044 24  0058   WBC 9.7 10.1 11.7*   RBC 3.80* 3.89* 4.01*   HEMOGLOBIN 9.8* 9.8* 10.1*   HEMATOCRIT 31.9* 31.9* 32.8*   MCV 83.9 82.0 81.8   MCH 25.8* 25.2* 25.2*   RDW 50.9* 50.2* 49.6   PLATELETCT 246 305 350   MPV 10.1 9.9 9.4   NEUTSPOLYS  --  77.80* 78.50*   LYMPHOCYTES  --  12.30* 9.80*   MONOCYTES  --  6.40 7.70   EOSINOPHILS  --  2.30 2.40   BASOPHILS  --  0.30 0.50     Recent Labs     24  01424  0058   SODIUM 135 133*   POTASSIUM 3.8 4.2   CHLORIDE 101 101   CO2 22 21   GLUCOSE 164* 131*   BUN 10 11     Recent Labs     24  0141 24  0058   ALBUMIN  --  3.0*   TBILIRUBIN  --  0.2   ALKPHOSPHAT  --  145*   TOTPROTEIN  --  7.4   ALTSGPT  --  50   ASTSGOT  --  30   CREATININE 0.57 0.43*       Imaging:  EC-ECHOCARDIOGRAM COMPLETE W/O CONT    Result Date: 2024  Transthoracic Echo Report Echocardiography Laboratory CONCLUSIONS Normal left ventricular systolic function. The left ventricular ejection fraction is visually estimated to be 55-60%. Mild concentric left ventricular hypertrophy. Grade I diastolic dysfunction. Normal right ventricular size and systolic function. No significant valvular abnormalities. No prior study is available for comparison. BRADENDANIELEKETURAH DONALDSON Exam Date:         2024                    14:55 Exam Location:     Inpatient Priority:          Routine Ordering Physician:        MAREK SANTO Referring Physician:       MAREK SANTO Sonographer:               Yamileth TAVERAS Age:    69     Gender:    M MRN:    [id number] :    1955 BSA:    2.25   Ht (in):    72     Wt (lb):    228 Exam Type:     Complete Indications:     Acute MI ICD Codes:       410.9 CPT Codes:       33603 BP:   135    /   67     HR:   85 Technical  Quality:       Fair MEASUREMENTS  (Male / Female) Normal Values 2D ECHO LV Diastolic Diameter PLAX        4.6 cm                4.2 - 5.9 / 3.9 - 5.3 cm LV Systolic Diameter PLAX         2.8 cm                2.1 - 4.0 cm IVS Diastolic Thickness           1.1 cm                LVPW Diastolic Thickness          1.1 cm                LVOT Diameter                     2.4 cm                LV Ejection Fraction MOD BP       56.1 %                >= 55  % LV Ejection Fraction MOD 4C       59 %                  LV Ejection Fraction MOD 2C       52.5 %                LV Ejection Fraction 4C AL        60.9 %                LV Ejection Fraction 2C AL        54.5 %                LA Volume Index                   22.2 cm3/m2           16 - 28 cm3/m2 DOPPLER AV Peak Velocity                  1.2 m/s               AV Peak Gradient                  6.1 mmHg              AV Mean Gradient                  4 mmHg                LVOT Peak Velocity                0.87 m/s              AV Area Cont Eq vti               3.2 cm2               Mitral E Point Velocity           0.57 m/s              Mitral E to A Ratio               0.75                  MV Pressure Half Time             63 ms                 MV Area PHT                       3.5 cm2               MV Deceleration Time              213 ms                PV Peak Velocity                  0.96 m/s              PV Peak Gradient                  3.7 mmHg              RVOT Peak Velocity                0.76 m/s              LV E' Lateral Velocity            8.5 cm/s              Mitral E to LV E' Lateral Ratio   6.7                   LV E' Septal Velocity             5.2 cm/s              Mitral E to LV E' Septal Ratio    11                    * Indicates values subject to auto-interpretation LV EF:        % FINDINGS Left Ventricle Normal left ventricular chamber size. Mild concentric left ventricular hypertrophy. Normal left ventricular systolic function. The left ventricular  ejection fraction is visually estimated to be 55-60%. Normal regional wall motion. Grade I diastolic dysfunction. Right Ventricle Normal right ventricular size and systolic function. Right Atrium Normal right atrial size. Normal inferior vena cava size and inspiratory collapse. Left Atrium Normal left atrial size. Left atrial volume index is 19 mL/sq m. Intact atrial septum without Doppler evidence of interatrial shunt (e.g. PFO or ASD). Mitral Valve Structurally normal mitral valve without significant stenosis or regurgitation. Aortic Valve Tricuspid aortic valve. Structurally normal aortic valve without significant stenosis or regurgitation. Tricuspid Valve Structurally normal tricuspid valve without significant stenosis. Trace tricuspid regurgitation. Unable to estimate right ventricular systolic pressure due to an inadequate tricuspid regurgitant jet. Pulmonic Valve Structurally normal pulmonic valve without significant stenosis. Trace pulmonic insufficiency. Pericardium Normal pericardium without effusion. Aorta Normal aortic root for body surface area. The ascending aorta diameter is 4.0 cm. Antonieta Casillas MD (Electronically Signed) Final Date:     24 December 2024                 18:18    CT-ABDOMEN-PELVIS WITH    Result Date: 12/22/2024 12/22/2024 8:09 PM HISTORY/REASON FOR EXAM:  Sepsis, rectal pain h/o of cancer. TECHNIQUE/EXAM DESCRIPTION:   CT scan of the abdomen and pelvis with contrast. Contrast-enhanced helical scanning was obtained from the diaphragmatic domes through the pubic symphysis following the bolus administration of nonionic contrast without complication. 100 mL of Omnipaque 350 nonionic contrast was administered without complication. Low dose optimization technique was utilized for this CT exam including automated exposure control and adjustment of the mA and/or kV according to patient size. COMPARISON: August 27, 2024 FINDINGS: Lower Chest: See dedicated CT of the chest for thoracic  findings. Liver: Hepatomegaly is seen with slight irregular hepatic contour. Spleen: Unremarkable. Pancreas: Unremarkable. Gallbladder: Cholelithiasis. Biliary: Nondilated. Adrenal glands: Normal. Kidneys: 2.0 cm interpolar right renal cyst is seen, otherwise unremarkable without hydronephrosis. Bowel: No obstruction or acute inflammation. Left lower quadrant ostomy is seen. Right lower quadrant ostomy is noted. Lymph nodes: No adenopathy. Vasculature: Atherosclerotic changes are seen including atherosclerotic coronary artery calcifications. Peritoneum: Unremarkable without ascites. Musculoskeletal: No acute or destructive process. Pelvis: There is complex mass seen within the pelvis measuring 8.7 x 8.9 cm. Small fat-containing bilateral inguinal hernias are seen.     1.  Complex mass in the pelvis, appearance most compatible with recurrent neoplastic disease. 2.  Hepatomegaly 3.  Irregular hepatic contour favoring changes of cirrhosis 4.  Cholelithiasis 5.  Small fat-containing bilateral inguinal hernias 6.  Atherosclerosis and atherosclerotic coronary artery disease    CT-CTA CHEST PULMONARY ARTERY W/ RECONS    Result Date: 12/22/2024 12/22/2024 8:09 PM HISTORY/REASON FOR EXAM:  Hypoxia, history of rectal cancer TECHNIQUE/EXAM DESCRIPTION:  CT angiogram of the chest with contrast. Transaxial MDCT scan of chest post bolus 100 cc Omnipaque 350 IV administration. MIP reconstructed images were created and reviewed. Low dose optimization technique was utilized for this CT exam including automated exposure control and adjustment of the mA and/or kV according to patient size. COMPARISON: August 15, 2024 FINDINGS: The visualized portion of the thyroid appear within normal limits.  The trachea and main stem airways are normal in caliber. There are no pathologically enlarged mediastinal lymph nodes. The heart and pericardium appear within normal limits.   Atherosclerotic changes are seen including atherosclerotic  coronary artery calcifications. The aorta and its main branch vessels are normal in caliber and configuration.  The pulmonary arteries are  well opacified without visualized filling defect. The pulmonary parenchyma demonstrates bibasilar linear densities favoring atelectasis. Emphysematous changes are seen greatest in the lung apices. Fluid-filled distention of the esophagus is seen. See dedicated CT of the abdomen for abdominal findings. The bony structures are age appropriate.     1.  No pulmonary embolus appreciated. 2.  Fluid-filled distention of esophagus, consider gastroesophageal reflux or esophageal dysmotility. Could be followed up with esophageal swallow study. 3.  Emphysematous changes, greatest in the apices.    DX-CHEST-PORTABLE (1 VIEW)    Result Date: 12/22/2024 12/22/2024 6:57 PM HISTORY/REASON FOR EXAM:  Sepsis; sepsis TECHNIQUE/EXAM DESCRIPTION AND NUMBER OF VIEWS: Single portable view of the chest. COMPARISON: 5/24/2023 FINDINGS: Right chest port with tip in the SVC. Hazy left basilar opacity. No pleural effusion. No pneumothorax. Stable cardiopericardial silhouette.     Hazy left basilar opacity, likely atelectasis.      Micro:  Results       Procedure Component Value Units Date/Time    Blood Culture - Draw one from central line and one from peripheral site [[id number]]  (Abnormal) Collected: 12/22/24 1905    Order Status: Completed Specimen: Blood from Line Updated: 12/26/24 1430     Significant Indicator POS     Source BLD     Site Peripheral     Culture Result Growth detected by automated blood culture system.  12/23/2024  23:25  Negative for Staphylococcus aureus and MRSA by PCR. Correlate  ongoing need for antibiotics with clinical condition.        Actinomyces turicensis      Anaerobic cocci  Peptoniphilus species        Anaerobic Gram positive sascha  Solobacterium moorei      Blood Culture - Draw one from central line and one from peripheral site [[id number]]  (Abnormal) Collected: 12/22/24  1900    Order Status: Completed Specimen: Blood from Peripheral Updated: 12/26/24 1430     Significant Indicator POS     Source BLD     Site PERIPHERAL     Culture Result Growth detected by automated blood culture system.  12/24/2024  03:50        Actinomyces turicensis      Anaerobic cocci  Peptoniphilus sp.      BLOOD CULTURE [[id number]] Collected: 12/25/24 1943    Order Status: Completed Specimen: Blood from Peripheral Updated: 12/25/24 2208     Significant Indicator NEG     Source BLD     Site PERIPHERAL     Culture Result No Growth  Note: Blood cultures are incubated for 5 days and  are monitored continuously.Positive blood cultures  are called to the RN and reported as soon as  they are identified.      BLOOD CULTURE [[id number]] Collected: 12/25/24 1943    Order Status: Completed Specimen: Blood from Peripheral Updated: 12/25/24 2157     Significant Indicator NEG     Source BLD     Site PERIPHERAL     Culture Result A significant status has been triggered by the BACTEC  instrument due to an increase in CO2 production.  Gram  stain of blood culture bottle shows NO ORGANISMS SEEN.  Further investigation is in progress.  Note: Blood cultures are incubated for 5 days and  are monitored continuously. Positive blood cultures  are called to the RN and reported as soon as  they are identified.      Urine Culture (New) [[id number]] Collected: 12/22/24 1918    Order Status: Completed Specimen: Urine Updated: 12/24/24 1621     Significant Indicator NEG     Source UR     Site -     Culture Result Mixed enteric tonio >100,000 cfu/mL  3 or more organisms isolated, culture of doubtful  significance, please recollect.      Respiratory Panel by PCR (Inpatient ONLY) [[id number]] Collected: 12/23/24 0019    Order Status: Completed Specimen: Respirate from Nasopharyngeal Updated: 12/23/24 0206     Adenovirus, PCR Not Detected     SARS-CoV-2 (COVID-19) RNA by KEATON NotDetected     Comment: [facility] providers: PLEASE REFER TO  DE-ESCALATION AND RETESTING PROTOCOL  on [email or web address]    **The [employer] Respiratory Panel 2.1 (RP2.1) RT-PCR test is FDA authorized.          Coronavirus 229E, PCR Not Detected     Coronavirus HKU1, PCR Not Detected     Coronavirus NL63, PCR Not Detected     Coronavirus OC43, PCR Not Detected     Human Metapneumovirus, PCR Not Detected     Rhino/Enterovirus, PCR Not Detected     Influenza A, PCR Not Detected     Influenza B, PCR Not Detected     Parainfluenza 1, PCR Not Detected     Parainfluenza 2, PCR Not Detected     Parainfluenza 3, PCR Not Detected     Parainfluenza 4, PCR Not Detected     RSV (Respiratory Syncytial Virus), PCR Not Detected     Bordetella parapertussis (EJ4721), PCR Not Detected     Bordetella pertussis (ptxP), PCR Not Detected     Mycoplasma pneumoniae, PCR Not Detected     Chlamydia pneumoniae, PCR Not Detected    Urinalysis [[id number]]  (Abnormal) Collected: 12/22/24 1918    Order Status: Completed Specimen: Urine Updated: 12/22/24 1951     Color Orange     Character Turbid     Specific Gravity 1.019     Ph 6.5     Glucose Negative mg/dL      Ketones Trace mg/dL      Protein 100 mg/dL      Bilirubin Negative     Urobilinogen, Urine 1.0 EU/dL      Nitrite Negative     Leukocyte Esterase Small     Occult Blood Large     Micro Urine Req Microscopic    URINALYSIS [[id number]]     Order Status: Canceled Specimen: Urine     CoV-2, Flu A/B, And RSV by PCR ([facility]) [[id number]] Collected: 12/22/24 0000    Order Status: Canceled Specimen: Respirate             Assessment:  Active Hospital Problems    Diagnosis     *Bacteremia [R78.81]     Sepsis ([facility]) [A41.9]     Hypoxia [R09.02]     DVT (deep venous thrombosis) ([facility]) [I82.409]     Coordination of complex care [Z71.89]     Malignant neoplasm of rectum (HCC) [C20]      Polymicrobial bacteremia including Actinomyces  Complex pelvic mass with fistulization near the anal verge, abscess (abdominopelvic Actinomycosis) versus recurrent malignancy versus  "both  Rectal adenocarcinoma with local recurrence near the anal sphincter  Recent pelvic abscess October 2024     Plan:   -Initial surgery eval declined resection and IR declined biopsies/drainage  -recommend sending both the liquid portion to path to look for \"sulfur granules\" as well as the more dense appearing component to look for malignancy  -Agree with second opinion for diagnostic eval and source control  -Continue IV penicillin 3 million units every 4 hours + p.o. Flagyl 500 mg 3 times daily TID     Disposition: Unable to determine at this time  Need for PICC line: Unable to determine at this time     Plan was discussed with the primary team, Dr. Starks.  ID will follow.  This illness poses a threat to life  "

## 2024-12-28 NOTE — PROGRESS NOTES
4 Eyes Skin Assessment Completed by levi RN and harriett RN.    Head WDL  Ears WDL  Nose WDL  Mouth WDL  Neck WDL  Breast/Chest WDL  Shoulder Blades WDL  Spine WDL  (R) Arm/Elbow/Hand WDL  (L) Arm/Elbow/Hand Redness and Blanching  Abdomen WDL  Groin Redness and Swelling  Scrotum/Coccyx/Buttocks Redness and Blanching  (R) Leg WDL  (L) Leg WDL  (R) Heel/Foot/Toe WDL  (L) Heel/Foot/Toe WDL          Devices In Places Pulse Ox      Interventions In Place Pillows and Q2 Turns    Possible Skin Injury No    Pictures Uploaded Into Epic Yes  Wound Consult Placed N/A  RN Wound Prevention Protocol Ordered No

## 2024-12-28 NOTE — CARE PLAN
Problem: Infection  Goal: Will remain free from infection  Outcome: Progressing  Note: Will continue with ABX per order.      Problem: Skin Integrity  Goal: Risk for impaired skin integrity will decrease  Outcome: Progressing  Note: Will continue with Q2H turns and encourage patient to sit up in chair for all meals and cool ways ambulation. Patient was up in chir for breakfast and ambulate in the hallways for about 300 ft.        The patient is Stable - Low risk of patient condition declining or worsening    Shift Goals  Clinical Goals: Enhance Mobility, POC  Patient Goals: Comfort and POC  Family Goals: POC    Progress made toward(s) clinical / shift goals:  Will continue to update patient with the POC and advocate as needed.     Patient is not progressing towards the following goals:

## 2024-12-28 NOTE — CARE PLAN
The patient is Stable - Low risk of patient condition declining or worsening    Shift Goals  Clinical Goals: mobility, poc  Patient Goals: comfort, mobility  Family Goals: ua    Progress made toward(s) clinical / shift goals:        Problem: Pain - Standard  Goal: Alleviation of pain or a reduction in pain to the patient’s comfort goal  Outcome: Progressing     Problem: Fluid Volume  Goal: Fluid volume balance will be maintained  Outcome: Progressing     Problem: Urinary - Renal Perfusion  Goal: Ability to achieve and maintain adequate renal perfusion and functioning will improve  Outcome: Progressing     Problem: Respiratory  Goal: Patient will achieve/maintain optimum respiratory ventilation and gas exchange  Outcome: Progressing     Problem: Fall Risk  Goal: Patient will remain free from falls  Outcome: Progressing     Problem: Infection  Goal: Will remain free from infection  Outcome: Progressing     Problem: Skin Integrity  Goal: Risk for impaired skin integrity will decrease  Outcome: Progressing       Patient is not progressing towards the following goals:

## 2024-12-29 LAB
ERYTHROCYTE [DISTWIDTH] IN BLOOD BY AUTOMATED COUNT: 49.9 FL (ref 35.9–50)
HCT VFR BLD AUTO: 33.7 % (ref 42–52)
HGB BLD-MCNC: 10.6 G/DL (ref 14–18)
MCH RBC QN AUTO: 25.6 PG (ref 27–33)
MCHC RBC AUTO-ENTMCNC: 31.5 G/DL (ref 32.3–36.5)
MCV RBC AUTO: 81.4 FL (ref 81.4–97.8)
PLATELET # BLD AUTO: 392 K/UL (ref 164–446)
PMV BLD AUTO: 9.4 FL (ref 9–12.9)
RBC # BLD AUTO: 4.14 M/UL (ref 4.7–6.1)
WBC # BLD AUTO: 10.2 K/UL (ref 4.8–10.8)

## 2024-12-29 PROCEDURE — 99232 SBSQ HOSP IP/OBS MODERATE 35: CPT | Mod: GC | Performed by: INTERNAL MEDICINE

## 2024-12-29 PROCEDURE — A9270 NON-COVERED ITEM OR SERVICE: HCPCS

## 2024-12-29 PROCEDURE — 770001 HCHG ROOM/CARE - MED/SURG/GYN PRIV*

## 2024-12-29 PROCEDURE — 85027 COMPLETE CBC AUTOMATED: CPT

## 2024-12-29 PROCEDURE — 700102 HCHG RX REV CODE 250 W/ 637 OVERRIDE(OP): Performed by: INTERNAL MEDICINE

## 2024-12-29 PROCEDURE — 36415 COLL VENOUS BLD VENIPUNCTURE: CPT

## 2024-12-29 PROCEDURE — 700102 HCHG RX REV CODE 250 W/ 637 OVERRIDE(OP)

## 2024-12-29 PROCEDURE — A9270 NON-COVERED ITEM OR SERVICE: HCPCS | Performed by: INTERNAL MEDICINE

## 2024-12-29 PROCEDURE — 700111 HCHG RX REV CODE 636 W/ 250 OVERRIDE (IP): Performed by: INTERNAL MEDICINE

## 2024-12-29 PROCEDURE — 99233 SBSQ HOSP IP/OBS HIGH 50: CPT | Performed by: INTERNAL MEDICINE

## 2024-12-29 PROCEDURE — 700105 HCHG RX REV CODE 258: Performed by: INTERNAL MEDICINE

## 2024-12-29 RX ADMIN — POLYETHYLENE GLYCOL 3350 1 PACKET: 17 POWDER, FOR SOLUTION ORAL at 11:48

## 2024-12-29 RX ADMIN — METRONIDAZOLE 500 MG: 500 TABLET ORAL at 22:37

## 2024-12-29 RX ADMIN — SODIUM CHLORIDE 3 MILLION UNITS: 9 INJECTION, SOLUTION INTRAVENOUS at 11:34

## 2024-12-29 RX ADMIN — METRONIDAZOLE 500 MG: 500 TABLET ORAL at 15:58

## 2024-12-29 RX ADMIN — METRONIDAZOLE 500 MG: 500 TABLET ORAL at 05:36

## 2024-12-29 RX ADMIN — RIVAROXABAN 20 MG: 20 TABLET, FILM COATED ORAL at 18:08

## 2024-12-29 RX ADMIN — SODIUM CHLORIDE 3 MILLION UNITS: 9 INJECTION, SOLUTION INTRAVENOUS at 05:37

## 2024-12-29 RX ADMIN — ALBUTEROL SULFATE 2 PUFF: 90 AEROSOL, METERED RESPIRATORY (INHALATION) at 15:59

## 2024-12-29 RX ADMIN — ALBUTEROL SULFATE 2 PUFF: 90 AEROSOL, METERED RESPIRATORY (INHALATION) at 18:10

## 2024-12-29 RX ADMIN — OXYCODONE 5 MG: 5 TABLET ORAL at 19:49

## 2024-12-29 RX ADMIN — SODIUM CHLORIDE 3 MILLION UNITS: 9 INJECTION, SOLUTION INTRAVENOUS at 02:04

## 2024-12-29 RX ADMIN — OXYCODONE 5 MG: 5 TABLET ORAL at 02:07

## 2024-12-29 RX ADMIN — SODIUM CHLORIDE 3 MILLION UNITS: 9 INJECTION, SOLUTION INTRAVENOUS at 16:05

## 2024-12-29 RX ADMIN — ALBUTEROL SULFATE 2 PUFF: 90 AEROSOL, METERED RESPIRATORY (INHALATION) at 11:41

## 2024-12-29 RX ADMIN — SODIUM CHLORIDE 3 MILLION UNITS: 9 INJECTION, SOLUTION INTRAVENOUS at 22:39

## 2024-12-29 RX ADMIN — ALBUTEROL SULFATE 2 PUFF: 90 AEROSOL, METERED RESPIRATORY (INHALATION) at 05:38

## 2024-12-29 RX ADMIN — ALBUTEROL SULFATE 2 PUFF: 90 AEROSOL, METERED RESPIRATORY (INHALATION) at 22:48

## 2024-12-29 RX ADMIN — SENNOSIDES AND DOCUSATE SODIUM 2 TABLET: 50; 8.6 TABLET ORAL at 18:08

## 2024-12-29 RX ADMIN — SODIUM CHLORIDE 3 MILLION UNITS: 9 INJECTION, SOLUTION INTRAVENOUS at 19:34

## 2024-12-29 RX ADMIN — ALBUTEROL SULFATE 2 PUFF: 90 AEROSOL, METERED RESPIRATORY (INHALATION) at 02:01

## 2024-12-29 ASSESSMENT — ENCOUNTER SYMPTOMS
NEUROLOGICAL NEGATIVE: 1
RESPIRATORY NEGATIVE: 1
FEVER: 0
EYES NEGATIVE: 1
PSYCHIATRIC NEGATIVE: 1
CHILLS: 0
CARDIOVASCULAR NEGATIVE: 1
GASTROINTESTINAL NEGATIVE: 1
SHORTNESS OF BREATH: 0
BACK PAIN: 1

## 2024-12-29 ASSESSMENT — PAIN DESCRIPTION - PAIN TYPE
TYPE: CHRONIC PAIN
TYPE: ACUTE PAIN

## 2024-12-29 NOTE — PROGRESS NOTES
4 Eyes Skin Assessment Completed by levi RN and MARITZA carter.    Head WDL  Ears WDL  Nose WDL  Mouth WDL  Neck WDL  Breast/Chest WDL  Shoulder Blades WDL  Spine WDL  (R) Arm/Elbow/Hand WDL  (L) Arm/Elbow/Hand Blanching and Bruising  Abdomen WDL  Groin Redness and Swelling  Scrotum/Coccyx/Buttocks Redness and Blanching  (R) Leg WDL  (L) Leg WDL  (R) Heel/Foot/Toe WDL  (L) Heel/Foot/Toe WDL          Devices In Places Blood Pressure Cuff and Pulse Ox      Interventions In Place Pillows and Q2 Turns    Possible Skin Injury No    Pictures Uploaded Into Epic Yes  Wound Consult Placed N/A  RN Wound Prevention Protocol Ordered No

## 2024-12-29 NOTE — PROGRESS NOTES
Infectious Disease Progress Note    Author: Zohra Estevez M.D. Date & Time of service: 2024  11:30 AM    Chief Complaint:  Polymicrobial bacteremia    Interval History:   69 y.o. male patient with history of distal rectal adenocarcinoma status post chemoradiation and surgical resection (cystoprostatectomy) with colostomy and urostomy, complicated by local recurrence of cancer near the anal sphincter along with superimposed infection with abscess formation   AF tolerating antibiotics Pain controlled   AF WBC 10.2 blood cult  from remain neg  Labs Reviewed, Medications Reviewed, and Radiology Reviewed.    Review of Systems:  Review of Systems   Constitutional:  Negative for fever.   Respiratory:  Negative for shortness of breath.        Hemodynamics:  Temp (24hrs), Av.6 °C (97.8 °F), Min:36.4 °C (97.5 °F), Max:36.6 °C (97.9 °F)  Temperature: 36.4 °C (97.5 °F)  Pulse  Av.5  Min: 47  Max: 135   Blood Pressure : 100/62       Physical Exam:  Physical Exam  Vitals and nursing note reviewed.   Constitutional:       General: He is not in acute distress.  Cardiovascular:      Rate and Rhythm: Normal rate.   Pulmonary:      Effort: Pulmonary effort is normal. No respiratory distress.      Breath sounds: No stridor.         Meds:    Current Facility-Administered Medications:     Pharmacy Consult Request    acetaminophen **FOLLOWED BY** [START ON 2025] acetaminophen    oxyCODONE immediate-release **OR** oxyCODONE immediate-release **OR** morphine injection    senna-docusate **AND** polyethylene glycol/lytes    prochlorperazine    penicillin g    metroNIDAZOLE    calcium carbonate    albuterol    acetaminophen    ondansetron    ondansetron    labetalol    rivaroxaban    Labs:  Recent Labs     24  0141 24  0058 24  0117   WBC 10.1 11.7* 10.2   RBC 3.89* 4.01* 4.14*   HEMOGLOBIN 9.8* 10.1* 10.6*   HEMATOCRIT 31.9* 32.8* 33.7*   MCV 82.0 81.8 81.4   MCH 25.2* 25.2* 25.6*    RDW 50.2* 49.6 49.9   PLATELETCT 305 350 392   MPV 9.9 9.4 9.4   NEUTSPOLYS 77.80* 78.50*  --    LYMPHOCYTES 12.30* 9.80*  --    MONOCYTES 6.40 7.70  --    EOSINOPHILS 2.30 2.40  --    BASOPHILS 0.30 0.50  --      Recent Labs     12/27/24  0141 12/28/24  0058   SODIUM 135 133*   POTASSIUM 3.8 4.2   CHLORIDE 101 101   CO2 22 21   GLUCOSE 164* 131*   BUN 10 11     Recent Labs     12/27/24  0141 12/28/24  0058   ALBUMIN  --  3.0*   TBILIRUBIN  --  0.2   ALKPHOSPHAT  --  145*   TOTPROTEIN  --  7.4   ALTSGPT  --  50   ASTSGOT  --  30   CREATININE 0.57 0.43*       Imaging:  EC-ECHOCARDIOGRAM COMPLETE W/O CONT    Result Date: 12/24/2024  Transthoracic Echo Report Echocardiography Laboratory CONCLUSIONS Normal left ventricular systolic function. The left ventricular ejection fraction is visually estimated to be 55-60%. Mild concentric left ventricular hypertrophy. Grade I diastolic dysfunction. Normal right ventricular size and systolic function. No significant valvular abnormalities. No prior study is available for comparison.        LV EF:        % FINDINGS Left Ventricle Normal left ventricular chamber size. Mild concentric left ventricular hypertrophy. Normal left ventricular systolic function. The left ventricular ejection fraction is visually estimated to be 55-60%. Normal regional wall motion. Grade I diastolic dysfunction. Right Ventricle Normal right ventricular size and systolic function. Right Atrium Normal right atrial size. Normal inferior vena cava size and inspiratory collapse. Left Atrium Normal left atrial size. Left atrial volume index is 19 mL/sq m. Intact atrial septum without Doppler evidence of interatrial shunt (e.g. PFO or ASD). Mitral Valve Structurally normal mitral valve without significant stenosis or regurgitation. Aortic Valve Tricuspid aortic valve. Structurally normal aortic valve without significant stenosis or regurgitation. Tricuspid Valve Structurally normal tricuspid valve without  significant stenosis. Trace tricuspid regurgitation. Unable to estimate right ventricular systolic pressure due to an inadequate tricuspid regurgitant jet. Pulmonic Valve Structurally normal pulmonic valve without significant stenosis. Trace pulmonic insufficiency. Pericardium Normal pericardium without effusion. Aorta Normal aortic root for body surface area. The ascending aorta diameter is 4.0 cm. Antonieta Casillas MD (Electronically Signed) Final Date:     24 December 2024                 18:18    CT-ABDOMEN-PELVIS WITH    Result Date: 12/22/2024 12/22/2024 8:09 PM HISTORY/REASON FOR EXAM:  Sepsis, rectal pain h/o of cancer. TECHNIQUE/EXAM DESCRIPTION:   CT scan of the abdomen and pelvis with contrast. Contrast-enhanced helical scanning was obtained from the diaphragmatic domes through the pubic symphysis following the bolus administration of nonionic contrast without complication. 100 mL of Omnipaque 350 nonionic contrast was administered without complication. Low dose optimization technique was utilized for this CT exam including automated exposure control and adjustment of the mA and/or kV according to patient size. COMPARISON: August 27, 2024 FINDINGS: Lower Chest: See dedicated CT of the chest for thoracic findings. Liver: Hepatomegaly is seen with slight irregular hepatic contour. Spleen: Unremarkable. Pancreas: Unremarkable. Gallbladder: Cholelithiasis. Biliary: Nondilated. Adrenal glands: Normal. Kidneys: 2.0 cm interpolar right renal cyst is seen, otherwise unremarkable without hydronephrosis. Bowel: No obstruction or acute inflammation. Left lower quadrant ostomy is seen. Right lower quadrant ostomy is noted. Lymph nodes: No adenopathy. Vasculature: Atherosclerotic changes are seen including atherosclerotic coronary artery calcifications. Peritoneum: Unremarkable without ascites. Musculoskeletal: No acute or destructive process. Pelvis: There is complex mass seen within the pelvis measuring 8.7 x 8.9 cm.  Small fat-containing bilateral inguinal hernias are seen.     1.  Complex mass in the pelvis, appearance most compatible with recurrent neoplastic disease. 2.  Hepatomegaly 3.  Irregular hepatic contour favoring changes of cirrhosis 4.  Cholelithiasis 5.  Small fat-containing bilateral inguinal hernias 6.  Atherosclerosis and atherosclerotic coronary artery disease    CT-CTA CHEST PULMONARY ARTERY W/ RECONS    Result Date: 12/22/2024 12/22/2024 8:09 PM HISTORY/REASON FOR EXAM:  Hypoxia, history of rectal cancer TECHNIQUE/EXAM DESCRIPTION:  CT angiogram of the chest with contrast. Transaxial MDCT scan of chest post bolus 100 cc Omnipaque 350 IV administration. MIP reconstructed images were created and reviewed. Low dose optimization technique was utilized for this CT exam including automated exposure control and adjustment of the mA and/or kV according to patient size. COMPARISON: August 15, 2024 FINDINGS: The visualized portion of the thyroid appear within normal limits.  The trachea and main stem airways are normal in caliber. There are no pathologically enlarged mediastinal lymph nodes. The heart and pericardium appear within normal limits.   Atherosclerotic changes are seen including atherosclerotic coronary artery calcifications. The aorta and its main branch vessels are normal in caliber and configuration.  The pulmonary arteries are  well opacified without visualized filling defect. The pulmonary parenchyma demonstrates bibasilar linear densities favoring atelectasis. Emphysematous changes are seen greatest in the lung apices. Fluid-filled distention of the esophagus is seen. See dedicated CT of the abdomen for abdominal findings. The bony structures are age appropriate.     1.  No pulmonary embolus appreciated. 2.  Fluid-filled distention of esophagus, consider gastroesophageal reflux or esophageal dysmotility. Could be followed up with esophageal swallow study. 3.  Emphysematous changes, greatest in the  joleen.    DX-CHEST-PORTABLE (1 VIEW)    Result Date: 12/22/2024 12/22/2024 6:57 PM HISTORY/REASON FOR EXAM:  Sepsis; sepsis TECHNIQUE/EXAM DESCRIPTION AND NUMBER OF VIEWS: Single portable view of the chest. COMPARISON: 5/24/2023 FINDINGS: Right chest port with tip in the SVC. Hazy left basilar opacity. No pleural effusion. No pneumothorax. Stable cardiopericardial silhouette.     Hazy left basilar opacity, likely atelectasis.      Micro:  Results       Procedure Component Value Units Date/Time    Blood Culture - Draw one from central line and one from peripheral site [[id number]]  (Abnormal) Collected: 12/22/24 1905    Order Status: Completed Specimen: Blood from Line Updated: 12/26/24 1430     Significant Indicator POS     Source BLD     Site Peripheral     Culture Result Growth detected by automated blood culture system.  12/23/2024  23:25  Negative for Staphylococcus aureus and MRSA by PCR. Correlate  ongoing need for antibiotics with clinical condition.        Actinomyces turicensis      Anaerobic cocci  Peptoniphilus species        Anaerobic Gram positive sascha  Solobacterium moorei      Blood Culture - Draw one from central line and one from peripheral site [[id number]]  (Abnormal) Collected: 12/22/24 1900    Order Status: Completed Specimen: Blood from Peripheral Updated: 12/26/24 1430     Significant Indicator POS     Source BLD     Site PERIPHERAL     Culture Result Growth detected by automated blood culture system.  12/24/2024  03:50        Actinomyces turicensis      Anaerobic cocci  Peptoniphilus sp.      BLOOD CULTURE [[id number]] Collected: 12/25/24 1943    Order Status: Completed Specimen: Blood from Peripheral Updated: 12/25/24 2208     Significant Indicator NEG     Source BLD     Site PERIPHERAL     Culture Result No Growth  Note: Blood cultures are incubated for 5 days and  are monitored continuously.Positive blood cultures  are called to the RN and reported as soon as  they are identified.      BLOOD  CULTURE [[id number]] Collected: 12/25/24 1943    Order Status: Completed Specimen: Blood from Peripheral Updated: 12/25/24 2157     Significant Indicator NEG     Source BLD     Site PERIPHERAL     Culture Result A significant status has been triggered by the BACTEC  instrument due to an increase in CO2 production.  Gram  stain of blood culture bottle shows NO ORGANISMS SEEN.  Further investigation is in progress.  Note: Blood cultures are incubated for 5 days and  are monitored continuously. Positive blood cultures  are called to the RN and reported as soon as  they are identified.      Urine Culture (New) [[id number]] Collected: 12/22/24 1918    Order Status: Completed Specimen: Urine Updated: 12/24/24 1621     Significant Indicator NEG     Source UR     Site -     Culture Result Mixed enteric tonio >100,000 cfu/mL  3 or more organisms isolated, culture of doubtful  significance, please recollect.      Respiratory Panel by PCR (Inpatient ONLY) [[id number]] Collected: 12/23/24 0019    Order Status: Completed Specimen: Respirate from Nasopharyngeal Updated: 12/23/24 0206     Adenovirus, PCR Not Detected     SARS-CoV-2 (COVID-19) RNA by [facility] NotDetected     Comment: [facility] providers: PLEASE REFER TO DE-ESCALATION AND RETESTING PROTOCOL  on [email or web address]    **The [facility] Respiratory Panel 2.1 (RP2.1) RT-PCR test is FDA authorized.          Coronavirus 229E, PCR Not Detected     Coronavirus HKU1, PCR Not Detected     Coronavirus NL63, PCR Not Detected     Coronavirus OC43, PCR Not Detected     Human Metapneumovirus, PCR Not Detected     Rhino/Enterovirus, PCR Not Detected     Influenza A, PCR Not Detected     Influenza B, PCR Not Detected     Parainfluenza 1, PCR Not Detected     Parainfluenza 2, PCR Not Detected     Parainfluenza 3, PCR Not Detected     Parainfluenza 4, PCR Not Detected     RSV (Respiratory Syncytial Virus), PCR Not Detected     Bordetella parapertussis (LD4652), PCR Not Detected     Bordetella  "pertussis (ptxP), PCR Not Detected     Mycoplasma pneumoniae, PCR Not Detected     Chlamydia pneumoniae, PCR Not Detected    Urinalysis [[id number]]  (Abnormal) Collected: 12/22/24 1918    Order Status: Completed Specimen: Urine Updated: 12/22/24 1951     Color Orange     Character Turbid     Specific Gravity 1.019     Ph 6.5     Glucose Negative mg/dL      Ketones Trace mg/dL      Protein 100 mg/dL      Bilirubin Negative     Urobilinogen, Urine 1.0 EU/dL      Nitrite Negative     Leukocyte Esterase Small     Occult Blood Large     Micro Urine Req Microscopic    URINALYSIS [[id number]]     Order Status: Canceled Specimen: Urine             Assessment:  Active Hospital Problems    Diagnosis     *Bacteremia [R78.81]     Sepsis ([facility]) [A41.9]     Hypoxia [R09.02]     DVT (deep venous thrombosis) ([facility]) [I82.409]     Coordination of complex care [Z71.89]     Malignant neoplasm of rectum ([facility]) [C20]      Polymicrobial bacteremia including Actinomyces  Complex pelvic mass with fistulization near the anal verge, abscess (abdominopelvic Actinomycosis) versus recurrent malignancy versus both  Rectal adenocarcinoma with local recurrence near the anal sphincter  Recent pelvic abscess October 2024     Plan:   -Initial surgery eval declined resection and IR declined biopsies/drainage  -recommend sending both the liquid portion to path to look for \"sulfur granules\" as well as the more dense appearing component to look for malignancy  -Agree with second opinion for diagnostic eval and source control  -Continue IV penicillin 3 million units every 4 hours + p.o. Flagyl 500 mg 3 times daily TID for now  After discharge plan to transition to ertapenem 1 gram IV daily to complete course of IV treatment  Stop date IV antibiotics 6 weeks from negative blood cultures, followed by PO Augmentin for the actinomycete-usually 6-12 months   Unclear duration given concurrent malignancy   Disposition: likely home  Need for PICC line: Yes-has port   "   Plan was discussed with the primary team, Dr. Starks.  ID will follow.  This illness poses a threat to life

## 2024-12-29 NOTE — CARE PLAN
Problem: Pain - Standard  Goal: Alleviation of pain or a reduction in pain to the patient’s comfort goal  Outcome: Progressing  Note: Per patient, pain is tolerable at this time. Will continue to assess per MAR.       Problem: Skin Integrity  Goal: Skin integrity is maintained or improved  Note: Assess and monitor skin integrity, appearance and/or temperature . Assess risk factors for impaired skin integrity and/or pressures ulcers . Implement precautions to protect skin integrity in collaboration with interdisciplinary team. Implement pressure ulcer prevention protocol if at risk for skin breakdown.     Problem: Mobility  Goal: Risk for activity intolerance will decrease  Note: Patient was encouraged to sit up in chair for all meals and ambulate in the cool away for about three times per day. Patient agreeable ambulated at least one in the cool ways.       The patient is Stable - Low risk of patient condition declining or worsening    Shift Goals  Clinical Goals: BM, Enhance mobility, ABX tx, pain control  Patient Goals: comfort and POC  Family Goals: POC    Progress made toward(s) clinical / shift goals:      Patient is not progressing towards the following goals:

## 2024-12-29 NOTE — PROGRESS NOTES
[facility] Internal Medicine Daily Progress Note    Date of Service  12/29/2024    [facility] Team: R JESSICA [facility] Team   Attending: Jayant Starks M.d.  Senior Resident: Dr. Montenegro  Intern:  Dr. Mejia  Contact Number: [phone]    Chief Complaint  Josh Lerner is a 69 y.o. male admitted 12/22/2024 with Sepsis    Hospital Course  Josh Lerner is a 69 y.o. male who presented 12/22/2024 with sepsis at that moment believed to be due to UTI.     Patient has a history of distal rectal adenocarcinoma stage II C s/p chemoRT and surgical resection (cystoprostatectomy) with colostomy and urostomy.  Unfortunately has recurrence and is following with hematology oncology and general surgery outpatient. Recently started seeing Dr Kirby with [facility]. In the ED, patient had lactic acidosis with neutropenia. CTPA negative, however with emphysema. CT abd noted with complex mass likely recurrent neoplasm. Completed fluid resuscitation, cefepime, then switched to unasyn before noting actinomyces in blood culture, ID consulted and swiched to pen G + metronidazole.     Consulted colorectal surgery Dr. Sewell, Infectious disease Dr. Butler, and Dr. Kirby oncologist, interventional radiology Dr. Graham. Dr. Sewell advised against surgical washout, and under impression that mass is malignant abscess given lack of margins in previous surgery. Dr. Kirby has advised that it was possible for patient to return to [facility], however with likely poor outcome with poor quality of life, and Dr. Graham also advised against IR guided drain placement given loculated fluid collections and proximity to likely recurrent colorectal cancer leading to high risk of hemorrhage.     Goals of care discussion was started, and advised patient of lack of concrete pathways for management. Patient has understood the options (however on day of this conversation, we have not talked about IR's decline in placing guided drain yet) and wanted to think  "about it, however was agreeable for DNR/DNI.     Interval Problem Update  No overnight events  Vitals stable, patient breathing comfortably on room air.    Patient eager to get second opinion with Dr. Desai. Surrounded by family, good support. He told me \"I lived a nice life, I'm ready\" at one point.     ID recommended to place a PICC line and discharge patient on ertapenem, undetermined duration of therapy.    I have discussed this patient's plan of care and discharge plan at IDT rounds today with Case Management, Nursing, Nursing leadership, and other members of the IDT team.    Consultants/Specialty  Infectious Disease: Dr. Butler and Dr Estevez  Oncology: Dr. Kirby  Colorectal Surgery: Dr. Sewell, Dr. Desai  IR: Dr. Graham    Code Status  DNAR/DNI    Disposition  The patient is not medically cleared for discharge to home or a post-acute facility.      I have placed the appropriate orders for post-discharge needs.    Review of Systems  Review of Systems   Constitutional:  Negative for chills and fever.   HENT: Negative.     Eyes: Negative.    Respiratory: Negative.     Cardiovascular: Negative.    Gastrointestinal: Negative.    Genitourinary: Negative.    Musculoskeletal:  Positive for back pain.   Skin: Negative.    Neurological: Negative.    Endo/Heme/Allergies: Negative.    Psychiatric/Behavioral: Negative.          Physical Exam  Temp:  [36.4 °C (97.5 °F)-36.6 °C (97.9 °F)] 36.4 °C (97.5 °F)  Pulse:  [89-98] 90  Resp:  [16-18] 16  BP: (100-147)/(62-85) 100/62  SpO2:  [89 %-96 %] 89 %    Physical Exam  Constitutional:       Appearance: Normal appearance. He is obese.   HENT:      Head: Normocephalic.      Nose: Nose normal.      Mouth/Throat:      Mouth: Mucous membranes are moist.      Comments: Poor dentition  Mallampati II  Eyes:      Pupils: Pupils are equal, round, and reactive to light.   Cardiovascular:      Rate and Rhythm: Normal rate and regular rhythm.      Pulses: Normal pulses.   Pulmonary:    "   Effort: Pulmonary effort is normal.      Breath sounds: Normal breath sounds.   Abdominal:      General: Abdomen is flat. Bowel sounds are normal.      Palpations: Abdomen is soft.      Comments: Right lower quadrant urostomy bag noted  Left lower quadrant colostomy bag noted   Musculoskeletal:         General: Normal range of motion.      Cervical back: Neck supple.   Skin:     General: Skin is warm.      Capillary Refill: Capillary refill takes less than 2 seconds.      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   Neurological:      General: No focal deficit present.      Mental Status: He is alert and oriented to person, place, and time. Mental status is at baseline.   Psychiatric:         Mood and Affect: Mood normal.         Behavior: Behavior normal.         Thought Content: Thought content normal.         Judgment: Judgment normal.         Fluids    Intake/Output Summary (Last 24 hours) at 12/29/2024 1332  Last data filed at 12/29/2024 1258  Gross per 24 hour   Intake 1967.38 ml   Output 3500 ml   Net -1532.62 ml       Laboratory  Recent Labs     12/27/24  0141 12/28/24  0058 12/29/24  0117   WBC 10.1 11.7* 10.2   RBC 3.89* 4.01* 4.14*   HEMOGLOBIN 9.8* 10.1* 10.6*   HEMATOCRIT 31.9* 32.8* 33.7*   MCV 82.0 81.8 81.4   MCH 25.2* 25.2* 25.6*   MCHC 30.7* 30.8* 31.5*   RDW 50.2* 49.6 49.9   PLATELETCT 305 350 392   MPV 9.9 9.4 9.4     Recent Labs     12/27/24  0141 12/28/24  0058   SODIUM 135 133*   POTASSIUM 3.8 4.2   CHLORIDE 101 101   CO2 22 21   GLUCOSE 164* 131*   BUN 10 11   CREATININE 0.57 0.43*   CALCIUM 8.5 8.6                   Imaging  EC-ECHOCARDIOGRAM COMPLETE W/O CONT   Final Result      CT-ABDOMEN-PELVIS WITH   Final Result         1.  Complex mass in the pelvis, appearance most compatible with recurrent neoplastic disease.   2.  Hepatomegaly   3.  Irregular hepatic contour favoring changes  of cirrhosis   4.  Cholelithiasis   5.  Small fat-containing bilateral inguinal hernias   6.  Atherosclerosis and atherosclerotic coronary artery disease      CT-CTA CHEST PULMONARY ARTERY W/ RECONS   Final Result         1.  No pulmonary embolus appreciated.   2.  Fluid-filled distention of esophagus, consider gastroesophageal reflux or esophageal dysmotility. Could be followed up with esophageal swallow study.   3.  Emphysematous changes, greatest in the apices.      DX-CHEST-PORTABLE (1 VIEW)   Final Result         Hazy left basilar opacity, likely atelectasis.      IR-PICC LINE PLACEMENT W/ GUIDANCE > AGE 5    (Results Pending)         Assessment/Plan  Problem Representation:    69-year-old male admitted with sepsis and thought to be due to UTI at that moment.  The patient has a past medical history of rectal adenocarcinoma stage IIc status post chemo and radiotherapy and surgical resection with urostomy and colostomy bags, unfortunately with local recurrence.  Patient initially on cefepime and, clinically improving and white blood count downtrending.  Blood cultures are positive for Actinomyces turicensis and Peptoniphilus, Solobacterium and urine culture was negative and likelihood of source being UTI is low.  Patient has mass/fluid collection in the pelvic area with what seems to be a fistula draining serosanguineous/feces. Working out on coordination of care with infectious disease, oncology colorectal surgery and internal medicine teams. Surgery and IR recommends no procedure to be done due to risk outweighs benefits. Patient transitioned to DNR and will see palliative care. He will likely be discharge with a PICC line for a long treatment with antibiotics as source is not under control.    * Coordination of complex care  Assessment & Plan  Patient stated that he was still continue to be full code during this admission.  Patient ongoing maintenance chemo as outpatient followed by Dr. Kirby and next  appointment is in February 2025  Patient is being followed by Dr. Desai, colorectal surgeon    Patient has bacteremia initially on cefepime and then transitioned to penicillin and flagyl due to positivity for actinomyces and anaerobes. Down trending WBC. Appreciate infectious disease for recommendations on duration of therapy.  We believe the source of the bacteremia is coming from the complex pelvic mass because of ongoing drainage from that area.    -Coordination of care with infectious disease, interventional radiology, oncology and colorectal surgery.  -Source control to be determined  -Dr. Sewell has advised that he is not a surgical candidate as the margins were not clear and he will continue to advance with his cancer.   -Infectious disease standpoint is that it would be best to drain the abscess / fluid collection in the pelvis however there is not a clear collection separate from the cancerous mass and therefore IR does not feel they can successfully drain either, and he would be at high risk of bleeding or other complications from this.   -Continue the IV antibiotics per ID and if he remains in hospital until Monday he can get a second colo-rectal opinion from a different surgeon, Dr Desai.   -Palliative care consult placed.       Bacteremia  Assessment & Plan  Blood cultures are positive: Growth detected by automated blood culture system 12/23/2024 for Actinomyces turicensis and Peptoniphilus, Solobacterium    -Source initially thought to be urinary but given no growth on urine culture unlikely.  -The patient has a pelvic mass/collection of fluid with ongoing drainage of serosanguineous/pus/feces.  -Patient underwent an extensive surgery when they remove the prostate, bladder, rectum and part of GI tract.  -CT abdomen pelvis shows a complex mass in the pelvis (neoplastic).  Unclear if there is some pus collection.  -Echocardiogram did not show vegetations.  -The patient was started on cefepime on  12/22/2024 and currently WBC trending down. Transitioned to penicillin + flagyl on 12/26 once the pathogen was determined.  -Per ID recommendations: After discharge plan to transition to ertapenem 1 gram IV daily to complete course of IV treatment.  -Stop date IV antibiotics 6 weeks from negative blood cultures, followed by PO Augmentin for the actinomycete-usually 6-12 months   -Unclear duration given concurrent malignancy   -Patient will need a PICC line before discharge      Malignant neoplasm of rectum (HCC)- (present on admission)  Assessment & Plan  Following with oncology and general surgery outpatient  Has received chemotherapy and radiation in the past and cystoprostatectomy with urostomy and colostomy  Still has mass noted, not on treatment at this time. Patient wearing diaper and having a dark discharge (feces/bleeding?)  Next appointment with general surgery on January 8  Dr Kirby aware of current hospitalization.    DVT (deep venous thrombosis) ([facility])  Assessment & Plan  History of DVT LLE.  Continue Xarelto    Hypoxia  Assessment & Plan  Emphysema noted, patient had a heavy smoking history but quit a year ago  COVID swab negative, CTPA negative for pneumonia and pulmonary embolism. Viral respiratory panel negative  Echocardiogram showing normal ventricular function with EF 60%  -Patient saturating well on room air  -Continue with incentive spirometry and Aerobika    Sepsis ([facility])- (present on admission)  Assessment & Plan  This is Sepsis Present on admission  SIRS criteria identified on my evaluation include: Tachycardia, with heart rate greater than 90 BPM, Tachypnea, with respirations greater than 20 per minute, and Leukopenia, with WBC less than 4,000  Clinical indicators of end organ dysfunction include Lactic Acid greater than 2  Patient was fluid resuscitated. Lactic acid trended 4.0 > 3.0 > 1.5  Antibiotic: Broad spectrum antibiotic  Initially thought to be due to UTI but urine cultures negative.   Patient has bacteremia positive for actinomyces and anaerobes.  Source likely to be abdominal/pelvic mass  -Sepsis resolved         VTE prophylaxis: Xarelto 10 mg daily as prophylaxis    I have performed a physical exam and reviewed and updated ROS and Plan today (12/29/2024). In review of yesterday's note (12/28/2024), there are no changes except as documented above.

## 2024-12-29 NOTE — PROGRESS NOTES
Per pt family, pt doesn't wear cpap at night time anymore due to the dementia and sometimes will get agitated with it on. Put pt on 2L NC while sleeping. Updated Dr Gabriel

## 2024-12-29 NOTE — CARE PLAN
The patient is Stable - Low risk of patient condition declining or worsening    Shift Goals  Clinical Goals: mobility, pain, abx  Patient Goals: rest  Family Goals: DIDIER    Progress made toward(s) clinical / shift goals:        Problem: Pain - Standard  Goal: Alleviation of pain or a reduction in pain to the patient’s comfort goal  Outcome: Progressing     Problem: Fluid Volume  Goal: Fluid volume balance will be maintained  Outcome: Progressing     Problem: Urinary - Renal Perfusion  Goal: Ability to achieve and maintain adequate renal perfusion and functioning will improve  Outcome: Progressing     Problem: Respiratory  Goal: Patient will achieve/maintain optimum respiratory ventilation and gas exchange  Outcome: Progressing     Problem: Fall Risk  Goal: Patient will remain free from falls  Outcome: Progressing     Problem: Knowledge Deficit - Standard  Goal: Patient and family/care givers will demonstrate understanding of plan of care, disease process/condition, diagnostic tests and medications  Outcome: Progressing     Problem: Infection  Goal: Will remain free from infection  Outcome: Progressing     Problem: Skin Integrity  Goal: Risk for impaired skin integrity will decrease  Outcome: Progressing     Problem: Skin Integrity  Goal: Skin integrity is maintained or improved  Outcome: Progressing       Patient is not progressing towards the following goals:

## 2024-12-30 ENCOUNTER — HOSPICE ADMISSION (OUTPATIENT)
Dept: HOSPICE | Facility: HOSPICE | Age: 69
End: 2024-12-30
Payer: MEDICARE

## 2024-12-30 ENCOUNTER — APPOINTMENT (OUTPATIENT)
Dept: RADIOLOGY | Facility: MEDICAL CENTER | Age: 69
End: 2024-12-30
Attending: INTERNAL MEDICINE
Payer: MEDICARE

## 2024-12-30 VITALS
HEIGHT: 72 IN | DIASTOLIC BLOOD PRESSURE: 63 MMHG | TEMPERATURE: 97.9 F | HEART RATE: 94 BPM | WEIGHT: 212.52 LBS | RESPIRATION RATE: 18 BRPM | BODY MASS INDEX: 28.79 KG/M2 | OXYGEN SATURATION: 92 % | SYSTOLIC BLOOD PRESSURE: 97 MMHG

## 2024-12-30 LAB
ALBUMIN SERPL BCP-MCNC: 2.9 G/DL (ref 3.2–4.9)
ALBUMIN/GLOB SERPL: 0.7 G/DL
ALP SERPL-CCNC: 107 U/L (ref 30–99)
ALT SERPL-CCNC: 23 U/L (ref 2–50)
ANION GAP SERPL CALC-SCNC: 8 MMOL/L (ref 7–16)
AST SERPL-CCNC: 12 U/L (ref 12–45)
BACTERIA BLD CULT: NORMAL
BASOPHILS # BLD AUTO: 0.2 % (ref 0–1.8)
BASOPHILS # BLD: 0.02 K/UL (ref 0–0.12)
BILIRUB SERPL-MCNC: 0.2 MG/DL (ref 0.1–1.5)
BUN SERPL-MCNC: 12 MG/DL (ref 8–22)
CALCIUM ALBUM COR SERPL-MCNC: 9.2 MG/DL (ref 8.5–10.5)
CALCIUM SERPL-MCNC: 8.3 MG/DL (ref 8.5–10.5)
CHLORIDE SERPL-SCNC: 101 MMOL/L (ref 96–112)
CO2 SERPL-SCNC: 24 MMOL/L (ref 20–33)
CREAT SERPL-MCNC: 0.71 MG/DL (ref 0.5–1.4)
EOSINOPHIL # BLD AUTO: 0.24 K/UL (ref 0–0.51)
EOSINOPHIL NFR BLD: 2.5 % (ref 0–6.9)
ERYTHROCYTE [DISTWIDTH] IN BLOOD BY AUTOMATED COUNT: 52.6 FL (ref 35.9–50)
GFR SERPLBLD CREATININE-BSD FMLA CKD-EPI: 99 ML/MIN/1.73 M 2
GLOBULIN SER CALC-MCNC: 4.1 G/DL (ref 1.9–3.5)
GLUCOSE SERPL-MCNC: 116 MG/DL (ref 65–99)
HCT VFR BLD AUTO: 32.8 % (ref 42–52)
HGB BLD-MCNC: 9.9 G/DL (ref 14–18)
IMM GRANULOCYTES # BLD AUTO: 0.07 K/UL (ref 0–0.11)
IMM GRANULOCYTES NFR BLD AUTO: 0.7 % (ref 0–0.9)
LYMPHOCYTES # BLD AUTO: 1.04 K/UL (ref 1–4.8)
LYMPHOCYTES NFR BLD: 10.7 % (ref 22–41)
MCH RBC QN AUTO: 25.6 PG (ref 27–33)
MCHC RBC AUTO-ENTMCNC: 30.2 G/DL (ref 32.3–36.5)
MCV RBC AUTO: 84.8 FL (ref 81.4–97.8)
MONOCYTES # BLD AUTO: 0.89 K/UL (ref 0–0.85)
MONOCYTES NFR BLD AUTO: 9.2 % (ref 0–13.4)
NEUTROPHILS # BLD AUTO: 7.42 K/UL (ref 1.82–7.42)
NEUTROPHILS NFR BLD: 76.7 % (ref 44–72)
NRBC # BLD AUTO: 0 K/UL
NRBC BLD-RTO: 0 /100 WBC (ref 0–0.2)
PLATELET # BLD AUTO: 376 K/UL (ref 164–446)
PMV BLD AUTO: 9.4 FL (ref 9–12.9)
POTASSIUM SERPL-SCNC: 3.9 MMOL/L (ref 3.6–5.5)
PROT SERPL-MCNC: 7 G/DL (ref 6–8.2)
RBC # BLD AUTO: 3.87 M/UL (ref 4.7–6.1)
SIGNIFICANT IND 70042: NORMAL
SITE SITE: NORMAL
SODIUM SERPL-SCNC: 133 MMOL/L (ref 135–145)
SOURCE SOURCE: NORMAL
WBC # BLD AUTO: 9.7 K/UL (ref 4.8–10.8)

## 2024-12-30 PROCEDURE — 700111 HCHG RX REV CODE 636 W/ 250 OVERRIDE (IP): Performed by: INTERNAL MEDICINE

## 2024-12-30 PROCEDURE — 80053 COMPREHEN METABOLIC PANEL: CPT

## 2024-12-30 PROCEDURE — A9270 NON-COVERED ITEM OR SERVICE: HCPCS | Performed by: INTERNAL MEDICINE

## 2024-12-30 PROCEDURE — 99239 HOSP IP/OBS DSCHRG MGMT >30: CPT | Mod: GC | Performed by: INTERNAL MEDICINE

## 2024-12-30 PROCEDURE — 700105 HCHG RX REV CODE 258: Performed by: INTERNAL MEDICINE

## 2024-12-30 PROCEDURE — 99233 SBSQ HOSP IP/OBS HIGH 50: CPT | Performed by: INTERNAL MEDICINE

## 2024-12-30 PROCEDURE — 85025 COMPLETE CBC W/AUTO DIFF WBC: CPT

## 2024-12-30 PROCEDURE — 700102 HCHG RX REV CODE 250 W/ 637 OVERRIDE(OP): Performed by: INTERNAL MEDICINE

## 2024-12-30 PROCEDURE — 302102 BAG OST N IMG 2.25IN 2PC (FECAL): Performed by: INTERNAL MEDICINE

## 2024-12-30 PROCEDURE — 99233 SBSQ HOSP IP/OBS HIGH 50: CPT | Performed by: SURGERY

## 2024-12-30 RX ORDER — ALBUTEROL SULFATE 90 UG/1
2 INHALANT RESPIRATORY (INHALATION) EVERY 4 HOURS
Qty: 8.5 G | Refills: 0 | Status: SHIPPED | OUTPATIENT
Start: 2024-12-30 | End: 2024-12-30

## 2024-12-30 RX ORDER — METRONIDAZOLE 500 MG/1
500 TABLET ORAL EVERY 8 HOURS
Qty: 35 TABLET | Refills: 0 | Status: ACTIVE | OUTPATIENT
Start: 2024-12-30

## 2024-12-30 RX ORDER — OXYCODONE HYDROCHLORIDE 5 MG/1
5 TABLET ORAL EVERY 4 HOURS PRN
Qty: 76 TABLET | Refills: 0 | Status: SHIPPED | OUTPATIENT
Start: 2024-12-30 | End: 2025-01-13

## 2024-12-30 RX ORDER — OXYCODONE HYDROCHLORIDE 5 MG/1
5 TABLET ORAL EVERY 4 HOURS PRN
Qty: 76 TABLET | Refills: 0 | Status: SHIPPED | OUTPATIENT
Start: 2024-12-30 | End: 2024-12-30

## 2024-12-30 RX ORDER — METRONIDAZOLE 500 MG/1
500 TABLET ORAL EVERY 8 HOURS
Qty: 35 TABLET | Refills: 0 | Status: ACTIVE | OUTPATIENT
Start: 2024-12-30 | End: 2024-12-30

## 2024-12-30 RX ORDER — 0.9 % SODIUM CHLORIDE 0.9 %
3 VIAL (ML) INJECTION PRN
Status: CANCELLED | OUTPATIENT
Start: 2024-12-30

## 2024-12-30 RX ORDER — 0.9 % SODIUM CHLORIDE 0.9 %
VIAL (ML) INJECTION PRN
Status: CANCELLED | OUTPATIENT
Start: 2024-12-30

## 2024-12-30 RX ORDER — 0.9 % SODIUM CHLORIDE 0.9 %
10 VIAL (ML) INJECTION PRN
Status: CANCELLED | OUTPATIENT
Start: 2024-12-30

## 2024-12-30 RX ORDER — ALBUTEROL SULFATE 90 UG/1
2 INHALANT RESPIRATORY (INHALATION) EVERY 4 HOURS
Qty: 8.5 G | Refills: 0 | Status: SHIPPED | OUTPATIENT
Start: 2024-12-30

## 2024-12-30 RX ADMIN — SODIUM CHLORIDE 3 MILLION UNITS: 9 INJECTION, SOLUTION INTRAVENOUS at 02:14

## 2024-12-30 RX ADMIN — OXYCODONE 5 MG: 5 TABLET ORAL at 06:06

## 2024-12-30 RX ADMIN — ALBUTEROL SULFATE 2 PUFF: 90 AEROSOL, METERED RESPIRATORY (INHALATION) at 11:19

## 2024-12-30 RX ADMIN — SODIUM CHLORIDE 3 MILLION UNITS: 9 INJECTION, SOLUTION INTRAVENOUS at 11:18

## 2024-12-30 RX ADMIN — ALBUTEROL SULFATE 2 PUFF: 90 AEROSOL, METERED RESPIRATORY (INHALATION) at 06:07

## 2024-12-30 RX ADMIN — METRONIDAZOLE 500 MG: 500 TABLET ORAL at 14:50

## 2024-12-30 RX ADMIN — SODIUM CHLORIDE 3 MILLION UNITS: 9 INJECTION, SOLUTION INTRAVENOUS at 06:04

## 2024-12-30 RX ADMIN — METRONIDAZOLE 500 MG: 500 TABLET ORAL at 06:04

## 2024-12-30 RX ADMIN — ALBUTEROL SULFATE 2 PUFF: 90 AEROSOL, METERED RESPIRATORY (INHALATION) at 14:41

## 2024-12-30 RX ADMIN — ALBUTEROL SULFATE 2 PUFF: 90 AEROSOL, METERED RESPIRATORY (INHALATION) at 02:15

## 2024-12-30 RX ADMIN — SODIUM CHLORIDE 3 MILLION UNITS: 9 INJECTION, SOLUTION INTRAVENOUS at 14:52

## 2024-12-30 ASSESSMENT — PAIN DESCRIPTION - PAIN TYPE
TYPE: ACUTE PAIN
TYPE: ACUTE PAIN
TYPE: CHRONIC PAIN

## 2024-12-30 ASSESSMENT — ENCOUNTER SYMPTOMS
SHORTNESS OF BREATH: 0
FEVER: 0

## 2024-12-30 NOTE — CONSULTS
"Palliative Care   Palliative care consult received by [facility] team resident Dr. Mejia: \"Recurrent cancer. Stage IIC rectal cancer s/p several surgeries, chemo and RT. 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 Mejia and Dr. Starks requesting they reach out to patient's primary oncologist Dr. Kameron Kirby with [facility] to ensure he is aware of admission and agreeable with palliative care consult. Please reconsult if primary oncologist agreeable.     KENYON SanchezP.R.N.  Palliative Care Nurse Practitioner  [phone]    "

## 2024-12-30 NOTE — WOUND TEAM
In to see pt to assess ostomy needs. He has a urostomy and colostomy. Extra supplies at bedside. Spouse, Su stated she performs the ostomy care. Plan is shower and then appliance change. Pt waiting to find out plan of care. While discussing the ostomy, Dr Desai in to see pt. Unable to assess heels or sacrococcygeal/perineum at this time.

## 2024-12-30 NOTE — PROGRESS NOTES
4 Eyes Skin Assessment Completed by Ratna WAGNER RN and Nicky EDWARDS RN.    Head WDL  Ears WDL  Nose WDL  Mouth WDL  Neck WDL  Breast/Chest WDL  Shoulder Blades WDL  Spine WDL  (R) Arm/Elbow/Hand WDL  (L) Arm/Elbow/Hand WDL  Abdomen Urostomy RLQ colostomy LLQ  Groin Redness, Blanching, and Swelling  Scrotum/Coccyx/Buttocks Redness and Blanching  (R) Leg Edema  (L) Leg Edema  (R) Heel/Foot/Toe Redness, Blanching, and Edema  (L) Heel/Foot/Toe Redness, Blanching, and Edema          Devices In Places Blood Pressure Cuff and Pulse Ox      Interventions In Place Pillows, Q2 Turns, Barrier Cream, Heels Loaded W/Pillows, and Pressure Redistribution Mattress    Possible Skin Injury No    Pictures Uploaded Into Epic N/A  Wound Consult Placed N/A  RN Wound Prevention Protocol Ordered No

## 2024-12-30 NOTE — CARE PLAN
The patient is Stable - Low risk of patient condition declining or worsening    Shift Goals  Clinical Goals: pain management; Antibiotics  Patient Goals: monitor ostomy bags; pain management  Family Goals: DIDIER    Progress made toward(s) clinical / shift goals:    Problem: Pain - Standard  Goal: Alleviation of pain or a reduction in pain to the patient’s comfort goal  Outcome: Progressing  Note: With complaints of pain to LLQ abdomen and coccyx/rectal area. PRN oxycodone given.     Problem: Hemodynamics  Goal: Patient's hemodynamics, fluid balance and neurologic status will be stable or improve  Outcome: Progressing  Note: Patient AO x4. Vital signs stable. Urostomy to RLQ and colostomy to LLQ.

## 2024-12-30 NOTE — DISCHARGE PLANNING
Referral Management Team    Hospice referral received via Epic- T will round to obtain choice ASAP  If there are any questions, please contact me directly.    Met with Su Moreno and family friend gave hospice information, philosophy, DME, medication and staff roles. Patient will discharge this evening after medication is finished.   Patient would like to speak to three hospice agencies before making a decision.   She gave choice for     [facility]

## 2024-12-30 NOTE — CARE PLAN
The patient is Stable - Low risk of patient condition declining or worsening    Shift Goals  Clinical Goals: manage pain; abx  Patient Goals: rest  Family Goals: martine    Progress made toward(s) clinical / shift goals:        Problem: Pain - Standard  Goal: Alleviation of pain or a reduction in pain to the patient’s comfort goal  Description: Target End Date:  Prior to discharge or change in level of care    Document on Vitals flowsheet    1.  Document pain using the appropriate pain scale per order or unit policy  2.  Educate and implement non-pharmacologic comfort measures (i.e. relaxation, distraction, massage, cold/heat therapy, etc.)  3.  Pain management medications as ordered  4.  Reassess pain after pain med administration per policy  5.  If opiods administered assess patient's response to pain medication is appropriate per POSS sedation scale  6.  Follow pain management plan developed in collaboration with patient and interdisciplinary team (including palliative care or pain specialists if applicable)  Outcome: Progressing     Problem: Hemodynamics  Goal: Patient's hemodynamics, fluid balance and neurologic status will be stable or improve  Description: Target End Date:  Prior to discharge or change in level of care    Document on Assessment and I/O flowsheet templates    1.  Monitor vital signs, pulse oximetry and cardiac monitor per provider order and/or policy  2.  Maintain blood pressure per provider order  3.  Hemodynamic monitoring per provider order  4.  Manage IV fluids and IV infusions  5.  Monitor intake and output  6.  Daily weights per unit policy or provider order  7.  Assess peripheral pulses and capillary refill  8.  Assess color and body temperature  9.  Position patient for maximum circulation/cardiac output  10. Monitor for signs/symptoms of excessive bleeding  11. Assess mental status, restlessness and changes in level of consciousness  12. Monitor temperature and report fever or hypothermia  to provider immediately. Consideration of targeted temperature management.  Outcome: Progressing       Patient is not progressing towards the following goals:

## 2024-12-30 NOTE — PROGRESS NOTES
Infectious Disease Progress Note    Author: Zohra Estevez M.D. Date & Time of service: 2024  12:41 PM    Chief Complaint:  Polymicrobial bacteremia    Interval History:   69 y.o. male patient with history of distal rectal adenocarcinoma status post chemoradiation and surgical resection (cystoprostatectomy) with colostomy and urostomy, complicated by local recurrence of cancer near the anal sphincter along with superimposed infection with abscess formation   AF tolerating antibiotics Pain controlled   AF WBC 10.2 blood cult  from remain neg   AF WBC 9.7 tolerating antibiotics Plan of care reviewed-has power port.  Wife would prefer home abx but does not feel comfortable accessing port  Labs Reviewed, Medications Reviewed, and Radiology Reviewed.    Review of Systems:  Review of Systems   Constitutional:  Negative for fever.   Respiratory:  Negative for shortness of breath.    All other systems reviewed and are negative.      Hemodynamics:  Temp (24hrs), Av.6 °C (97.8 °F), Min:36.5 °C (97.7 °F), Max:36.6 °C (97.9 °F)  Temperature: 36.6 °C (97.9 °F)  Pulse  Av.1  Min: 47  Max: 135   Blood Pressure : 97/63       Physical Exam:  Physical Exam  Vitals and nursing note reviewed.   Constitutional:       General: He is not in acute distress.     Appearance: He is not ill-appearing, toxic-appearing or diaphoretic.   Eyes:      General: No scleral icterus.     Extraocular Movements: Extraocular movements intact.      Pupils: Pupils are equal, round, and reactive to light.   Cardiovascular:      Rate and Rhythm: Normal rate.   Pulmonary:      Effort: Pulmonary effort is normal. No respiratory distress.      Breath sounds: No stridor.      Comments: Port right upper chest palpated-nontender  Abdominal:      General: There is no distension.   Neurological:      General: No focal deficit present.      Mental Status: He is alert and oriented to person, place, and time.   Psychiatric:          Mood and Affect: Mood normal.         Behavior: Behavior normal.         Meds:    Current Facility-Administered Medications:     Pharmacy Consult Request    acetaminophen **FOLLOWED BY** [START ON 1/2/2025] acetaminophen    oxyCODONE immediate-release **OR** oxyCODONE immediate-release **OR** morphine injection    senna-docusate **AND** polyethylene glycol/lytes    prochlorperazine    penicillin g    metroNIDAZOLE    calcium carbonate    albuterol    acetaminophen    ondansetron    ondansetron    labetalol    rivaroxaban    Labs:  Recent Labs     12/28/24  0058 12/29/24  0117 12/30/24  0120   WBC 11.7* 10.2 9.7   RBC 4.01* 4.14* 3.87*   HEMOGLOBIN 10.1* 10.6* 9.9*   HEMATOCRIT 32.8* 33.7* 32.8*   MCV 81.8 81.4 84.8   MCH 25.2* 25.6* 25.6*   RDW 49.6 49.9 52.6*   PLATELETCT 350 392 376   MPV 9.4 9.4 9.4   NEUTSPOLYS 78.50*  --  76.70*   LYMPHOCYTES 9.80*  --  10.70*   MONOCYTES 7.70  --  9.20   EOSINOPHILS 2.40  --  2.50   BASOPHILS 0.50  --  0.20     Recent Labs     12/28/24  0058 12/30/24  0120   SODIUM 133* 133*   POTASSIUM 4.2 3.9   CHLORIDE 101 101   CO2 21 24   GLUCOSE 131* 116*   BUN 11 12     Recent Labs     12/28/24  0058 12/30/24  0120   ALBUMIN 3.0* 2.9*   TBILIRUBIN 0.2 0.2   ALKPHOSPHAT 145* 107*   TOTPROTEIN 7.4 7.0   ALTSGPT 50 23   ASTSGOT 30 12   CREATININE 0.43* 0.71       Imaging:  EC-ECHOCARDIOGRAM COMPLETE W/O CONT    Result Date: 12/24/2024  Transthoracic Echo Report Echocardiography Laboratory CONCLUSIONS Normal left ventricular systolic function. The left ventricular ejection fraction is visually estimated to be 55-60%. Mild concentric left ventricular hypertrophy. Grade I diastolic dysfunction. Normal right ventricular size and systolic function. No significant valvular abnormalities. No prior study is available for comparison.        LV EF:        % FINDINGS Left Ventricle Normal left ventricular chamber size. Mild concentric left ventricular hypertrophy. Normal left ventricular systolic  function. The left ventricular ejection fraction is visually estimated to be 55-60%. Normal regional wall motion. Grade I diastolic dysfunction. Right Ventricle Normal right ventricular size and systolic function. Right Atrium Normal right atrial size. Normal inferior vena cava size and inspiratory collapse. Left Atrium Normal left atrial size. Left atrial volume index is 19 mL/sq m. Intact atrial septum without Doppler evidence of interatrial shunt (e.g. PFO or ASD). Mitral Valve Structurally normal mitral valve without significant stenosis or regurgitation. Aortic Valve Tricuspid aortic valve. Structurally normal aortic valve without significant stenosis or regurgitation. Tricuspid Valve Structurally normal tricuspid valve without significant stenosis. Trace tricuspid regurgitation. Unable to estimate right ventricular systolic pressure due to an inadequate tricuspid regurgitant jet. Pulmonic Valve Structurally normal pulmonic valve without significant stenosis. Trace pulmonic insufficiency. Pericardium Normal pericardium without effusion. Aorta Normal aortic root for body surface area. The ascending aorta diameter is 4.0 cm. Antonieta Casillas MD (Electronically Signed) Final Date:     24 December 2024                 18:18    CT-ABDOMEN-PELVIS WITH    Result Date: 12/22/2024 12/22/2024 8:09 PM HISTORY/REASON FOR EXAM:  Sepsis, rectal pain h/o of cancer. TECHNIQUE/EXAM DESCRIPTION:   CT scan of the abdomen and pelvis with contrast. Contrast-enhanced helical scanning was obtained from the diaphragmatic domes through the pubic symphysis following the bolus administration of nonionic contrast without complication. 100 mL of Omnipaque 350 nonionic contrast was administered without complication. Low dose optimization technique was utilized for this CT exam including automated exposure control and adjustment of the mA and/or kV according to patient size. COMPARISON: August 27, 2024 FINDINGS: Lower Chest: See dedicated CT  of the chest for thoracic findings. Liver: Hepatomegaly is seen with slight irregular hepatic contour. Spleen: Unremarkable. Pancreas: Unremarkable. Gallbladder: Cholelithiasis. Biliary: Nondilated. Adrenal glands: Normal. Kidneys: 2.0 cm interpolar right renal cyst is seen, otherwise unremarkable without hydronephrosis. Bowel: No obstruction or acute inflammation. Left lower quadrant ostomy is seen. Right lower quadrant ostomy is noted. Lymph nodes: No adenopathy. Vasculature: Atherosclerotic changes are seen including atherosclerotic coronary artery calcifications. Peritoneum: Unremarkable without ascites. Musculoskeletal: No acute or destructive process. Pelvis: There is complex mass seen within the pelvis measuring 8.7 x 8.9 cm. Small fat-containing bilateral inguinal hernias are seen.     1.  Complex mass in the pelvis, appearance most compatible with recurrent neoplastic disease. 2.  Hepatomegaly 3.  Irregular hepatic contour favoring changes of cirrhosis 4.  Cholelithiasis 5.  Small fat-containing bilateral inguinal hernias 6.  Atherosclerosis and atherosclerotic coronary artery disease    CT-CTA CHEST PULMONARY ARTERY W/ RECONS    Result Date: 12/22/2024 12/22/2024 8:09 PM HISTORY/REASON FOR EXAM:  Hypoxia, history of rectal cancer TECHNIQUE/EXAM DESCRIPTION:  CT angiogram of the chest with contrast. Transaxial MDCT scan of chest post bolus 100 cc Omnipaque 350 IV administration. MIP reconstructed images were created and reviewed. Low dose optimization technique was utilized for this CT exam including automated exposure control and adjustment of the mA and/or kV according to patient size. COMPARISON: August 15, 2024 FINDINGS: The visualized portion of the thyroid appear within normal limits.  The trachea and main stem airways are normal in caliber. There are no pathologically enlarged mediastinal lymph nodes. The heart and pericardium appear within normal limits.   Atherosclerotic changes are seen  including atherosclerotic coronary artery calcifications. The aorta and its main branch vessels are normal in caliber and configuration.  The pulmonary arteries are  well opacified without visualized filling defect. The pulmonary parenchyma demonstrates bibasilar linear densities favoring atelectasis. Emphysematous changes are seen greatest in the lung apices. Fluid-filled distention of the esophagus is seen. See dedicated CT of the abdomen for abdominal findings. The bony structures are age appropriate.     1.  No pulmonary embolus appreciated. 2.  Fluid-filled distention of esophagus, consider gastroesophageal reflux or esophageal dysmotility. Could be followed up with esophageal swallow study. 3.  Emphysematous changes, greatest in the apices.    DX-CHEST-PORTABLE (1 VIEW)    Result Date: 12/22/2024 12/22/2024 6:57 PM HISTORY/REASON FOR EXAM:  Sepsis; sepsis TECHNIQUE/EXAM DESCRIPTION AND NUMBER OF VIEWS: Single portable view of the chest. COMPARISON: 5/24/2023 FINDINGS: Right chest port with tip in the SVC. Hazy left basilar opacity. No pleural effusion. No pneumothorax. Stable cardiopericardial silhouette.     Hazy left basilar opacity, likely atelectasis.      Micro:  Results       Procedure Component Value Units Date/Time    Blood Culture - Draw one from central line and one from peripheral site [[id number]]  (Abnormal) Collected: 12/22/24 1905    Order Status: Completed Specimen: Blood from Line Updated: 12/26/24 1430     Significant Indicator POS     Source BLD     Site Peripheral     Culture Result Growth detected by automated blood culture system.  12/23/2024  23:25  Negative for Staphylococcus aureus and MRSA by PCR. Correlate  ongoing need for antibiotics with clinical condition.        Actinomyces turicensis      Anaerobic cocci  Peptoniphilus species        Anaerobic Gram positive sascha  Solobacterium moorei      Blood Culture - Draw one from central line and one from peripheral site [[id number]]   (Abnormal) Collected: 12/22/24 1900    Order Status: Completed Specimen: Blood from Peripheral Updated: 12/26/24 1430     Significant Indicator POS     Source BLD     Site PERIPHERAL     Culture Result Growth detected by automated blood culture system.  12/24/2024  03:50        Actinomyces turicensis      Anaerobic cocci  Peptoniphilus sp.      BLOOD CULTURE [[id number]] Collected: 12/25/24 1943    Order Status: Completed Specimen: Blood from Peripheral Updated: 12/25/24 2208     Significant Indicator NEG     Source BLD     Site PERIPHERAL     Culture Result No Growth  Note: Blood cultures are incubated for 5 days and  are monitored continuously.Positive blood cultures  are called to the RN and reported as soon as  they are identified.      BLOOD CULTURE [[id number]] Collected: 12/25/24 1943    Order Status: Completed Specimen: Blood from Peripheral Updated: 12/25/24 2157     Significant Indicator NEG     Source BLD     Site PERIPHERAL     Culture Result A significant status has been triggered by the BACTEC  instrument due to an increase in CO2 production.  Gram  stain of blood culture bottle shows NO ORGANISMS SEEN.  Further investigation is in progress.  Note: Blood cultures are incubated for 5 days and  are monitored continuously. Positive blood cultures  are called to the RN and reported as soon as  they are identified.      Urine Culture (New) [[id number]] Collected: 12/22/24 1918    Order Status: Completed Specimen: Urine Updated: 12/24/24 1621     Significant Indicator NEG     Source UR     Site -     Culture Result Mixed enteric tonio >100,000 cfu/mL  3 or more organisms isolated, culture of doubtful  significance, please recollect.              Assessment:  Active Hospital Problems    Diagnosis     *Bacteremia [R78.81]     Sepsis ([facility]) [A41.9]     Hypoxia [R09.02]     DVT (deep venous thrombosis) ([facility]) [I82.409]     Coordination of complex care [Z71.89]     Malignant neoplasm of rectum ([facility]) [C20]   "    Polymicrobial bacteremia including Actinomyces  Complex pelvic mass with fistulization near the anal verge, abscess (abdominopelvic Actinomycosis) versus recurrent malignancy versus both  Rectal adenocarcinoma with local recurrence near the anal sphincter  Recent pelvic abscess October 2024     Plan:   -Initial surgery eval declined resection and IR declined biopsies/drainage  -recommend sending both the liquid portion to path to look for \"sulfur granules\" as well as the more dense appearing component to look for malignancy  -Agree with second opinion for diagnostic eval and source control  -Continue IV penicillin 3 million units every 4 hours + p.o. Flagyl 500 mg 3 times daily TID for now  After discharge plan to transition to ertapenem 1 gram IV daily to complete course of IV treatment  Stop date IV antibiotics 6 weeks from negative blood cultures, followed by PO Augmentin for the actinomycete-usually 6-12 months   Unclear duration given concurrent malignancy   Orders placed in [place]  Disposition: likely home  Need for PICC line: -has port  ID will follow.  This illness poses a threat to life    ADDENDUM:  Called by Dr Starks  Second opinion-no surgery  New plan is for hospice and no IV antibiotics  Augmentin 875 mg PO BID for months for actinomycetes-this is not standard of care for Actinomycete bacteremia, but per IM this is patient wishes  Dc flagyl at 2 weeks  [facility] orders cancelled    Will sign off-please reconsult if needed    "

## 2024-12-30 NOTE — DISCHARGE INSTRUCTIONS
You were admitted because of sepsis due to an ongoing infection where the surgery was done in the pelvic area and bloodstream infection with actinomyces.  Unclear if there is also malignant cells in the area where the surgery was but it is likely to be.  Surgery was not recommended by Dr. Desai, and Dr. Sewell due to more risk than benefit and unlikely to solve the issue.    You Will go home with antibiotics by mouth with Augmentin of undetermined duration and Flagyl for 2 weeks.  These antibiotics will not cure the infection but we will stop the progression.    You will likely benefit off of hospice and willing to get that set up.    We are sending you pain medications to take as needed and you can follow-up with your PCP if you need more    It has been great to take care of you during this hospitalization and it was very nice to meet you.

## 2024-12-30 NOTE — DISCHARGE SUMMARY
[facility] Discharge Summary    Attending: Jayant Starks M.d.  Senior Resident: Dr. Campbell  Intern:  Dr. Mejia  Contact Number: [phone]    CHIEF COMPLAINT ON ADMISSION  Chief Complaint   Patient presents with    Flu Like Symptoms     Cough (unproductive), fever, nausea for past 3 days.    Hx rectal cancer, has urostomy and colostomy.     93% on 2L, 139 HR.     Blood in Urine     Red tinged urine    Abdominal Pain     Denies pain currently, low abd pain earlier today. Given 100mcg fentanyl        Reason for Admission  EMS     Admission Date  12/22/2024    CODE STATUS  DNAR/DNI    HPI & HOSPITAL COURSE    Josh Lerner is a 69 y.o. male who presented 12/22/2024 with sepsis at that moment believed to be due to UTI.     Patient has a history of distal rectal adenocarcinoma stage II C s/p chemoRT and surgical resection (cystoprostatectomy) with colostomy and urostomy.  Unfortunately has recurrence and is following with hematology oncology and general surgery outpatient. Recently started seeing Dr Kirby with [facility]. In the ED, patient had lactic acidosis with neutropenia. CTPA negative, however with emphysema. CT abd noted with complex mass likely recurrent neoplasm. Completed fluid resuscitation, cefepime, then switched to unasyn before noting actinomyces in blood culture, ID consulted and swiched to pen G + metronidazole.     Consulted colorectal surgery Dr. Sewell, Infectious disease Dr. Butler, and Dr. Kirby oncologist, interventional radiology Dr. Graham. Dr. Sewell advised against surgical washout, and under impression that mass is malignant abscess given lack of margins in previous surgery. Dr. Kirby has advised that it was possible for patient to return to [facility], however with likely poor outcome with poor quality of life, and Dr. Graham also advised against IR guided drain placement given loculated fluid collections and proximity to likely recurrent colorectal cancer  leading to high risk of hemorrhage.  Patient requested second opinion with Dr. Desai who also suggested no surgery.  Patient understood to current state of health and decided to be DNR/DNI and transition to hospice.  Antibiotic duration and administration were discussed with infectious disease and ultimately recommended only p.o. antibiotic with Augmentin for months and to continue with Flagyl for 2 weeks.  Antibiotic treatment would likely not be enough and is not a standard for actinomyces bacteremia but per patient wishes will go home with this treatment and no source control or surgeries will be done.        Therefore, he is discharged in guarded and stable condition to hospice.    The patient met 2-midnight criteria for an inpatient stay at the time of discharge.    Discharge Date  12/30/2024    Physical Exam on Day of Discharge  Physical Exam  Constitutional:       Appearance: Normal appearance. He is obese.   HENT:      Head: Normocephalic.      Nose: Nose normal.      Mouth/Throat:      Mouth: Mucous membranes are moist.      Comments: Poor dentition  Mallampati II  Eyes:      Pupils: Pupils are equal, round, and reactive to light.   Cardiovascular:      Rate and Rhythm: Normal rate and regular rhythm.      Pulses: Normal pulses.   Pulmonary:      Effort: Pulmonary effort is normal.      Breath sounds: Normal breath sounds.   Abdominal:      General: Abdomen is flat. Bowel sounds are normal.      Palpations: Abdomen is soft.      Comments: Right lower quadrant urostomy bag noted  Left lower quadrant colostomy bag noted   Musculoskeletal:         General: Normal range of motion.      Cervical back: Neck supple.   Skin:     General: Skin is warm.      Capillary Refill: Capillary refill takes less than 2 seconds.      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   Neurological:      General: No focal deficit  present.      Mental Status: He is alert and oriented to person, place, and time. Mental status is at baseline.   Psychiatric:         Mood and Affect: Mood normal.         Behavior: Behavior normal.         Thought Content: Thought content normal.         Judgment: Judgment normal.         FOLLOW UP ITEMS POST DISCHARGE  None    DISCHARGE DIAGNOSES  Principal Problem:    Coordination of complex care (POA: Unknown)  Active Problems:    Malignant neoplasm of rectum ([facility]) (POA: Yes)    Bacteremia (POA: Unknown)    Sepsis ([facility]) (POA: Yes)    Hypoxia (POA: Unknown)    DVT (deep venous thrombosis) ([facility]) (POA: Unknown)  Resolved Problems:    * No resolved hospital problems. *      FOLLOW UP  Future Appointments   Date Time Provider Department Center   1/8/2025  9:00 AM Zoran Desai M.D. [facility] None   2/5/2025  9:40 AM [facility]   2/12/2025  9:00 AM Kameron Kirby D.O. [facility] None     [facility]  [address]  [place] [address]  [phone]          MEDICATIONS ON DISCHARGE     Medication List        START taking these medications        Instructions   albuterol 108 (90 Base) MCG/ACT Aers inhalation aerosol   Inhale 2 Puffs every 4 hours.  Dose: 2 Puff     amoxicillin-clavulanate 875-125 MG Tabs  Commonly known as: Augmentin   Take 1 Tablet by mouth 2 times a day.  Dose: 1 Tablet     metroNIDAZOLE 500 MG Tabs  Commonly known as: Flagyl   Take 1 Tablet by mouth every 8 hours.  Dose: 500 mg     oxyCODONE immediate-release 5 MG Tabs  Commonly known as: Roxicodone   Take 1 Tablet by mouth every four hours as needed for Severe Pain (PRN for cancer-related pain.) for up to 14 days.  Dose: 5 mg            CONTINUE taking these medications        Instructions   rivaroxaban 20 MG Tabs tablet  Commonly known as: Xarelto   Doctor's comments: Please bill through .  Take 1 Tablet by mouth with dinner for 180 days.  Dose: 20 mg              Allergies  No Known Allergies    DIET  Orders  Placed This Encounter   Procedures    Diet Order Diet: Regular     Standing Status:   Standing     Number of Occurrences:   1     Order Specific Question:   Diet:     Answer:   Regular [1]       ACTIVITY  As tolerated.  Weight bearing as tolerated    CONSULTATIONS  Infectious disease  Oncology  Colorectal surgery  Interventional radiology    PROCEDURES  None    LABORATORY  Lab Results   Component Value Date    SODIUM 133 (L) 12/30/2024    POTASSIUM 3.9 12/30/2024    CHLORIDE 101 12/30/2024    CO2 24 12/30/2024    GLUCOSE 116 (H) 12/30/2024    BUN 12 12/30/2024    CREATININE 0.71 12/30/2024        Lab Results   Component Value Date    WBC 9.7 12/30/2024    HEMOGLOBIN 9.9 (L) 12/30/2024    HEMATOCRIT 32.8 (L) 12/30/2024    PLATELETCT 376 12/30/2024        Total time of the discharge process exceeds 40 minutes.

## 2024-12-30 NOTE — PROGRESS NOTES
ATSP by Dr Starks for Metastatic Rectal Cancer    HPI: 69y M with known hx of locally advanced rectal cancer.  He has previously been treated with APR, Cystectomy and Prostatectomy, and has a permanent colostomy and urostomy in place.  He more recently had an excision of his infected perineal wound and was noted to have metastatic disease present.  He has been struggling with wound healing since.  More recently he was admitted as an inpatient due to sepsis and was found to have a large fungating mass int he pelvis with associated fluid collections consistent with metastatic cancer and secondary infection.      PMHx: Rectal Cancer- locally advanced, Colostomy, Urostomy, Dental Disorder, Partial Paralysis, Stroke in 1972, Pneumonia    PSHx: Robotic Abdominoperineal Resection, Prostatectomy, Cystectomy, Portacath placement, bilateral inguinal hernia repairs,     Meds: see Med Rec, no anticoagulation    NKDA    FamHx: no colon/rectal cancers, no other pertinent family history    SocHx: No Tob/Drugs, occasional EtOH, previous smoking with 14 pack/year history      ROS: negative except as above    Consitutional- above  HEENT- no visual changes, no sneezing or runny nose  Skin- no rashes or itching  Cardiovascular- no chest pain or palpatations  Respiratory- no SOB or cough  GI- above  - no dysurea  Neuro- no weakness or syncope  Musculoskeletal- no muscle or joint pain  Heme- no bleeding or bruising  Lymphatic- no enlarged nodes or previous splenectomy  Endocrine- No sweating or heat/cold intolerance  Allergy- No asthma or hives  Psychiatric- no depression or anxiety        Physical Exam:   AFVSS  A@O x3, NAD  NCAT, no scleral icterus  Neck nontender, no lymphadenopathy  Normal respiratory effort, no chest wall masses  RRR, 2+ pulses  Abdomen soft, no peritonitis, no masses  Extremities warm and well perfused  No skin rashes or lesions    Labs: WBC 9.7, Hct 33, Plt 376  Na 133, otherwise lytes wnl    Radiology: CT  A/P: 1.  Complex mass in the pelvis, appearance most compatible with recurrent neoplastic disease.  2.  Hepatomegaly  3.  Irregular hepatic contour favoring changes of cirrhosis  4.  Cholelithiasis  5.  Small fat-containing bilateral inguinal hernias  6.  Atherosclerosis and atherosclerotic coronary artery disease    A/P: 69y M here with metastatic cancer with associated infection. Unfortunately, in this scenario I don't think surgery is an option.  Removal of metastatic locally invasive disease is unlikely to clear the cancer, very highly risky and morbid, and likely to lead to prolonged hospitalization.  At this point, chemotherapy is not going to be a realistic option either in the setting of infection.  We discussed this grim diagnosis and patient, wife, and I discussed transitioning to hospice care.  I think discharge home with abx and pain control is a reasonable approach at this time.  Agree with consulting oncology as well.    Will follow while inpatient for any surgical questions.

## 2024-12-31 ENCOUNTER — HOSPICE ADMISSION (OUTPATIENT)
Dept: HOSPICE | Facility: HOSPICE | Age: 69
End: 2024-12-31
Payer: MEDICARE

## 2024-12-31 ENCOUNTER — HOME CARE VISIT (OUTPATIENT)
Dept: HOSPICE | Facility: HOSPICE | Age: 69
End: 2024-12-31
Payer: MEDICARE

## 2024-12-31 NOTE — PROGRESS NOTES
Pt discharged from unit. All belongings with pt. PIV removed. Discharge paperwork reviewed with patient, all questions answered, and paperwork signed. One copy with patient, and one copy kept to be scanned into patient's chart.   Meds sent to pt's preferred pharmacy.

## 2024-12-31 NOTE — PROGRESS NOTES
Pt transported to discharge lounge via wheelchair. Pt's spouse at bedside and both pt and spouse understand discharge POC. Pt spoke with hospice referral RN; pt spoke with physician and declined IV abx at home; pt to take PO instead. Pt given opportunity to ask questions. Discharge medications sent to pt's preferred pharmacy.

## 2025-01-01 ENCOUNTER — HOME CARE VISIT (OUTPATIENT)
Dept: HOSPICE | Facility: HOSPICE | Age: 70
End: 2025-01-01
Payer: MEDICARE

## 2025-01-01 ENCOUNTER — APPOINTMENT (OUTPATIENT)
Dept: RADIOLOGY | Facility: MEDICAL CENTER | Age: 70
DRG: 374 | End: 2025-01-01
Attending: INTERNAL MEDICINE
Payer: MEDICARE

## 2025-01-01 ENCOUNTER — OFFICE VISIT (OUTPATIENT)
Dept: INFECTIOUS DISEASES | Facility: MEDICAL CENTER | Age: 70
End: 2025-01-01
Attending: NURSE PRACTITIONER
Payer: MEDICARE

## 2025-01-01 ENCOUNTER — APPOINTMENT (OUTPATIENT)
Dept: RADIOLOGY | Facility: MEDICAL CENTER | Age: 70
DRG: 374 | End: 2025-01-01
Attending: STUDENT IN AN ORGANIZED HEALTH CARE EDUCATION/TRAINING PROGRAM
Payer: MEDICARE

## 2025-01-01 ENCOUNTER — APPOINTMENT (OUTPATIENT)
Facility: MEDICAL CENTER | Age: 70
End: 2025-01-01
Payer: MEDICARE

## 2025-01-01 ENCOUNTER — HOSPICE ADMISSION (OUTPATIENT)
Dept: HOSPICE | Facility: HOSPICE | Age: 70
End: 2025-01-01
Payer: MEDICARE

## 2025-01-01 ENCOUNTER — HOSPITAL ENCOUNTER (INPATIENT)
Facility: MEDICAL CENTER | Age: 70
LOS: 8 days | End: 2025-03-29
Attending: STUDENT IN AN ORGANIZED HEALTH CARE EDUCATION/TRAINING PROGRAM | Admitting: STUDENT IN AN ORGANIZED HEALTH CARE EDUCATION/TRAINING PROGRAM
Payer: COMMERCIAL

## 2025-01-01 ENCOUNTER — APPOINTMENT (OUTPATIENT)
Dept: RADIOLOGY | Facility: MEDICAL CENTER | Age: 70
End: 2025-01-01
Attending: STUDENT IN AN ORGANIZED HEALTH CARE EDUCATION/TRAINING PROGRAM
Payer: COMMERCIAL

## 2025-01-01 ENCOUNTER — HOSPITAL ENCOUNTER (INPATIENT)
Facility: MEDICAL CENTER | Age: 70
LOS: 12 days | DRG: 374 | End: 2025-03-21
Attending: INTERNAL MEDICINE | Admitting: INTERNAL MEDICINE
Payer: MEDICARE

## 2025-01-01 ENCOUNTER — APPOINTMENT (OUTPATIENT)
Dept: RADIOLOGY | Facility: MEDICAL CENTER | Age: 70
DRG: 374 | End: 2025-01-01
Payer: MEDICARE

## 2025-01-01 ENCOUNTER — TELEPHONE (OUTPATIENT)
Dept: HEMATOLOGY ONCOLOGY | Facility: MEDICAL CENTER | Age: 70
End: 2025-01-01
Payer: MEDICARE

## 2025-01-01 VITALS
SYSTOLIC BLOOD PRESSURE: 112 MMHG | TEMPERATURE: 97.9 F | DIASTOLIC BLOOD PRESSURE: 73 MMHG | WEIGHT: 205.91 LBS | HEIGHT: 72 IN | OXYGEN SATURATION: 96 % | HEART RATE: 78 BPM | BODY MASS INDEX: 27.89 KG/M2 | RESPIRATION RATE: 18 BRPM

## 2025-01-01 VITALS — RESPIRATION RATE: 20 BRPM

## 2025-01-01 VITALS
BODY MASS INDEX: 27.36 KG/M2 | SYSTOLIC BLOOD PRESSURE: 96 MMHG | WEIGHT: 202 LBS | TEMPERATURE: 98.8 F | OXYGEN SATURATION: 96 % | HEART RATE: 110 BPM | HEIGHT: 72 IN | DIASTOLIC BLOOD PRESSURE: 60 MMHG

## 2025-01-01 VITALS
TEMPERATURE: 102.2 F | HEIGHT: 72 IN | HEART RATE: 132 BPM | BODY MASS INDEX: 27.89 KG/M2 | WEIGHT: 205.91 LBS | RESPIRATION RATE: 4 BRPM | OXYGEN SATURATION: 78 % | SYSTOLIC BLOOD PRESSURE: 59 MMHG | DIASTOLIC BLOOD PRESSURE: 35 MMHG

## 2025-01-01 VITALS — RESPIRATION RATE: 14 BRPM

## 2025-01-01 VITALS — RESPIRATION RATE: 22 BRPM

## 2025-01-01 VITALS — RESPIRATION RATE: 5 BRPM

## 2025-01-01 DIAGNOSIS — K60.30 ANAL FISTULA: ICD-10-CM

## 2025-01-01 DIAGNOSIS — C20 MALIGNANT NEOPLASM OF RECTUM (HCC): ICD-10-CM

## 2025-01-01 DIAGNOSIS — Z93.3 COLOSTOMY IN PLACE (HCC): ICD-10-CM

## 2025-01-01 DIAGNOSIS — K65.1 PELVIC ABSCESS IN MALE (HCC): ICD-10-CM

## 2025-01-01 DIAGNOSIS — C20 RECTAL ADENOCARCINOMA (HCC): ICD-10-CM

## 2025-01-01 DIAGNOSIS — K56.609 SBO (SMALL BOWEL OBSTRUCTION) (HCC): ICD-10-CM

## 2025-01-01 LAB
ALBUMIN SERPL BCP-MCNC: 2.5 G/DL (ref 3.2–4.9)
ALBUMIN SERPL BCP-MCNC: 2.7 G/DL (ref 3.2–4.9)
ALBUMIN SERPL BCP-MCNC: 3 G/DL (ref 3.2–4.9)
ALBUMIN/GLOB SERPL: 0.7 G/DL
ALP SERPL-CCNC: 95 U/L (ref 30–99)
ALT SERPL-CCNC: 12 U/L (ref 2–50)
ANION GAP SERPL CALC-SCNC: 10 MMOL/L (ref 7–16)
ANION GAP SERPL CALC-SCNC: 13 MMOL/L (ref 7–16)
AST SERPL-CCNC: 19 U/L (ref 12–45)
BACTERIA BLD CULT: NORMAL
BASOPHILS # BLD AUTO: 0.1 % (ref 0–1.8)
BASOPHILS # BLD AUTO: 0.3 % (ref 0–1.8)
BASOPHILS # BLD: 0.01 K/UL (ref 0–0.12)
BASOPHILS # BLD: 0.04 K/UL (ref 0–0.12)
BILIRUB SERPL-MCNC: 0.6 MG/DL (ref 0.1–1.5)
BUN SERPL-MCNC: 11 MG/DL (ref 8–22)
BUN SERPL-MCNC: 13 MG/DL (ref 8–22)
BUN SERPL-MCNC: 13 MG/DL (ref 8–22)
BUN SERPL-MCNC: 14 MG/DL (ref 8–22)
CALCIUM ALBUM COR SERPL-MCNC: 9.1 MG/DL (ref 8.5–10.5)
CALCIUM ALBUM COR SERPL-MCNC: 9.2 MG/DL (ref 8.5–10.5)
CALCIUM ALBUM COR SERPL-MCNC: 9.7 MG/DL (ref 8.5–10.5)
CALCIUM SERPL-MCNC: 8 MG/DL (ref 8.5–10.5)
CALCIUM SERPL-MCNC: 8.1 MG/DL (ref 8.5–10.5)
CALCIUM SERPL-MCNC: 8.9 MG/DL (ref 8.5–10.5)
CALCIUM SERPL-MCNC: 8.9 MG/DL (ref 8.5–10.5)
CHLORIDE SERPL-SCNC: 103 MMOL/L (ref 96–112)
CHLORIDE SERPL-SCNC: 105 MMOL/L (ref 96–112)
CHLORIDE SERPL-SCNC: 99 MMOL/L (ref 96–112)
CHLORIDE SERPL-SCNC: 99 MMOL/L (ref 96–112)
CO2 SERPL-SCNC: 20 MMOL/L (ref 20–33)
CO2 SERPL-SCNC: 22 MMOL/L (ref 20–33)
CO2 SERPL-SCNC: 23 MMOL/L (ref 20–33)
CO2 SERPL-SCNC: 24 MMOL/L (ref 20–33)
CREAT SERPL-MCNC: 0.61 MG/DL (ref 0.5–1.4)
CREAT SERPL-MCNC: 0.62 MG/DL (ref 0.5–1.4)
CREAT SERPL-MCNC: 0.79 MG/DL (ref 0.5–1.4)
CREAT SERPL-MCNC: 0.81 MG/DL (ref 0.5–1.4)
EOSINOPHIL # BLD AUTO: 0.03 K/UL (ref 0–0.51)
EOSINOPHIL # BLD AUTO: 0.05 K/UL (ref 0–0.51)
EOSINOPHIL NFR BLD: 0.2 % (ref 0–6.9)
EOSINOPHIL NFR BLD: 0.5 % (ref 0–6.9)
ERYTHROCYTE [DISTWIDTH] IN BLOOD BY AUTOMATED COUNT: 56.6 FL (ref 35.9–50)
ERYTHROCYTE [DISTWIDTH] IN BLOOD BY AUTOMATED COUNT: 57.1 FL (ref 35.9–50)
ERYTHROCYTE [DISTWIDTH] IN BLOOD BY AUTOMATED COUNT: 57.1 FL (ref 35.9–50)
ERYTHROCYTE [DISTWIDTH] IN BLOOD BY AUTOMATED COUNT: 57.2 FL (ref 35.9–50)
ERYTHROCYTE [DISTWIDTH] IN BLOOD BY AUTOMATED COUNT: 57.4 FL (ref 35.9–50)
GFR SERPLBLD CREATININE-BSD FMLA CKD-EPI: 103 ML/MIN/1.73 M 2
GFR SERPLBLD CREATININE-BSD FMLA CKD-EPI: 104 ML/MIN/1.73 M 2
GFR SERPLBLD CREATININE-BSD FMLA CKD-EPI: 95 ML/MIN/1.73 M 2
GFR SERPLBLD CREATININE-BSD FMLA CKD-EPI: 96 ML/MIN/1.73 M 2
GLOBULIN SER CALC-MCNC: 4.1 G/DL (ref 1.9–3.5)
GLUCOSE SERPL-MCNC: 115 MG/DL (ref 65–99)
GLUCOSE SERPL-MCNC: 126 MG/DL (ref 65–99)
GLUCOSE SERPL-MCNC: 132 MG/DL (ref 65–99)
GLUCOSE SERPL-MCNC: 90 MG/DL (ref 65–99)
HCT VFR BLD AUTO: 33.6 % (ref 42–52)
HCT VFR BLD AUTO: 35.3 % (ref 42–52)
HCT VFR BLD AUTO: 35.4 % (ref 42–52)
HCT VFR BLD AUTO: 37.4 % (ref 42–52)
HCT VFR BLD AUTO: 38.5 % (ref 42–52)
HGB BLD-MCNC: 10.4 G/DL (ref 14–18)
HGB BLD-MCNC: 10.8 G/DL (ref 14–18)
HGB BLD-MCNC: 11 G/DL (ref 14–18)
HGB BLD-MCNC: 11.8 G/DL (ref 14–18)
HGB BLD-MCNC: 11.9 G/DL (ref 14–18)
IMM GRANULOCYTES # BLD AUTO: 0.07 K/UL (ref 0–0.11)
IMM GRANULOCYTES # BLD AUTO: 0.12 K/UL (ref 0–0.11)
IMM GRANULOCYTES NFR BLD AUTO: 0.7 % (ref 0–0.9)
IMM GRANULOCYTES NFR BLD AUTO: 0.9 % (ref 0–0.9)
LYMPHOCYTES # BLD AUTO: 0.54 K/UL (ref 1–4.8)
LYMPHOCYTES # BLD AUTO: 0.67 K/UL (ref 1–4.8)
LYMPHOCYTES NFR BLD: 3.9 % (ref 22–41)
LYMPHOCYTES NFR BLD: 7 % (ref 22–41)
MAGNESIUM SERPL-MCNC: 2 MG/DL (ref 1.5–2.5)
MAGNESIUM SERPL-MCNC: 2 MG/DL (ref 1.5–2.5)
MCH RBC QN AUTO: 25.3 PG (ref 27–33)
MCH RBC QN AUTO: 25.8 PG (ref 27–33)
MCH RBC QN AUTO: 26 PG (ref 27–33)
MCH RBC QN AUTO: 26 PG (ref 27–33)
MCH RBC QN AUTO: 26.1 PG (ref 27–33)
MCHC RBC AUTO-ENTMCNC: 30.6 G/DL (ref 32.3–36.5)
MCHC RBC AUTO-ENTMCNC: 30.6 G/DL (ref 32.3–36.5)
MCHC RBC AUTO-ENTMCNC: 31 G/DL (ref 32.3–36.5)
MCHC RBC AUTO-ENTMCNC: 31.1 G/DL (ref 32.3–36.5)
MCHC RBC AUTO-ENTMCNC: 31.8 G/DL (ref 32.3–36.5)
MCV RBC AUTO: 81.8 FL (ref 81.4–97.8)
MCV RBC AUTO: 82.6 FL (ref 81.4–97.8)
MCV RBC AUTO: 83.7 FL (ref 81.4–97.8)
MCV RBC AUTO: 84.2 FL (ref 81.4–97.8)
MCV RBC AUTO: 84.4 FL (ref 81.4–97.8)
MONOCYTES # BLD AUTO: 0.78 K/UL (ref 0–0.85)
MONOCYTES # BLD AUTO: 1.18 K/UL (ref 0–0.85)
MONOCYTES NFR BLD AUTO: 8.2 % (ref 0–13.4)
MONOCYTES NFR BLD AUTO: 8.5 % (ref 0–13.4)
NEUTROPHILS # BLD AUTO: 12 K/UL (ref 1.82–7.42)
NEUTROPHILS # BLD AUTO: 7.99 K/UL (ref 1.82–7.42)
NEUTROPHILS NFR BLD: 83.5 % (ref 44–72)
NEUTROPHILS NFR BLD: 86.2 % (ref 44–72)
NRBC # BLD AUTO: 0 K/UL
NRBC # BLD AUTO: 0 K/UL
NRBC BLD-RTO: 0 /100 WBC (ref 0–0.2)
NRBC BLD-RTO: 0 /100 WBC (ref 0–0.2)
PHOSPHATE SERPL-MCNC: 2.6 MG/DL (ref 2.5–4.5)
PHOSPHATE SERPL-MCNC: 2.7 MG/DL (ref 2.5–4.5)
PLATELET # BLD AUTO: 279 K/UL (ref 164–446)
PLATELET # BLD AUTO: 301 K/UL (ref 164–446)
PLATELET # BLD AUTO: 314 K/UL (ref 164–446)
PLATELET # BLD AUTO: 364 K/UL (ref 164–446)
PLATELET # BLD AUTO: 388 K/UL (ref 164–446)
PMV BLD AUTO: 8.7 FL (ref 9–12.9)
PMV BLD AUTO: 8.8 FL (ref 9–12.9)
PMV BLD AUTO: 8.9 FL (ref 9–12.9)
POTASSIUM SERPL-SCNC: 3.8 MMOL/L (ref 3.6–5.5)
POTASSIUM SERPL-SCNC: 3.9 MMOL/L (ref 3.6–5.5)
POTASSIUM SERPL-SCNC: 4.5 MMOL/L (ref 3.6–5.5)
POTASSIUM SERPL-SCNC: 4.7 MMOL/L (ref 3.6–5.5)
PROCALCITONIN SERPL-MCNC: 0.31 NG/ML
PROT SERPL-MCNC: 7.1 G/DL (ref 6–8.2)
RBC # BLD AUTO: 3.98 M/UL (ref 4.7–6.1)
RBC # BLD AUTO: 4.19 M/UL (ref 4.7–6.1)
RBC # BLD AUTO: 4.23 M/UL (ref 4.7–6.1)
RBC # BLD AUTO: 4.57 M/UL (ref 4.7–6.1)
RBC # BLD AUTO: 4.66 M/UL (ref 4.7–6.1)
SIGNIFICANT IND 70042: NORMAL
SITE SITE: NORMAL
SODIUM SERPL-SCNC: 133 MMOL/L (ref 135–145)
SODIUM SERPL-SCNC: 134 MMOL/L (ref 135–145)
SODIUM SERPL-SCNC: 135 MMOL/L (ref 135–145)
SODIUM SERPL-SCNC: 135 MMOL/L (ref 135–145)
SOURCE SOURCE: NORMAL
WBC # BLD AUTO: 11.8 K/UL (ref 4.8–10.8)
WBC # BLD AUTO: 13.9 K/UL (ref 4.8–10.8)
WBC # BLD AUTO: 8.2 K/UL (ref 4.8–10.8)
WBC # BLD AUTO: 8.9 K/UL (ref 4.8–10.8)
WBC # BLD AUTO: 9.6 K/UL (ref 4.8–10.8)

## 2025-01-01 PROCEDURE — 85025 COMPLETE CBC W/AUTO DIFF WBC: CPT

## 2025-01-01 PROCEDURE — 770001 HCHG ROOM/CARE - MED/SURG/GYN PRIV*

## 2025-01-01 PROCEDURE — 80069 RENAL FUNCTION PANEL: CPT

## 2025-01-01 PROCEDURE — 700111 HCHG RX REV CODE 636 W/ 250 OVERRIDE (IP): Mod: JZ | Performed by: INTERNAL MEDICINE

## 2025-01-01 PROCEDURE — 700111 HCHG RX REV CODE 636 W/ 250 OVERRIDE (IP): Mod: JZ | Performed by: STUDENT IN AN ORGANIZED HEALTH CARE EDUCATION/TRAINING PROGRAM

## 2025-01-01 PROCEDURE — 700101 HCHG RX REV CODE 250: Performed by: INTERNAL MEDICINE

## 2025-01-01 PROCEDURE — 85027 COMPLETE CBC AUTOMATED: CPT

## 2025-01-01 PROCEDURE — A9270 NON-COVERED ITEM OR SERVICE: HCPCS | Performed by: HOSPITALIST

## 2025-01-01 PROCEDURE — 700105 HCHG RX REV CODE 258: Performed by: INTERNAL MEDICINE

## 2025-01-01 PROCEDURE — 80053 COMPREHEN METABOLIC PANEL: CPT

## 2025-01-01 PROCEDURE — G0299 HHS/HOSPICE OF RN EA 15 MIN: HCPCS

## 2025-01-01 PROCEDURE — 99497 ADVNCD CARE PLAN 30 MIN: CPT | Performed by: NURSE PRACTITIONER

## 2025-01-01 PROCEDURE — 99233 SBSQ HOSP IP/OBS HIGH 50: CPT | Mod: 25 | Performed by: NURSE PRACTITIONER

## 2025-01-01 PROCEDURE — 99498 ADVNCD CARE PLAN ADDL 30 MIN: CPT | Performed by: NURSE PRACTITIONER

## 2025-01-01 PROCEDURE — 700101 HCHG RX REV CODE 250

## 2025-01-01 PROCEDURE — 80048 BASIC METABOLIC PNL TOTAL CA: CPT

## 2025-01-01 PROCEDURE — 700101 HCHG RX REV CODE 250: Performed by: STUDENT IN AN ORGANIZED HEALTH CARE EDUCATION/TRAINING PROGRAM

## 2025-01-01 PROCEDURE — G0155 HHCP-SVS OF CSW,EA 15 MIN: HCPCS

## 2025-01-01 PROCEDURE — 99233 SBSQ HOSP IP/OBS HIGH 50: CPT | Performed by: INTERNAL MEDICINE

## 2025-01-01 PROCEDURE — 700111 HCHG RX REV CODE 636 W/ 250 OVERRIDE (IP): Performed by: STUDENT IN AN ORGANIZED HEALTH CARE EDUCATION/TRAINING PROGRAM

## 2025-01-01 PROCEDURE — 3078F DIAST BP <80 MM HG: CPT | Performed by: NURSE PRACTITIONER

## 2025-01-01 PROCEDURE — 700102 HCHG RX REV CODE 250 W/ 637 OVERRIDE(OP): Performed by: HOSPITALIST

## 2025-01-01 PROCEDURE — 700111 HCHG RX REV CODE 636 W/ 250 OVERRIDE (IP): Performed by: INTERNAL MEDICINE

## 2025-01-01 PROCEDURE — 83735 ASSAY OF MAGNESIUM: CPT

## 2025-01-01 PROCEDURE — T2045 HOSPICE GENERAL CARE: HCPCS

## 2025-01-01 PROCEDURE — 700111 HCHG RX REV CODE 636 W/ 250 OVERRIDE (IP)

## 2025-01-01 PROCEDURE — 700111 HCHG RX REV CODE 636 W/ 250 OVERRIDE (IP): Mod: JZ

## 2025-01-01 PROCEDURE — 99233 SBSQ HOSP IP/OBS HIGH 50: CPT | Performed by: NURSE PRACTITIONER

## 2025-01-01 PROCEDURE — A9270 NON-COVERED ITEM OR SERVICE: HCPCS | Performed by: INTERNAL MEDICINE

## 2025-01-01 PROCEDURE — 3074F SYST BP LT 130 MM HG: CPT | Performed by: NURSE PRACTITIONER

## 2025-01-01 PROCEDURE — A9270 NON-COVERED ITEM OR SERVICE: HCPCS

## 2025-01-01 PROCEDURE — 99233 SBSQ HOSP IP/OBS HIGH 50: CPT | Performed by: HOSPITALIST

## 2025-01-01 PROCEDURE — 700111 HCHG RX REV CODE 636 W/ 250 OVERRIDE (IP): Mod: JZ | Performed by: NURSE PRACTITIONER

## 2025-01-01 PROCEDURE — 700111 HCHG RX REV CODE 636 W/ 250 OVERRIDE (IP): Performed by: NURSE PRACTITIONER

## 2025-01-01 PROCEDURE — 306580 SET INFUSION,POWERLOC 20G X.75: Performed by: STUDENT IN AN ORGANIZED HEALTH CARE EDUCATION/TRAINING PROGRAM

## 2025-01-01 PROCEDURE — A9270 NON-COVERED ITEM OR SERVICE: HCPCS | Performed by: NURSE PRACTITIONER

## 2025-01-01 PROCEDURE — 700102 HCHG RX REV CODE 250 W/ 637 OVERRIDE(OP): Performed by: INTERNAL MEDICINE

## 2025-01-01 PROCEDURE — 99223 1ST HOSP IP/OBS HIGH 75: CPT | Mod: AI | Performed by: INTERNAL MEDICINE

## 2025-01-01 PROCEDURE — 700102 HCHG RX REV CODE 250 W/ 637 OVERRIDE(OP): Performed by: STUDENT IN AN ORGANIZED HEALTH CARE EDUCATION/TRAINING PROGRAM

## 2025-01-01 PROCEDURE — 36415 COLL VENOUS BLD VENIPUNCTURE: CPT

## 2025-01-01 PROCEDURE — 99223 1ST HOSP IP/OBS HIGH 75: CPT | Mod: 25 | Performed by: NURSE PRACTITIONER

## 2025-01-01 PROCEDURE — A9270 NON-COVERED ITEM OR SERVICE: HCPCS | Performed by: STUDENT IN AN ORGANIZED HEALTH CARE EDUCATION/TRAINING PROGRAM

## 2025-01-01 PROCEDURE — 84145 PROCALCITONIN (PCT): CPT

## 2025-01-01 PROCEDURE — 306580 SET INFUSION,POWERLOC 20G X.75: Performed by: INTERNAL MEDICINE

## 2025-01-01 PROCEDURE — 99211 OFF/OP EST MAY X REQ PHY/QHP: CPT | Performed by: NURSE PRACTITIONER

## 2025-01-01 PROCEDURE — 99214 OFFICE O/P EST MOD 30 MIN: CPT | Performed by: NURSE PRACTITIONER

## 2025-01-01 PROCEDURE — 97602 WOUND(S) CARE NON-SELECTIVE: CPT

## 2025-01-01 PROCEDURE — 665036 HSPC NOTICE OF ELECTION NOE

## 2025-01-01 PROCEDURE — 99231 SBSQ HOSP IP/OBS SF/LOW 25: CPT | Performed by: SURGERY

## 2025-01-01 PROCEDURE — 700102 HCHG RX REV CODE 250 W/ 637 OVERRIDE(OP): Performed by: NURSE PRACTITIONER

## 2025-01-01 PROCEDURE — 99497 ADVNCD CARE PLAN 30 MIN: CPT | Performed by: INTERNAL MEDICINE

## 2025-01-01 PROCEDURE — 700102 HCHG RX REV CODE 250 W/ 637 OVERRIDE(OP)

## 2025-01-01 PROCEDURE — 99239 HOSP IP/OBS DSCHRG MGMT >30: CPT | Performed by: STUDENT IN AN ORGANIZED HEALTH CARE EDUCATION/TRAINING PROGRAM

## 2025-01-01 PROCEDURE — 700101 HCHG RX REV CODE 250: Performed by: NURSE PRACTITIONER

## 2025-01-01 RX ORDER — CARBOXYMETHYLCELLULOSE SODIUM 5 MG/ML
1 SOLUTION/ DROPS OPHTHALMIC PRN
Status: DISCONTINUED | OUTPATIENT
Start: 2025-01-01 | End: 2025-01-01

## 2025-01-01 RX ORDER — ENOXAPARIN SODIUM 100 MG/ML
100 INJECTION SUBCUTANEOUS 2 TIMES DAILY
Status: DISCONTINUED | OUTPATIENT
Start: 2025-01-01 | End: 2025-01-01

## 2025-01-01 RX ORDER — ONDANSETRON 2 MG/ML
4 INJECTION INTRAMUSCULAR; INTRAVENOUS EVERY 4 HOURS PRN
Status: DISCONTINUED | OUTPATIENT
Start: 2025-01-01 | End: 2025-01-01 | Stop reason: HOSPADM

## 2025-01-01 RX ORDER — HALOPERIDOL 5 MG/ML
2 INJECTION INTRAMUSCULAR EVERY 4 HOURS PRN
Status: DISCONTINUED | OUTPATIENT
Start: 2025-01-01 | End: 2025-01-01 | Stop reason: HOSPADM

## 2025-01-01 RX ORDER — SCOPOLAMINE 1 MG/3D
1 PATCH, EXTENDED RELEASE TRANSDERMAL
Status: DISCONTINUED | OUTPATIENT
Start: 2025-01-01 | End: 2025-01-01

## 2025-01-01 RX ORDER — HALOPERIDOL 5 MG/ML
10 INJECTION INTRAMUSCULAR EVERY 4 HOURS PRN
Status: DISCONTINUED | OUTPATIENT
Start: 2025-01-01 | End: 2025-03-30 | Stop reason: HOSPADM

## 2025-01-01 RX ORDER — LIDOCAINE HYDROCHLORIDE 20 MG/ML
15 SOLUTION OROPHARYNGEAL PRN
Status: DISCONTINUED | OUTPATIENT
Start: 2025-01-01 | End: 2025-03-30 | Stop reason: HOSPADM

## 2025-01-01 RX ORDER — PROCHLORPERAZINE EDISYLATE 5 MG/ML
10 INJECTION INTRAMUSCULAR; INTRAVENOUS EVERY 6 HOURS PRN
Status: DISCONTINUED | OUTPATIENT
Start: 2025-01-01 | End: 2025-01-01 | Stop reason: HOSPADM

## 2025-01-01 RX ORDER — OXYCODONE HYDROCHLORIDE 5 MG/1
2.5 TABLET ORAL
Refills: 0 | Status: DISCONTINUED | OUTPATIENT
Start: 2025-01-01 | End: 2025-01-01

## 2025-01-01 RX ORDER — MORPHINE SULFATE 4 MG/ML
2 INJECTION INTRAVENOUS
Status: DISCONTINUED | OUTPATIENT
Start: 2025-01-01 | End: 2025-01-01

## 2025-01-01 RX ORDER — OXYCODONE HYDROCHLORIDE 5 MG/1
5 TABLET ORAL
Refills: 0 | Status: DISCONTINUED | OUTPATIENT
Start: 2025-01-01 | End: 2025-01-01

## 2025-01-01 RX ORDER — SCOPOLAMINE 1 MG/3D
1 PATCH, EXTENDED RELEASE TRANSDERMAL
Status: DISCONTINUED | OUTPATIENT
Start: 2025-01-01 | End: 2025-03-30 | Stop reason: HOSPADM

## 2025-01-01 RX ORDER — AMOXICILLIN 250 MG
2 CAPSULE ORAL EVERY EVENING
Status: DISCONTINUED | OUTPATIENT
Start: 2025-01-01 | End: 2025-01-01

## 2025-01-01 RX ORDER — LORAZEPAM 2 MG/ML
1 INJECTION INTRAMUSCULAR
Status: DISCONTINUED | OUTPATIENT
Start: 2025-01-01 | End: 2025-01-01

## 2025-01-01 RX ORDER — DEXAMETHASONE SODIUM PHOSPHATE 4 MG/ML
4 INJECTION, SOLUTION INTRA-ARTICULAR; INTRALESIONAL; INTRAMUSCULAR; INTRAVENOUS; SOFT TISSUE EVERY 6 HOURS
Status: DISCONTINUED | OUTPATIENT
Start: 2025-01-01 | End: 2025-01-01

## 2025-01-01 RX ORDER — SODIUM CHLORIDE 9 MG/ML
INJECTION, SOLUTION INTRAVENOUS CONTINUOUS
Status: DISCONTINUED | OUTPATIENT
Start: 2025-01-01 | End: 2025-01-01

## 2025-01-01 RX ORDER — ENOXAPARIN SODIUM 100 MG/ML
40 INJECTION SUBCUTANEOUS DAILY
Status: DISCONTINUED | OUTPATIENT
Start: 2025-01-01 | End: 2025-01-01

## 2025-01-01 RX ORDER — PANTOPRAZOLE SODIUM 40 MG/10ML
40 INJECTION, POWDER, LYOPHILIZED, FOR SOLUTION INTRAVENOUS 2 TIMES DAILY
Status: DISCONTINUED | OUTPATIENT
Start: 2025-01-01 | End: 2025-03-30 | Stop reason: HOSPADM

## 2025-01-01 RX ORDER — POLYETHYLENE GLYCOL 3350 17 G/17G
1 POWDER, FOR SOLUTION ORAL
Status: DISCONTINUED | OUTPATIENT
Start: 2025-01-01 | End: 2025-01-01

## 2025-01-01 RX ORDER — ONDANSETRON 4 MG/1
4 TABLET, ORALLY DISINTEGRATING ORAL EVERY 4 HOURS PRN
Status: DISCONTINUED | OUTPATIENT
Start: 2025-01-01 | End: 2025-01-01 | Stop reason: HOSPADM

## 2025-01-01 RX ORDER — LIDOCAINE HYDROCHLORIDE 20 MG/ML
JELLY TOPICAL 2 TIMES DAILY PRN
Status: DISPENSED | OUTPATIENT
Start: 2025-01-01 | End: 2025-01-01

## 2025-01-01 RX ORDER — CLINDAMYCIN PHOSPHATE 600 MG/50ML
600 INJECTION, SOLUTION INTRAVENOUS EVERY 8 HOURS
Status: DISCONTINUED | OUTPATIENT
Start: 2025-01-01 | End: 2025-01-01

## 2025-01-01 RX ORDER — METRONIDAZOLE 500 MG/100ML
500 INJECTION, SOLUTION INTRAVENOUS EVERY 12 HOURS
Status: DISCONTINUED | OUTPATIENT
Start: 2025-01-01 | End: 2025-01-01

## 2025-01-01 RX ORDER — MORPHINE SULFATE 4 MG/ML
1 INJECTION INTRAVENOUS
Status: DISCONTINUED | OUTPATIENT
Start: 2025-01-01 | End: 2025-01-01

## 2025-01-01 RX ORDER — ONDANSETRON 4 MG/1
4 TABLET, ORALLY DISINTEGRATING ORAL EVERY 4 HOURS PRN
Status: DISCONTINUED | OUTPATIENT
Start: 2025-01-01 | End: 2025-01-01

## 2025-01-01 RX ORDER — ATROPINE SULFATE 10 MG/ML
2 SOLUTION/ DROPS OPHTHALMIC EVERY 4 HOURS PRN
Status: DISCONTINUED | OUTPATIENT
Start: 2025-01-01 | End: 2025-01-01 | Stop reason: HOSPADM

## 2025-01-01 RX ORDER — DEXAMETHASONE SODIUM PHOSPHATE 4 MG/ML
4 INJECTION, SOLUTION INTRA-ARTICULAR; INTRALESIONAL; INTRAMUSCULAR; INTRAVENOUS; SOFT TISSUE EVERY 6 HOURS
Status: DISCONTINUED | OUTPATIENT
Start: 2025-01-01 | End: 2025-03-30 | Stop reason: HOSPADM

## 2025-01-01 RX ORDER — LORAZEPAM 2 MG/ML
1 INJECTION INTRAMUSCULAR EVERY 4 HOURS
Status: DISCONTINUED | OUTPATIENT
Start: 2025-01-01 | End: 2025-03-30 | Stop reason: HOSPADM

## 2025-01-01 RX ORDER — GLYCOPYRROLATE 0.2 MG/ML
0.2 INJECTION INTRAMUSCULAR; INTRAVENOUS 3 TIMES DAILY PRN
Status: DISCONTINUED | OUTPATIENT
Start: 2025-01-01 | End: 2025-03-30 | Stop reason: HOSPADM

## 2025-01-01 RX ORDER — LIDOCAINE AND PRILOCAINE 25; 25 MG/G; MG/G
CREAM TOPICAL ONCE
Status: COMPLETED | OUTPATIENT
Start: 2025-01-01 | End: 2025-01-01

## 2025-01-01 RX ORDER — LIDOCAINE HYDROCHLORIDE 20 MG/ML
JELLY TOPICAL
Status: COMPLETED | OUTPATIENT
Start: 2025-01-01 | End: 2025-01-01

## 2025-01-01 RX ORDER — LIDOCAINE HYDROCHLORIDE 20 MG/ML
JELLY TOPICAL ONCE
Status: COMPLETED | OUTPATIENT
Start: 2025-01-01 | End: 2025-01-01

## 2025-01-01 RX ORDER — DIPHENHYDRAMINE HYDROCHLORIDE 50 MG/ML
25 INJECTION, SOLUTION INTRAMUSCULAR; INTRAVENOUS EVERY 6 HOURS PRN
Status: DISCONTINUED | OUTPATIENT
Start: 2025-01-01 | End: 2025-03-30 | Stop reason: HOSPADM

## 2025-01-01 RX ORDER — CARBOXYMETHYLCELLULOSE SODIUM 5 MG/ML
1 SOLUTION/ DROPS OPHTHALMIC PRN
Status: DISCONTINUED | OUTPATIENT
Start: 2025-01-01 | End: 2025-03-30 | Stop reason: HOSPADM

## 2025-01-01 RX ORDER — ONDANSETRON 2 MG/ML
4 INJECTION INTRAMUSCULAR; INTRAVENOUS EVERY 6 HOURS
Status: DISCONTINUED | OUTPATIENT
Start: 2025-01-01 | End: 2025-03-30 | Stop reason: HOSPADM

## 2025-01-01 RX ORDER — LORAZEPAM 2 MG/ML
1-2 INJECTION INTRAMUSCULAR
Status: DISCONTINUED | OUTPATIENT
Start: 2025-01-01 | End: 2025-01-01

## 2025-01-01 RX ORDER — ONDANSETRON 2 MG/ML
4 INJECTION INTRAMUSCULAR; INTRAVENOUS EVERY 6 HOURS
Status: DISCONTINUED | OUTPATIENT
Start: 2025-01-01 | End: 2025-01-01

## 2025-01-01 RX ORDER — ONDANSETRON 2 MG/ML
4 INJECTION INTRAMUSCULAR; INTRAVENOUS EVERY 4 HOURS PRN
Status: DISCONTINUED | OUTPATIENT
Start: 2025-01-01 | End: 2025-01-01

## 2025-01-01 RX ORDER — HYDRALAZINE HYDROCHLORIDE 20 MG/ML
10 INJECTION INTRAMUSCULAR; INTRAVENOUS EVERY 4 HOURS PRN
Status: DISCONTINUED | OUTPATIENT
Start: 2025-01-01 | End: 2025-01-01 | Stop reason: HOSPADM

## 2025-01-01 RX ORDER — ACETAMINOPHEN 325 MG/1
650 TABLET ORAL EVERY 6 HOURS PRN
Status: DISCONTINUED | OUTPATIENT
Start: 2025-01-01 | End: 2025-01-01

## 2025-01-01 RX ORDER — LORAZEPAM 2 MG/ML
1-2 INJECTION INTRAMUSCULAR
Status: DISCONTINUED | OUTPATIENT
Start: 2025-01-01 | End: 2025-03-30 | Stop reason: HOSPADM

## 2025-01-01 RX ORDER — SCOPOLAMINE 1 MG/3D
1 PATCH, EXTENDED RELEASE TRANSDERMAL
Status: DISCONTINUED | OUTPATIENT
Start: 2025-01-01 | End: 2025-01-01 | Stop reason: HOSPADM

## 2025-01-01 RX ORDER — ACETAMINOPHEN 650 MG/1
650 SUPPOSITORY RECTAL EVERY 4 HOURS PRN
Status: DISCONTINUED | OUTPATIENT
Start: 2025-01-01 | End: 2025-01-01 | Stop reason: HOSPADM

## 2025-01-01 RX ORDER — LIDOCAINE HYDROCHLORIDE 20 MG/ML
JELLY TOPICAL 2 TIMES DAILY PRN
Status: COMPLETED | OUTPATIENT
Start: 2025-01-01 | End: 2025-01-01

## 2025-01-01 RX ORDER — LORAZEPAM 2 MG/ML
0.5 INJECTION INTRAMUSCULAR EVERY 4 HOURS PRN
Status: DISCONTINUED | OUTPATIENT
Start: 2025-01-01 | End: 2025-01-01

## 2025-01-01 RX ORDER — HALOPERIDOL 5 MG/ML
5 INJECTION INTRAMUSCULAR EVERY 4 HOURS PRN
Status: DISCONTINUED | OUTPATIENT
Start: 2025-01-01 | End: 2025-01-01

## 2025-01-01 RX ORDER — LORAZEPAM 2 MG/ML
0.5 INJECTION INTRAMUSCULAR
Status: COMPLETED | OUTPATIENT
Start: 2025-01-01 | End: 2025-01-01

## 2025-01-01 RX ADMIN — LORAZEPAM 1 MG: 2 INJECTION INTRAMUSCULAR; INTRAVENOUS at 19:00

## 2025-01-01 RX ADMIN — WATER 2 G: 1 INJECTION INTRAMUSCULAR; INTRAVENOUS; SUBCUTANEOUS at 05:57

## 2025-01-01 RX ADMIN — ONDANSETRON 4 MG: 2 INJECTION INTRAMUSCULAR; INTRAVENOUS at 22:58

## 2025-01-01 RX ADMIN — MORPHINE SULFATE 2 MG: 4 INJECTION, SOLUTION INTRAMUSCULAR; INTRAVENOUS at 15:13

## 2025-01-01 RX ADMIN — MORPHINE SULFATE 2 MG: 4 INJECTION, SOLUTION INTRAMUSCULAR; INTRAVENOUS at 14:10

## 2025-01-01 RX ADMIN — LORAZEPAM 1 MG: 2 INJECTION INTRAMUSCULAR; INTRAVENOUS at 21:58

## 2025-01-01 RX ADMIN — Medication 50 MG: at 04:27

## 2025-01-01 RX ADMIN — Medication: at 02:54

## 2025-01-01 RX ADMIN — MORPHINE SULFATE 2 MG: 4 INJECTION, SOLUTION INTRAMUSCULAR; INTRAVENOUS at 20:23

## 2025-01-01 RX ADMIN — OXYCODONE 5 MG: 5 TABLET ORAL at 18:21

## 2025-01-01 RX ADMIN — CEFTRIAXONE SODIUM 2000 MG: 10 INJECTION, POWDER, FOR SOLUTION INTRAVENOUS at 21:36

## 2025-01-01 RX ADMIN — LORAZEPAM 1 MG: 2 INJECTION INTRAMUSCULAR; INTRAVENOUS at 14:14

## 2025-01-01 RX ADMIN — ENOXAPARIN SODIUM 100 MG: 100 INJECTION SUBCUTANEOUS at 04:30

## 2025-01-01 RX ADMIN — LORAZEPAM 1 MG: 2 INJECTION INTRAMUSCULAR; INTRAVENOUS at 01:57

## 2025-01-01 RX ADMIN — SCOPOLAMINE 1 PATCH: 1.5 PATCH, EXTENDED RELEASE TRANSDERMAL at 14:56

## 2025-01-01 RX ADMIN — MORPHINE SULFATE 2 MG: 4 INJECTION, SOLUTION INTRAMUSCULAR; INTRAVENOUS at 21:45

## 2025-01-01 RX ADMIN — MORPHINE SULFATE 2 MG: 4 INJECTION, SOLUTION INTRAMUSCULAR; INTRAVENOUS at 09:46

## 2025-01-01 RX ADMIN — OXYCODONE 5 MG: 5 TABLET ORAL at 09:54

## 2025-01-01 RX ADMIN — ONDANSETRON 4 MG: 2 INJECTION INTRAMUSCULAR; INTRAVENOUS at 06:13

## 2025-01-01 RX ADMIN — ONDANSETRON 4 MG: 2 INJECTION INTRAMUSCULAR; INTRAVENOUS at 23:48

## 2025-01-01 RX ADMIN — FAMOTIDINE 20 MG: 10 INJECTION, SOLUTION INTRAVENOUS at 04:51

## 2025-01-01 RX ADMIN — Medication 50 MG: at 21:03

## 2025-01-01 RX ADMIN — FAMOTIDINE 20 MG: 10 INJECTION, SOLUTION INTRAVENOUS at 17:39

## 2025-01-01 RX ADMIN — DEXAMETHASONE SODIUM PHOSPHATE 4 MG: 4 INJECTION INTRA-ARTICULAR; INTRALESIONAL; INTRAMUSCULAR; INTRAVENOUS; SOFT TISSUE at 01:09

## 2025-01-01 RX ADMIN — WATER 2 G: 1 INJECTION INTRAMUSCULAR; INTRAVENOUS; SUBCUTANEOUS at 17:24

## 2025-01-01 RX ADMIN — ONDANSETRON 4 MG: 2 INJECTION INTRAMUSCULAR; INTRAVENOUS at 00:26

## 2025-01-01 RX ADMIN — DEXAMETHASONE SODIUM PHOSPHATE 4 MG: 4 INJECTION INTRA-ARTICULAR; INTRALESIONAL; INTRAMUSCULAR; INTRAVENOUS; SOFT TISSUE at 22:51

## 2025-01-01 RX ADMIN — METRONIDAZOLE 500 MG: 5 INJECTION, SOLUTION INTRAVENOUS at 06:22

## 2025-01-01 RX ADMIN — ONDANSETRON 4 MG: 2 INJECTION INTRAMUSCULAR; INTRAVENOUS at 11:05

## 2025-01-01 RX ADMIN — FAMOTIDINE 20 MG: 10 INJECTION, SOLUTION INTRAVENOUS at 17:59

## 2025-01-01 RX ADMIN — ENOXAPARIN SODIUM 100 MG: 100 INJECTION SUBCUTANEOUS at 17:54

## 2025-01-01 RX ADMIN — MORPHINE SULFATE 2 MG: 4 INJECTION, SOLUTION INTRAMUSCULAR; INTRAVENOUS at 23:15

## 2025-01-01 RX ADMIN — DEXAMETHASONE SODIUM PHOSPHATE 4 MG: 4 INJECTION INTRA-ARTICULAR; INTRALESIONAL; INTRAMUSCULAR; INTRAVENOUS; SOFT TISSUE at 04:31

## 2025-01-01 RX ADMIN — MORPHINE SULFATE 2 MG: 4 INJECTION, SOLUTION INTRAMUSCULAR; INTRAVENOUS at 10:11

## 2025-01-01 RX ADMIN — ONDANSETRON 4 MG: 2 INJECTION INTRAMUSCULAR; INTRAVENOUS at 04:49

## 2025-01-01 RX ADMIN — ENOXAPARIN SODIUM 100 MG: 100 INJECTION SUBCUTANEOUS at 18:27

## 2025-01-01 RX ADMIN — HALOPERIDOL LACTATE 2 MG: 5 INJECTION, SOLUTION INTRAMUSCULAR at 17:23

## 2025-01-01 RX ADMIN — DIPHENHYDRAMINE HYDROCHLORIDE 25 MG: 50 INJECTION, SOLUTION INTRAMUSCULAR; INTRAVENOUS at 13:06

## 2025-01-01 RX ADMIN — WATER 2 G: 1 INJECTION INTRAMUSCULAR; INTRAVENOUS; SUBCUTANEOUS at 06:51

## 2025-01-01 RX ADMIN — DEXAMETHASONE SODIUM PHOSPHATE 4 MG: 4 INJECTION INTRA-ARTICULAR; INTRALESIONAL; INTRAMUSCULAR; INTRAVENOUS; SOFT TISSUE at 11:51

## 2025-01-01 RX ADMIN — LORAZEPAM 2 MG: 2 INJECTION INTRAMUSCULAR; INTRAVENOUS at 11:35

## 2025-01-01 RX ADMIN — FAMOTIDINE 20 MG: 10 INJECTION, SOLUTION INTRAVENOUS at 04:50

## 2025-01-01 RX ADMIN — FENTANYL CITRATE 25 MCG: 50 INJECTION, SOLUTION INTRAMUSCULAR; INTRAVENOUS at 16:14

## 2025-01-01 RX ADMIN — DEXAMETHASONE SODIUM PHOSPHATE 4 MG: 4 INJECTION INTRA-ARTICULAR; INTRALESIONAL; INTRAMUSCULAR; INTRAVENOUS; SOFT TISSUE at 11:34

## 2025-01-01 RX ADMIN — WATER 2 G: 1 INJECTION INTRAMUSCULAR; INTRAVENOUS; SUBCUTANEOUS at 21:28

## 2025-01-01 RX ADMIN — SENNOSIDES, DOCUSATE SODIUM 2 TABLET: 50; 8.6 TABLET, FILM COATED ORAL at 22:22

## 2025-01-01 RX ADMIN — DEXAMETHASONE SODIUM PHOSPHATE 4 MG: 4 INJECTION INTRA-ARTICULAR; INTRALESIONAL; INTRAMUSCULAR; INTRAVENOUS; SOFT TISSUE at 06:04

## 2025-01-01 RX ADMIN — MORPHINE SULFATE 2 MG: 4 INJECTION, SOLUTION INTRAMUSCULAR; INTRAVENOUS at 06:50

## 2025-01-01 RX ADMIN — FAMOTIDINE 20 MG: 10 INJECTION, SOLUTION INTRAVENOUS at 04:25

## 2025-01-01 RX ADMIN — PANTOPRAZOLE SODIUM 40 MG: 40 INJECTION, POWDER, FOR SOLUTION INTRAVENOUS at 18:06

## 2025-01-01 RX ADMIN — MORPHINE SULFATE 10 MG: 10 INJECTION INTRAVENOUS at 12:43

## 2025-01-01 RX ADMIN — WATER 2 G: 1 INJECTION INTRAMUSCULAR; INTRAVENOUS; SUBCUTANEOUS at 23:09

## 2025-01-01 RX ADMIN — MORPHINE SULFATE 2 MG: 4 INJECTION, SOLUTION INTRAMUSCULAR; INTRAVENOUS at 12:55

## 2025-01-01 RX ADMIN — ENOXAPARIN SODIUM 100 MG: 100 INJECTION SUBCUTANEOUS at 04:51

## 2025-01-01 RX ADMIN — Medication 50 MG: at 11:42

## 2025-01-01 RX ADMIN — PHENOL 1 SPRAY: 1.5 LIQUID ORAL at 22:11

## 2025-01-01 RX ADMIN — METRONIDAZOLE 500 MG: 5 INJECTION, SOLUTION INTRAVENOUS at 17:57

## 2025-01-01 RX ADMIN — SODIUM CHLORIDE: 9 INJECTION, SOLUTION INTRAVENOUS at 00:55

## 2025-01-01 RX ADMIN — MORPHINE SULFATE 1 MG: 4 INJECTION, SOLUTION INTRAMUSCULAR; INTRAVENOUS at 04:50

## 2025-01-01 RX ADMIN — MORPHINE SULFATE 10 MG: 10 INJECTION INTRAVENOUS at 22:03

## 2025-01-01 RX ADMIN — MORPHINE SULFATE 2 MG: 4 INJECTION, SOLUTION INTRAMUSCULAR; INTRAVENOUS at 16:44

## 2025-01-01 RX ADMIN — PANTOPRAZOLE SODIUM 40 MG: 40 INJECTION, POWDER, FOR SOLUTION INTRAVENOUS at 17:32

## 2025-01-01 RX ADMIN — DEXAMETHASONE SODIUM PHOSPHATE 4 MG: 4 INJECTION INTRA-ARTICULAR; INTRALESIONAL; INTRAMUSCULAR; INTRAVENOUS; SOFT TISSUE at 12:48

## 2025-01-01 RX ADMIN — LORAZEPAM 2 MG: 2 INJECTION INTRAMUSCULAR; INTRAVENOUS at 14:42

## 2025-01-01 RX ADMIN — ONDANSETRON 4 MG: 2 INJECTION INTRAMUSCULAR; INTRAVENOUS at 12:48

## 2025-01-01 RX ADMIN — PHENOL 1 SPRAY: 1.5 LIQUID ORAL at 16:40

## 2025-01-01 RX ADMIN — Medication 50 MG: at 18:01

## 2025-01-01 RX ADMIN — FAMOTIDINE 20 MG: 10 INJECTION, SOLUTION INTRAVENOUS at 05:13

## 2025-01-01 RX ADMIN — ONDANSETRON 4 MG: 2 INJECTION INTRAMUSCULAR; INTRAVENOUS at 23:16

## 2025-01-01 RX ADMIN — SODIUM CHLORIDE: 9 INJECTION, SOLUTION INTRAVENOUS at 21:15

## 2025-01-01 RX ADMIN — PANTOPRAZOLE SODIUM 40 MG: 40 INJECTION, POWDER, FOR SOLUTION INTRAVENOUS at 17:18

## 2025-01-01 RX ADMIN — WATER 2 G: 1 INJECTION INTRAMUSCULAR; INTRAVENOUS; SUBCUTANEOUS at 15:39

## 2025-01-01 RX ADMIN — DEXAMETHASONE SODIUM PHOSPHATE 4 MG: 4 INJECTION INTRA-ARTICULAR; INTRALESIONAL; INTRAMUSCULAR; INTRAVENOUS; SOFT TISSUE at 11:04

## 2025-01-01 RX ADMIN — SODIUM CHLORIDE: 9 INJECTION, SOLUTION INTRAVENOUS at 16:27

## 2025-01-01 RX ADMIN — MORPHINE SULFATE 10 MG: 10 INJECTION INTRAVENOUS at 09:06

## 2025-01-01 RX ADMIN — SODIUM CHLORIDE: 9 INJECTION, SOLUTION INTRAVENOUS at 18:30

## 2025-01-01 RX ADMIN — FAMOTIDINE 20 MG: 10 INJECTION, SOLUTION INTRAVENOUS at 18:00

## 2025-01-01 RX ADMIN — PHENOL 1 SPRAY: 1.5 LIQUID ORAL at 06:24

## 2025-01-01 RX ADMIN — MORPHINE SULFATE 10 MG: 10 INJECTION INTRAVENOUS at 20:03

## 2025-01-01 RX ADMIN — FAMOTIDINE 20 MG: 10 INJECTION, SOLUTION INTRAVENOUS at 05:54

## 2025-01-01 RX ADMIN — LORAZEPAM 0.5 MG: 2 INJECTION, SOLUTION INTRAMUSCULAR; INTRAVENOUS at 00:20

## 2025-01-01 RX ADMIN — OXYCODONE 5 MG: 5 TABLET ORAL at 20:14

## 2025-01-01 RX ADMIN — DIPHENHYDRAMINE HYDROCHLORIDE 25 MG: 50 INJECTION, SOLUTION INTRAMUSCULAR; INTRAVENOUS at 10:13

## 2025-01-01 RX ADMIN — SODIUM CHLORIDE: 9 INJECTION, SOLUTION INTRAVENOUS at 21:33

## 2025-01-01 RX ADMIN — LIDOCAINE HYDROCHLORIDE 1 APPLICATION: 20 JELLY TOPICAL at 17:24

## 2025-01-01 RX ADMIN — PHENOL 1 SPRAY: 1.5 LIQUID ORAL at 22:16

## 2025-01-01 RX ADMIN — LIDOCAINE HYDROCHLORIDE 1 APPLICATION: 20 JELLY TOPICAL at 12:53

## 2025-01-01 RX ADMIN — METRONIDAZOLE 500 MG: 5 INJECTION, SOLUTION INTRAVENOUS at 17:56

## 2025-01-01 RX ADMIN — ONDANSETRON 4 MG: 2 INJECTION INTRAMUSCULAR; INTRAVENOUS at 11:32

## 2025-01-01 RX ADMIN — MORPHINE SULFATE 10 MG: 10 INJECTION INTRAVENOUS at 07:59

## 2025-01-01 RX ADMIN — Medication 1 LOZENGE: at 04:24

## 2025-01-01 RX ADMIN — METRONIDAZOLE 500 MG: 5 INJECTION, SOLUTION INTRAVENOUS at 04:28

## 2025-01-01 RX ADMIN — OXYCODONE 5 MG: 5 TABLET ORAL at 04:21

## 2025-01-01 RX ADMIN — LORAZEPAM 1 MG: 2 INJECTION INTRAMUSCULAR; INTRAVENOUS at 09:06

## 2025-01-01 RX ADMIN — SODIUM CHLORIDE: 9 INJECTION, SOLUTION INTRAVENOUS at 15:03

## 2025-01-01 RX ADMIN — WATER 2 G: 1 INJECTION INTRAMUSCULAR; INTRAVENOUS; SUBCUTANEOUS at 14:55

## 2025-01-01 RX ADMIN — WATER 2 G: 1 INJECTION INTRAMUSCULAR; INTRAVENOUS; SUBCUTANEOUS at 06:12

## 2025-01-01 RX ADMIN — DEXAMETHASONE SODIUM PHOSPHATE 4 MG: 4 INJECTION INTRA-ARTICULAR; INTRALESIONAL; INTRAMUSCULAR; INTRAVENOUS; SOFT TISSUE at 05:47

## 2025-01-01 RX ADMIN — DEXAMETHASONE SODIUM PHOSPHATE 4 MG: 4 INJECTION INTRA-ARTICULAR; INTRALESIONAL; INTRAMUSCULAR; INTRAVENOUS; SOFT TISSUE at 11:35

## 2025-01-01 RX ADMIN — FAMOTIDINE 20 MG: 10 INJECTION, SOLUTION INTRAVENOUS at 06:18

## 2025-01-01 RX ADMIN — PHENOL 1 SPRAY: 1.5 LIQUID ORAL at 16:50

## 2025-01-01 RX ADMIN — PANTOPRAZOLE SODIUM 40 MG: 40 INJECTION, POWDER, FOR SOLUTION INTRAVENOUS at 04:21

## 2025-01-01 RX ADMIN — PANTOPRAZOLE SODIUM 40 MG: 40 INJECTION, POWDER, FOR SOLUTION INTRAVENOUS at 17:39

## 2025-01-01 RX ADMIN — CEFEPIME 2 G: 2 INJECTION, POWDER, FOR SOLUTION INTRAVENOUS at 15:13

## 2025-01-01 RX ADMIN — MORPHINE SULFATE 10 MG: 10 INJECTION INTRAVENOUS at 04:50

## 2025-01-01 RX ADMIN — FAMOTIDINE 20 MG: 10 INJECTION, SOLUTION INTRAVENOUS at 16:46

## 2025-01-01 RX ADMIN — FAMOTIDINE 20 MG: 10 INJECTION, SOLUTION INTRAVENOUS at 17:30

## 2025-01-01 RX ADMIN — FAMOTIDINE 20 MG: 10 INJECTION, SOLUTION INTRAVENOUS at 17:54

## 2025-01-01 RX ADMIN — Medication 50 MG: at 21:46

## 2025-01-01 RX ADMIN — DEXAMETHASONE SODIUM PHOSPHATE 4 MG: 4 INJECTION INTRA-ARTICULAR; INTRALESIONAL; INTRAMUSCULAR; INTRAVENOUS; SOFT TISSUE at 17:39

## 2025-01-01 RX ADMIN — FAMOTIDINE 20 MG: 10 INJECTION, SOLUTION INTRAVENOUS at 22:09

## 2025-01-01 RX ADMIN — MORPHINE SULFATE 2 MG: 4 INJECTION, SOLUTION INTRAMUSCULAR; INTRAVENOUS at 21:57

## 2025-01-01 RX ADMIN — ENOXAPARIN SODIUM 100 MG: 100 INJECTION SUBCUTANEOUS at 05:13

## 2025-01-01 RX ADMIN — DIBASIC SODIUM PHOSPHATE, MONOBASIC POTASSIUM PHOSPHATE AND MONOBASIC SODIUM PHOSPHATE 500 MG: 852; 155; 130 TABLET ORAL at 08:04

## 2025-01-01 RX ADMIN — ONDANSETRON 4 MG: 2 INJECTION INTRAMUSCULAR; INTRAVENOUS at 23:58

## 2025-01-01 RX ADMIN — MORPHINE SULFATE 2 MG: 4 INJECTION, SOLUTION INTRAMUSCULAR; INTRAVENOUS at 00:52

## 2025-01-01 RX ADMIN — MORPHINE SULFATE 2 MG: 4 INJECTION, SOLUTION INTRAMUSCULAR; INTRAVENOUS at 08:06

## 2025-01-01 RX ADMIN — LIDOCAINE HYDROCHLORIDE 1 APPLICATION: 20 JELLY TOPICAL at 15:09

## 2025-01-01 RX ADMIN — MORPHINE SULFATE 2 MG: 4 INJECTION, SOLUTION INTRAMUSCULAR; INTRAVENOUS at 16:45

## 2025-01-01 RX ADMIN — ONDANSETRON 4 MG: 2 INJECTION INTRAMUSCULAR; INTRAVENOUS at 17:46

## 2025-01-01 RX ADMIN — FAMOTIDINE 20 MG: 10 INJECTION, SOLUTION INTRAVENOUS at 04:09

## 2025-01-01 RX ADMIN — ENOXAPARIN SODIUM 100 MG: 100 INJECTION SUBCUTANEOUS at 17:36

## 2025-01-01 RX ADMIN — PANTOPRAZOLE SODIUM 40 MG: 40 INJECTION, POWDER, FOR SOLUTION INTRAVENOUS at 06:04

## 2025-01-01 RX ADMIN — MORPHINE SULFATE 10 MG: 10 INJECTION INTRAVENOUS at 02:04

## 2025-01-01 RX ADMIN — OXYCODONE 5 MG: 5 TABLET ORAL at 01:59

## 2025-01-01 RX ADMIN — MORPHINE SULFATE 10 MG: 10 INJECTION INTRAVENOUS at 14:13

## 2025-01-01 RX ADMIN — POTASSIUM PHOSPHATE, MONOBASIC AND POTASSIUM PHOSPHATE, DIBASIC 15 MMOL: 224; 236 INJECTION, SOLUTION, CONCENTRATE INTRAVENOUS at 09:42

## 2025-01-01 RX ADMIN — METRONIDAZOLE 500 MG: 5 INJECTION, SOLUTION INTRAVENOUS at 17:24

## 2025-01-01 RX ADMIN — DEXAMETHASONE SODIUM PHOSPHATE 4 MG: 4 INJECTION INTRA-ARTICULAR; INTRALESIONAL; INTRAMUSCULAR; INTRAVENOUS; SOFT TISSUE at 23:58

## 2025-01-01 RX ADMIN — MORPHINE SULFATE 2 MG: 4 INJECTION, SOLUTION INTRAMUSCULAR; INTRAVENOUS at 12:59

## 2025-01-01 RX ADMIN — LORAZEPAM 2 MG: 2 INJECTION INTRAMUSCULAR; INTRAVENOUS at 08:23

## 2025-01-01 RX ADMIN — MORPHINE SULFATE 2 MG: 4 INJECTION, SOLUTION INTRAMUSCULAR; INTRAVENOUS at 13:17

## 2025-01-01 RX ADMIN — FAMOTIDINE 20 MG: 10 INJECTION, SOLUTION INTRAVENOUS at 05:43

## 2025-01-01 RX ADMIN — ONDANSETRON 4 MG: 2 INJECTION INTRAMUSCULAR; INTRAVENOUS at 06:23

## 2025-01-01 RX ADMIN — MORPHINE SULFATE 2 MG: 4 INJECTION, SOLUTION INTRAMUSCULAR; INTRAVENOUS at 11:22

## 2025-01-01 RX ADMIN — METRONIDAZOLE 500 MG: 5 INJECTION, SOLUTION INTRAVENOUS at 04:20

## 2025-01-01 RX ADMIN — LIDOCAINE HYDROCHLORIDE 2 %: 20 JELLY TOPICAL at 16:00

## 2025-01-01 RX ADMIN — ONDANSETRON 4 MG: 2 INJECTION INTRAMUSCULAR; INTRAVENOUS at 11:35

## 2025-01-01 RX ADMIN — SODIUM CHLORIDE: 9 INJECTION, SOLUTION INTRAVENOUS at 06:20

## 2025-01-01 RX ADMIN — DEXAMETHASONE SODIUM PHOSPHATE 4 MG: 4 INJECTION INTRA-ARTICULAR; INTRALESIONAL; INTRAMUSCULAR; INTRAVENOUS; SOFT TISSUE at 23:48

## 2025-01-01 RX ADMIN — LIDOCAINE HYDROCHLORIDE 15 ML: 20 SOLUTION ORAL at 16:26

## 2025-01-01 RX ADMIN — DEXAMETHASONE SODIUM PHOSPHATE 4 MG: 4 INJECTION INTRA-ARTICULAR; INTRALESIONAL; INTRAMUSCULAR; INTRAVENOUS; SOFT TISSUE at 17:40

## 2025-01-01 RX ADMIN — DEXAMETHASONE SODIUM PHOSPHATE 4 MG: 4 INJECTION INTRA-ARTICULAR; INTRALESIONAL; INTRAMUSCULAR; INTRAVENOUS; SOFT TISSUE at 06:34

## 2025-01-01 RX ADMIN — PANTOPRAZOLE SODIUM 40 MG: 40 INJECTION, POWDER, FOR SOLUTION INTRAVENOUS at 05:46

## 2025-01-01 RX ADMIN — DEXAMETHASONE SODIUM PHOSPHATE 4 MG: 4 INJECTION INTRA-ARTICULAR; INTRALESIONAL; INTRAMUSCULAR; INTRAVENOUS; SOFT TISSUE at 05:46

## 2025-01-01 RX ADMIN — FAMOTIDINE 20 MG: 10 INJECTION, SOLUTION INTRAVENOUS at 06:04

## 2025-01-01 RX ADMIN — PHENOL 1 SPRAY: 1.5 LIQUID ORAL at 09:42

## 2025-01-01 RX ADMIN — DEXAMETHASONE SODIUM PHOSPHATE 4 MG: 4 INJECTION INTRA-ARTICULAR; INTRALESIONAL; INTRAMUSCULAR; INTRAVENOUS; SOFT TISSUE at 18:01

## 2025-01-01 RX ADMIN — MORPHINE SULFATE 2 MG: 4 INJECTION, SOLUTION INTRAMUSCULAR; INTRAVENOUS at 03:15

## 2025-01-01 RX ADMIN — SODIUM CHLORIDE: 9 INJECTION, SOLUTION INTRAVENOUS at 18:28

## 2025-01-01 RX ADMIN — PANTOPRAZOLE SODIUM 40 MG: 40 INJECTION, POWDER, FOR SOLUTION INTRAVENOUS at 17:59

## 2025-01-01 RX ADMIN — CEFEPIME 2 G: 2 INJECTION, POWDER, FOR SOLUTION INTRAVENOUS at 14:16

## 2025-01-01 RX ADMIN — ENOXAPARIN SODIUM 100 MG: 100 INJECTION SUBCUTANEOUS at 06:12

## 2025-01-01 RX ADMIN — FAMOTIDINE 20 MG: 10 INJECTION, SOLUTION INTRAVENOUS at 06:24

## 2025-01-01 RX ADMIN — SCOPOLAMINE 1 PATCH: 1.5 PATCH, EXTENDED RELEASE TRANSDERMAL at 16:11

## 2025-01-01 RX ADMIN — MORPHINE SULFATE 1 MG: 4 INJECTION, SOLUTION INTRAMUSCULAR; INTRAVENOUS at 17:45

## 2025-01-01 RX ADMIN — LIDOCAINE HYDROCHLORIDE 15 ML: 20 SOLUTION ORAL at 11:39

## 2025-01-01 RX ADMIN — PANTOPRAZOLE SODIUM 40 MG: 40 INJECTION, POWDER, FOR SOLUTION INTRAVENOUS at 17:40

## 2025-01-01 RX ADMIN — METRONIDAZOLE 500 MG: 5 INJECTION, SOLUTION INTRAVENOUS at 16:48

## 2025-01-01 RX ADMIN — MORPHINE SULFATE 2 MG: 4 INJECTION, SOLUTION INTRAMUSCULAR; INTRAVENOUS at 10:08

## 2025-01-01 RX ADMIN — CEFEPIME 2 G: 2 INJECTION, POWDER, FOR SOLUTION INTRAVENOUS at 14:39

## 2025-01-01 RX ADMIN — MORPHINE SULFATE 10 MG: 10 INJECTION INTRAVENOUS at 11:32

## 2025-01-01 RX ADMIN — PHENOL 1 SPRAY: 1.5 LIQUID ORAL at 22:58

## 2025-01-01 RX ADMIN — ONDANSETRON 4 MG: 2 INJECTION INTRAMUSCULAR; INTRAVENOUS at 17:44

## 2025-01-01 RX ADMIN — WATER 2 G: 1 INJECTION INTRAMUSCULAR; INTRAVENOUS; SUBCUTANEOUS at 20:28

## 2025-01-01 RX ADMIN — MORPHINE SULFATE 2 MG: 4 INJECTION, SOLUTION INTRAMUSCULAR; INTRAVENOUS at 04:15

## 2025-01-01 RX ADMIN — ENOXAPARIN SODIUM 100 MG: 100 INJECTION SUBCUTANEOUS at 18:01

## 2025-01-01 RX ADMIN — LIDOCAINE HYDROCHLORIDE 15 ML: 20 SOLUTION ORAL at 19:16

## 2025-01-01 RX ADMIN — FAMOTIDINE 20 MG: 10 INJECTION, SOLUTION INTRAVENOUS at 17:46

## 2025-01-01 RX ADMIN — DEXAMETHASONE SODIUM PHOSPHATE 4 MG: 4 INJECTION INTRA-ARTICULAR; INTRALESIONAL; INTRAMUSCULAR; INTRAVENOUS; SOFT TISSUE at 00:26

## 2025-01-01 RX ADMIN — MORPHINE SULFATE 5 MG: 10 INJECTION INTRAVENOUS at 10:12

## 2025-01-01 RX ADMIN — LIDOCAINE HYDROCHLORIDE 1 APPLICATION: 20 JELLY TOPICAL at 07:10

## 2025-01-01 RX ADMIN — PANTOPRAZOLE SODIUM 40 MG: 40 INJECTION, POWDER, FOR SOLUTION INTRAVENOUS at 17:24

## 2025-01-01 RX ADMIN — ONDANSETRON 4 MG: 2 INJECTION INTRAMUSCULAR; INTRAVENOUS at 17:24

## 2025-01-01 RX ADMIN — MORPHINE SULFATE 10 MG: 10 INJECTION INTRAVENOUS at 21:20

## 2025-01-01 RX ADMIN — ENOXAPARIN SODIUM 100 MG: 100 INJECTION SUBCUTANEOUS at 04:22

## 2025-01-01 RX ADMIN — ONDANSETRON 4 MG: 2 INJECTION INTRAMUSCULAR; INTRAVENOUS at 05:43

## 2025-01-01 RX ADMIN — LIDOCAINE HYDROCHLORIDE 15 ML: 20 SOLUTION ORAL at 23:42

## 2025-01-01 RX ADMIN — CEFEPIME 2 G: 2 INJECTION, POWDER, FOR SOLUTION INTRAVENOUS at 22:09

## 2025-01-01 RX ADMIN — FAMOTIDINE 20 MG: 10 INJECTION, SOLUTION INTRAVENOUS at 18:26

## 2025-01-01 RX ADMIN — DEXAMETHASONE SODIUM PHOSPHATE 4 MG: 4 INJECTION INTRA-ARTICULAR; INTRALESIONAL; INTRAMUSCULAR; INTRAVENOUS; SOFT TISSUE at 23:10

## 2025-01-01 RX ADMIN — LIDOCAINE HYDROCHLORIDE 1 APPLICATION: 20 JELLY TOPICAL at 19:27

## 2025-01-01 RX ADMIN — METRONIDAZOLE 500 MG: 5 INJECTION, SOLUTION INTRAVENOUS at 16:32

## 2025-01-01 RX ADMIN — LORAZEPAM 0.5 MG: 2 INJECTION INTRAMUSCULAR; INTRAVENOUS at 00:22

## 2025-01-01 RX ADMIN — ONDANSETRON 4 MG: 2 INJECTION INTRAMUSCULAR; INTRAVENOUS at 17:40

## 2025-01-01 RX ADMIN — FAMOTIDINE 20 MG: 10 INJECTION, SOLUTION INTRAVENOUS at 17:33

## 2025-01-01 RX ADMIN — METRONIDAZOLE 500 MG: 5 INJECTION, SOLUTION INTRAVENOUS at 04:21

## 2025-01-01 RX ADMIN — FAMOTIDINE 20 MG: 10 INJECTION, SOLUTION INTRAVENOUS at 17:40

## 2025-01-01 RX ADMIN — ENOXAPARIN SODIUM 100 MG: 100 INJECTION SUBCUTANEOUS at 04:27

## 2025-01-01 RX ADMIN — PROCHLORPERAZINE EDISYLATE 10 MG: 5 INJECTION INTRAMUSCULAR; INTRAVENOUS at 22:47

## 2025-01-01 RX ADMIN — DEXAMETHASONE SODIUM PHOSPHATE 4 MG: 4 INJECTION INTRA-ARTICULAR; INTRALESIONAL; INTRAMUSCULAR; INTRAVENOUS; SOFT TISSUE at 12:19

## 2025-01-01 RX ADMIN — ONDANSETRON 4 MG: 2 INJECTION INTRAMUSCULAR; INTRAVENOUS at 16:50

## 2025-01-01 RX ADMIN — FAMOTIDINE 20 MG: 10 INJECTION, SOLUTION INTRAVENOUS at 17:36

## 2025-01-01 RX ADMIN — ONDANSETRON 4 MG: 2 INJECTION INTRAMUSCULAR; INTRAVENOUS at 12:31

## 2025-01-01 RX ADMIN — ONDANSETRON 4 MG: 2 INJECTION INTRAMUSCULAR; INTRAVENOUS at 11:24

## 2025-01-01 RX ADMIN — PANTOPRAZOLE SODIUM 40 MG: 40 INJECTION, POWDER, FOR SOLUTION INTRAVENOUS at 05:48

## 2025-01-01 RX ADMIN — MORPHINE SULFATE 2 MG: 4 INJECTION, SOLUTION INTRAMUSCULAR; INTRAVENOUS at 04:22

## 2025-01-01 RX ADMIN — MORPHINE SULFATE 2 MG: 4 INJECTION, SOLUTION INTRAMUSCULAR; INTRAVENOUS at 01:12

## 2025-01-01 RX ADMIN — LIDOCAINE AND PRILOCAINE 1 G: 25; 25 CREAM TOPICAL at 23:33

## 2025-01-01 RX ADMIN — DEXAMETHASONE SODIUM PHOSPHATE 4 MG: 4 INJECTION INTRA-ARTICULAR; INTRALESIONAL; INTRAMUSCULAR; INTRAVENOUS; SOFT TISSUE at 11:25

## 2025-01-01 RX ADMIN — ONDANSETRON 4 MG: 2 INJECTION INTRAMUSCULAR; INTRAVENOUS at 22:50

## 2025-01-01 RX ADMIN — MORPHINE SULFATE 2 MG: 4 INJECTION, SOLUTION INTRAMUSCULAR; INTRAVENOUS at 23:02

## 2025-01-01 RX ADMIN — DEXAMETHASONE SODIUM PHOSPHATE 4 MG: 4 INJECTION INTRA-ARTICULAR; INTRALESIONAL; INTRAMUSCULAR; INTRAVENOUS; SOFT TISSUE at 17:46

## 2025-01-01 RX ADMIN — SODIUM CHLORIDE: 9 INJECTION, SOLUTION INTRAVENOUS at 17:14

## 2025-01-01 RX ADMIN — LIDOCAINE HYDROCHLORIDE 2 PACKET: 20 JELLY TOPICAL at 17:44

## 2025-01-01 RX ADMIN — DEXAMETHASONE SODIUM PHOSPHATE 4 MG: 4 INJECTION INTRA-ARTICULAR; INTRALESIONAL; INTRAMUSCULAR; INTRAVENOUS; SOFT TISSUE at 18:09

## 2025-01-01 RX ADMIN — ONDANSETRON 4 MG: 2 INJECTION INTRAMUSCULAR; INTRAVENOUS at 23:15

## 2025-01-01 RX ADMIN — FAMOTIDINE 20 MG: 10 INJECTION, SOLUTION INTRAVENOUS at 17:18

## 2025-01-01 RX ADMIN — DEXAMETHASONE SODIUM PHOSPHATE 4 MG: 4 INJECTION INTRA-ARTICULAR; INTRALESIONAL; INTRAMUSCULAR; INTRAVENOUS; SOFT TISSUE at 04:21

## 2025-01-01 RX ADMIN — ONDANSETRON 4 MG: 2 INJECTION INTRAMUSCULAR; INTRAVENOUS at 17:18

## 2025-01-01 RX ADMIN — FAMOTIDINE 20 MG: 10 INJECTION, SOLUTION INTRAVENOUS at 04:21

## 2025-01-01 RX ADMIN — MORPHINE SULFATE 2 MG: 4 INJECTION, SOLUTION INTRAMUSCULAR; INTRAVENOUS at 07:57

## 2025-01-01 RX ADMIN — CEFEPIME 2 G: 2 INJECTION, POWDER, FOR SOLUTION INTRAVENOUS at 05:13

## 2025-01-01 RX ADMIN — ONDANSETRON 4 MG: 2 INJECTION INTRAMUSCULAR; INTRAVENOUS at 05:46

## 2025-01-01 RX ADMIN — WATER 2 G: 1 INJECTION INTRAMUSCULAR; INTRAVENOUS; SUBCUTANEOUS at 14:10

## 2025-01-01 RX ADMIN — WATER 2 G: 1 INJECTION INTRAMUSCULAR; INTRAVENOUS; SUBCUTANEOUS at 17:49

## 2025-01-01 RX ADMIN — FAMOTIDINE 20 MG: 10 INJECTION, SOLUTION INTRAVENOUS at 05:46

## 2025-01-01 RX ADMIN — WATER 2 G: 1 INJECTION INTRAMUSCULAR; INTRAVENOUS; SUBCUTANEOUS at 21:48

## 2025-01-01 RX ADMIN — METRONIDAZOLE 500 MG: 5 INJECTION, SOLUTION INTRAVENOUS at 22:29

## 2025-01-01 RX ADMIN — DEXAMETHASONE SODIUM PHOSPHATE 4 MG: 4 INJECTION INTRA-ARTICULAR; INTRALESIONAL; INTRAMUSCULAR; INTRAVENOUS; SOFT TISSUE at 23:15

## 2025-01-01 RX ADMIN — FAMOTIDINE 20 MG: 10 INJECTION, SOLUTION INTRAVENOUS at 04:15

## 2025-01-01 RX ADMIN — MORPHINE SULFATE 1 MG: 4 INJECTION, SOLUTION INTRAMUSCULAR; INTRAVENOUS at 14:52

## 2025-01-01 RX ADMIN — DEXAMETHASONE SODIUM PHOSPHATE 4 MG: 4 INJECTION INTRA-ARTICULAR; INTRALESIONAL; INTRAMUSCULAR; INTRAVENOUS; SOFT TISSUE at 05:43

## 2025-01-01 RX ADMIN — METRONIDAZOLE 500 MG: 5 INJECTION, SOLUTION INTRAVENOUS at 17:55

## 2025-01-01 RX ADMIN — METRONIDAZOLE 500 MG: 5 INJECTION, SOLUTION INTRAVENOUS at 05:13

## 2025-01-01 RX ADMIN — MORPHINE SULFATE 2 MG: 4 INJECTION, SOLUTION INTRAMUSCULAR; INTRAVENOUS at 16:37

## 2025-01-01 RX ADMIN — SCOPOLAMINE 1 PATCH: 1.5 PATCH, EXTENDED RELEASE TRANSDERMAL at 15:31

## 2025-01-01 RX ADMIN — ENOXAPARIN SODIUM 100 MG: 100 INJECTION SUBCUTANEOUS at 17:30

## 2025-01-01 RX ADMIN — METRONIDAZOLE 500 MG: 5 INJECTION, SOLUTION INTRAVENOUS at 06:00

## 2025-01-01 RX ADMIN — MORPHINE SULFATE 10 MG: 10 INJECTION INTRAVENOUS at 17:33

## 2025-01-01 RX ADMIN — PANTOPRAZOLE SODIUM 40 MG: 40 INJECTION, POWDER, FOR SOLUTION INTRAVENOUS at 17:45

## 2025-01-01 RX ADMIN — OXYCODONE 5 MG: 5 TABLET ORAL at 11:41

## 2025-01-01 RX ADMIN — MORPHINE SULFATE 2 MG: 4 INJECTION, SOLUTION INTRAMUSCULAR; INTRAVENOUS at 18:26

## 2025-01-01 RX ADMIN — MORPHINE SULFATE 10 MG: 10 INJECTION INTRAVENOUS at 05:54

## 2025-01-01 RX ADMIN — METRONIDAZOLE 500 MG: 5 INJECTION, SOLUTION INTRAVENOUS at 04:59

## 2025-01-01 RX ADMIN — METRONIDAZOLE 500 MG: 5 INJECTION, SOLUTION INTRAVENOUS at 18:24

## 2025-01-01 RX ADMIN — SODIUM CHLORIDE: 9 INJECTION, SOLUTION INTRAVENOUS at 10:07

## 2025-01-01 RX ADMIN — DEXAMETHASONE SODIUM PHOSPHATE 4 MG: 4 INJECTION INTRA-ARTICULAR; INTRALESIONAL; INTRAMUSCULAR; INTRAVENOUS; SOFT TISSUE at 11:32

## 2025-01-01 RX ADMIN — DEXAMETHASONE SODIUM PHOSPHATE 4 MG: 4 INJECTION INTRA-ARTICULAR; INTRALESIONAL; INTRAMUSCULAR; INTRAVENOUS; SOFT TISSUE at 12:31

## 2025-01-01 RX ADMIN — FAMOTIDINE 20 MG: 10 INJECTION, SOLUTION INTRAVENOUS at 19:29

## 2025-01-01 RX ADMIN — LORAZEPAM 1 MG: 2 INJECTION INTRAMUSCULAR; INTRAVENOUS at 14:00

## 2025-01-01 RX ADMIN — DEXAMETHASONE SODIUM PHOSPHATE 4 MG: 4 INJECTION INTRA-ARTICULAR; INTRALESIONAL; INTRAMUSCULAR; INTRAVENOUS; SOFT TISSUE at 12:17

## 2025-01-01 RX ADMIN — LORAZEPAM 2 MG: 2 INJECTION INTRAMUSCULAR; INTRAVENOUS at 13:56

## 2025-01-01 RX ADMIN — ENOXAPARIN SODIUM 100 MG: 100 INJECTION SUBCUTANEOUS at 16:46

## 2025-01-01 RX ADMIN — ENOXAPARIN SODIUM 100 MG: 100 INJECTION SUBCUTANEOUS at 17:46

## 2025-01-01 RX ADMIN — LORAZEPAM 1 MG: 2 INJECTION INTRAMUSCULAR; INTRAVENOUS at 21:19

## 2025-01-01 RX ADMIN — CEFEPIME 2 G: 2 INJECTION, POWDER, FOR SOLUTION INTRAVENOUS at 05:54

## 2025-01-01 RX ADMIN — MORPHINE SULFATE 1 MG: 4 INJECTION, SOLUTION INTRAMUSCULAR; INTRAVENOUS at 10:12

## 2025-01-01 RX ADMIN — LIDOCAINE HYDROCHLORIDE 15 ML: 20 SOLUTION ORAL at 15:31

## 2025-01-01 RX ADMIN — LORAZEPAM 1 MG: 2 INJECTION INTRAMUSCULAR; INTRAVENOUS at 18:00

## 2025-01-01 RX ADMIN — MORPHINE SULFATE 10 MG: 10 INJECTION INTRAVENOUS at 08:32

## 2025-01-01 RX ADMIN — MORPHINE SULFATE 2 MG: 4 INJECTION, SOLUTION INTRAMUSCULAR; INTRAVENOUS at 17:21

## 2025-01-01 RX ADMIN — ONDANSETRON 4 MG: 2 INJECTION INTRAMUSCULAR; INTRAVENOUS at 17:39

## 2025-01-01 RX ADMIN — MORPHINE SULFATE 10 MG: 10 INJECTION INTRAVENOUS at 00:08

## 2025-01-01 RX ADMIN — Medication: at 23:22

## 2025-01-01 RX ADMIN — DEXAMETHASONE SODIUM PHOSPHATE 4 MG: 4 INJECTION INTRA-ARTICULAR; INTRALESIONAL; INTRAMUSCULAR; INTRAVENOUS; SOFT TISSUE at 17:45

## 2025-01-01 RX ADMIN — MORPHINE SULFATE 2 MG: 4 INJECTION, SOLUTION INTRAMUSCULAR; INTRAVENOUS at 05:57

## 2025-01-01 RX ADMIN — ENOXAPARIN SODIUM 100 MG: 100 INJECTION SUBCUTANEOUS at 17:33

## 2025-01-01 RX ADMIN — METRONIDAZOLE 500 MG: 5 INJECTION, SOLUTION INTRAVENOUS at 17:40

## 2025-01-01 RX ADMIN — ONDANSETRON 4 MG: 2 INJECTION INTRAMUSCULAR; INTRAVENOUS at 12:17

## 2025-01-01 RX ADMIN — Medication 50 MG: at 11:19

## 2025-01-01 RX ADMIN — ONDANSETRON 4 MG: 2 INJECTION INTRAMUSCULAR; INTRAVENOUS at 20:21

## 2025-01-01 RX ADMIN — DEXAMETHASONE SODIUM PHOSPHATE 4 MG: 4 INJECTION INTRA-ARTICULAR; INTRALESIONAL; INTRAMUSCULAR; INTRAVENOUS; SOFT TISSUE at 17:17

## 2025-01-01 RX ADMIN — MORPHINE SULFATE 2 MG: 4 INJECTION, SOLUTION INTRAMUSCULAR; INTRAVENOUS at 23:53

## 2025-01-01 RX ADMIN — MORPHINE SULFATE 10 MG: 10 INJECTION INTRAVENOUS at 12:32

## 2025-01-01 RX ADMIN — FAMOTIDINE 20 MG: 10 INJECTION, SOLUTION INTRAVENOUS at 18:09

## 2025-01-01 RX ADMIN — ONDANSETRON 4 MG: 2 INJECTION INTRAMUSCULAR; INTRAVENOUS at 23:10

## 2025-01-01 RX ADMIN — MORPHINE SULFATE 2 MG: 4 INJECTION, SOLUTION INTRAMUSCULAR; INTRAVENOUS at 22:17

## 2025-01-01 RX ADMIN — MORPHINE SULFATE 2 MG: 4 INJECTION, SOLUTION INTRAMUSCULAR; INTRAVENOUS at 14:09

## 2025-01-01 RX ADMIN — SODIUM CHLORIDE: 9 INJECTION, SOLUTION INTRAVENOUS at 10:04

## 2025-01-01 RX ADMIN — DEXAMETHASONE SODIUM PHOSPHATE 4 MG: 4 INJECTION INTRA-ARTICULAR; INTRALESIONAL; INTRAMUSCULAR; INTRAVENOUS; SOFT TISSUE at 17:24

## 2025-01-01 RX ADMIN — WATER 2 G: 1 INJECTION INTRAMUSCULAR; INTRAVENOUS; SUBCUTANEOUS at 04:10

## 2025-01-01 RX ADMIN — FAMOTIDINE 20 MG: 10 INJECTION, SOLUTION INTRAVENOUS at 05:48

## 2025-01-01 RX ADMIN — MORPHINE SULFATE 2 MG: 4 INJECTION, SOLUTION INTRAMUSCULAR; INTRAVENOUS at 19:29

## 2025-01-01 RX ADMIN — CEFEPIME 2 G: 2 INJECTION, POWDER, FOR SOLUTION INTRAVENOUS at 06:25

## 2025-01-01 RX ADMIN — LORAZEPAM 2 MG: 2 INJECTION INTRAMUSCULAR; INTRAVENOUS at 05:07

## 2025-01-01 RX ADMIN — ONDANSETRON 4 MG: 2 INJECTION INTRAMUSCULAR; INTRAVENOUS at 04:22

## 2025-01-01 RX ADMIN — PHENOL 1 SPRAY: 1.5 LIQUID ORAL at 20:16

## 2025-01-01 RX ADMIN — ONDANSETRON 4 MG: 2 INJECTION INTRAMUSCULAR; INTRAVENOUS at 05:48

## 2025-01-01 RX ADMIN — FAMOTIDINE 20 MG: 10 INJECTION, SOLUTION INTRAVENOUS at 06:23

## 2025-01-01 RX ADMIN — ONDANSETRON 4 MG: 2 INJECTION INTRAMUSCULAR; INTRAVENOUS at 18:00

## 2025-01-01 RX ADMIN — MORPHINE SULFATE 2 MG: 4 INJECTION, SOLUTION INTRAMUSCULAR; INTRAVENOUS at 16:54

## 2025-01-01 RX ADMIN — LORAZEPAM 1 MG: 2 INJECTION INTRAMUSCULAR; INTRAVENOUS at 05:46

## 2025-01-01 RX ADMIN — MORPHINE SULFATE 10 MG: 10 INJECTION INTRAVENOUS at 19:31

## 2025-01-01 RX ADMIN — OXYCODONE 5 MG: 5 TABLET ORAL at 16:38

## 2025-01-01 RX ADMIN — FAMOTIDINE 20 MG: 10 INJECTION, SOLUTION INTRAVENOUS at 04:22

## 2025-01-01 RX ADMIN — MORPHINE SULFATE 2 MG: 4 INJECTION, SOLUTION INTRAMUSCULAR; INTRAVENOUS at 06:39

## 2025-01-01 RX ADMIN — DEXAMETHASONE SODIUM PHOSPHATE 4 MG: 4 INJECTION INTRA-ARTICULAR; INTRALESIONAL; INTRAMUSCULAR; INTRAVENOUS; SOFT TISSUE at 04:49

## 2025-01-01 RX ADMIN — MORPHINE SULFATE 10 MG: 10 INJECTION INTRAVENOUS at 09:43

## 2025-01-01 RX ADMIN — WATER 2 G: 1 INJECTION INTRAMUSCULAR; INTRAVENOUS; SUBCUTANEOUS at 04:30

## 2025-01-01 RX ADMIN — CEFEPIME 2 G: 2 INJECTION, POWDER, FOR SOLUTION INTRAVENOUS at 22:13

## 2025-01-01 RX ADMIN — FAMOTIDINE 20 MG: 10 INJECTION, SOLUTION INTRAVENOUS at 04:30

## 2025-01-01 RX ADMIN — DEXAMETHASONE SODIUM PHOSPHATE 4 MG: 4 INJECTION INTRA-ARTICULAR; INTRALESIONAL; INTRAMUSCULAR; INTRAVENOUS; SOFT TISSUE at 23:16

## 2025-01-01 RX ADMIN — MORPHINE SULFATE 2 MG: 4 INJECTION, SOLUTION INTRAMUSCULAR; INTRAVENOUS at 07:19

## 2025-01-01 RX ADMIN — FAMOTIDINE 20 MG: 10 INJECTION, SOLUTION INTRAVENOUS at 17:24

## 2025-01-01 RX ADMIN — PANTOPRAZOLE SODIUM 40 MG: 40 INJECTION, POWDER, FOR SOLUTION INTRAVENOUS at 04:50

## 2025-01-01 RX ADMIN — Medication: at 18:29

## 2025-01-01 RX ADMIN — DIBASIC SODIUM PHOSPHATE, MONOBASIC POTASSIUM PHOSPHATE AND MONOBASIC SODIUM PHOSPHATE 500 MG: 852; 155; 130 TABLET ORAL at 11:41

## 2025-01-01 RX ADMIN — MORPHINE SULFATE 10 MG: 10 INJECTION INTRAVENOUS at 11:35

## 2025-01-01 RX ADMIN — LORAZEPAM 2 MG: 2 INJECTION INTRAMUSCULAR; INTRAVENOUS at 23:35

## 2025-01-01 RX ADMIN — DEXAMETHASONE SODIUM PHOSPHATE 4 MG: 4 INJECTION INTRA-ARTICULAR; INTRALESIONAL; INTRAMUSCULAR; INTRAVENOUS; SOFT TISSUE at 17:33

## 2025-01-01 RX ADMIN — SODIUM CHLORIDE: 9 INJECTION, SOLUTION INTRAVENOUS at 00:57

## 2025-01-01 RX ADMIN — LORAZEPAM 1 MG: 2 INJECTION INTRAMUSCULAR; INTRAVENOUS at 10:02

## 2025-01-01 RX ADMIN — ONDANSETRON 4 MG: 2 INJECTION INTRAMUSCULAR; INTRAVENOUS at 19:16

## 2025-01-01 RX ADMIN — LORAZEPAM 1 MG: 2 INJECTION INTRAMUSCULAR; INTRAVENOUS at 07:18

## 2025-01-01 RX ADMIN — METRONIDAZOLE 500 MG: 5 INJECTION, SOLUTION INTRAVENOUS at 17:45

## 2025-01-01 RX ADMIN — ONDANSETRON 4 MG: 2 INJECTION INTRAMUSCULAR; INTRAVENOUS at 01:09

## 2025-01-01 RX ADMIN — PHENOL 1 SPRAY: 1.5 LIQUID ORAL at 08:51

## 2025-01-01 RX ADMIN — ONDANSETRON 4 MG: 2 INJECTION INTRAMUSCULAR; INTRAVENOUS at 00:08

## 2025-01-01 RX ADMIN — ENOXAPARIN SODIUM 100 MG: 100 INJECTION SUBCUTANEOUS at 00:19

## 2025-01-01 RX ADMIN — PANTOPRAZOLE SODIUM 40 MG: 40 INJECTION, POWDER, FOR SOLUTION INTRAVENOUS at 06:23

## 2025-01-01 RX ADMIN — PANTOPRAZOLE SODIUM 40 MG: 40 INJECTION, POWDER, FOR SOLUTION INTRAVENOUS at 04:58

## 2025-01-01 RX ADMIN — MORPHINE SULFATE 10 MG: 10 INJECTION INTRAVENOUS at 18:00

## 2025-01-01 RX ADMIN — ENOXAPARIN SODIUM 100 MG: 100 INJECTION SUBCUTANEOUS at 06:25

## 2025-01-01 RX ADMIN — MORPHINE SULFATE 2 MG: 4 INJECTION, SOLUTION INTRAMUSCULAR; INTRAVENOUS at 19:46

## 2025-01-01 RX ADMIN — PHENOL 1 SPRAY: 1.5 LIQUID ORAL at 20:14

## 2025-01-01 RX ADMIN — ENOXAPARIN SODIUM 100 MG: 100 INJECTION SUBCUTANEOUS at 16:29

## 2025-01-01 RX ADMIN — METRONIDAZOLE 500 MG: 5 INJECTION, SOLUTION INTRAVENOUS at 06:36

## 2025-01-01 RX ADMIN — WATER 2 G: 1 INJECTION INTRAMUSCULAR; INTRAVENOUS; SUBCUTANEOUS at 21:58

## 2025-01-01 RX ADMIN — MORPHINE SULFATE 10 MG: 10 INJECTION INTRAVENOUS at 02:03

## 2025-01-01 RX ADMIN — PHENOL 1 SPRAY: 1.5 LIQUID ORAL at 18:06

## 2025-01-01 RX ADMIN — CEFEPIME 2 G: 2 INJECTION, POWDER, FOR SOLUTION INTRAVENOUS at 22:58

## 2025-01-01 RX ADMIN — ONDANSETRON 4 MG: 2 INJECTION INTRAMUSCULAR; INTRAVENOUS at 06:04

## 2025-01-01 RX ADMIN — PHENOL 1 SPRAY: 1.5 LIQUID ORAL at 04:03

## 2025-01-01 RX ADMIN — MORPHINE SULFATE 10 MG: 10 INJECTION INTRAVENOUS at 15:17

## 2025-01-01 RX ADMIN — LIDOCAINE HYDROCHLORIDE 2 ML: 20 JELLY TOPICAL at 21:54

## 2025-01-01 RX ADMIN — FAMOTIDINE 20 MG: 10 INJECTION, SOLUTION INTRAVENOUS at 17:45

## 2025-01-01 RX ADMIN — MORPHINE SULFATE 2 MG: 4 INJECTION, SOLUTION INTRAMUSCULAR; INTRAVENOUS at 11:48

## 2025-01-01 RX ADMIN — ENOXAPARIN SODIUM 100 MG: 100 INJECTION SUBCUTANEOUS at 04:09

## 2025-01-01 RX ADMIN — PANTOPRAZOLE SODIUM 40 MG: 40 INJECTION, POWDER, FOR SOLUTION INTRAVENOUS at 06:34

## 2025-01-01 RX ADMIN — MORPHINE SULFATE 5 MG: 10 INJECTION INTRAVENOUS at 14:41

## 2025-01-01 RX ADMIN — METRONIDAZOLE 500 MG: 5 INJECTION, SOLUTION INTRAVENOUS at 04:32

## 2025-01-01 RX ADMIN — FAMOTIDINE 20 MG: 10 INJECTION, SOLUTION INTRAVENOUS at 06:11

## 2025-01-01 RX ADMIN — Medication: at 15:39

## 2025-01-01 RX ADMIN — FAMOTIDINE 20 MG: 10 INJECTION, SOLUTION INTRAVENOUS at 16:29

## 2025-01-01 RX ADMIN — METRONIDAZOLE 500 MG: 5 INJECTION, SOLUTION INTRAVENOUS at 04:31

## 2025-01-01 RX ADMIN — MORPHINE SULFATE 10 MG: 10 INJECTION INTRAVENOUS at 08:35

## 2025-01-01 RX ADMIN — MORPHINE SULFATE 2 MG: 4 INJECTION, SOLUTION INTRAMUSCULAR; INTRAVENOUS at 17:47

## 2025-01-01 RX ADMIN — DEXAMETHASONE SODIUM PHOSPHATE 4 MG: 4 INJECTION INTRA-ARTICULAR; INTRALESIONAL; INTRAMUSCULAR; INTRAVENOUS; SOFT TISSUE at 00:08

## 2025-01-01 SDOH — ECONOMIC STABILITY: GENERAL

## 2025-01-01 ASSESSMENT — ENCOUNTER SYMPTOMS
RESPIRATORY NEGATIVE: 1
WEIGHT LOSS: 0
DIZZINESS: 0
SORE THROAT: 0
NERVOUS/ANXIOUS: 1
DIAPHORESIS: 0
HEARTBURN: 0
PSYCHIATRIC NEGATIVE: 1
SPUTUM PRODUCTION: 0
NAUSEA: 1
DECREASED ORAL INTAKE: 1
NERVOUS/ANXIOUS: 0
NAUSEA: 1
ABDOMINAL PAIN: 1
BRUISES/BLEEDS EASILY: 0
SUBJECTIVE PAIN PROGRESSION: UNCHANGED
SHORTNESS OF BREATH: 0
LOWEST PAIN SEVERITY IN PAST 24 HOURS: 0/10
HEADACHES: 0
ORTHOPNEA: 0
PSYCHIATRIC NEGATIVE: 1
NEUROLOGICAL NEGATIVE: 1
SHORTNESS OF BREATH: 0
DIAPHORESIS: 0
HALLUCINATIONS: 0
SHORTNESS OF BREATH: 1
WEIGHT LOSS: 0
NEUROLOGICAL NEGATIVE: 1
BRUISES/BLEEDS EASILY: 0
HEADACHES: 0
HIGHEST PAIN SEVERITY IN PAST 24 HOURS: 3/10
MUSCULOSKELETAL NEGATIVE: 1
DIAPHORESIS: 0
PAIN LOCATION: RECTUM AREA
EYES NEGATIVE: 1
FEVER: 0
CONSTITUTIONAL NEGATIVE: 1
MUSCULOSKELETAL NEGATIVE: 1
NAUSEA: 1
ABDOMINAL PAIN: 1
COUGH: 0
VOMITING: 0
RESPIRATORY NEGATIVE: 1
DOUBLE VISION: 0
DEPRESSION: 0
FEVER: 0
VOMITING: 0
DEPRESSION: 0
PSYCHIATRIC NEGATIVE: 1
VOMITING: 0
FATIGUES EASILY: 1
ABDOMINAL PAIN: 1
BRUISES/BLEEDS EASILY: 0
FOCAL WEAKNESS: 0
MYALGIAS: 0
MYALGIAS: 0
BRUISES/BLEEDS EASILY: 0
CHILLS: 0
RESPIRATORY NEGATIVE: 1
DIZZINESS: 0
PAIN LOCATION - PAIN SEVERITY: 2/10
CHILLS: 0
SPEECH CHANGE: 0
APNEIC BREATHING: 1
ABDOMINAL PAIN: 0
VOMITING: 0
CHILLS: 0
FOCAL WEAKNESS: 0
NAUSEA: 1
COUGH: 0
HEADACHES: 0
NEUROLOGICAL NEGATIVE: 1
INCREASED SLEEPING: 1
DIZZINESS: 0
SORE THROAT: 0
ABDOMINAL PAIN: 0
FEVER: 0
HEADACHES: 0
CONSTITUTIONAL NEGATIVE: 1
NEUROLOGICAL NEGATIVE: 1
PAIN LOCATION - PAIN DURATION: CONSTANT
NAUSEA: 0
MYALGIAS: 0
EYES NEGATIVE: 1
MYALGIAS: 0
ABDOMINAL PAIN: 1
WEAKNESS: 0
CARDIOVASCULAR NEGATIVE: 1
SORE THROAT: 0
ABDOMINAL PAIN: 1
NAUSEA: 1
BRUISES/BLEEDS EASILY: 0
NAUSEA: 1
CHILLS: 0
NERVOUS/ANXIOUS: 0
CARDIOVASCULAR NEGATIVE: 1
BLURRED VISION: 0
DEPRESSION: 0
FOCAL WEAKNESS: 0
COUGH: 0
DIAPHORESIS: 0
NERVOUS/ANXIOUS: 0
BLURRED VISION: 0
CARDIOVASCULAR NEGATIVE: 1
BLURRED VISION: 0
NEUROLOGICAL NEGATIVE: 1
POLYDIPSIA: 0
BLURRED VISION: 0
CHILLS: 0
EYES NEGATIVE: 1
LOWEST PAIN SEVERITY IN PAST 24 HOURS: 0/10
CHILLS: 0
PAIN LOCATION - PAIN SEVERITY: 0/10
CHILLS: 0
NERVOUS/ANXIOUS: 0
NAUSEA: 1
MUSCULOSKELETAL NEGATIVE: 1
SHORTNESS OF BREATH: 0
SHORTNESS OF BREATH: 0
DIZZINESS: 0
DEPRESSION: 0
DEPRESSION: 0
CARDIOVASCULAR NEGATIVE: 1
COUGH: 0
SHORTNESS OF BREATH: 0
MUSCULOSKELETAL NEGATIVE: 1
BLURRED VISION: 0
PSYCHIATRIC NEGATIVE: 1
PAIN LOCATION - PAIN QUALITY: ACHY
NEUROLOGICAL NEGATIVE: 1
DEPRESSION: 0
NAUSEA: 1
FOCAL WEAKNESS: 0
WEIGHT LOSS: 0
INCREASED FATIGUE: 1
WEAKNESS: 0
BLURRED VISION: 0
VOMITING: 1
WEIGHT LOSS: 1
FOCAL WEAKNESS: 0
PALPITATIONS: 0
RESPIRATORY NEGATIVE: 1
MYALGIAS: 0
NERVOUS/ANXIOUS: 0
CARDIOVASCULAR NEGATIVE: 1
CARDIOVASCULAR NEGATIVE: 1
CONSTITUTIONAL NEGATIVE: 1
VOMITING: 1
PAIN: 1
DIAPHORESIS: 0
CHILLS: 0
NEUROLOGICAL NEGATIVE: 1
WEIGHT LOSS: 1
BLURRED VISION: 0
NAUSEA: 1
FOCAL WEAKNESS: 0
CONSTIPATION: 0
CONSTITUTIONAL NEGATIVE: 1
WEAKNESS: 1
CHILLS: 0
BRUISES/BLEEDS EASILY: 0
RESPIRATORY NEGATIVE: 1
NEUROLOGICAL NEGATIVE: 1
FEVER: 0
MYALGIAS: 0
CHILLS: 0
EYES NEGATIVE: 1
BLURRED VISION: 0
EYES NEGATIVE: 1
BLURRED VISION: 0
PAIN LOCATION - RELIEVING FACTORS: MORPHINE
PALPITATIONS: 0
NEUROLOGICAL NEGATIVE: 1
PSYCHIATRIC NEGATIVE: 1
NAUSEA: 1
BLURRED VISION: 0
CONSTITUTIONAL NEGATIVE: 1
DIAPHORESIS: 0
CARDIOVASCULAR NEGATIVE: 1
HEADACHES: 0
COUGH: 0
PALPITATIONS: 0
FEVER: 0
VOMITING: 0
HEADACHES: 0
SORE THROAT: 0
HIGHEST PAIN SEVERITY IN PAST 24 HOURS: 5/10
MUSCULOSKELETAL NEGATIVE: 1
DECREASED ORAL INTAKE: 1
FOCAL WEAKNESS: 0
SORE THROAT: 0
WEAKNESS: 0
WEIGHT LOSS: 0
EYES NEGATIVE: 1
PAIN: 1
FEVER: 0
RESPIRATORY NEGATIVE: 1
ABDOMINAL PAIN: 1
DIAPHORESIS: 0
DRY SKIN: 1
VOMITING: 1
BRUISES/BLEEDS EASILY: 0
PALPITATIONS: 0
EYES NEGATIVE: 1
FOCAL WEAKNESS: 0
PALPITATIONS: 0
PALPITATIONS: 0
PSYCHIATRIC NEGATIVE: 1
DRY SKIN: 1
MUSCULOSKELETAL NEGATIVE: 1
WEAKNESS: 0
INCREASED SLEEPING: 1
DIARRHEA: 0
CARDIOVASCULAR NEGATIVE: 1
FEVER: 0
PSYCHIATRIC NEGATIVE: 1
PALPITATIONS: 0
PSYCHIATRIC NEGATIVE: 1
WEIGHT LOSS: 1
PSYCHIATRIC NEGATIVE: 1
VOMITING: 0
PAIN: 1
SUBJECTIVE PAIN PROGRESSION: RAPIDLY IMPROVING
WEAKNESS: 0
CONSTITUTIONAL NEGATIVE: 1
RESPIRATORY NEGATIVE: 1
VOMITING: 0
PALPITATIONS: 0
WEIGHT LOSS: 0
MYALGIAS: 0
CARDIOVASCULAR NEGATIVE: 1
EYES NEGATIVE: 1
SORE THROAT: 0
FEVER: 0
DECREASED ORAL INTAKE: 1
SORE THROAT: 0
PAIN LOCATION: ABD
SPUTUM PRODUCTION: 0
PALPITATIONS: 0
DECREASED TO NO URINARY OUTPUT: 1
COUGH: 0
MYALGIAS: 0
WEIGHT LOSS: 1
DEPRESSION: 0
INCREASED FATIGUE: 1
ABDOMINAL PAIN: 1
NAUSEA: 1
ABDOMINAL PAIN: 1
SORE THROAT: 0
BRUISES/BLEEDS EASILY: 0
DYSPNEA ACTIVITY LEVEL: WHILE SPEAKING
PAIN LOCATION: GENERALIZED
VOMITING: 0
DECREASED TO NO URINARY OUTPUT: 1
PAIN LOCATION - EXACERBATING FACTORS: SITTING
DEPRESSION: 0
WEAKNESS: 0
WEIGHT LOSS: 0
VOMITING: 0
BACK PAIN: 0
DOUBLE VISION: 0
SHORTNESS OF BREATH: 0
PALPITATIONS: 0
SHORTNESS OF BREATH: 0
DIZZINESS: 0
SHORTNESS OF BREATH: 0
TREMORS: 0
ABDOMINAL PAIN: 1
DEPRESSION: 0
COUGH: 0
BLURRED VISION: 0
ABDOMINAL PAIN: 1
COUGH: 0
COUGH: 0
DIZZINESS: 0
HEADACHES: 0
RESPIRATORY NEGATIVE: 1
EYES NEGATIVE: 1
VOMITING: 1
HEADACHES: 0
MUSCULOSKELETAL NEGATIVE: 1
HEADACHES: 0
DEPRESSION: 0
BRUISES/BLEEDS EASILY: 0
PAIN: 1
WEIGHT LOSS: 0
FEVER: 0
WEAKNESS: 0
ABDOMINAL PAIN: 1
INCREASED FATIGUE: 1
CONSTITUTIONAL NEGATIVE: 1
WEIGHT LOSS: 0
ABDOMINAL PAIN: 1
DRY SKIN: 1
WEIGHT LOSS: 0
MOTTLING: 1
SHORTNESS OF BREATH: 0
RESPIRATORY NEGATIVE: 1
PAIN SEVERITY GOAL: 3/10
DIZZINESS: 0
BRUISES/BLEEDS EASILY: 0
COUGH: 0
FEVER: 0
DIAPHORESIS: 0
WEAKNESS: 0
COUGH: 0
CARDIOVASCULAR NEGATIVE: 1
FEVER: 0
COUGH: 0
WEAKNESS: 1
NERVOUS/ANXIOUS: 1
MYALGIAS: 0
PALPITATIONS: 0
WEIGHT LOSS: 0
CONSTITUTIONAL NEGATIVE: 1
VOMITING: 1
HEADACHES: 0
VOMITING: 0
HEADACHES: 0
DIAPHORESIS: 0
FEVER: 0
FLANK PAIN: 0
BRUISES/BLEEDS EASILY: 0
INCREASED SLEEPING: 1
NECK PAIN: 0
CHILLS: 0
PSYCHIATRIC NEGATIVE: 1
DYSPNEA ON EXERTION: 1
EYES NEGATIVE: 1
MYALGIAS: 0
RESPIRATORY NEGATIVE: 1
CARDIOVASCULAR NEGATIVE: 1
FOCAL WEAKNESS: 0
RESPIRATORY NEGATIVE: 1
SORE THROAT: 0
SORE THROAT: 0
BLURRED VISION: 0
VOMITING: 0
PALPITATIONS: 0
PAIN LOCATION - PAIN FREQUENCY: CONSTANT
WEIGHT LOSS: 0
SHORTNESS OF BREATH: 0
MUSCULOSKELETAL NEGATIVE: 1
PALPITATIONS: 0
NAUSEA: 1
WHEEZING: 0
DIAPHORESIS: 0
NAUSEA: 0
WEIGHT LOSS: 0
MYALGIAS: 0
NEUROLOGICAL NEGATIVE: 1
WEAKNESS: 0
DEPRESSION: 0
NAUSEA: 1
MUSCULOSKELETAL NEGATIVE: 1
SUBJECTIVE PAIN PROGRESSION: WAXING AND WANING
PSYCHIATRIC NEGATIVE: 1
WEAKNESS: 0
FEVER: 0
NERVOUS/ANXIOUS: 0
ABDOMINAL PAIN: 1
CHILLS: 0
PSYCHIATRIC NEGATIVE: 1
DIZZINESS: 0
WEIGHT LOSS: 1
DEPRESSION: 0
CONSTITUTIONAL NEGATIVE: 1
FOCAL WEAKNESS: 0
PAIN SEVERITY GOAL: 0/10
CHILLS: 0
WEAKNESS: 1
COUGH: 0
CHILLS: 0
WEAKNESS: 1
HEMOPTYSIS: 0
HIGHEST PAIN SEVERITY IN PAST 24 HOURS: 2/10
SORE THROAT: 0
VOMITING: 0
COUGH: 0
DIAPHORESIS: 0
RESPIRATORY NEGATIVE: 1
HEADACHES: 0
DIZZINESS: 0
PALPITATIONS: 0
PHOTOPHOBIA: 0
SORE THROAT: 0
NEUROLOGICAL NEGATIVE: 1
CONSTITUTIONAL NEGATIVE: 1
MUSCULOSKELETAL NEGATIVE: 1
EYES NEGATIVE: 1
HEADACHES: 0
BLURRED VISION: 0
BRUISES/BLEEDS EASILY: 0
SHORTNESS OF BREATH: 0
DIAPHORESIS: 0
FOCAL WEAKNESS: 0
PAIN LOCATION - PAIN QUALITY: BURNING, SHARP
EYES NEGATIVE: 1
CONSTITUTIONAL NEGATIVE: 1
MYALGIAS: 0
BRUISES/BLEEDS EASILY: 0
FOCAL WEAKNESS: 0
DIZZINESS: 0
MYALGIAS: 0
MUSCULOSKELETAL NEGATIVE: 1
WEAKNESS: 0
WEAKNESS: 0
DIZZINESS: 0
CARDIOVASCULAR NEGATIVE: 1
NAUSEA: 0
HEADACHES: 0
VOMITING: 1
FOCAL WEAKNESS: 0
WEIGHT LOSS: 1
FEVER: 0
BRUISES/BLEEDS EASILY: 0
BLURRED VISION: 0
NEUROLOGICAL NEGATIVE: 1
CONSTITUTIONAL NEGATIVE: 1
DIZZINESS: 0
SLEEP QUALITY: FAIR
FOCAL WEAKNESS: 0
MUSCULOSKELETAL NEGATIVE: 1
SHORTNESS OF BREATH: 0
PAIN LOCATION: MOUTH/THROAT
NAUSEA: 1
NAUSEA: 1
SHORTNESS OF BREATH: 0
DIZZINESS: 0
WEIGHT LOSS: 0
VOMITING: 0

## 2025-01-01 ASSESSMENT — PAIN SCALES - PAIN ASSESSMENT IN ADVANCED DEMENTIA (PAINAD)
NEGVOCALIZATION: 0 - NONE.
TOTALSCORE: 0
CONSOLABILITY: 0 - NO NEED TO CONSOLE.
FACIALEXPRESSION: 1 - SAD. FRIGHTENED. FROWN.
BODYLANGUAGE: 0 - RELAXED.
NEGVOCALIZATION: 1 - OCCASIONAL MOAN OR GROAN. LOW-LEVEL SPEECH WITH A NEGATIVE OR DISAPPROVING QUALITY.
BODYLANGUAGE: 0 - RELAXED.
FACIALEXPRESSION: 0 - SMILING OR INEXPRESSIVE.
TOTALSCORE: 3
BODYLANGUAGE: 1 - TENSE. DISTRESSED PACING. FIDGETING.
CONSOLABILITY: 0 - NO NEED TO CONSOLE.
TOTALSCORE: 2
FACIALEXPRESSION: 0 - SMILING OR INEXPRESSIVE.
FACIALEXPRESSION: 0 - SMILING OR INEXPRESSIVE.
BODYLANGUAGE: 0 - RELAXED.
NEGVOCALIZATION: 0 - NONE.
FACIALEXPRESSION: 0 - SMILING OR INEXPRESSIVE.
NEGVOCALIZATION: 0 - NONE.
TOTALSCORE: 1
CONSOLABILITY: 0 - NO NEED TO CONSOLE.
CONSOLABILITY: 0 - NO NEED TO CONSOLE.
BODYLANGUAGE: 0 - RELAXED.
TOTALSCORE: 1
CONSOLABILITY: 0 - NO NEED TO CONSOLE.
NEGVOCALIZATION: 1 - OCCASIONAL MOAN OR GROAN. LOW-LEVEL SPEECH WITH A NEGATIVE OR DISAPPROVING QUALITY.

## 2025-01-01 ASSESSMENT — PAIN DESCRIPTION - PAIN TYPE
TYPE: ACUTE PAIN
TYPE: ACUTE PAIN;SURGICAL PAIN
TYPE: ACUTE PAIN
TYPE: ACUTE PAIN
TYPE: ACUTE PAIN;SURGICAL PAIN
TYPE: ACUTE PAIN
TYPE: ACUTE PAIN;CHRONIC PAIN;SURGICAL PAIN;NEUROPATHIC PAIN
TYPE: ACUTE PAIN
TYPE: ACUTE PAIN;OTHER (COMMENT)
TYPE: ACUTE PAIN
TYPE: OTHER (COMMENT)
TYPE: OTHER (COMMENT);ACUTE PAIN
TYPE: OTHER (COMMENT)
TYPE: ACUTE PAIN
TYPE: ACUTE PAIN
TYPE: CHRONIC PAIN
TYPE: ACUTE PAIN
TYPE: CHRONIC PAIN
TYPE: ACUTE PAIN
TYPE: ACUTE PAIN
TYPE: CHRONIC PAIN
TYPE: ACUTE PAIN
TYPE: ACUTE PAIN;SURGICAL PAIN
TYPE: ACUTE PAIN
TYPE: ACUTE PAIN;SURGICAL PAIN
TYPE: ACUTE PAIN
TYPE: OTHER (COMMENT)
TYPE: CHRONIC PAIN
TYPE: ACUTE PAIN
TYPE: ACUTE PAIN;SURGICAL PAIN
TYPE: ACUTE PAIN
TYPE: OTHER (COMMENT)
TYPE: ACUTE PAIN
TYPE: ACUTE PAIN;SURGICAL PAIN
TYPE: ACUTE PAIN
TYPE: ACUTE PAIN;SURGICAL PAIN
TYPE: ACUTE PAIN

## 2025-01-01 ASSESSMENT — LIFESTYLE VARIABLES
HOW MANY TIMES IN THE PAST YEAR HAVE YOU HAD 5 OR MORE DRINKS IN A DAY: 0
SUBSTANCE_ABUSE: 0
ALCOHOL_USE: NO
HAVE YOU EVER FELT YOU SHOULD CUT DOWN ON YOUR DRINKING: NO
SUBSTANCE_ABUSE: 0
HAVE YOU EVER FELT YOU SHOULD CUT DOWN ON YOUR DRINKING: NO
TOTAL SCORE: 0
HOW MANY TIMES IN THE PAST YEAR HAVE YOU HAD 5 OR MORE DRINKS IN A DAY: 0
TOTAL SCORE: 0
EVER HAD A DRINK FIRST THING IN THE MORNING TO STEADY YOUR NERVES TO GET RID OF A HANGOVER: NO
HAVE PEOPLE ANNOYED YOU BY CRITICIZING YOUR DRINKING: NO
SUBSTANCE_ABUSE: 0
SUBSTANCE_ABUSE: 0
DOES PATIENT WANT TO STOP DRINKING: NO
DOES PATIENT WANT TO STOP DRINKING: NO
SUBSTANCE_ABUSE: 0
SUBSTANCE_ABUSE: 0
EVER HAD A DRINK FIRST THING IN THE MORNING TO STEADY YOUR NERVES TO GET RID OF A HANGOVER: NO
AVERAGE NUMBER OF DAYS PER WEEK YOU HAVE A DRINK CONTAINING ALCOHOL: 0
TOTAL SCORE: 0
SUBSTANCE_ABUSE: 0
AVERAGE NUMBER OF DAYS PER WEEK YOU HAVE A DRINK CONTAINING ALCOHOL: 0
TOTAL SCORE: 0
ON A TYPICAL DAY WHEN YOU DRINK ALCOHOL HOW MANY DRINKS DO YOU HAVE: 0
ALCOHOL_USE: NO
HAVE PEOPLE ANNOYED YOU BY CRITICIZING YOUR DRINKING: NO
TOTAL SCORE: 0
CONSUMPTION TOTAL: NEGATIVE
EVER FELT BAD OR GUILTY ABOUT YOUR DRINKING: NO
TOTAL SCORE: 0
SUBSTANCE_ABUSE: 0
EVER FELT BAD OR GUILTY ABOUT YOUR DRINKING: NO
SUBSTANCE_ABUSE: 0
CONSUMPTION TOTAL: NEGATIVE
SUBSTANCE_ABUSE: 0
ON A TYPICAL DAY WHEN YOU DRINK ALCOHOL HOW MANY DRINKS DO YOU HAVE: 0

## 2025-01-01 ASSESSMENT — COGNITIVE AND FUNCTIONAL STATUS - GENERAL
WALKING IN HOSPITAL ROOM: A LITTLE
SUGGESTED CMS G CODE MODIFIER DAILY ACTIVITY: CJ
MOBILITY SCORE: 18
MOBILITY SCORE: 18
DRESSING REGULAR UPPER BODY CLOTHING: A LITTLE
CLIMB 3 TO 5 STEPS WITH RAILING: A LITTLE
PERSONAL GROOMING: A LITTLE
MOVING FROM LYING ON BACK TO SITTING ON SIDE OF FLAT BED: A LITTLE
EATING MEALS: A LITTLE
CLIMB 3 TO 5 STEPS WITH RAILING: A LITTLE
DAILY ACTIVITIY SCORE: 18
SUGGESTED CMS G CODE MODIFIER DAILY ACTIVITY: CK
STANDING UP FROM CHAIR USING ARMS: A LITTLE
TURNING FROM BACK TO SIDE WHILE IN FLAT BAD: A LITTLE
MOVING TO AND FROM BED TO CHAIR: A LITTLE
DRESSING REGULAR LOWER BODY CLOTHING: A LITTLE
TOILETING: A LITTLE
SUGGESTED CMS G CODE MODIFIER MOBILITY: CK
HELP NEEDED FOR BATHING: A LITTLE
TOILETING: A LITTLE
HELP NEEDED FOR BATHING: A LITTLE
TURNING FROM BACK TO SIDE WHILE IN FLAT BAD: A LITTLE
STANDING UP FROM CHAIR USING ARMS: A LITTLE
SUGGESTED CMS G CODE MODIFIER MOBILITY: CK
WALKING IN HOSPITAL ROOM: A LITTLE
DAILY ACTIVITIY SCORE: 20
MOVING TO AND FROM BED TO CHAIR: A LITTLE
MOVING FROM LYING ON BACK TO SITTING ON SIDE OF FLAT BED: A LITTLE
DRESSING REGULAR LOWER BODY CLOTHING: A LITTLE
DRESSING REGULAR UPPER BODY CLOTHING: A LITTLE

## 2025-01-01 ASSESSMENT — SOCIAL DETERMINANTS OF HEALTH (SDOH)
IN THE PAST 12 MONTHS, HAS THE ELECTRIC, GAS, OIL, OR WATER COMPANY THREATENED TO SHUT OFF SERVICE IN YOUR HOME?: NO
IN THE PAST 12 MONTHS, HAS THE ELECTRIC, GAS, OIL, OR WATER COMPANY THREATENED TO SHUT OFF SERVICE IN YOUR HOME?: NO
WITHIN THE LAST YEAR, HAVE YOU BEEN AFRAID OF YOUR PARTNER OR EX-PARTNER?: NO
WITHIN THE LAST YEAR, HAVE YOU BEEN AFRAID OF YOUR PARTNER OR EX-PARTNER?: NO
WITHIN THE LAST YEAR, HAVE YOU BEEN KICKED, HIT, SLAPPED, OR OTHERWISE PHYSICALLY HURT BY YOUR PARTNER OR EX-PARTNER?: NO
WITHIN THE PAST 12 MONTHS, YOU WORRIED THAT YOUR FOOD WOULD RUN OUT BEFORE YOU GOT THE MONEY TO BUY MORE: NEVER TRUE
WITHIN THE LAST YEAR, HAVE YOU BEEN HUMILIATED OR EMOTIONALLY ABUSED IN OTHER WAYS BY YOUR PARTNER OR EX-PARTNER?: NO
IN THE PAST 12 MONTHS, HAS THE ELECTRIC, GAS, OIL, OR WATER COMPANY THREATENED TO SHUT OFF SERVICE IN YOUR HOME?: NO
WITHIN THE PAST 12 MONTHS, YOU WORRIED THAT YOUR FOOD WOULD RUN OUT BEFORE YOU GOT THE MONEY TO BUY MORE: NEVER TRUE
WITHIN THE LAST YEAR, HAVE TO BEEN RAPED OR FORCED TO HAVE ANY KIND OF SEXUAL ACTIVITY BY YOUR PARTNER OR EX-PARTNER?: NO
ACTIVE STRESSOR - LOSS OF CONTROL: 1
WITHIN THE PAST 12 MONTHS, THE FOOD YOU BOUGHT JUST DIDN'T LAST AND YOU DIDN'T HAVE MONEY TO GET MORE: NEVER TRUE
WITHIN THE PAST 12 MONTHS, YOU WORRIED THAT YOUR FOOD WOULD RUN OUT BEFORE YOU GOT THE MONEY TO BUY MORE: NEVER TRUE
ACTIVE STRESSOR - HEALTH CHANGES: 1
WITHIN THE LAST YEAR, HAVE YOU BEEN AFRAID OF YOUR PARTNER OR EX-PARTNER?: NO
WITHIN THE LAST YEAR, HAVE YOU BEEN KICKED, HIT, SLAPPED, OR OTHERWISE PHYSICALLY HURT BY YOUR PARTNER OR EX-PARTNER?: NO
WITHIN THE LAST YEAR, HAVE TO BEEN RAPED OR FORCED TO HAVE ANY KIND OF SEXUAL ACTIVITY BY YOUR PARTNER OR EX-PARTNER?: NO
WITHIN THE LAST YEAR, HAVE TO BEEN RAPED OR FORCED TO HAVE ANY KIND OF SEXUAL ACTIVITY BY YOUR PARTNER OR EX-PARTNER?: NO
WITHIN THE LAST YEAR, HAVE YOU BEEN HUMILIATED OR EMOTIONALLY ABUSED IN OTHER WAYS BY YOUR PARTNER OR EX-PARTNER?: NO
WITHIN THE LAST YEAR, HAVE YOU BEEN KICKED, HIT, SLAPPED, OR OTHERWISE PHYSICALLY HURT BY YOUR PARTNER OR EX-PARTNER?: NO
WITHIN THE PAST 12 MONTHS, THE FOOD YOU BOUGHT JUST DIDN'T LAST AND YOU DIDN'T HAVE MONEY TO GET MORE: NEVER TRUE
WITHIN THE PAST 12 MONTHS, THE FOOD YOU BOUGHT JUST DIDN'T LAST AND YOU DIDN'T HAVE MONEY TO GET MORE: NEVER TRUE
WITHIN THE LAST YEAR, HAVE YOU BEEN HUMILIATED OR EMOTIONALLY ABUSED IN OTHER WAYS BY YOUR PARTNER OR EX-PARTNER?: NO

## 2025-01-01 ASSESSMENT — ACTIVITIES OF DAILY LIVING (ADL)
CURRENT_FUNCTION: ONE PERSON
BATHING_REQUIRES_ASSISTANCE: 1
AMBULATION_REQUIRES_ASSISTANCE: 1
MONEY MANAGEMENT (EXPENSES/BILLS): NEEDS ASSISTANCE
DRESSING_REQUIRES_ASSISTANCE: 1
PHYSICAL_TRANSFER_REQUIRES_ASSISTANCE: 1
CONTINENCE_REQUIRES_ASSISTANCE: 1
AMBULATION ASSISTANCE: NON-AMBULATORY
AMBULATION ASSISTANCE: ONE PERSON
CURRENT_FUNCTION: TWO PERSON

## 2025-01-01 ASSESSMENT — PATIENT HEALTH QUESTIONNAIRE - PHQ9
2. FEELING DOWN, DEPRESSED, IRRITABLE, OR HOPELESS: NOT AT ALL
CLINICAL INTERPRETATION OF PHQ2 SCORE: 0
2. FEELING DOWN, DEPRESSED, IRRITABLE, OR HOPELESS: NOT AT ALL
SUM OF ALL RESPONSES TO PHQ9 QUESTIONS 1 AND 2: 0
1. LITTLE INTEREST OR PLEASURE IN DOING THINGS: NOT AT ALL
CLINICAL INTERPRETATION OF PHQ2 SCORE: 0
SUM OF ALL RESPONSES TO PHQ9 QUESTIONS 1 AND 2: 0
1. LITTLE INTEREST OR PLEASURE IN DOING THINGS: NOT AT ALL

## 2025-01-01 ASSESSMENT — FIBROSIS 4 INDEX
FIB4 SCORE: 1.36
FIB4 SCORE: 0.46
FIB4 SCORE: 0.46

## 2025-01-01 ASSESSMENT — PAIN SCALES - WONG BAKER: WONGBAKER_NUMERICALRESPONSE: DOESN'T HURT AT ALL

## 2025-01-08 ENCOUNTER — APPOINTMENT (OUTPATIENT)
Dept: SURGICAL ONCOLOGY | Facility: MEDICAL CENTER | Age: 70
End: 2025-01-08
Payer: MEDICARE

## 2025-02-05 ENCOUNTER — APPOINTMENT (OUTPATIENT)
Dept: RADIOLOGY | Facility: MEDICAL CENTER | Age: 70
End: 2025-02-05
Attending: STUDENT IN AN ORGANIZED HEALTH CARE EDUCATION/TRAINING PROGRAM
Payer: MEDICARE

## 2025-02-12 ENCOUNTER — APPOINTMENT (OUTPATIENT)
Dept: HEMATOLOGY ONCOLOGY | Facility: MEDICAL CENTER | Age: 70
End: 2025-02-12
Attending: STUDENT IN AN ORGANIZED HEALTH CARE EDUCATION/TRAINING PROGRAM
Payer: MEDICARE

## 2025-02-21 ENCOUNTER — TELEPHONE (OUTPATIENT)
Dept: INFECTIOUS DISEASES | Facility: MEDICAL CENTER | Age: 70
End: 2025-02-21
Payer: MEDICARE

## 2025-02-21 NOTE — TELEPHONE ENCOUNTER
Received call from patients wife doyle to schedule hospital follow up appointment. Patient seen December last year. Discharged to hospice and patient continued flagyl for this time. Patient no longer on hospice and would like to have a follow up in clinic to discuss with ID provider. Patient scheduled appt at this time asked to have referral place to our office as  well
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
## 2025-03-04 NOTE — Clinical Note
REFERRAL APPROVAL NOTICE         Sent on March 4, 2025                   Josh Lerner  [address] [facility] [address]                   Dear Mr. Lernre,    After a careful review of the medical information and benefit coverage, [facility] has processed your referral. See below for additional details.    If applicable, you must be actively enrolled with your insurance for coverage of the authorized service. If you have any questions regarding your coverage, please contact your insurance directly.    REFERRAL INFORMATION   Referral #:  [id number]  Referred-To Department    Referred-By Provider:  Oncology    FAITH Park   [facility]      [address]  [address]  [place] [address]  [phone] [address]  [place] [address]  [phone]    Referral Start Date:  03/04/2025  Referral End Date:   03/04/2026             SCHEDULING  If you do not already have an appointment, please call [phone] to make an appointment.     MORE INFORMATION  If you do not already have a [employer] account, sign up at: [email or web address]  You can access your medical information, make appointments, see lab results, billing information, and more.  If you have questions regarding this referral, please contact  the [facility] Referrals department at:             [phone]. Monday - Friday 8:00AM - 5:00PM.     Sincerely,    [facility]

## 2025-03-06 NOTE — Clinical Note
REFERRAL APPROVAL NOTICE         Sent on March 6, 2025                   Josh Lerner  [address] [facility] [address]                   Dear Mr. Lerner,    After a careful review of the medical information and benefit coverage, [facility] has processed your referral. See below for additional details.    If applicable, you must be actively enrolled with your insurance for coverage of the authorized service. If you have any questions regarding your coverage, please contact your insurance directly.    REFERRAL INFORMATION   Referral #:  [id number]  Referred-To Department    Referred-By Provider:  General Surgery    FAITH Park   Department Of Surgery      [address]  [address]  [place] [address]  [phone] [address]  [place] [address]  [phone]    Referral Start Date:  03/04/2025  Referral End Date:   03/04/2026             SCHEDULING  If you do not already have an appointment, please call [phone] to make an appointment.     MORE INFORMATION  If you do not already have a [employer] account, sign up at: [email or web address]  You can access your medical information, make appointments, see lab results, billing information, and more.  If you have questions regarding this referral, please contact  the [facility] Referrals department at:             [phone]. Monday - Friday 8:00AM - 5:00PM.     Sincerely,    [facility]

## 2025-03-06 NOTE — Clinical Note
REFERRAL APPROVAL NOTICE         Sent on March 5, 2025                   Josh Lerner  [address] [facility] [address]                   Dear Mr. Lerner,    After a careful review of the medical information and benefit coverage, [facility] has processed your referral. See below for additional details.    If applicable, you must be actively enrolled with your insurance for coverage of the authorized service. If you have any questions regarding your coverage, please contact your insurance directly.    REFERRAL INFORMATION   Referral #:  [id number]  Referred-To Department    Referred-By Provider:  Wound Care    FAITH Park   [facility]      [address] [facility] [address]  [facility] [address]  [phone] [address] [place] [address] [address]  [facility] [id number] [phone]    Referral Start Date:  03/04/2025  Referral End Date:   03/04/2026             SCHEDULING  If you do not already have an appointment, please call [phone] to make an appointment.     MORE INFORMATION  If you do not already have a [employer] account, sign up at: [email or web address]  You can access your medical information, make appointments, see lab results, billing information, and more.  If you have questions regarding this referral, please contact  the [facility] Referrals department at:             [phone]. Monday - Friday 8:00AM - 5:00PM.     Sincerely,    [facility]

## 2025-03-07 NOTE — TELEPHONE ENCOUNTER
Returned call to patients wife, LVM to schedule follow up per her request, did inform that Dr. Kirby is booking out until April currently

## 2025-03-09 PROBLEM — K56.609 SBO (SMALL BOWEL OBSTRUCTION) (HCC): Status: ACTIVE | Noted: 2025-01-01

## 2025-03-09 PROBLEM — K65.1 PELVIC ABSCESS IN MALE (HCC): Status: ACTIVE | Noted: 2025-01-01

## 2025-03-10 NOTE — CARE PLAN
Problem: Knowledge Deficit - Standard  Goal: Patient and family/care givers will demonstrate understanding of plan of care, disease process/condition, diagnostic tests and medications  Outcome: Progressing     Problem: Pain - Standard  Goal: Alleviation of pain or a reduction in pain to the patient’s comfort goal  Outcome: Progressing     Problem: Fall Risk  Goal: Patient will remain free from falls  Outcome: Progressing     Problem: Gastrointestinal Irritability  Goal: Nausea and vomiting will be absent or improve  Outcome: Progressing    Shift Goals  Clinical Goals: NG insertion, admission, pain/nausea control  Patient Goals: comfort, feel better

## 2025-03-10 NOTE — ASSESSMENT & PLAN NOTE
3/17/2025  Please blood cultures in December 2024 were positive for actinomyces  Has been on p.o. Augmentin per ID recommendation  Current npo, on iv cefazolin/ flagyl  Following  Cbc in am

## 2025-03-10 NOTE — PROGRESS NOTES
Patient admitted to floor. Report received from day shift RN.      Patient reports no pain at this time. Educated patient regarding pharmacologic and non pharmacologic modalities for pain management.     A&Ox 4.   Denies CP/SOB.  Reports nausea.  Urostomy and colostomy in place.  See 2 RN skin note.    /73   Pulse (!) 106   Temp 36.9 °C (98.4 °F) (Temporal)   Resp 18   Ht 1.829 m (6')   Wt 93.4 kg (205 lb 14.6 oz)   SpO2 92%   BMI 27.93 kg/m²       Oriented to room, call light, and policies. Reviewed plan of care with patient and family.   All needs met at this time. Call light and belongings within reach. Patient calls appropriately. Bed low and locked. Fall precautions in place. Hourly rounding in place.
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
## 2025-03-10 NOTE — ASSESSMENT & PLAN NOTE
3/17/2025  69-year-old male with recurrent prostate cancer, colostomy end ileostomy in place, multiple abdominal surgeries, presented with diffuse abdominal pain, distention, nausea, vomiting  CT of the abdomen pelvis with contrast: High-grade severe small bowel obstruction with transition point left lower quadrant with dilated loops small bowel up to 5.5 cm.    continue NG tube to low continuous suction and, continue bowel rest  IV Zofran as needed for nausea  IV fluid support  IV morphine as needed for pain  Monitor for respiratory depression secondary to IV morphine  Continuous pulse oximetry  Discussed with Dr. Desai     No further options to offer in terms of surgical intervention or chemo.  NG tube to suction.  Clear liquids as tolerated

## 2025-03-10 NOTE — ASSESSMENT & PLAN NOTE
3/17/2025  Patient has not been followed by oncology since he was transition to hospice in December.

## 2025-03-10 NOTE — ASSESSMENT & PLAN NOTE
Patient was of sound mind and voluntarily participated in the conversation.  Patient and family were present for the conversation.  I discussed advance care planning face to face with the patient and family for at least 29 minutes, including diagnosis, prognosis, plan of care, risks and benefits of any therapies that could be offered, as well as alternatives including palliation and hospice, as appropriate.  Forms were completed and placed in the chart.    Remains DNR/DNI.  Plan to go comfort care tomorrow.    3/19 patient continues to have GI symptoms and requiring NG tube in place.  Patient have seen the family members and elected to comfort care.  He understands that we will discontinue current treatments, IV fluid.  The main focus will be to manage his pain and anxiety.  His life expectancy is very guarded.  He wishes to suffer less and feels ready to accept to the ending.  I discussed with palliative team and hospice team.  Comfort care placed.  ACP 20 minutes.

## 2025-03-10 NOTE — PROGRESS NOTES
Patient has NG tube in place: Right  nare    Skin integrity beneath NG tube assessed with MARITZA Oh    Skin underneath NG tube is visible: rachel    NG retaped to visualize skin underneath: initial tape    Skin description: clean dry intact, no redness or irritation

## 2025-03-10 NOTE — PROGRESS NOTES
"    DEPARTMENT OF SURGERY  COLORECTAL SURGERY    PROGRESS NOTE        Interval Events:   69y M with known hx of locally advanced rectal cancer.  He has previously been treated with APR, Cystectomy and Prostatectomy, and has a permanent colostomy and urostomy in place.  He more recently had an excision of his infected perineal wound and was noted to have metastatic disease present.  He has been struggling with wound healing since. Dec 2024, he was admitted as an inpatient due to sepsis and was found to have a large fungating mass in the pelvis with associated fluid collections consistent with metastatic cancer and secondary infection. He presented to  [facility] ER 3/9 complaining of diffuse abdominal pain, nausea and vomiting as well as minimal oral intake for 1-2 weeks due to nausea but continued having colostomy output.       Subjective:     Josh reports feeling uncomfortable this morning, primarily due to NGT. He is using sponges to wet mouth, but is feeling quite hungry. Notes that even a small sip of water triggers vomiting. Minimal lower abdominal pain. New bag on ostomy, no output noted.     He continues to express an interest in surgery and reports that he wants to \"live just a little bit longer\". He is not interested in reinstating hospice at this time and continues to express hope that once his pelvic infection is treated, he will be able to undergo surgery.     Otherwise, no recent history of fevers/chills, CP/SOB, bloating, worsening abdominal pain. No other symptoms or complaints at this time.     Objective:    /74   Pulse 98   Temp 36.5 °C (97.7 °F) (Temporal)   Resp 18   Ht 1.829 m (6')   Wt 93.4 kg (205 lb 14.6 oz)   SpO2 92%     I/O last 3 completed shifts:  In: -   Out: 760 [Urine:710]    Current Facility-Administered Medications   Medication Dose    NS infusion      acetaminophen (Tylenol) tablet 650 mg  650 mg    oxyCODONE immediate-release (Roxicodone) tablet 2.5 mg  2.5 mg "    Or    oxyCODONE immediate-release (Roxicodone) tablet 5 mg  5 mg    Or    morphine 4 MG/ML injection 2 mg  2 mg    senna-docusate (Pericolace Or Senokot S) 8.6-50 MG per tablet 2 Tablet  2 Tablet    And    polyethylene glycol/lytes (Miralax) Packet 1 Packet  1 Packet    hydrALAZINE (Apresoline) injection 10 mg  10 mg    ondansetron (Zofran) syringe/vial injection 4 mg  4 mg    Or    ondansetron (Zofran ODT) dispertab 4 mg  4 mg    famotidine (Pepcid) injection 20 mg  20 mg    cefepime (Maxipime) in D5W IV syringe 2 g  2 g    metroNIDAZOLE (Flagyl) IVPB 500 mg  500 mg    Pharmacy Consult: Enteral tube insertion - review meds/change route/product selection      enoxaparin (Lovenox) inj 100 mg  100 mg       Physical Exam:   Vitals and nursing note reviewed. Constitutional:       General: He is not in acute distress.     Appearance: Normal appearance. He is not diaphoretic.   HENT:      Head: Normocephalic.      Nose: Nose normal.      Mouth/Throat:      Mouth: Mucous membranes are dry.   Eyes:      Pupils: Pupils are equal, round, and reactive to light.   Cardiovascular:      Rate and Rhythm: Normal rate and regular rhythm.      Pulses: Normal pulses.   Pulmonary:      Effort: Pulmonary effort is normal.   Abdominal:      General: There is distension.      Palpations: Abdomen is soft.      Tenderness: There is abdominal tenderness (mild).      Comments: Colostomy in place, no output  Urostomy with bag in place, yellow urine. NGT in place  Musculoskeletal:         General: No swelling or deformity. Normal range of motion.   Skin:     General: Skin is warm and dry.      Capillary Refill: Capillary refill takes less than 2 seconds.    Neurological:      General: No focal deficit present.      Mental Status: He is alert and oriented to person, place, and time.      Cranial Nerves: No cranial nerve deficit.   Psychiatric:         Mood and Affect: Mood normal. Tearful.         Behavior: Behavior normal.    Labs:    Recent  Labs     03/09/25  2240 03/10/25  0039   WBC 11.8* 13.9*   RBC 4.66* 4.57*   HEMOGLOBIN 11.8* 11.9*   HEMATOCRIT 38.5* 37.4*   MCV 82.6 81.8   MCH 25.3* 26.0*   MCHC 30.6* 31.8*   RDW 56.6* 57.2*   PLATELETCT 364 388   MPV 8.9* 8.9*     Recent Labs     03/09/25  2240 03/10/25  0039   SODIUM 133* 135   POTASSIUM 4.5 4.7   CHLORIDE 99 99   CO2 24 23   GLUCOSE 132* 126*   BUN 13 13     Imaging:    Outside CT impression:  CT of the abdomen pelvis with contrast: High-grade severe small bowel obstruction with transition point left lower quadrant with dilated loops small bowel up to 5.5 cm.   Alveolar process posterior basal segment left lower lobe probably pneumonia, aspiration and differential.  Severe three-vessel coronary artery calcifications.  Cholelithiasis.  Left parastomal hernia with small and large bowel abutting once another, not etiology of bowel obstruction.     DX-ABDOMEN FOR TUBE PLACEMENT  Result Date: 3/10/2025   3/10/2025 1:35 AM HISTORY/REASON FOR EXAM: Nasogastric tube placement TECHNIQUE/EXAM DESCRIPTION: Single AP view of the abdomen COMPARISON:  None FINDINGS: Hazy left lower lobe opacities are seen. Atherosclerotic changes are seen. Nasogastric tube is seen, the tip overlies the lumbar spine.  The bowel gas pattern in the upper abdomen appears nonspecific. The bony structures appear age-appropriate.     1.  Nonspecific bowel gas pattern in the upper abdomen. 2.  Hazy left lower lobe infiltrates 3.  Nasogastric tube tip terminates overlying the expected location of the gastric antrum. 4.  Atherosclerosis      Assessment/Plan:    69y M here with metastatic cancer with high grade SBO. He remains a poor surgical candidate as removal of metastatic locally invasive disease is unlikely to clear the cancer, very highly risky and morbid, and likely to lead to prolonged hospitalization. Gently reiterated this information with him at bedside, though he continues to express a desire for surgical intervention.  We will follow while inpatient for any surgical questions. But agree with NGT, NPO, and abx in the setting of SBO.     3/10/2025

## 2025-03-10 NOTE — PROGRESS NOTES
NG tube placed by MARITZA Juarez per orders. X-ray confirmed placement. Began low continuous suction.

## 2025-03-10 NOTE — PROGRESS NOTES
4 Eyes Skin Assessment Completed by MARITZA Engle and MARITZA Francisco.    Head WDL  Ears WDL  Nose WDL  Mouth WDL  Neck WDL  Breast/Chest WDL  Shoulder Blades WDL  Spine WDL  (R) Arm/Elbow/Hand WDL  (L) Arm/Elbow/Hand WDL  Abdomen LLQ ostomy, RLQ urostomy  Groin WDL  Scrotum/Coccyx/Buttocks Previous open wound to anus. Discoloration, redness, and blanching    (R) Leg Edema  (L) Leg Edema  (R) Heel/Foot/Toe Edema, dry and flaky  (L) Heel/Foot/Toe Edema, dry and flaky      Devices In Places Pulse Ox, ostomy appliance, urostomy appliance      Interventions In Place Pillows and Pressure Redistribution Mattress    Possible Skin Injury Yes    Pictures Uploaded Into Epic Yes  Wound Consult Placed Yes  RN Wound Prevention Protocol Ordered No

## 2025-03-10 NOTE — PROGRESS NOTES
Patient has NG tube in place: Right nare    Skin integrity beneath NG tube assessed with Karine ACT B    Skin underneath NG tube is visible: yes    NG retaped to visualize skin underneath: no. Why? Patient refused d/t pain/tenderness from recent insertion.     Skin description:c/d/I no redness noted

## 2025-03-10 NOTE — PROGRESS NOTES
"Hospital Medicine Daily Progress Note    Date of Service  3/10/2025    Chief Complaint  Josh Lerner is a 69 y.o. male admitted 3/9/2025 with abdominal pain     Hospital Course  Patient is a 69-year-old male with recurrent rectal cancer,  pelvic abscess, s/p resection, chemoradiation, ileostomy, colostomy and DVT( on Xarelto). He presented to  [facility] ER complaining of diffuse abdominal pain, nausea and vomiting.   He reported minimal oral intake for 1-2 weeks due to nausea but continued having colostomy output.  The day prior to admission, he developed worsening of diffuse abdominal pain and was not able to tolerate any food or fluids due to nausea and vomiting.    In December 2024, he was admitted to [facility] with a pelvic abscess secondary to rectal cancer.  Dr. Sewell, Dr. Kirby, Dr. Graham were consulted.  At that time, he was not felt to be a candidate for any surgery and upon discharge he was transitioned to hospice.   He stated that he hds been on hospice for 2 weeks, however would like to \"live as long as possible\" now and requesting full code.  He has been taking Augmentin indefinite course recommended by ID and  Xarelto for history of DVT  He was also seen at [facility] for his above symptoms.  CBC: WBC 13.9, hemoglobin 12.9, platelets 399  Chemistry: Glucose 136, sodium 133, potassium 5.3, creatinine 0.79, BUN 14, anion gap 12 CO2 26, albumin 2.4.  UA cloudy, 3+ blood, 2+ protein, trace leukocyte esterase  CT of the abdomen pelvis with contrast: High-grade severe small bowel obstruction with transition point left lower quadrant with dilated loops small bowel up to 5.5 cm.   Alveolar process posterior basal segment left lower lobe probably pneumonia, aspiration and differential.  Severe three-vessel coronary artery calcifications.  Cholelithiasis.  Left parastomal hernia with small and large bowel abutting once another, not etiology of bowel obstruction.      An NG tube was " placed    Interval Problem Update  Axox3, patient states he is feeling much better since NGT was placed, currently reports diffuse abdominal pain 2/10, minimal nausea. He denies flatus, no BM. He repeated that he no longer wants to be on hospice and wants to remain a full code. He tells me he does not believe that when he was recently told that surgery was not an option is accurate, I communicated with Dr. Desai and updated him on the situation, he will evaluate patient. Vitals stable, wbc did increase some overnight. ROS otherwise negative.     I have discussed this patient's plan of care and discharge plan at IDT rounds today with Case Management, Nursing, Nursing leadership, and other members of the IDT team.    Consultants/Specialty  [facility]     Code Status  Full Code    Disposition  The patient is not medically cleared for discharge to home or a post-acute facility.      I have placed the appropriate orders for post-discharge needs.    Review of Systems  Review of Systems   Constitutional: Negative.  Negative for chills, diaphoresis, fever, malaise/fatigue and weight loss.   HENT: Negative.  Negative for sore throat.    Eyes: Negative.  Negative for blurred vision.   Respiratory: Negative.  Negative for cough and shortness of breath.    Cardiovascular: Negative.  Negative for chest pain, palpitations and leg swelling.   Gastrointestinal:  Positive for abdominal pain and nausea. Negative for vomiting.   Genitourinary: Negative.  Negative for dysuria.   Musculoskeletal: Negative.  Negative for myalgias.   Skin: Negative.  Negative for itching and rash.   Neurological: Negative.  Negative for dizziness, focal weakness, weakness and headaches.   Endo/Heme/Allergies: Negative.  Does not bruise/bleed easily.   Psychiatric/Behavioral: Negative.  Negative for depression, substance abuse and suicidal ideas.    All other systems reviewed and are negative.       Physical Exam  Temp:  [36.5 °C (97.7 °F)-36.9 °C  (98.4 °F)] 36.5 °C (97.7 °F)  Pulse:  [] 98  Resp:  [18] 18  BP: (120-147)/(73-88) 132/74  SpO2:  [92 %-95 %] 92 %    Physical Exam  Vitals and nursing note reviewed. Exam conducted with a chaperone present.   Constitutional:       General: He is not in acute distress.     Appearance: Normal appearance. He is not diaphoretic.   HENT:      Head: Normocephalic.      Nose: Nose normal.      Mouth/Throat:      Mouth: Mucous membranes are moist.   Eyes:      Pupils: Pupils are equal, round, and reactive to light.   Cardiovascular:      Rate and Rhythm: Normal rate and regular rhythm.      Pulses: Normal pulses.      Heart sounds: Normal heart sounds.   Pulmonary:      Effort: Pulmonary effort is normal.      Breath sounds: Normal breath sounds.   Abdominal:      General: There is distension.      Palpations: Abdomen is soft.      Tenderness: There is abdominal tenderness (mild).      Comments: Colostomy in place, no output  Urostomy with bag in place, yellow urine   Musculoskeletal:         General: No swelling or deformity. Normal range of motion.   Skin:     General: Skin is warm and dry.      Capillary Refill: Capillary refill takes less than 2 seconds.      Comments: Mulitple tatoos   Neurological:      General: No focal deficit present.      Mental Status: He is alert and oriented to person, place, and time.      Cranial Nerves: No cranial nerve deficit.   Psychiatric:         Mood and Affect: Mood normal.         Behavior: Behavior normal.         Fluids    Intake/Output Summary (Last 24 hours) at 3/10/2025 1253  Last data filed at 3/10/2025 0405  Gross per 24 hour   Intake --   Output 760 ml   Net -760 ml        Laboratory  Recent Labs     03/09/25  2240 03/10/25  0039   WBC 11.8* 13.9*   RBC 4.66* 4.57*   HEMOGLOBIN 11.8* 11.9*   HEMATOCRIT 38.5* 37.4*   MCV 82.6 81.8   MCH 25.3* 26.0*   MCHC 30.6* 31.8*   RDW 56.6* 57.2*   PLATELETCT 364 388   MPV 8.9* 8.9*     Recent Labs     03/09/25  2240 03/10/25  0039    SODIUM 133* 135   POTASSIUM 4.5 4.7   CHLORIDE 99 99   CO2 24 23   GLUCOSE 132* 126*   BUN 13 13   CREATININE 0.79 0.81   CALCIUM 8.9 8.9                   Imaging  DX-ABDOMEN FOR TUBE PLACEMENT   Final Result         1.  Nonspecific bowel gas pattern in the upper abdomen.   2.  Hazy left lower lobe infiltrates   3.  Nasogastric tube tip terminates overlying the expected location of the gastric antrum.   4.  Atherosclerosis           Assessment/Plan  * SBO (small bowel obstruction) (HCC)- (present on admission)  Assessment & Plan  69-year-old male with recurrent prostate cancer, colostomy end ileostomy in place,,  multiple abdominal surgeries, presented with diffuse abdominal pain, distention, nausea, vomiting  CT of the abdomen pelvis with contrast: High-grade severe small bowel obstruction with transition point left lower quadrant with dilated loops small bowel up to 5.5 cm.    continue NG tube to low continuous suction and, continue bowel rest  IV Zofran as needed for nausea  IV fluid support  IV morphine as needed for pain  Monitor for respiratory depression secondary to IV morphine  Continuous pulse oximetry  Discussed with Dr. Desai who will eval      Pelvic abscess in male (HCC)  Assessment & Plan  In 10/1/2024, patient was taken to the OR for anorectal exam with excisional biopsy, I&D of pelvic abscess and closure.  Cultures grew Finegoldia and Peptoniphilus and path was positive for rectal adenocarcinoma   December 2024 during admission rectal surgery consulted and concluded he is not a candidate for any surgeries at this point  He has been on suppressive p.o. Augmentin and followed with ID  While patient is n.p.o. and IV Unasyn is on shortage, will place on cefepime and Flagyl  Patient was seen at ID clinic on 3/4, recommended to continue p.o. Augmentin at that time  Cbc in am     Bacteremia- (present on admission)  Assessment & Plan  Please blood cultures in December 2024 were positive for  actinomyces  Has been on p.o. Augmentin per ID recommendation  Current npo, on iv cefepime/ flagyl  Following  Cbc in am       Advance care planning- (present on admission)  Assessment & Plan  I discussed advance care planning with the patient  for  19 minutes, including diagnosis, prognosis, plan of care, risks and benefits of any therapies that could be offered, as well as alternatives including palliation and hospice, as appropriate. Patient has been made a Full code now. BILL CODE 84644.      DVT (deep venous thrombosis) (HCC)- (present on admission)  Assessment & Plan  Chronic Xarelto held while npo, continue therapeutic Lovenox while n.p.o.    Colostomy in place (HCC)- (present on admission)  Assessment & Plan  Colostomy care    Malignant neoplasm of rectum (HCC)- (present on admission)  Assessment & Plan  Patient has not been followed by oncology since he was transition to hospice in December.          VTE prophylaxis: lovenox    I have performed a physical exam and reviewed and updated ROS and Plan today (3/10/2025). In review of yesterday's note (3/9/2025), there are no changes except as documented above.

## 2025-03-10 NOTE — PROGRESS NOTES
Report received at bedside from previous shift RN   Assumed care. Pt in bed. A/O x 4. VS stable  Responds appropriately.   Pain denied, denies SOB/ denies chest pain. Provided non pharmacological interventions for comfort.  Denies N/V currently NPO ice chips okay   Patient on RA  +Void, -flatus, last BM 3/6 via ostomy per report  Patient ambulates x1 assist     Assessment complete.   Discussed POC, pt verbalizes understanding.   Explained importance of calling before getting OOB.   Call light and belongings within reach. Bed frame alarm on, patient moderate fall risk per julianna emery.   Bed in the lowest position. Treaded socks in place. Hourly rounding in progress.

## 2025-03-10 NOTE — PROGRESS NOTES
[facility] DIRECT ADMISSION REPORT  Transferring facility: [facility]  Transferring physician: Teri    Chief complaint: Abdominal pain  Pertinent history & patient course: 69-year-old male with recurrent rectal cancer,  pelvic abscess, s/p resection, chemoradiation, ileostomy, colostomy, presented at outside facility complaining of abdominal pain.  CT of the abdomen showed small bowel obstruction with transition point in left lower quadrant  Blood pressure 108/73, heart rate 120, WBC 13.  Chemistry is unremarkable per transferring physician.  Reportedly patient refused NG tube.  Transfer for surgery consult  Pertinent imaging & lab results:   Consultants called prior to transfer and pertinent input from consultants:   Code Status:  per transferring provider, I personally verified with the transferring provider patient's code status and the transferring provider has confirmed this with the patient.  Reason for Transfer: SBO  Further work up or recommendations requested prior to transfer:     Patient accepted for transfer: Yes  Accepting [facility] Facility: [facility] - Nursing to notify the Triage Coordinator in the RTOC via Voalte or Phone ext. [phone] when patient arrives to the unit. The Triage Coordinator will assign the admitting provider.    Consultants to be called upon arrival: surgery  Admission status: Inpatient.   Floor requested: med surg  If ICU transfer, name of intensivist case discussed with and pertinent input from critical care: n/a    The admitting provider is the point of contact for questions or concerns regarding patient's care.

## 2025-03-10 NOTE — H&P
"Hospital Medicine History & Physical Note    Date of Service  3/9/2025    Primary Care Physician  Christos Sandoval M.D.      Code Status  Full Code    Chief Complaint  Abdominal pain, nausea, vomiting    History of Presenting Illness  69-year-old male with recurrent rectal cancer,  pelvic abscess, s/p resection, chemoradiation, ileostomy, colostomy, DVT on Xarelto, presented at outside facility complaining of diffuse abdominal pain, nausea and vomiting  Patient stated that he has been burping in the last 2 weeks, has been having very limited oral intake, but continued having colostomy output.  Yesterday he developed worsening of diffuse abdominal pain and was not able to tolerate any food or fluids due to nausea and vomiting.  Most recently he was admitted at this hospital in December 2024 for with pelvic abscess secondary to rectal cancer.  Dr. Sewell, Dr. Kirby, Dr. Graham consulted.  Patient felt to be not a candidate for any surgery and upon discharge he was transitioned to hospice.  He states that he has been on hospice for 2 weeks, however would like to \"live as long as possible\" now and requesting full code.  He has been taking Augmentin indefinite course recommended by ID, Xarelto for history of DVT  He was seen at [facility] today for above symptoms.  CBC: WBC 13.9, hemoglobin 12.9, platelets 399  Chemistry: Glucose 136, sodium 133, potassium 5.3, creatinine 0.79, BUN 14, anion gap 12 CO2 26, albumin 2.4.  UA cloudy, 3+ blood, 2+ protein, trace leukocyte esterase  CT of the abdomen pelvis with contrast: High-grade severe small bowel obstruction with transition point left lower quadrant with dilated loops small bowel up to 5.5 cm.   Alveolar process posterior basal segment left lower lobe probably pneumonia, aspiration and differential.  Severe three-vessel coronary artery calcifications.  Cholelithiasis.  Left parastomal hernia with small and large bowel abutting once another, not etiology of bowel " obstruction.     I saw the patient at bedside.  He is burping continuously, abdomen is distended  Discussed with him result of CT scanning and I recommended NG tube placement for decompression and patient agreed.    I discussed the plan of care with patient, bedside RN, and patient's wife, CHANTEL .    Review of Systems  Review of Systems   Constitutional:  Positive for malaise/fatigue and weight loss. Negative for chills and fever.   HENT:  Negative for ear pain, hearing loss and tinnitus.    Eyes:  Negative for blurred vision, double vision and photophobia.   Respiratory:  Negative for cough, hemoptysis and sputum production.    Cardiovascular:  Negative for chest pain, palpitations and orthopnea.   Gastrointestinal:  Positive for abdominal pain, nausea and vomiting. Negative for heartburn.   Genitourinary:  Negative for dysuria, flank pain, frequency and hematuria.   Musculoskeletal:  Negative for back pain and neck pain.   Skin:  Negative for itching and rash.   Neurological:  Negative for tremors, speech change, focal weakness and headaches.   Endo/Heme/Allergies:  Negative for environmental allergies and polydipsia. Does not bruise/bleed easily.   Psychiatric/Behavioral:  Negative for hallucinations and substance abuse. The patient is not nervous/anxious.        Past Medical History   has a past medical history of Blood clotting disorder (HCC), Blood plasma poisoning, Bowel habit changes, Cancer (HCC), Dental disorder, Paralysis ([facility]) (1972), Pneumonia, and Stroke (HCC).    Surgical History   has a past surgical history that includes inguinal hernia repair bilateral (Bilateral); pr lap, surg colostomy (Left, 05/16/2023); pr sigmoidoscopy,diagnostic (N/A, 05/16/2023); cath placement (Right, 05/16/2023); pr part removal colon w anastomosis (05/19/2023); pr colostomy (05/19/2023); other; pr colostomy (03/05/2024); low anterior resection robotic xi (03/05/2024); other (06/2024); and pr biopsy of rectum (10/1/2024).      Family History  family history includes Brain Cancer in his maternal uncle; Cancer in his father and maternal grandfather; Lung Cancer in his father; Stomach Cancer in his maternal grandfather.   Family history reviewed with patient. There is no family history that is pertinent to the chief complaint.     Social History   reports that he quit smoking about 21 months ago. His smoking use included cigarettes. He has a 14 pack-year smoking history. He has never used smokeless tobacco. He reports that he does not currently use alcohol. He reports that he does not currently use drugs.    Allergies  No Known Allergies    Medications  Prior to Admission Medications   Prescriptions Last Dose Informant Patient Reported? Taking?   amoxicillin-clavulanate (AUGMENTIN) 875-125 MG Tab 3/9/2025 at  7:00 AM  No Yes   Sig: Take 1 Tablet by mouth 2 times a day for 60 days.   rivaroxaban (XARELTO) 20 MG Tab tablet 3/9/2025 at  6:00 AM  No Yes   Sig: Take 1 Tablet by mouth with dinner for 180 days.      Facility-Administered Medications: None       Physical Exam  Temp:  [36.9 °C (98.4 °F)] 36.9 °C (98.4 °F)  Pulse:  [106] 106  Resp:  [18] 18  BP: (120)/(73) 120/73  SpO2:  [92 %] 92 %  Blood Pressure : 120/73   Temperature: 36.9 °C (98.4 °F)   Pulse: (!) 106   Respiration: 18   Pulse Oximetry: 92 %       Physical Exam  Vitals and nursing note reviewed.   Constitutional:       General: He is not in acute distress.     Appearance: Normal appearance.   HENT:      Head: Normocephalic and atraumatic.      Comments: Burping constantly     Nose: Nose normal.      Mouth/Throat:      Mouth: Mucous membranes are dry.   Eyes:      Extraocular Movements: Extraocular movements intact.      Pupils: Pupils are equal, round, and reactive to light.   Cardiovascular:      Rate and Rhythm: Normal rate and regular rhythm.   Pulmonary:      Effort: Pulmonary effort is normal.      Breath sounds: Normal breath sounds.   Abdominal:      General: Abdomen  "is flat. Bowel sounds are decreased. There is distension.      Tenderness: There is no abdominal tenderness.      Comments: Colostomy empty bag, ileostomy with small amount of urine   Musculoskeletal:         General: No swelling or deformity. Normal range of motion.      Cervical back: Normal range of motion and neck supple.      Right lower leg: Edema (Trace edema) present.   Skin:     General: Skin is warm and dry.   Neurological:      General: No focal deficit present.      Mental Status: He is alert and oriented to person, place, and time.   Psychiatric:         Mood and Affect: Mood normal.         Behavior: Behavior normal.         Laboratory:          No results for input(s): \"ALTSGPT\", \"ASTSGOT\", \"ALKPHOSPHAT\", \"TBILIRUBIN\", \"DBILIRUBIN\", \"GAMMAGT\", \"AMYLASE\", \"LIPASE\", \"ALB\", \"PREALBUMIN\", \"GLUCOSE\" in the last 72 hours.      No results for input(s): \"NTPROBNP\" in the last 72 hours.      No results for input(s): \"TROPONINT\" in the last 72 hours.    Imaging:  No orders to display         Assessment/Plan:  Justification for Admission Status  I anticipate this patient will require at least two midnights for appropriate medical management, necessitating inpatient admission because of bowel obstruction    Patient will need a Med/Surg bed on MEDICAL service .  The need is secondary to small bowel obstruction.    * SBO (small bowel obstruction) (HCC)- (present on admission)  Assessment & Plan  69-year-old male with recurrent prostate cancer, colostomy end ileostomy in place,,  multiple abdominal surgeries, presented with diffuse abdominal pain, distention, nausea, vomiting  CT of the abdomen pelvis with contrast: High-grade severe small bowel obstruction with transition point left lower quadrant with dilated loops small bowel up to 5.5 cm.    Plan: To place NG tube to low continuous suction and, continue bowel rest  IV Zofran as needed for nausea  IV fluid support  IV morphine as needed for pain  Monitor for " respiratory depression secondary to IV morphine  Continuous pulse oximetry  Will plan to consult general surgery tomorrow    Pelvic abscess in male (HCC)  Assessment & Plan  In 10/1/2024, patient was taken to the OR for anorectal exam with excisional biopsy, I&D of pelvic abscess and closure.  Cultures grew Finegoldia and Peptoniphilus and path was positive for rectal adenocarcinoma   December 2024 during admission rectal surgery consulted and concluded he is not a candidate for any surgeries at this point  He has been on suppressive p.o. Augmentin and followed with ID  While patient is n.p.o. and IV Unasyn is on shortage, will place on cefepime and Flagyl  Patient was seen at ID clinic on 3/4, recommended to continue p.o. Augmentin at that time  Considering inpatient ID consult    Bacteremia- (present on admission)  Assessment & Plan  Please blood cultures in December 2024 were positive for actinomyces  Has been on p.o. Augmentin per ID recommendation      Advance care planning- (present on admission)  Assessment & Plan  I discussed advance care planning with the patient  for  19 minutes, including diagnosis, prognosis, plan of care, risks and benefits of any therapies that could be offered, as well as alternatives including palliation and hospice, as appropriate. Patient has been made a Full code now. BILL CODE 57607.      DVT (deep venous thrombosis) ([facility])- (present on admission)  Assessment & Plan  We will switch from Xarelto to therapeutic Lovenox while n.p.o.    Colostomy in place ([facility])- (present on admission)  Assessment & Plan  Colostomy care    Malignant neoplasm of rectum (HCC)- (present on admission)  Assessment & Plan  Patient has not been followed by oncology since he was transition to hospice in December.  Now he would like to reestablish with oncology.  Consider medical oncology consult        VTE prophylaxis: therapeutic anticoagulation with Lovenox    I spent a total of 79 minutes during this  clinical encounter with Mr Lerner of which > 50% was devoted to counseling and coordinating care including review of records, pertinent lab data and studies, as well as discussing diagnostic evaluation and work up, planned therapeutic interventions and future disposition of care. Where indicated, the assessment and plan reflect discussion of patient with consultants, other healthcare providers, family members, and additional research needed to obtain further information in formulating the plan of care of this patient.

## 2025-03-10 NOTE — CARE PLAN
Problem: Knowledge Deficit - Standard  Goal: Patient and family/care givers will demonstrate understanding of plan of care, disease process/condition, diagnostic tests and medications  Outcome: Progressing     Problem: Gastrointestinal Irritability  Goal: Nausea and vomiting will be absent or improve  Outcome: Progressing     The patient is Stable - Low risk of patient condition declining or worsening    Shift Goals  Clinical Goals: NG insertion, admission, pain/nausea control  Patient Goals: comfort, feel better    Progress made toward(s) clinical / shift goals:  NG tube inserted, continuous suction, brown output noted. Patient reports nausea relief with NG insertion. Patient reports minimal pain throughout night. IV abx administered per MAR.    Patient is not progressing towards the following goals:

## 2025-03-10 NOTE — CARE PLAN
The patient is Stable - Low risk of patient condition declining or worsening    Shift Goals  Clinical Goals: NG maintenance  Patient Goals: POC    Progress made toward(s) clinical / shift goals:  Patient verbalizes understanding    Patient is not progressing towards the following goals:

## 2025-03-10 NOTE — ASSESSMENT & PLAN NOTE
3/17/2025  In 10/1/2024, patient was taken to the OR for anorectal exam with excisional biopsy, I&D of pelvic abscess and closure.  Cultures grew Finegoldia and Peptoniphilus and path was positive for rectal adenocarcinoma   December 2024 during admission rectal surgery consulted and concluded he is not a candidate for any surgeries at this point  He has been on suppressive p.o. Augmentin and followed with ID  While patient is n.p.o. and IV Unasyn is on shortage, will place on cefepime and Flagyl  Patient was seen at ID clinic on 3/4

## 2025-03-11 NOTE — PROGRESS NOTES
Report received at bedside from previous shift RN   Assumed care. Pt in bed. A/O x 4. VS stable  Responds appropriately.   Pain denied, denies SOB/ denies chest pain. Provided non pharmacological interventions for comfort.  Denies N/V currently NPO ice chips okay   Patient on RA  +Void, -flatus, last BM 3/6 via ostomy per report  Patient ambulates x1 assist   NG tube to R nare to suction per order   Assessment complete.   Discussed POC, pt verbalizes understanding.   Explained importance of calling before getting OOB.   Call light and belongings within reach. Bed alarm on, patient moderate fall risk per julianna emery.   Bed in the lowest position. Treaded socks in place. Hourly rounding in progress
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
## 2025-03-11 NOTE — CARE PLAN
The patient is Stable - Low risk of patient condition declining or worsening    Shift Goals  Clinical Goals: NG tube management  Patient Goals: POC    Progress made toward(s) clinical / shift goals:  Patient verbalizes understanding of POC, no further questions. Pain managed per MAR/non pharmacological interventions in place for comfort. Fall risk interventions in place.    Patient is not progressing towards the following goals:

## 2025-03-11 NOTE — PROGRESS NOTES
PT seen and examined, had a long discussion with him and his wife about his current medical issues.  Unfortunately, he has end stage cancer and at this point I don't see any surgical intervention that is going to be of benefit or provide palliation.  He has a chronic pelvic tumor and infection which is unresectable, and I explained that further surgical exploration is not going to be of benefit to him.  We don't have any realistic chemotherapy options at this time either.  He has been on Hospice for the last couple months, but had been displeased with the amount of pain medication he received and how he felt on that.    We discussed ongoing comfort care, he is already receiving Abx and IVF which is helping some.  He wants to eat, but with obstruction and NGT in place, this is likely not going to work and may increase his pain.  I think trying limited clears may be an options that could help some with palliation.  Palliative care consult has been placed and they are planning on seeing him later today.  Will follow while in house, no surgical options at this time.

## 2025-03-11 NOTE — PROGRESS NOTES
"Hospital Medicine Daily Progress Note    Date of Service  3/11/2025    Chief Complaint  Josh Lerner is a 69 y.o. male admitted 3/9/2025 with abdominal pain     Hospital Course  Patient is a 69-year-old male with recurrent rectal cancer,  pelvic abscess, s/p resection, chemoradiation, ileostomy, colostomy and DVT( on Xarelto). He presented to  [facility] ER complaining of diffuse abdominal pain, nausea and vomiting.   He reported minimal oral intake for 1-2 weeks due to nausea but continued having colostomy output.  The day prior to admission, he developed worsening of diffuse abdominal pain and was not able to tolerate any food or fluids due to nausea and vomiting.    In December 2024, he was admitted to [facility] with a pelvic abscess secondary to rectal cancer.  Dr. Sewell, Dr. Kriby, Dr. Graham were consulted.  At that time, he was not felt to be a candidate for any surgery and upon discharge he was transitioned to hospice.   He stated that he hds been on hospice for 2 weeks, however would like to \"live as long as possible\" now and requesting full code.  He has been taking Augmentin indefinite course recommended by ID and  Xarelto for history of DVT  He was also seen at [facility] for his above symptoms.  CBC: WBC 13.9, hemoglobin 12.9, platelets 399  Chemistry: Glucose 136, sodium 133, potassium 5.3, creatinine 0.79, BUN 14, anion gap 12 CO2 26, albumin 2.4.  UA cloudy, 3+ blood, 2+ protein, trace leukocyte esterase  CT of the abdomen pelvis with contrast: High-grade severe small bowel obstruction with transition point left lower quadrant with dilated loops small bowel up to 5.5 cm.   Alveolar process posterior basal segment left lower lobe probably pneumonia, aspiration and differential.  Severe three-vessel coronary artery calcifications.  Cholelithiasis.  Left parastomal hernia with small and large bowel abutting one another, not etiology of bowel obstruction.      An NG tube was " placed    Interval Problem Update  Axox3, patient states he is feeling much better since NGT was placed, currently reports diffuse abdominal pain 2/10, minimal nausea. He denies flatus, no BM. He repeated that he no longer wants to be on hospice and wants to remain a full code. He tells me he does not believe that when he was recently told that surgery was not an option is accurate, I communicated with Dr. Desai and updated him on the situation, he will evaluate patient. Vitals stable, wbc did increase some overnight. ROS otherwise negative.     Patient was seen and examined at bedside.  I have personally reviewed and interpreted vitals, labs, and imaging.    3/11.  Afebrile.  Intermittent tachycardia.  On room air.  Denies fevers, chills, chest pains, shortness of breath.  Abdominal pain, nausea, vomiting is improved since NG tube is in place to suction.  Case was discussed with surgery, palliative care.  Long discussion with patient.  He is agreeable with DNR/DNI.  Continue antibiotics, therapeutic Lovenox for now.  Ongoing goals of care discussion  Wbc 13.9 > 9.6  Hgb 11    I have discussed this patient's plan of care and discharge plan at IDT rounds today with Case Management, Nursing, Nursing leadership, and other members of the IDT team.    Consultants/Specialty  [facility]     Code Status  Full Code    Disposition  The patient is not medically cleared for discharge to home or a post-acute facility.  Anticipate discharge to: hospice    I have placed the appropriate orders for post-discharge needs.    Review of Systems  Review of Systems   Constitutional: Negative.  Negative for chills, diaphoresis, fever, malaise/fatigue and weight loss.   HENT: Negative.  Negative for sore throat.    Eyes: Negative.  Negative for blurred vision.   Respiratory: Negative.  Negative for cough and shortness of breath.    Cardiovascular: Negative.  Negative for chest pain, palpitations and leg swelling.   Gastrointestinal:   Positive for abdominal pain and nausea. Negative for vomiting.   Genitourinary: Negative.  Negative for dysuria.   Musculoskeletal: Negative.  Negative for myalgias.   Skin: Negative.  Negative for itching and rash.   Neurological: Negative.  Negative for dizziness, focal weakness, weakness and headaches.   Endo/Heme/Allergies: Negative.  Does not bruise/bleed easily.   Psychiatric/Behavioral: Negative.  Negative for depression, substance abuse and suicidal ideas.    All other systems reviewed and are negative.       Physical Exam  Temp:  [36.6 °C (97.9 °F)-36.8 °C (98.2 °F)] 36.6 °C (97.9 °F)  Pulse:  [] 94  Resp:  [17-18] 18  BP: (133-142)/(75-82) 134/76  SpO2:  [92 %-94 %] 92 %    Physical Exam  Vitals and nursing note reviewed. Exam conducted with a chaperone present.   Constitutional:       General: He is not in acute distress.     Appearance: Normal appearance. He is not diaphoretic.   HENT:      Head: Normocephalic.      Nose: Nose normal.      Mouth/Throat:      Mouth: Mucous membranes are moist.   Eyes:      Pupils: Pupils are equal, round, and reactive to light.   Cardiovascular:      Rate and Rhythm: Normal rate and regular rhythm.      Pulses: Normal pulses.      Heart sounds: Normal heart sounds.   Pulmonary:      Effort: Pulmonary effort is normal.      Breath sounds: Normal breath sounds.   Abdominal:      General: There is distension.      Palpations: Abdomen is soft.      Tenderness: There is abdominal tenderness (mild).      Comments: Colostomy in place, no output  Urostomy with bag in place, yellow urine   Musculoskeletal:         General: No swelling or deformity. Normal range of motion.   Skin:     General: Skin is warm and dry.      Capillary Refill: Capillary refill takes less than 2 seconds.      Comments: Marley wharton   Neurological:      General: No focal deficit present.      Mental Status: He is alert and oriented to person, place, and time.      Cranial Nerves: No cranial nerve  deficit.   Psychiatric:         Mood and Affect: Mood normal.         Behavior: Behavior normal.         Fluids    Intake/Output Summary (Last 24 hours) at 3/11/2025 0852  Last data filed at 3/11/2025 0722  Gross per 24 hour   Intake 20 ml   Output 2525 ml   Net -2505 ml        Laboratory  Recent Labs     03/09/25  2240 03/10/25  0039 03/11/25  0400   WBC 11.8* 13.9* 9.6   RBC 4.66* 4.57* 4.23*   HEMOGLOBIN 11.8* 11.9* 11.0*   HEMATOCRIT 38.5* 37.4* 35.4*   MCV 82.6 81.8 83.7   MCH 25.3* 26.0* 26.0*   MCHC 30.6* 31.8* 31.1*   RDW 56.6* 57.2* 57.1*   PLATELETCT 364 388 314   MPV 8.9* 8.9* 8.8*     Recent Labs     03/09/25  2240 03/10/25  0039   SODIUM 133* 135   POTASSIUM 4.5 4.7   CHLORIDE 99 99   CO2 24 23   GLUCOSE 132* 126*   BUN 13 13   CREATININE 0.79 0.81   CALCIUM 8.9 8.9                   Imaging  DX-ABDOMEN FOR TUBE PLACEMENT   Final Result         1.  Nonspecific bowel gas pattern in the upper abdomen.   2.  Hazy left lower lobe infiltrates   3.  Nasogastric tube tip terminates overlying the expected location of the gastric antrum.   4.  Atherosclerosis           Assessment/Plan  * SBO (small bowel obstruction) (HCC)- (present on admission)  Assessment & Plan  3/11/2025  69-year-old male with recurrent prostate cancer, colostomy end ileostomy in place, multiple abdominal surgeries, presented with diffuse abdominal pain, distention, nausea, vomiting  CT of the abdomen pelvis with contrast: High-grade severe small bowel obstruction with transition point left lower quadrant with dilated loops small bowel up to 5.5 cm.    continue NG tube to low continuous suction and, continue bowel rest  IV Zofran as needed for nausea  IV fluid support  IV morphine as needed for pain  Monitor for respiratory depression secondary to IV morphine  Continuous pulse oximetry  Discussed with Dr. Desai who will eval    Pelvic abscess in male (HCC)  Assessment & Plan  3/11/2025  In 10/1/2024, patient was taken to the OR for  anorectal exam with excisional biopsy, I&D of pelvic abscess and closure.  Cultures grew Finegoldia and Peptoniphilus and path was positive for rectal adenocarcinoma   December 2024 during admission rectal surgery consulted and concluded he is not a candidate for any surgeries at this point  He has been on suppressive p.o. Augmentin and followed with ID  While patient is n.p.o. and IV Unasyn is on shortage, will place on cefepime and Flagyl  Patient was seen at ID clinic on 3/4, recommended to continue p.o. Augmentin at that time  Cbc in am      Bacteremia- (present on admission)  Assessment & Plan  3/11/2025  Please blood cultures in December 2024 were positive for actinomyces  Has been on p.o. Augmentin per ID recommendation  Current npo, on iv cefepime/ flagyl  Following  Cbc in am     Advance care planning- (present on admission)  Assessment & Plan  Patient was of sound mind and voluntarily participated in the conversation.  Patient and wife were present for the conversation.  I discussed advance care planning face to face with the patient and family for at least 28 minutes, including diagnosis, prognosis, plan of care, risks and benefits of any therapies that could be offered, as well as alternatives including palliation and hospice, as appropriate.  Forms were completed and placed in the chart.  Patient is agreeable with DNR/DNI    DVT (deep venous thrombosis) (HCC)- (present on admission)  Assessment & Plan  3/11/2025  Chronic Xarelto held while npo, continue therapeutic Lovenox while n.p.o.    Colostomy in place (HCC)- (present on admission)  Assessment & Plan  3/11/2025  Colostomy care    Malignant neoplasm of rectum (HCC)- (present on admission)  Assessment & Plan  3/11/2025  Patient has not been followed by oncology since he was transition to hospice in December.          VTE prophylaxis: lovenox    I have performed a physical exam and reviewed and updated ROS and Plan today (3/11/2025). In review of  yesterday's note (3/10/2025), there are no changes except as documented above.    Greater than 52 minutes spent prepping to see patient (e.g. review of tests) obtaining and/or reviewing separately obtained history. Performing a medically appropriate examination and/ evaluation.  Counseling and educating the patient/family/caregiver.  Ordering medications, tests, or procedures.  Referring and communicating with other health care professionals.  Documenting clinical information in EPIC.  Independently interpreting results and communicating results to patient/family/caregiver.  Care coordination.

## 2025-03-11 NOTE — PROGRESS NOTES
Assumed care of patient at 1900. Bedside report received. Assessment complete.    A&O x4. Patient calls appropriately.  Reports 2/10 pain. Pain managed with prescribed medications per MAR.  Denies SOB. O2 sats >90% on RA  NPO. Medicated for nausea.  Hypoactive bowel sounds. No output via ostomy.  + output to RLQ urostomy.  NG to right nare, continuous suction, brown output noted.  Skin per flowsheets.  Mobility: x1 assist    Reviewed plan of care with patient. All needs met at this time. Call light and belongings within reach. Fall precautions in place. Hourly rounding in place.

## 2025-03-11 NOTE — CARE PLAN
Problem: Knowledge Deficit - Standard  Goal: Patient and family/care givers will demonstrate understanding of plan of care, disease process/condition, diagnostic tests and medications  Outcome: Progressing     Problem: Pain - Standard  Goal: Alleviation of pain or a reduction in pain to the patient’s comfort goal  Outcome: Progressing     Problem: Gastrointestinal Irritability  Goal: Nausea and vomiting will be absent or improve  Outcome: Progressing     Problem: Wound/ / Incision Healing  Goal: Patient's wound/surgical incision will decrease in size and heals properly  Outcome: Progressing     The patient is Stable - Low risk of patient condition declining or worsening    Shift Goals  Clinical Goals: NG manageent, port acess, pain control  Patient Goals: comfort, rest    Progress made toward(s) clinical / shift goals:  NG tube to suction with thin brown output. Medicated for nausea with reported relief. Patient reports minimal pain, only to nose and throat. Accessed patient's chest port per protocol and d/c peripheral IV. WBC trending down, IV abx administered per MAR.    Patient is not progressing towards the following goals:

## 2025-03-11 NOTE — PROGRESS NOTES
Patient has NG tube in place: Right nare    Skin integrity beneath NG tube assessed with MARITZA Montalvo    Skin underneath NG tube is visible: yes    NG retaped to visualize skin underneath: yes    Skin description: clean, dry, intact, no redness or irritation. Patient reports tenderness to deep inner nare.

## 2025-03-12 NOTE — PROGRESS NOTES
Patient has NG tube in place: Right nare    Skin integrity beneath NG tube assessed with MARITZA Cerrato    Skin underneath NG tube is visible: yes    NG retaped to visualize skin underneath: yes    Skin description:red/blanching

## 2025-03-12 NOTE — CARE PLAN
The patient is Stable - Low risk of patient condition declining or worsening    Shift Goals  Clinical Goals: NGT management, pain control, comfort  Patient Goals: Shower and go outside'  Family Goals: Shower    Progress made toward(s) clinical / shift goals:        Problem: Knowledge Deficit - Standard  Goal: Patient and family/care givers will demonstrate understanding of plan of care, disease process/condition, diagnostic tests and medications  Outcome: Progressing     Problem: Pain - Standard  Goal: Alleviation of pain or a reduction in pain to the patient’s comfort goal  Outcome: Progressing     Problem: Fall Risk  Goal: Patient will remain free from falls  Outcome: Progressing     Problem: Gastrointestinal Irritability  Goal: Nausea and vomiting will be absent or improve  Outcome: Progressing     Problem: Wound/ / Incision Healing  Goal: Patient's wound/surgical incision will decrease in size and heals properly  Outcome: Progressing     Problem: Skin Integrity  Goal: Skin integrity is maintained or improved  Outcome: Progressing

## 2025-03-12 NOTE — PROGRESS NOTES
"Pt NGT connected to low-continuous suction and in right place, verified by X-ray. Pt has been emotional when talking to RN, but doesn't want any medication to help relax as he stated \"I don't want to feel weird\". Pt complaining of pain in his right nare, medicated per MAR. RN offered more pain medication but pt denied and says he \"doesn't want to get addicted and be tired and not want to do anything.\" Call light within reach and family at bedside.   "

## 2025-03-12 NOTE — PROGRESS NOTES
Pt seen and examined, resting comfortably, still with NGT in place.  Trying some limited clears PO, but with SBO, he is not having much bowel function.  Remains on IVF and IV Abx for now.    Exam benign, unchanged  Consulting with Palliative Care, home health is probably not realistic for his situation, consulting with GI about a possible PEG tube placement for palliation.  We discussed this as a possible alternative to NGT, as well as chance that the PEG may not drain the stomach as effectively.  Pt is willing to try though.  Poor candidate for TPN.   No surgical options at this time.  Will follow peripherally along with primary team.

## 2025-03-12 NOTE — PROGRESS NOTES
4 Eyes Skin Assessment Completed by MARITZA Cerrato and MARITZA Whittaker.    Head WDL  Ears WDL  Nose WDL with NGT to right nare  Mouth WDL  Neck WDL  Breast/Chest right sided chest port  Shoulder Blades WDL  Spine WDL  (R) Arm/Elbow/Hand WDL  (L) Arm/Elbow/Hand WDL  Abdomen urostomy, ostomy, old scars.   Groin WDL  Scrotum/Coccyx/Buttocks Redness, Moisture Fissure, and Discoloration - dressing placed.   (R) Leg scab - larger than left leg. Baseline for patient  (L) Leg WDL  (R) Heel/Foot/Toe WDL  (L) Heel/Foot/Toe WDL          Devices In Places Pulse Ox, OG/NG, and Nasal Cannula      Interventions In Place Gray Ear Foams, NC W/Ear Foams, Pillows, Q2 Turns, Low Air Loss Mattress, and Heels Loaded W/Pillows    Possible Skin Injury Yes    Pictures Uploaded Into Epic Yes  Wound Consult Placed Yes  RN Wound Prevention Protocol Ordered Yes

## 2025-03-12 NOTE — PROGRESS NOTES
Patient has NG tube in place: Right    Skin integrity beneath NG tube assessed with MARITZA Hillman    Skin underneath NG tube is visible: yes    NG retaped to visualize skin underneath: n/a    Skin description: clean, dry, intact, no redness/irritation

## 2025-03-12 NOTE — PROGRESS NOTES
Patient has NG tube in place: Right nare    Skin integrity beneath NG tube assessed with MARITZA Whittaker    Skin underneath NG tube is visible: yes    NG retaped to visualize skin underneath: NA    Skin description:CDI
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
## 2025-03-12 NOTE — PROGRESS NOTES
"Hospital Medicine Daily Progress Note    Date of Service  3/12/2025    Chief Complaint  Josh Lerner is a 69 y.o. male admitted 3/9/2025 with abdominal pain     Hospital Course  Patient is a 69-year-old male with recurrent rectal cancer,  pelvic abscess, s/p resection, chemoradiation, ileostomy, colostomy and DVT( on Xarelto). He presented to  [facility] ER complaining of diffuse abdominal pain, nausea and vomiting.   He reported minimal oral intake for 1-2 weeks due to nausea but continued having colostomy output.  The day prior to admission, he developed worsening of diffuse abdominal pain and was not able to tolerate any food or fluids due to nausea and vomiting.    In December 2024, he was admitted to [facility] with a pelvic abscess secondary to rectal cancer.  Dr. Sewell, Dr. Kirby, Dr. Graham were consulted.  At that time, he was not felt to be a candidate for any surgery and upon discharge he was transitioned to hospice.   He stated that he hds been on hospice for 2 weeks, however would like to \"live as long as possible\" now and requesting full code.  He has been taking Augmentin indefinite course recommended by ID and  Xarelto for history of DVT  He was also seen at [facility] for his above symptoms.  CBC: WBC 13.9, hemoglobin 12.9, platelets 399  Chemistry: Glucose 136, sodium 133, potassium 5.3, creatinine 0.79, BUN 14, anion gap 12 CO2 26, albumin 2.4.  UA cloudy, 3+ blood, 2+ protein, trace leukocyte esterase  CT of the abdomen pelvis with contrast: High-grade severe small bowel obstruction with transition point left lower quadrant with dilated loops small bowel up to 5.5 cm.   Alveolar process posterior basal segment left lower lobe probably pneumonia, aspiration on differential.  Severe three-vessel coronary artery calcifications.  Cholelithiasis.  Left parastomal hernia with small and large bowel abutting one another, not etiology of bowel obstruction.      An NG tube was placed    Interval " Problem Update  Axox3, patient states he is feeling much better since NGT was placed, currently reports diffuse abdominal pain 2/10, minimal nausea. He denies flatus, no BM. He repeated that he no longer wants to be on hospice and wants to remain a full code. He tells me he does not believe that when he was recently told that surgery was not an option is accurate, I communicated with Dr. Desai and updated him on the situation, he will evaluate patient. Vitals stable, wbc did increase some overnight. ROS otherwise negative.     Patient was seen and examined at bedside.  I have personally reviewed and interpreted vitals, labs, and imaging.    3/11.  Afebrile.  Intermittent tachycardia.  On room air.  Denies fevers, chills, chest pains, shortness of breath.  Abdominal pain, nausea, vomiting is improved since NG tube is in place to suction.  Case was discussed with surgery, palliative care.  Long discussion with patient.  He is agreeable with DNR/DNI.  Continue antibiotics, therapeutic Lovenox for now.  Ongoing goals of care discussion  3/12.  Afebrile.  Stable vitals.  On room air.  Replete potassium, phosphorus.  Denies any fevers, chills, chest pains, shortness of breath.  Denies any abdominal pain, nausea, vomiting.  He is very anxious.  Case was discussed with general surgery, and GI.  The patient is a poor candidate for endoscopic PEG tube.  He is very high risk for procedure and his anatomy may not allow placement.  GI recommends to trial clear liquids while holding NG tube to suction.  Can consider palliative venting G-tube placed by IR.  It may also be possible to go home with NG tube.  On further discussion with patient and family at bedside even if palliative venting G-tube is placed he would prefer to pass in the hospital.  States he wants to keep fighting and cannot care for himself at home.  Not a candidate for TPN.  He still wants to continue anticoagulation antibiotics.  With his antibiotics we can  de-escalate cefepime to ceftriaxone.  Ongoing goals of care discussion.  Wbc 13.9 > 9.6 > 8.9  Hgb 11 > 10.8  Na 134  Procal 0.31    I have discussed this patient's plan of care and discharge plan at IDT rounds today with Case Management, Nursing, Nursing leadership, and other members of the IDT team.    Consultants/Specialty  [facility]     Code Status  DNAR/DNI    Disposition  The patient is not medically cleared for discharge to home or a post-acute facility.  Anticipate discharge to: home with organized home healthcare and close outpatient follow-up    I have placed the appropriate orders for post-discharge needs.    Review of Systems  Review of Systems   Constitutional: Negative.  Negative for chills, diaphoresis, fever, malaise/fatigue and weight loss.   HENT: Negative.  Negative for sore throat.    Eyes: Negative.  Negative for blurred vision.   Respiratory: Negative.  Negative for cough and shortness of breath.    Cardiovascular: Negative.  Negative for chest pain, palpitations and leg swelling.   Gastrointestinal:  Positive for abdominal pain and nausea. Negative for vomiting.   Genitourinary: Negative.  Negative for dysuria.   Musculoskeletal: Negative.  Negative for myalgias.   Skin: Negative.  Negative for itching and rash.   Neurological: Negative.  Negative for dizziness, focal weakness, weakness and headaches.   Endo/Heme/Allergies: Negative.  Does not bruise/bleed easily.   Psychiatric/Behavioral: Negative.  Negative for depression, substance abuse and suicidal ideas.    All other systems reviewed and are negative.       Physical Exam  Temp:  [36.4 °C (97.5 °F)-36.8 °C (98.2 °F)] 36.8 °C (98.2 °F)  Pulse:  [78-99] 95  Resp:  [18] 18  BP: (111-134)/(71-76) 113/74  SpO2:  [92 %-94 %] 93 %    Physical Exam  Vitals and nursing note reviewed. Exam conducted with a chaperone present.   Constitutional:       General: He is not in acute distress.     Appearance: Normal appearance. He is not diaphoretic.    HENT:      Head: Normocephalic.      Nose: Nose normal.      Mouth/Throat:      Mouth: Mucous membranes are moist.   Eyes:      Pupils: Pupils are equal, round, and reactive to light.   Cardiovascular:      Rate and Rhythm: Normal rate and regular rhythm.      Pulses: Normal pulses.      Heart sounds: Normal heart sounds.   Pulmonary:      Effort: Pulmonary effort is normal.      Breath sounds: Normal breath sounds.   Abdominal:      General: There is distension.      Palpations: Abdomen is soft.      Tenderness: There is abdominal tenderness (mild).      Comments: Colostomy in place, no output  Urostomy with bag in place, yellow urine   Musculoskeletal:         General: No swelling or deformity. Normal range of motion.   Skin:     General: Skin is warm and dry.      Capillary Refill: Capillary refill takes less than 2 seconds.      Comments: Mulitple tatoos   Neurological:      General: No focal deficit present.      Mental Status: He is alert and oriented to person, place, and time.      Cranial Nerves: No cranial nerve deficit.   Psychiatric:         Mood and Affect: Mood normal.         Behavior: Behavior normal.         Fluids    Intake/Output Summary (Last 24 hours) at 3/12/2025 0619  Last data filed at 3/12/2025 0046  Gross per 24 hour   Intake 0 ml   Output 1675 ml   Net -1675 ml        Laboratory  Recent Labs     03/10/25  0039 03/11/25  0400 03/12/25  0220   WBC 13.9* 9.6 8.9   RBC 4.57* 4.23* 4.19*   HEMOGLOBIN 11.9* 11.0* 10.8*   HEMATOCRIT 37.4* 35.4* 35.3*   MCV 81.8 83.7 84.2   MCH 26.0* 26.0* 25.8*   MCHC 31.8* 31.1* 30.6*   RDW 57.2* 57.1* 57.1*   PLATELETCT 388 314 301   MPV 8.9* 8.8* 8.7*     Recent Labs     03/09/25  2240 03/10/25  0039 03/12/25  0220   SODIUM 133* 135 134*   POTASSIUM 4.5 4.7 3.8   CHLORIDE 99 99 103   CO2 24 23 20   GLUCOSE 132* 126* 90   BUN 13 13 14   CREATININE 0.79 0.81 0.62   CALCIUM 8.9 8.9 8.1*                   Imaging  DX-ABDOMEN FOR TUBE PLACEMENT   Final Result          1.  Nonspecific bowel gas pattern in the upper abdomen.   2.  Hazy left lower lobe infiltrates   3.  Nasogastric tube tip terminates overlying the expected location of the gastric antrum.   4.  Atherosclerosis           Assessment/Plan  * SBO (small bowel obstruction) (HCC)- (present on admission)  Assessment & Plan  3/12/2025  69-year-old male with recurrent prostate cancer, colostomy end ileostomy in place, multiple abdominal surgeries, presented with diffuse abdominal pain, distention, nausea, vomiting  CT of the abdomen pelvis with contrast: High-grade severe small bowel obstruction with transition point left lower quadrant with dilated loops small bowel up to 5.5 cm.    continue NG tube to low continuous suction and, continue bowel rest  IV Zofran as needed for nausea  IV fluid support  IV morphine as needed for pain  Monitor for respiratory depression secondary to IV morphine  Continuous pulse oximetry  Discussed with Dr. Desai     No further options to offer in terms of surgical intervention or chemo.  NG tube to suction.  Will consider venting G-tube    Pelvic abscess in male (HCC)  Assessment & Plan  3/12/2025  In 10/1/2024, patient was taken to the OR for anorectal exam with excisional biopsy, I&D of pelvic abscess and closure.  Cultures grew Finegoldia and Peptoniphilus and path was positive for rectal adenocarcinoma   December 2024 during admission rectal surgery consulted and concluded he is not a candidate for any surgeries at this point  He has been on suppressive p.o. Augmentin and followed with ID  While patient is n.p.o. and IV Unasyn is on shortage, will place on cefepime and Flagyl  Patient was seen at ID clinic on 3/4, recommended to continue p.o. Augmentin at that time  Cbc in am      Bacteremia- (present on admission)  Assessment & Plan  3/12/2025  Please blood cultures in December 2024 were positive for actinomyces  Has been on p.o. Augmentin per ID recommendation  Current npo, on iv  cefepime/ flagyl  Following  Cbc in am     Advance care planning- (present on admission)  Assessment & Plan  Patient was of sound mind and voluntarily participated in the conversation.  Patient and wife were present for the conversation.  I discussed advance care planning face to face with the patient and family for at least 28 minutes, including diagnosis, prognosis, plan of care, risks and benefits of any therapies that could be offered, as well as alternatives including palliation and hospice, as appropriate.  Forms were completed and placed in the chart.  Patient is agreeable with DNR/DNI    DVT (deep venous thrombosis) (HCC)- (present on admission)  Assessment & Plan  3/12/2025  Chronic Xarelto held while npo, continue therapeutic Lovenox while n.p.o.    Colostomy in place (HCC)- (present on admission)  Assessment & Plan  3/12/2025  Colostomy care    Malignant neoplasm of rectum (HCC)- (present on admission)  Assessment & Plan  3/12/2025  Patient has not been followed by oncology since he was transition to hospice in December.          VTE prophylaxis: lovenox    I have performed a physical exam and reviewed and updated ROS and Plan today (3/12/2025). In review of yesterday's note (3/11/2025), there are no changes except as documented above.    Greater than 50 minutes spent prepping to see patient (e.g. review of tests) obtaining and/or reviewing separately obtained history. Performing a medically appropriate examination and/ evaluation.  Counseling and educating the patient/family/caregiver.  Ordering medications, tests, or procedures.  Referring and communicating with other health care professionals.  Documenting clinical information in EPIC.  Independently interpreting results and communicating results to patient/family/caregiver.  Care coordination.

## 2025-03-12 NOTE — CARE PLAN
Problem: Knowledge Deficit - Standard  Goal: Patient and family/care givers will demonstrate understanding of plan of care, disease process/condition, diagnostic tests and medications  Outcome: Progressing     Problem: Pain - Standard  Goal: Alleviation of pain or a reduction in pain to the patient’s comfort goal  Outcome: Progressing     Problem: Gastrointestinal Irritability  Goal: Nausea and vomiting will be absent or improve  Outcome: Progressing     The patient is Stable - Low risk of patient condition declining or worsening    Shift Goals  Clinical Goals: NG management, pain/nausea control  Patient Goals: comfort, rest    Progress made toward(s) clinical / shift goals:  NG to suction, minimal brown output noted. Patient denies nausea. Patient reports no abdominal pain. Medicated patient for pain in nose. Dressing changed to wound on anus. WBC trending down.     Patient is not progressing towards the following goals:

## 2025-03-12 NOTE — PROGRESS NOTES
Assumed care of patient at 1900. Bedside report received. Assessment complete.    A&O x4. Patient calls appropriately.  Reports no pain at this time.  Denies SOB. O2 sats >90% on RA  NPO, ice chips allowed. Denies N/V.  NG tube in place, continuous suction with brown output.  Hypoactive bowel sounds. Last BM 3/6 via ostomy.  Urostomy to down drain bag, + void.  Skin per flowsheets.  Mobility: x1 assist    Reviewed plan of care with patient. All needs met at this time. Call light and belongings within reach. Fall precautions in place. Hourly rounding in place.   '

## 2025-03-13 NOTE — PROGRESS NOTES
Gastroenterology Progress Note               Author:  Maddy Valencia M.D. Date & Time Created: 3/13/2025 9:46 AM       Patient ID:  Name:             Josh Lerner  YOB: 1955  Age:                 69 y.o.  male  MRN:               [id number]        Medical Decision Making, by Problem:  Active Hospital Problems    Diagnosis     SBO (small bowel obstruction) ([facility]) [K56.609]     Pelvic abscess in male ([facility]) [K65.1]     Bacteremia [R78.81]     DVT (deep venous thrombosis) ([facility]) [I82.409]     Advance care planning [Z71.89]     Colostomy in place ([facility]) [Z93.3]     Malignant neoplasm of rectum ([facility]) [C20]            Presenting Chief Complaint:  possible PEG tube placement for palliation     Interval History:  No acute events overnight. Team had meeting with both patient and palliative care team. We again recommended against pursuing a PEG tube given the high risk nature of the procedure given his comorbidities along with the little benefit provided from the procedure. Patient and family expressed understanding.        Hospital Medications:  Current Facility-Administered Medications   Medication Dose Frequency Provider Last Rate Last Admin    phosphorus (K-Phos-Neutral) per tablet 500 mg  2 Tablet TID ANSELMO Navarro.O.   500 mg at 03/13/25 0804    Pharmacy Consult: Enteral tube insertion - review meds/change route/product selection   PHARMACY TO DOSE MIGUE NavarroO.        cefTRIAXone (Rocephin) syringe 2,000 mg  2,000 mg Q24HRS ANSELMO Navarro.O.   2,000 mg at 03/12/25 2136    acetaminophen (Tylenol) tablet 650 mg  650 mg Q6HRS PRN ANSELMO Navarro.O.        ondansetron (Zofran) syringe/vial injection 4 mg  4 mg Q4HRS PRN MIGUE NavarroO.        Or    ondansetron (Zofran ODT) dispertab 4 mg  4 mg Q4HRS PRN ANSELMO Navarro.O.        oxyCODONE immediate-release (Roxicodone) tablet 2.5 mg  2.5 mg Q3HRS PRN MIGUE NavarroO.        Or    oxyCODONE immediate-release (Roxicodone)  tablet 5 mg  5 mg Q3HRS PRN MIGUE NavarroOFlora   5 mg at 03/13/25 0421    Or    morphine 4 MG/ML injection 2 mg  2 mg Q3HRS PRN MIGUE NavarroOFlora   2 mg at 03/12/25 2145    menthol (Halls) lozenge 1 Lozenge  1 Lozenge Q2HRS PRN Eliazar Hidalgo P.A.-C.   1 Lozenge at 03/13/25 0424    sore throat spray (Chloraseptic) 1 Spray  1 Spray Q2HRS PRN Amanda Oseguera M.D.   1 Spray at 03/12/25 2014    NS infusion   Continuous Jesus Gutierrez M.D. 75 mL/hr at 03/12/25 1714 New Bag at 03/12/25 1714    [Held by provider] senna-docusate (Pericolace Or Senokot S) 8.6-50 MG per tablet 2 Tablet  2 Tablet Q EVENING Jesus Gutierrez M.D.   2 Tablet at 03/09/25 2222    And    [Held by provider] polyethylene glycol/lytes (Miralax) Packet 1 Packet  1 Packet QDAY PRN Jesus Gutierrez M.D.        hydrALAZINE (Apresoline) injection 10 mg  10 mg Q4HRS PRN Jesus Gutierrez M.D.        famotidine (Pepcid) injection 20 mg  20 mg BID Jesus Gutierrez M.D.   20 mg at 03/13/25 0425    metroNIDAZOLE (Flagyl) IVPB 500 mg  500 mg Q12HRS Jesus Gutierrez M.D.   Stopped at 03/13/25 0531    enoxaparin (Lovenox) inj 100 mg  100 mg BID Jesus Gutierrez M.D.   100 mg at 03/13/25 0427   Last reviewed on 3/9/2025  8:03 PM by Kadie Greco RASPEN       Review of Systems:  Review of Systems   Constitutional:  Positive for malaise/fatigue.   HENT: Negative.     Eyes: Negative.    Respiratory: Negative.     Cardiovascular: Negative.    Gastrointestinal:  Positive for abdominal pain.   Genitourinary: Negative.    Musculoskeletal: Negative.    Skin: Negative.    Neurological: Negative.    Endo/Heme/Allergies: Negative.    Psychiatric/Behavioral: Negative.           Vital signs:  Weight/BMI: Body mass index is 27.93 kg/m².  /67   Pulse 83   Temp 36.5 °C (97.7 °F) (Temporal)   Resp 18   Ht 1.829 m (6')   Wt 93.4 kg (205 lb 14.6 oz)   SpO2 95%   Vitals:    03/12/25 1638 03/12/25 1913 03/13/25 0420 03/13/25 0703   BP:  121/67 126/66 106/67    Pulse:  91 87 83   Resp: 16 16 17 18   Temp:  36.8 °C (98.2 °F) 36.8 °C (98.2 °F) 36.5 °C (97.7 °F)   TempSrc:  Temporal Temporal Temporal   SpO2:  93% 96% 95%   Weight:       Height:         Oxygen Therapy:  Pulse Oximetry: 95 %, O2 (LPM): 1, O2 Delivery Device: Silicone Nasal Cannula    Intake/Output Summary (Last 24 hours) at 3/13/2025 0946  Last data filed at 3/13/2025 0900  Gross per 24 hour   Intake 2865.04 ml   Output 2350 ml   Net 515.04 ml         Physical Exam:  Physical Exam  Constitutional:       Appearance: He is ill-appearing.   HENT:      Head: Normocephalic and atraumatic.      Right Ear: Tympanic membrane, ear canal and external ear normal.      Left Ear: Tympanic membrane, ear canal and external ear normal.      Nose: Nose normal.      Mouth/Throat:      Pharynx: Oropharynx is clear.   Eyes:      Extraocular Movements: Extraocular movements intact.      Conjunctiva/sclera: Conjunctivae normal.      Pupils: Pupils are equal, round, and reactive to light.   Cardiovascular:      Rate and Rhythm: Normal rate and regular rhythm.      Pulses: Normal pulses.      Heart sounds: Normal heart sounds.   Abdominal:      Palpations: Abdomen is soft.      Tenderness: There is abdominal tenderness.      Comments: Colostomy and urostomy bags in place. Diffuse scarring throughout abdomen from prior procedures   Musculoskeletal:         General: Normal range of motion.      Cervical back: Normal range of motion.   Skin:     General: Skin is warm.      Capillary Refill: Capillary refill takes less than 2 seconds.   Neurological:      Mental Status: He is oriented to person, place, and time.   Psychiatric:         Behavior: Behavior normal.             Labs:  Recent Labs     03/12/25 0220 03/13/25 0148   SODIUM 134* 135   POTASSIUM 3.8 3.9   CHLORIDE 103 105   CO2 20 22   BUN 14 11   CREATININE 0.62 0.61   MAGNESIUM 2.0 2.0   PHOSPHORUS 2.6 2.7   CALCIUM 8.1* 8.0*     Recent Labs     03/12/25 0220 03/13/25 0148    GLUCOSE 90 115*     Recent Labs     03/11/25  0400 03/12/25 0220 03/13/25 0148   WBC 9.6 8.9 8.2   NEUTSPOLYS 83.50*  --   --    LYMPHOCYTES 7.00*  --   --    MONOCYTES 8.20  --   --    EOSINOPHILS 0.50  --   --    BASOPHILS 0.10  --   --      Recent Labs     03/11/25 0400 03/12/25 0220 03/13/25 0148   RBC 4.23* 4.19* 3.98*   HEMOGLOBIN 11.0* 10.8* 10.4*   HEMATOCRIT 35.4* 35.3* 33.6*   PLATELETCT 314 301 279     Recent Results (from the past 24 hours)   CBC WITHOUT DIFFERENTIAL    Collection Time: 03/13/25  1:48 AM   Result Value Ref Range    WBC 8.2 4.8 - 10.8 K/uL    RBC 3.98 (L) 4.70 - 6.10 M/uL    Hemoglobin 10.4 (L) 14.0 - 18.0 g/dL    Hematocrit 33.6 (L) 42.0 - 52.0 %    MCV 84.4 81.4 - 97.8 fL    MCH 26.1 (L) 27.0 - 33.0 pg    MCHC 31.0 (L) 32.3 - 36.5 g/dL    RDW 57.4 (H) 35.9 - 50.0 fL    Platelet Count 279 164 - 446 K/uL    MPV 8.9 (L) 9.0 - 12.9 fL   MAGNESIUM    Collection Time: 03/13/25  1:48 AM   Result Value Ref Range    Magnesium 2.0 1.5 - 2.5 mg/dL   Renal Function Panel    Collection Time: 03/13/25  1:48 AM   Result Value Ref Range    Sodium 135 135 - 145 mmol/L    Potassium 3.9 3.6 - 5.5 mmol/L    Chloride 105 96 - 112 mmol/L    Co2 22 20 - 33 mmol/L    Glucose 115 (H) 65 - 99 mg/dL    Creatinine 0.61 0.50 - 1.40 mg/dL    Bun 11 8 - 22 mg/dL    Calcium 8.0 (L) 8.5 - 10.5 mg/dL    Correct Calcium 9.2 8.5 - 10.5 mg/dL    Phosphorus 2.7 2.5 - 4.5 mg/dL    Albumin 2.5 (L) 3.2 - 4.9 g/dL   ESTIMATED GFR    Collection Time: 03/13/25  1:48 AM   Result Value Ref Range    GFR (CKD-EPI) 104 >60 mL/min/1.73 m 2       Radiology Review:  DX-ABDOMEN FOR TUBE PLACEMENT   Final Result         1.  Nonspecific bowel gas pattern in the upper abdomen.   2.  Hazy left lower lobe infiltrates   3.  Nasogastric tube tip terminates overlying the expected location of the gastric antrum.   4.  Atherosclerosis      [id number] VIEW    (Results Pending)         MDM (Data Review):   -Records reviewed and summarized in  current documentation  -I personally reviewed and interpreted the laboratory results  -I personally reviewed the radiology images    Assessment/Recommendations:    High grade SBO  Metastatic rectal cancer  Parastomal hernia med left abdomen    Patient is a poor procedural candidate at this time. Risks of a procedure out way the little to no benefit it would provide. GI will sign off at this time.      Core Quality Measures   Reviewed items::  Labs, Medications and Radiology reports reviewed

## 2025-03-13 NOTE — PROGRESS NOTES
Patient has been enjoying CLD. No new nausea/abdominal pain/distention noted. Patient is reporting hiccups and has asked to reconnected to NGT LCS.

## 2025-03-13 NOTE — PROGRESS NOTES
Patient verbalized that NGT provides great symptom management for him, relieving him of nausea, allowing him to enjoy his coffee and some PO intake without vomiting. He wishes for it to be replaced/ NGT replaced in right nare and thick brown output returned.
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
## 2025-03-13 NOTE — PROGRESS NOTES
"Hospital Medicine Daily Progress Note    Date of Service  3/13/2025    Chief Complaint  Josh Lerner is a 69 y.o. male admitted 3/9/2025 with abdominal pain     Hospital Course  Patient is a 69-year-old male with recurrent rectal cancer,  pelvic abscess, s/p resection, chemoradiation, urostomy, colostomy and DVT( on Xarelto). He presented to  [facility] ER complaining of diffuse abdominal pain, nausea and vomiting.   He reported minimal oral intake for 1-2 weeks due to nausea but continued having colostomy output.  The day prior to admission, he developed worsening of diffuse abdominal pain and was not able to tolerate any food or fluids due to nausea and vomiting.    In December 2024, he was admitted to [facility] with a pelvic abscess secondary to rectal cancer.  Dr. Sewell, Dr. Kirby, Dr. Graham were consulted.  At that time, he was not felt to be a candidate for any surgery and upon discharge he was transitioned to hospice.   He stated that he hds been on hospice for 2 weeks, however would like to \"live as long as possible\" now and requesting full code.  He has been taking Augmentin indefinite course recommended by ID and  Xarelto for history of DVT  He was also seen at [facility] for his above symptoms.  CBC: WBC 13.9, hemoglobin 12.9, platelets 399  Chemistry: Glucose 136, sodium 133, potassium 5.3, creatinine 0.79, BUN 14, anion gap 12 CO2 26, albumin 2.4.  UA cloudy, 3+ blood, 2+ protein, trace leukocyte esterase  CT of the abdomen pelvis with contrast: High-grade severe small bowel obstruction with transition point left lower quadrant with dilated loops small bowel up to 5.5 cm.   Alveolar process posterior basal segment left lower lobe probably pneumonia, aspiration on differential.  Severe three-vessel coronary artery calcifications.  Cholelithiasis.  Left parastomal hernia with small and large bowel abutting one another, not etiology of bowel obstruction.      An NG tube was placed    Interval " Problem Update  Axox3, patient states he is feeling much better since NGT was placed, currently reports diffuse abdominal pain 2/10, minimal nausea. He denies flatus, no BM. He repeated that he no longer wants to be on hospice and wants to remain a full code. He tells me he does not believe that when he was recently told that surgery was not an option is accurate, I communicated with Dr. Desai and updated him on the situation, he will evaluate patient. Vitals stable, wbc did increase some overnight. ROS otherwise negative.     Patient was seen and examined at bedside.  I have personally reviewed and interpreted vitals, labs, and imaging.    3/11.  Afebrile.  Intermittent tachycardia.  On room air.  Denies fevers, chills, chest pains, shortness of breath.  Abdominal pain, nausea, vomiting is improved since NG tube is in place to suction.  Case was discussed with surgery, palliative care.  Long discussion with patient.  He is agreeable with DNR/DNI.  Continue antibiotics, therapeutic Lovenox for now.  Ongoing goals of care discussion  3/12.  Afebrile.  Stable vitals.  On room air.  Replete potassium, phosphorus.  Denies any fevers, chills, chest pains, shortness of breath.  Denies any abdominal pain, nausea, vomiting.  He is very anxious.  Case was discussed with general surgery, and GI.  The patient is a poor candidate for endoscopic PEG tube.  He is very high risk for procedure and his anatomy may not allow placement.  GI recommends to trial clear liquids while holding NG tube to suction.  Can consider palliative venting G-tube placed by IR.  It may also be possible to go home with NG tube.  On further discussion with patient and family at bedside even if palliative venting G-tube is placed he would prefer to pass in the hospital.  States he wants to keep fighting and cannot care for himself at home.  Not a candidate for TPN.  He still wants to continue anticoagulation antibiotics.  With his antibiotics we can  de-escalate cefepime to ceftriaxone.  Ongoing goals of care discussion.  Procal 0.31  3/13.  Afebrile.  Intermittent tachycardia.  On 0-1 L nasal cannula.  Replete potassium, phosphorus.  Denies fevers, chills, chest pains, shortness of breath.  Was started on clears as tolerated using intermittent NG tube to low intermittent suction.  Has not had much abdominal pain nausea or vomiting.  Discussed with palliative and GI.  Venting G-tube is not an option.  Patient is not yet ready for hospice and would like to continue IV fluids, IV antibiotics, and anticoagulation.  He does have more family coming in this evening.  He is agreeable to talk to hospice.  Ongoing goals of care discussion.  Wbc 13.9 > 9.6 > 8.9 > 8.2  Hgb 11 > 10.8 > 10.4  Na 134 > 135    I have discussed this patient's plan of care and discharge plan at IDT rounds today with Case Management, Nursing, Nursing leadership, and other members of the IDT team.    Consultants/Specialty  [facility]     Code Status  DNAR/DNI    Disposition  The patient is not medically cleared for discharge to home or a post-acute facility.  Anticipate discharge to: skilled nursing facility    I have placed the appropriate orders for post-discharge needs.    Review of Systems  Review of Systems   Constitutional: Negative.  Negative for chills, diaphoresis, fever, malaise/fatigue and weight loss.   HENT: Negative.  Negative for sore throat.    Eyes: Negative.  Negative for blurred vision.   Respiratory: Negative.  Negative for cough and shortness of breath.    Cardiovascular: Negative.  Negative for chest pain, palpitations and leg swelling.   Gastrointestinal:  Positive for abdominal pain and nausea. Negative for vomiting.   Genitourinary: Negative.  Negative for dysuria.   Musculoskeletal: Negative.  Negative for myalgias.   Skin: Negative.  Negative for itching and rash.   Neurological: Negative.  Negative for dizziness, focal weakness, weakness and headaches.    Endo/Heme/Allergies: Negative.  Does not bruise/bleed easily.   Psychiatric/Behavioral: Negative.  Negative for depression, substance abuse and suicidal ideas.    All other systems reviewed and are negative.       Physical Exam  Temp:  [36.6 °C (97.9 °F)-36.8 °C (98.2 °F)] 36.8 °C (98.2 °F)  Pulse:  [] 87  Resp:  [16-18] 17  BP: (117-126)/(66-74) 126/66  SpO2:  [93 %-96 %] 96 %    Physical Exam  Vitals and nursing note reviewed. Exam conducted with a chaperone present.   Constitutional:       General: He is not in acute distress.     Appearance: Normal appearance. He is not diaphoretic.   HENT:      Head: Normocephalic.      Nose: Nose normal.      Mouth/Throat:      Mouth: Mucous membranes are moist.   Eyes:      Pupils: Pupils are equal, round, and reactive to light.   Cardiovascular:      Rate and Rhythm: Normal rate and regular rhythm.      Pulses: Normal pulses.      Heart sounds: Normal heart sounds.   Pulmonary:      Effort: Pulmonary effort is normal.      Breath sounds: Normal breath sounds.   Abdominal:      General: There is distension.      Palpations: Abdomen is soft.      Tenderness: There is abdominal tenderness (mild).      Comments: Colostomy in place, no output  Urostomy with bag in place, yellow urine   Musculoskeletal:         General: No swelling or deformity. Normal range of motion.   Skin:     General: Skin is warm and dry.      Capillary Refill: Capillary refill takes less than 2 seconds.      Comments: Mulitple tatoos   Neurological:      General: No focal deficit present.      Mental Status: He is alert and oriented to person, place, and time.      Cranial Nerves: No cranial nerve deficit.   Psychiatric:         Mood and Affect: Mood normal.         Behavior: Behavior normal.         Fluids    Intake/Output Summary (Last 24 hours) at 3/13/2025 0637  Last data filed at 3/13/2025 0424  Gross per 24 hour   Intake 2265.04 ml   Output 1400 ml   Net 865.04 ml        Laboratory  Recent  Labs     03/11/25  0400 03/12/25  0220 03/13/25  0148   WBC 9.6 8.9 8.2   RBC 4.23* 4.19* 3.98*   HEMOGLOBIN 11.0* 10.8* 10.4*   HEMATOCRIT 35.4* 35.3* 33.6*   MCV 83.7 84.2 84.4   MCH 26.0* 25.8* 26.1*   MCHC 31.1* 30.6* 31.0*   RDW 57.1* 57.1* 57.4*   PLATELETCT 314 301 279   MPV 8.8* 8.7* 8.9*     Recent Labs     03/12/25  0220 03/13/25  0148   SODIUM 134* 135   POTASSIUM 3.8 3.9   CHLORIDE 103 105   CO2 20 22   GLUCOSE 90 115*   BUN 14 11   CREATININE 0.62 0.61   CALCIUM 8.1* 8.0*                   Imaging  DX-ABDOMEN FOR TUBE PLACEMENT   Final Result         1.  Nonspecific bowel gas pattern in the upper abdomen.   2.  Hazy left lower lobe infiltrates   3.  Nasogastric tube tip terminates overlying the expected location of the gastric antrum.   4.  Atherosclerosis      [id number] VIEW    (Results Pending)        Assessment/Plan  * SBO (small bowel obstruction) ([facility])- (present on admission)  Assessment & Plan  3/13/2025  69-year-old male with recurrent prostate cancer, colostomy end ileostomy in place, multiple abdominal surgeries, presented with diffuse abdominal pain, distention, nausea, vomiting  CT of the abdomen pelvis with contrast: High-grade severe small bowel obstruction with transition point left lower quadrant with dilated loops small bowel up to 5.5 cm.    continue NG tube to low continuous suction and, continue bowel rest  IV Zofran as needed for nausea  IV fluid support  IV morphine as needed for pain  Monitor for respiratory depression secondary to IV morphine  Continuous pulse oximetry  Discussed with Dr. Desai     No further options to offer in terms of surgical intervention or chemo.  NG tube to suction.  Clear liquids as tolerated    Pelvic abscess in male (HCC)  Assessment & Plan  3/13/2025  In 10/1/2024, patient was taken to the OR for anorectal exam with excisional biopsy, I&D of pelvic abscess and closure.  Cultures grew Finegoldia and Peptoniphilus and path was positive for rectal  adenocarcinoma   December 2024 during admission rectal surgery consulted and concluded he is not a candidate for any surgeries at this point  He has been on suppressive p.o. Augmentin and followed with ID  While patient is n.p.o. and IV Unasyn is on shortage, will place on cefepime and Flagyl  Patient was seen at ID clinic on 3/4, recommended to continue p.o. Augmentin at that time  Cbc in am      Bacteremia- (present on admission)  Assessment & Plan  3/13/2025  Please blood cultures in December 2024 were positive for actinomyces  Has been on p.o. Augmentin per ID recommendation  Current npo, on iv cefepime/ flagyl  Following  Cbc in am     Advance care planning- (present on admission)  Assessment & Plan  Patient was of sound mind and voluntarily participated in the conversation.  Patient and wife were present for the conversation.  I discussed advance care planning face to face with the patient and family for at least 28 minutes, including diagnosis, prognosis, plan of care, risks and benefits of any therapies that could be offered, as well as alternatives including palliation and hospice, as appropriate.  Forms were completed and placed in the chart.  Patient is agreeable with DNR/DNI    DVT (deep venous thrombosis) (HCC)- (present on admission)  Assessment & Plan  3/13/2025  Chronic Xarelto held while npo, continue therapeutic Lovenox while n.p.o.    Colostomy in place (HCC)- (present on admission)  Assessment & Plan  3/13/2025  Colostomy care    Malignant neoplasm of rectum (HCC)- (present on admission)  Assessment & Plan  3/13/2025  Patient has not been followed by oncology since he was transition to hospice in December.          VTE prophylaxis: lovenox    I have performed a physical exam and reviewed and updated ROS and Plan today (3/13/2025). In review of yesterday's note (3/12/2025), there are no changes except as documented above.    Greater than 51 minutes spent prepping to see patient (e.g. review of  tests) obtaining and/or reviewing separately obtained history. Performing a medically appropriate examination and/ evaluation.  Counseling and educating the patient/family/caregiver.  Ordering medications, tests, or procedures.  Referring and communicating with other health care professionals.  Documenting clinical information in EPIC.  Independently interpreting results and communicating results to patient/family/caregiver.  Care coordination.

## 2025-03-13 NOTE — CARE PLAN
The patient is Stable - Low risk of patient condition declining or worsening    Shift Goals  Clinical Goals: NGT maintence, pain control, rest  Patient Goals: pain control, comfort  Family Goals: POC    Progress made toward(s) clinical / shift goals:  NGT patnet to low cont suction. Pain managed with prescribed medications. Bed alarm in place for safety.       Problem: Knowledge Deficit - Standard  Goal: Patient and family/care givers will demonstrate understanding of plan of care, disease process/condition, diagnostic tests and medications  Outcome: Progressing     Problem: Pain - Standard  Goal: Alleviation of pain or a reduction in pain to the patient’s comfort goal  Outcome: Progressing

## 2025-03-13 NOTE — PROGRESS NOTES
Report received from RN, assumed care at 0645  Pt is A0X4, and responds appropriately   Pt declines any SOB, chest pain, new onset of numbness/ tingling  Declines abdominal pain. Any pain he is having is related to NGT/nose/throat pain  + UOP from urostomy. No stool or gas in colostomy.+ bowel sounds  Pt ambulates with a x1 assist   Pt is tolerating a CLD, pt denies any nausea/vomiting. NGT in place and is intermittently to LCS depending on if patient feels abdominal discomfort  Plan of care discussed, all questions answered. Explained importance of calling before getting OOB and pt verbalizes understanding. Explained importance of oral care. Call light is within reach, treaded slipper socks on, bed in lowest/ locked position, hourly rounding in place, all needs met at this time. Bed alarm on and set to 0 seconds

## 2025-03-13 NOTE — PROGRESS NOTES
4 Eyes Skin Assessment Completed by MARITZA Lunsford and Yoseph, ACT    Head WDL  Ears WDL  Nose NGT right nare   Mouth WDL  Neck WDL  Breast/Chest R chest port   Shoulder Blades WDL  Spine WDL  (R) Arm/Elbow/Hand WDL  (L) Arm/Elbow/Hand WDL  Abdomen Urostomy, ostomy, old scars  Groin WDL  Scrotum/Coccyx/Buttocks Redness, Blanching, Moisture Fissure, and Discoloration DIP in place  (R) Leg Swelling - baseline per patient  (L) Leg WDL  (R) Heel/Foot/Toe WDL  (L) Heel/Foot/Toe WDL      Devices In Places Blood Pressure Cuff, Pulse Ox, and Nasal Cannula      Interventions In Place NC W/Ear Foams, Pillows, Q2 Turns, Low Air Loss Mattress, and Heels Loaded W/Pillows    Possible Skin Injury Yes    Pictures Uploaded Into Epic Yes  Wound Consult Placed Yes  RN Wound Prevention Protocol Ordered Yes, previously done       Patient has NG tube in place: Right    Skin integrity beneath NG tube assessed with ACT Yoseph    Skin underneath NG tube is visible: yes    NG retaped to visualize skin underneath: NA    Skin description:CDI

## 2025-03-13 NOTE — DISCHARGE PLANNING
"Case Management Discharge Planning    Admission Date: 3/9/2025  GMLOS: 3.6  ALOS: 4    6-Clicks ADL Score: 20  6-Clicks Mobility Score: 18      Anticipated Discharge Dispo: Discharge Disposition: D/T to home under HHA care in anticipation of covered skilled care (06) vs Home on Hospice    DME Needed: No    Action(s) Taken: Updated Provider/Nurse on Discharge Plan  \"Josh Lerner is a 69 y.o. male with medical history significant for locally advanced recurrent rectal cancer status postresection, chemotherapy, colostomy, development of pelvic abscess, and DVT on Xarelto initially presented to [facility] complaining of diffuse abdominal pain, nausea, and vomiting with minimal oral intake for several weeks though he did report colostomy output and was directly admitted 3/9/2025.  CT of abdomen and pelvis notable for small bowel obstruction with transition point in left lower quadrant.  Day prior to admission unable to tolerate any food or fluids due to nausea and vomiting.  He was hospital in December 2024 at [facility] with pelvic abscess secondary to rectal cancer and seen by multiple providers including surgery, oncology, and ID and unfortunately was not a surgical candidate and thus discharged home with hospice care.  Seen by surgeon Dr. Desai earlier today who confirmed patient has unresectable disease and did not recommend further surgical exploration.  He is unable to eat due to obstruction and requirement for nasogastric tube for decompression. '    Pt was discussed in IDT rounds with Dr Peña.   Hospice referral was ordered by Dr Peña.    Pt is now DNAR/DNI .    Will meet with Pt/ family about discharge choice.    Pt was on service with [facility] .  Will follow    Per Trina SALAS of Palliative Care , she \"met with Pt and Pt s wife at bedside.  Pt is not a candidate for venting G tube and bowel obstruction symptoms will likely keep Pt in the hospital  and they were agreeable with Hospice " "referral and talking with [facility]\"     Jerad SALAS of [facility] to see Pt/ family later.      Escalations Completed: None    Medically Clear: No    Next Steps:   CM to continue     Barriers to Discharge:   Medical clearance  Pending transition to hospice    Is the patient up for discharge tomorrow: No        "

## 2025-03-13 NOTE — CARE PLAN
The patient is Stable - Low risk of patient condition declining or worsening    Shift Goals  Clinical Goals: shower today, complete dressing change after shower  Patient Goals: visit with family and shower  Family Goals: POC    Progress made toward(s) clinical / shift goals:  Showered today. Will do dressing change once family leaves.     Patient is not progressing towards the following goals:

## 2025-03-13 NOTE — PROGRESS NOTES
Received report from previous shift RN  Assessment complete.  A&O x 4. Patient calls appropriately.  Patient ambulates with SBA assist. Bed alarm on   Patient has 7/10 pain. Pain managed with prescribed medications.  Denies N&V. Tolerating clears  RLQ urostomy patent to down-drainage bag  LLQ ostomy in place   NGT in place patent to LCS green output  Patient denies SOB.  Patient lying in bed.  Review plan with of care with patient. Call light and personal belongings with in reach. Hourly rounding in place. All needs met at this time.

## 2025-03-14 PROBLEM — E43 SEVERE PROTEIN-CALORIE MALNUTRITION (HCC): Status: ACTIVE | Noted: 2025-01-01

## 2025-03-14 NOTE — DIETARY
"Nutrition Services: Initial Assessment     Day 5 of admit. Josh Lerner is 69 y.o., male with admitting DX of SBO (small bowel obstruction).    Consult Received for: MST - Malnutrition Screening Tool    Current Hospital Problems List:      SBO (small bowel obstruction)   Active Problems:    Malignant neoplasm of rectum     Colostomy in place     DVT (deep venous thrombosis)    Advance care planning     Bacteremia     Pelvic abscess in male     Nutrition Assessment:      Height: 182.9 cm (6')  Weight: 93.4 kg (205 lb 14.6 oz)  Weight taken via: Bed Scale  BMI Calculated: 27.93  BMI Classification: WNL     Weight Readings from Last 5 encounters:   Wt Readings from Last 12 Encounters:   03/09/25 93.4 kg (205 lb 14.6 oz)   03/04/25 91.6 kg (202 lb)   12/28/24 96.4 kg (212 lb 8.4 oz)   11/13/24 104 kg (228 lb 4.8 oz)   10/01/24 99.3 kg (218 lb 14.7 oz)   09/26/24 103 kg (226 lb 3.2 oz)   09/06/24 104 kg (229 lb)   08/30/24 104 kg (228 lb 6.3 oz)   08/19/24 104 kg (228 lb 9.9 oz)   03/22/24 111 kg (244 lb 0.8 oz)   03/05/24 119 kg (263 lb 0.1 oz)   01/05/24 109 kg (239 lb 10.2 oz)       Objective:   Per MD note: \"Plan is to move towards hospice by Monday. \"  Pertinent Medical Hx: Blood clotting disorder, blood plasma poisoning, bowel habit changes, cancer, dental disorder, paralysis, pneumonia, and stroke.   Enteral Tube 03/10/25 Nasogastric 14 Fr. Right nare (Active)      Pertinent Labs: Glucose 115, Bun-Creatinine ratio 22.2  Pertinent Meds: Augmentin, Pepcid, Zofran, BM protocol.  Skin/Wounds:  Incision buttocks, Colostomy LLQ, Urostomy RLQ.   Food Allergies: None documented.   Last BM:     03/09/25     Current Diet Order/Intake:   Clear Liquids, recorded PO intake per flowsheets 0-100% of meals consumed.       Subjective:     RD visited with patient at bedside, with family present. The family shared that the patient has experienced weight loss due to cancer and has had a poor appetite. They mentioned that the " patient is at the end of life and that they are focusing on providing foods the patient enjoys from home. RD noticed [place] chocolate milk on the bedside table brought from home. The family also reported intermittent nausea and vomiting, but noted that the patient otherwise been able to tolerate a clear liquid diet.     Nutrition Focused Physical Exam (NFPE)  Weight Loss: -25.6 kg (22%) x 1 year. RD Suspects this change related to cancer, limited PO intake.  Muscle Mass: Severe Wasting at Temples, Clavicles, and Acromion Shoulder Region  Subcutaneous Fat: Moderate Loss at Orbital and Buccal Pads  Fluid Accumulation: generalized RLE edema.   Reduced  Strength: N/A in acute care setting.    Nutrition Diagnosis:      Inadequate Oral Intake related to small bowel obstruction as evidenced by intermittent episodes of nausea/vomiting.      Severe Malnutrition in context of Chronic Illness related to diminished oral intake due to small bowel obstruction with diagnosis of rectal cancer as evidenced by weight loss of 25.6 kg (22%) over the past year, severe muscle mass loss at temples, clavicles, and acromion shoulder region, moderate subcutaneous fat loss at orbital and buccal pads.      RD notified provider  Acierto .     Nutrition Interventions:      Diet advancement per provider. If it is not medically feasible to advance diet beyond clear liquids within 48-72 hours, consider nutrition support to meet nutritional needs (if it aligns with GOC)   Recommend Ensure Clear (provides 240 calories 8 g protein per 8 fl oz) TID.  Patient aware of active plan of care as appropriate.       Nutrition Monitoring and Evaluation:      Monitor nutrition POC, goal for >50% intake from meals and supplements.  Additional fluids per MD/DO  Monitor vital signs pertinent to nutrition.    RD following and will provide updated recommendations as indicated.      Rosa Isela Villalobos R.D.                                         TERRIE/AND CRITERIA  FOR MALNUTRITION

## 2025-03-14 NOTE — HOSPICE
Bedside visit made. NGT was just replaced, patient/family declined hospice discussion today. They are agreeable for it tomorrow. MARITZA Cerrato and hospice team made aware.

## 2025-03-14 NOTE — PROGRESS NOTES
Bedside report received.  Assessment complete.  A&O x 4. Patient calls appropriately.  Patient ambulates with x1 assist   Patient has 0/10 pain. Patient medicated per MAR.  Denies N&V. Tolerating clear liquid diet.  + output in urostomy; - output in colostomy  Patient denies SOB.  SCD's off - patient refused.  Patient calm and cooperative with plan of care.  Review plan with of care with patient. Call light and personal belongings within reach. Hourly rounding in place. All needs met at this time.

## 2025-03-14 NOTE — PROGRESS NOTES
"Hospital Medicine Daily Progress Note    Date of Service  3/14/2025    Chief Complaint  Josh Lerner is a 69 y.o. male admitted 3/9/2025 with abdominal pain     Hospital Course  Patient is a 69-year-old male with recurrent rectal cancer,  pelvic abscess, s/p resection, chemoradiation, urostomy, colostomy and DVT( on Xarelto). He presented to  [facility] ER complaining of diffuse abdominal pain, nausea and vomiting.   He reported minimal oral intake for 1-2 weeks due to nausea but continued having colostomy output.  The day prior to admission, he developed worsening of diffuse abdominal pain and was not able to tolerate any food or fluids due to nausea and vomiting.    In December 2024, he was admitted to [facility] with a pelvic abscess secondary to rectal cancer.  Dr. Sewell, Dr. Kirby, Dr. Graham were consulted.  At that time, he was not felt to be a candidate for any surgery and upon discharge he was transitioned to hospice.   He stated that he hds been on hospice for 2 weeks, however would like to \"live as long as possible\" now and requesting full code.  He has been taking Augmentin indefinite course recommended by ID and  Xarelto for history of DVT  He was also seen at [facility] for his above symptoms.  CBC: WBC 13.9, hemoglobin 12.9, platelets 399  Chemistry: Glucose 136, sodium 133, potassium 5.3, creatinine 0.79, BUN 14, anion gap 12 CO2 26, albumin 2.4.  UA cloudy, 3+ blood, 2+ protein, trace leukocyte esterase  CT of the abdomen pelvis with contrast: High-grade severe small bowel obstruction with transition point left lower quadrant with dilated loops small bowel up to 5.5 cm.   Alveolar process posterior basal segment left lower lobe probably pneumonia, aspiration on differential.  Severe three-vessel coronary artery calcifications.  Cholelithiasis.  Left parastomal hernia with small and large bowel abutting one another, not etiology of bowel obstruction.      An NG tube was placed    Interval " Problem Update  Axox3, patient states he is feeling much better since NGT was placed, currently reports diffuse abdominal pain 2/10, minimal nausea. He denies flatus, no BM. He repeated that he no longer wants to be on hospice and wants to remain a full code. He tells me he does not believe that when he was recently told that surgery was not an option is accurate, I communicated with Dr. Desai and updated him on the situation, he will evaluate patient. Vitals stable, wbc did increase some overnight. ROS otherwise negative.     Patient was seen and examined at bedside.  I have personally reviewed and interpreted vitals, labs, and imaging.    3/11.  Afebrile.  Intermittent tachycardia.  On room air.  Denies fevers, chills, chest pains, shortness of breath.  Abdominal pain, nausea, vomiting is improved since NG tube is in place to suction.  Case was discussed with surgery, palliative care.  Long discussion with patient.  He is agreeable with DNR/DNI.  Continue antibiotics, therapeutic Lovenox for now.  Ongoing goals of care discussion  3/12.  Afebrile.  Stable vitals.  On room air.  Replete potassium, phosphorus.  Denies any fevers, chills, chest pains, shortness of breath.  Denies any abdominal pain, nausea, vomiting.  He is very anxious.  Case was discussed with general surgery, and GI.  The patient is a poor candidate for endoscopic PEG tube.  He is very high risk for procedure and his anatomy may not allow placement.  GI recommends to trial clear liquids while holding NG tube to suction.  Can consider palliative venting G-tube placed by IR.  It may also be possible to go home with NG tube.  On further discussion with patient and family at bedside even if palliative venting G-tube is placed he would prefer to pass in the hospital.  States he wants to keep fighting and cannot care for himself at home.  Not a candidate for TPN.  He still wants to continue anticoagulation antibiotics.  With his antibiotics we can  de-escalate cefepime to ceftriaxone.  Ongoing goals of care discussion.  Procal 0.31  3/13.  Afebrile.  Intermittent tachycardia.  On 0-1 L nasal cannula.  Replete potassium, phosphorus.  Denies fevers, chills, chest pains, shortness of breath.  Was started on clears as tolerated using intermittent NG tube to low intermittent suction.  Has not had much abdominal pain nausea or vomiting.  Discussed with palliative and GI.  Venting G-tube is not an option.  Patient is not yet ready for hospice and would like to continue IV fluids, IV antibiotics, and anticoagulation.  He does have more family coming in this evening.  He is agreeable to talk to hospice.  Ongoing goals of care discussion.  Wbc 13.9 > 9.6 > 8.9 > 8.2  Hgb 11 > 10.8 > 10.4  Na 134 > 135  3/14.  Afebrile.  Stable vitals.  On room air.  NGT had to be replaced overnight.  Denies fever, chills or chest pains, shortness of breath.  States he had his NG tube off for 40 minutes while he was trying clear liquids.  Reports severe nausea and vomiting, abdominal pain after the 40 minutes and needed NG tube back on suction.  Goals of care to outline with hospice.  Discussed with palliative care.  His family comes in on Sunday night.  Plan is to move towards hospice by Monday.    I have discussed this patient's plan of care and discharge plan at IDT rounds today with Case Management, Nursing, Nursing leadership, and other members of the IDT team.    Consultants/Specialty  [facility] Surgery     Code Status  DNAR/DNI    Disposition  The patient is not medically cleared for discharge to home or a post-acute facility.  Anticipate discharge to: hospice    I have placed the appropriate orders for post-discharge needs.    Review of Systems  Review of Systems   Constitutional: Negative.  Negative for chills, diaphoresis, fever, malaise/fatigue and weight loss.   HENT: Negative.  Negative for sore throat.    Eyes: Negative.  Negative for blurred vision.   Respiratory: Negative.   Negative for cough and shortness of breath.    Cardiovascular: Negative.  Negative for chest pain, palpitations and leg swelling.   Gastrointestinal:  Positive for abdominal pain and nausea. Negative for vomiting.   Genitourinary: Negative.  Negative for dysuria.   Musculoskeletal: Negative.  Negative for myalgias.   Skin: Negative.  Negative for itching and rash.   Neurological: Negative.  Negative for dizziness, focal weakness, weakness and headaches.   Endo/Heme/Allergies: Negative.  Does not bruise/bleed easily.   Psychiatric/Behavioral: Negative.  Negative for depression, substance abuse and suicidal ideas.    All other systems reviewed and are negative.       Physical Exam  Temp:  [36.5 °C (97.7 °F)-37.1 °C (98.8 °F)] 36.8 °C (98.2 °F)  Pulse:  [83-98] 95  Resp:  [18] 18  BP: (102-110)/(66-75) 110/75  SpO2:  [93 %-95 %] 93 %    Physical Exam  Vitals and nursing note reviewed. Exam conducted with a chaperone present.   Constitutional:       General: He is not in acute distress.     Appearance: Normal appearance. He is not diaphoretic.   HENT:      Head: Normocephalic.      Nose: Nose normal.      Mouth/Throat:      Mouth: Mucous membranes are moist.   Eyes:      Pupils: Pupils are equal, round, and reactive to light.   Cardiovascular:      Rate and Rhythm: Normal rate and regular rhythm.      Pulses: Normal pulses.      Heart sounds: Normal heart sounds.   Pulmonary:      Effort: Pulmonary effort is normal.      Breath sounds: Normal breath sounds.   Abdominal:      General: There is distension.      Palpations: Abdomen is soft.      Tenderness: There is abdominal tenderness (mild).      Comments: Colostomy in place, no output  Urostomy with bag in place, yellow urine   Musculoskeletal:         General: No swelling or deformity. Normal range of motion.   Skin:     General: Skin is warm and dry.      Capillary Refill: Capillary refill takes less than 2 seconds.      Comments: Marley wharton   Neurological:       General: No focal deficit present.      Mental Status: He is alert and oriented to person, place, and time.      Cranial Nerves: No cranial nerve deficit.   Psychiatric:         Mood and Affect: Mood normal.         Behavior: Behavior normal.         Fluids    Intake/Output Summary (Last 24 hours) at 3/14/2025 0554  Last data filed at 3/14/2025 0440  Gross per 24 hour   Intake 600 ml   Output 3550 ml   Net -2950 ml        Laboratory  Recent Labs     03/12/25 0220 03/13/25  0148   WBC 8.9 8.2   RBC 4.19* 3.98*   HEMOGLOBIN 10.8* 10.4*   HEMATOCRIT 35.3* 33.6*   MCV 84.2 84.4   MCH 25.8* 26.1*   MCHC 30.6* 31.0*   RDW 57.1* 57.4*   PLATELETCT 301 279   MPV 8.7* 8.9*     Recent Labs     03/12/25 0220 03/13/25  0148   SODIUM 134* 135   POTASSIUM 3.8 3.9   CHLORIDE 103 105   CO2 20 22   GLUCOSE 90 115*   BUN 14 11   CREATININE 0.62 0.61   CALCIUM 8.1* 8.0*                   Imaging  DX-ABDOMEN FOR TUBE PLACEMENT   Final Result         1.  Air-filled distended loops of bowel are seen with reactive mucosal pattern in the upper abdomen, appearance suggests ileus or enteritis versus evolving obstructive changes. Recommend radiographic followup to resolution to exclude progression to    obstruction.   2.  Nasogastric tube is coiled within the stomach, the tip terminates overlying the expected location of the gastric antrum.   3.  Bibasilar atelectasis and/or infiltrates.      DX-ABDOMEN FOR TUBE PLACEMENT   Final Result      1.  NG tube extends in the fundus of stomach      DX-ABDOMEN FOR TUBE PLACEMENT   Final Result         1.  Nonspecific bowel gas pattern in the upper abdomen.   2.  Hazy left lower lobe infiltrates   3.  Nasogastric tube tip terminates overlying the expected location of the gastric antrum.   4.  Atherosclerosis           Assessment/Plan  * SBO (small bowel obstruction) (HCC)- (present on admission)  Assessment & Plan  3/14/2025  69-year-old male with recurrent prostate cancer, colostomy end ileostomy in  place, multiple abdominal surgeries, presented with diffuse abdominal pain, distention, nausea, vomiting  CT of the abdomen pelvis with contrast: High-grade severe small bowel obstruction with transition point left lower quadrant with dilated loops small bowel up to 5.5 cm.    continue NG tube to low continuous suction and, continue bowel rest  IV Zofran as needed for nausea  IV fluid support  IV morphine as needed for pain  Monitor for respiratory depression secondary to IV morphine  Continuous pulse oximetry  Discussed with Dr. Desai     No further options to offer in terms of surgical intervention or chemo.  NG tube to suction.  Clear liquids as tolerated    Severe protein-calorie malnutrition (HCC)  Assessment & Plan  Nutrition Focused Physical Exam (NFPE)  Weight Loss: -25.6 kg (22%) x 1 year. RD Suspects this change related to cancer, limited PO intake.  Muscle Mass: Severe Wasting at Temples, Clavicles, and Acromion Shoulder Region  Subcutaneous Fat: Moderate Loss at Orbital and Buccal Pads  Fluid Accumulation: generalized RLE edema.   Reduced  Strength: N/A in acute care setting.     Nutrition Diagnosis:       Inadequate Oral Intake related to small bowel obstruction as evidenced by intermittent episodes of nausea/vomiting.       Severe Malnutrition in context of Chronic Illness related to diminished oral intake due to small bowel obstruction with diagnosis of rectal cancer as evidenced by weight loss of 25.6 kg (22%) over the past year, severe muscle mass loss at temples, clavicles, and acromion shoulder region, moderate subcutaneous fat loss at orbital and buccal pads.      Pelvic abscess in male (HCC)  Assessment & Plan  3/14/2025  In 10/1/2024, patient was taken to the OR for anorectal exam with excisional biopsy, I&D of pelvic abscess and closure.  Cultures grew Finegoldia and Peptoniphilus and path was positive for rectal adenocarcinoma   December 2024 during admission rectal surgery consulted and  concluded he is not a candidate for any surgeries at this point  He has been on suppressive p.o. Augmentin and followed with ID  While patient is n.p.o. and IV Unasyn is on shortage, will place on cefepime and Flagyl  Patient was seen at ID clinic on 3/4  Cbc in am      Bacteremia- (present on admission)  Assessment & Plan  3/14/2025  Please blood cultures in December 2024 were positive for actinomyces  Has been on p.o. Augmentin per ID recommendation  Current npo, on iv cefepime/ flagyl  Following  Cbc in am     Advance care planning- (present on admission)  Assessment & Plan  Patient was of sound mind and voluntarily participated in the conversation.  Patient and wife were present for the conversation.  I discussed advance care planning face to face with the patient and family for at least 28 minutes, including diagnosis, prognosis, plan of care, risks and benefits of any therapies that could be offered, as well as alternatives including palliation and hospice, as appropriate.  Forms were completed and placed in the chart.  Patient is agreeable with DNR/DNI    DVT (deep venous thrombosis) (HCC)- (present on admission)  Assessment & Plan  3/14/2025  Chronic Xarelto held while npo, continue therapeutic Lovenox while n.p.o.    Colostomy in place (HCC)- (present on admission)  Assessment & Plan  3/14/2025  Colostomy care    Malignant neoplasm of rectum (HCC)- (present on admission)  Assessment & Plan  3/14/2025  Patient has not been followed by oncology since he was transition to hospice in December.          VTE prophylaxis: lovenox    I have performed a physical exam and reviewed and updated ROS and Plan today (3/14/2025). In review of yesterday's note (3/13/2025), there are no changes except as documented above.    Greater than 50 minutes spent prepping to see patient (e.g. review of tests) obtaining and/or reviewing separately obtained history. Performing a medically appropriate examination and/ evaluation.   Counseling and educating the patient/family/caregiver.  Ordering medications, tests, or procedures.  Referring and communicating with other health care professionals.  Documenting clinical information in EPIC.  Independently interpreting results and communicating results to patient/family/caregiver.  Care coordination.

## 2025-03-14 NOTE — ASSESSMENT & PLAN NOTE
Nutrition Focused Physical Exam (NFPE)  Weight Loss: -25.6 kg (22%) x 1 year. RD Suspects this change related to cancer, limited PO intake.  Muscle Mass: Severe Wasting at Temples, Clavicles, and Acromion Shoulder Region  Subcutaneous Fat: Moderate Loss at Orbital and Buccal Pads  Fluid Accumulation: generalized RLE edema.   Reduced  Strength: N/A in acute care setting.     Nutrition Diagnosis:       Inadequate Oral Intake related to small bowel obstruction as evidenced by intermittent episodes of nausea/vomiting.       Severe Malnutrition in context of Chronic Illness related to diminished oral intake due to small bowel obstruction with diagnosis of rectal cancer as evidenced by weight loss of 25.6 kg (22%) over the past year, severe muscle mass loss at temples, clavicles, and acromion shoulder region, moderate subcutaneous fat loss at orbital and buccal pads.

## 2025-03-14 NOTE — PROGRESS NOTES
Bedside report received.  Assessment complete.  A&O x 4. Patient calls appropriately.  Patient ambulates x1 assist with FWW. Bed alarm on.   Patient has 0/10 pain. Pain managed with prescribed medications.  Complains of intermittent N&V. Clear liquid diet ordered.  Right nare NG tube in place, LLQ colostomy, RLQ urostomy  + void via Urostomy, - flatus, - BM via colostomy (last BM 3/9).  Patient denies SOB.  Patient is pleasant and cooperative with care plan..  Review plan with of care with patient. Call light and personal belongings within reach. Hourly rounding in place. All needs met at this time.

## 2025-03-14 NOTE — PROGRESS NOTES
Patient complained to this RN that he could feel NGT on tongue. Upon assessment, NGT was kinked in the patients throat. Charge RN notified. Rapid RN notified. Charge RN and rapid RN to bedside to replace NGT. Patient tolerated appropriately. Yvon CHACON notified to change PO pain meds to IV.

## 2025-03-14 NOTE — PROGRESS NOTES
Patient has NG tube in place: Right nare    Skin integrity beneath NG tube assessed with MARITZA Hillman    Skin underneath NG tube is visible: yes    NG retaped to visualize skin underneath: yes    Skin description: pink, CDI

## 2025-03-14 NOTE — PROGRESS NOTES
Patient has NG tube in place: Right nare    Skin integrity beneath NG tube assessed with MARITZA Abreu.    Skin underneath NG tube is visible: yes.    NG retaped to visualize skin underneath: yes    Skin description:intact

## 2025-03-14 NOTE — HOSPICE
[facility] Hospice referral/consult response    Is this patient accepted to [facility]?:   What hospice level of care?:  Approved by provider: Dr. Wilcox    Anticipated DC date:  DC Barriers: Goals are not in-line with hospice, we will f/u on Monday t ore-assess hi goals of care.    Additional Information:
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
## 2025-03-14 NOTE — CARE PLAN
The patient is Stable - Low risk of patient condition declining or worsening    Shift Goals  Clinical Goals: pain management, nausea control, IV fluid therapy, NG tube management  Patient Goals: pain control, rest  Family Goals: updates    Progress made toward(s) clinical / shift goals:  Patient is resting in bed. Patients pain is managed with prescribed medication. Patient remains free from falls. Patient turns self in bed. Right nare NG tube in place to low continuous suction. Discussed care plan with patient to verbalized understanding.     Patient is not progressing towards the following goals:

## 2025-03-14 NOTE — CARE PLAN
The patient is Stable - Low risk of patient condition declining or worsening    Shift Goals  Clinical Goals: NGT management, nause/pain control  Patient Goals: visit with family and shower  Family Goals: POC    Progress made toward(s) clinical / shift goals: Plan of care discussed with patient and all questions answered. Patient NGT dislodged and replaced. Xray confirmed placement. Patient medicated per MAR. Sacral dressing change completed. IV fluids infusing. Patient intermittently sleeping throughout shift.     Problem: Pain - Standard  Goal: Alleviation of pain or a reduction in pain to the patient’s comfort goal  Description: Target End Date:  Prior to discharge or change in level of care    Document on Vitals flowsheet    1.  Document pain using the appropriate pain scale per order or unit policy  2.  Educate and implement non-pharmacologic comfort measures (i.e. relaxation, distraction, massage, cold/heat therapy, etc.)  3.  Pain management medications as ordered  4.  Reassess pain after pain med administration per policy  5.  If opiods administered assess patient's response to pain medication is appropriate per POSS sedation scale  6.  Follow pain management plan developed in collaboration with patient and interdisciplinary team (including palliative care or pain specialists if applicable)  Outcome: Progressing     Problem: Gastrointestinal Irritability  Goal: Nausea and vomiting will be absent or improve  Description: Target End Date:  Prior to discharge or change in level of care    Document on I/O and Assessment flowsheets    1.  Assess for history, duration, frequency, severity, and potential precipitating factors  2.  Administer antiemetics as ordered  3.  Emesis basin within easy reach of the patient, but out of sight if psychogenic component  4.  Eliminate strong odors from surroundings  5.  Introduce cold water, ice chips, roldan products, and room temperature broth or bouillon if tolerated and  appropriate to the patient's diet  6.  Encourage small amounts of bland, simple foods like broth, rice, bananas, Jell-O, crackers or toast if tolerated and appropriate to patient's diet  7.  Position the patient upright while eating and for 1 to 2 hours post-meal  8.  Encourage nonpharmacological nausea control techniques such as relaxation, guided imagery, music therapy, distraction, or deep breathing exercises  Outcome: Progressing       Problem: Knowledge Deficit - Standard  Goal: Patient and family/care givers will demonstrate understanding of plan of care, disease process/condition, diagnostic tests and medications  Description: Target End Date:  1-3 days or as soon as patient condition allows    Document in Patient Education    1.  Patient and family/caregiver oriented to unit, equipment, visitation policy and means for communicating concern  2.  Complete/review Learning Assessment  3.  Assess knowledge level of disease process/condition, treatment plan, diagnostic tests and medications  4.  Explain disease process/condition, treatment plan, diagnostic tests and medications  Outcome: Progressing       Patient is not progressing towards the following goals:

## 2025-03-15 NOTE — PROGRESS NOTES
Patient has NG tube in place: Right nare    Skin integrity beneath NG tube assessed with MARITZA Abreu    Skin underneath NG tube is visible: yes    NG retaped to visualize skin underneath: NA    Skin description:intact

## 2025-03-15 NOTE — CARE PLAN
The patient is Stable - Low risk of patient condition declining or worsening    Shift Goals  Clinical Goals: pain/nausea control, IV fluids, NGT management  Patient Goals: Pain control, rest  Family Goals: updates    Progress made toward(s) clinical / shift goals:    Problem: Knowledge Deficit - Standard  Goal: Patient and family/care givers will demonstrate understanding of plan of care, disease process/condition, diagnostic tests and medications  Description: Target End Date:  1-3 days or as soon as patient condition allows    Document in Patient Education    1.  Patient and family/caregiver oriented to unit, equipment, visitation policy and means for communicating concern  2.  Complete/review Learning Assessment  3.  Assess knowledge level of disease process/condition, treatment plan, diagnostic tests and medications  4.  Explain disease process/condition, treatment plan, diagnostic tests and medications  Outcome: Progressing     Problem: Pain - Standard  Goal: Alleviation of pain or a reduction in pain to the patient’s comfort goal  Description: Target End Date:  Prior to discharge or change in level of care    Document on Vitals flowsheet    1.  Document pain using the appropriate pain scale per order or unit policy  2.  Educate and implement non-pharmacologic comfort measures (i.e. relaxation, distraction, massage, cold/heat therapy, etc.)  3.  Pain management medications as ordered  4.  Reassess pain after pain med administration per policy  5.  If opiods administered assess patient's response to pain medication is appropriate per POSS sedation scale  6.  Follow pain management plan developed in collaboration with patient and interdisciplinary team (including palliative care or pain specialists if applicable)  Outcome: Progressing     Problem: Gastrointestinal Irritability  Goal: Nausea and vomiting will be absent or improve  Description: Target End Date:  Prior to discharge or change in level of  care    Document on I/O and Assessment flowsheets    1.  Assess for history, duration, frequency, severity, and potential precipitating factors  2.  Administer antiemetics as ordered  3.  Emesis basin within easy reach of the patient, but out of sight if psychogenic component  4.  Eliminate strong odors from surroundings  5.  Introduce cold water, ice chips, roldan products, and room temperature broth or bouillon if tolerated and appropriate to the patient's diet  6.  Encourage small amounts of bland, simple foods like broth, rice, bananas, Jell-O, crackers or toast if tolerated and appropriate to patient's diet  7.  Position the patient upright while eating and for 1 to 2 hours post-meal  8.  Encourage nonpharmacological nausea control techniques such as relaxation, guided imagery, music therapy, distraction, or deep breathing exercises  Outcome: Progressing       Patient is not progressing towards the following goals:

## 2025-03-15 NOTE — PROGRESS NOTES
"Hospital Medicine Daily Progress Note    Date of Service  3/15/2025    Chief Complaint  Josh Lerner is a 69 y.o. male admitted 3/9/2025 with abdominal pain     Hospital Course  Patient is a 69-year-old male with recurrent rectal cancer,  pelvic abscess, s/p resection, chemoradiation, urostomy, colostomy and DVT( on Xarelto). He presented to  [facility] ER complaining of diffuse abdominal pain, nausea and vomiting.   He reported minimal oral intake for 1-2 weeks due to nausea but continued having colostomy output.  The day prior to admission, he developed worsening of diffuse abdominal pain and was not able to tolerate any food or fluids due to nausea and vomiting.    In December 2024, he was admitted to [facility] with a pelvic abscess secondary to rectal cancer.  Dr. Sewell, Dr. Kirby, Dr. Graham were consulted.  At that time, he was not felt to be a candidate for any surgery and upon discharge he was transitioned to hospice.   He stated that he hds been on hospice for 2 weeks, however would like to \"live as long as possible\" now and requesting full code.  He has been taking Augmentin indefinite course recommended by ID and  Xarelto for history of DVT  He was also seen at [facility] for his above symptoms.  CBC: WBC 13.9, hemoglobin 12.9, platelets 399  Chemistry: Glucose 136, sodium 133, potassium 5.3, creatinine 0.79, BUN 14, anion gap 12 CO2 26, albumin 2.4.  UA cloudy, 3+ blood, 2+ protein, trace leukocyte esterase  CT of the abdomen pelvis with contrast: High-grade severe small bowel obstruction with transition point left lower quadrant with dilated loops small bowel up to 5.5 cm.   Alveolar process posterior basal segment left lower lobe probably pneumonia, aspiration on differential.  Severe three-vessel coronary artery calcifications.  Cholelithiasis.  Left parastomal hernia with small and large bowel abutting one another, not etiology of bowel obstruction.      An NG tube was placed    Interval " Problem Update  Axox3, patient states he is feeling much better since NGT was placed, currently reports diffuse abdominal pain 2/10, minimal nausea. He denies flatus, no BM. He repeated that he no longer wants to be on hospice and wants to remain a full code. He tells me he does not believe that when he was recently told that surgery was not an option is accurate, I communicated with Dr. Desai and updated him on the situation, he will evaluate patient. Vitals stable, wbc did increase some overnight. ROS otherwise negative.     Patient was seen and examined at bedside.  I have personally reviewed and interpreted vitals, labs, and imaging.    3/11.  Afebrile.  Intermittent tachycardia.  On room air.  Denies fevers, chills, chest pains, shortness of breath.  Abdominal pain, nausea, vomiting is improved since NG tube is in place to suction.  Case was discussed with surgery, palliative care.  Long discussion with patient.  He is agreeable with DNR/DNI.  Continue antibiotics, therapeutic Lovenox for now.  Ongoing goals of care discussion  3/12.  Afebrile.  Stable vitals.  On room air.  Replete potassium, phosphorus.  Denies any fevers, chills, chest pains, shortness of breath.  Denies any abdominal pain, nausea, vomiting.  He is very anxious.  Case was discussed with general surgery, and GI.  The patient is a poor candidate for endoscopic PEG tube.  He is very high risk for procedure and his anatomy may not allow placement.  GI recommends to trial clear liquids while holding NG tube to suction.  Can consider palliative venting G-tube placed by IR.  It may also be possible to go home with NG tube.  On further discussion with patient and family at bedside even if palliative venting G-tube is placed he would prefer to pass in the hospital.  States he wants to keep fighting and cannot care for himself at home.  Not a candidate for TPN.  He still wants to continue anticoagulation antibiotics.  With his antibiotics we can  de-escalate cefepime to ceftriaxone.  Ongoing goals of care discussion.  Procal 0.31  3/13.  Afebrile.  Intermittent tachycardia.  On 0-1 L nasal cannula.  Replete potassium, phosphorus.  Denies fevers, chills, chest pains, shortness of breath.  Was started on clears as tolerated using intermittent NG tube to low intermittent suction.  Has not had much abdominal pain nausea or vomiting.  Discussed with palliative and GI.  Venting G-tube is not an option.  Patient is not yet ready for hospice and would like to continue IV fluids, IV antibiotics, and anticoagulation.  He does have more family coming in this evening.  He is agreeable to talk to hospice.  Ongoing goals of care discussion.  Wbc 13.9 > 9.6 > 8.9 > 8.2  Hgb 11 > 10.8 > 10.4  Na 134 > 135  3/14.  Afebrile.  Stable vitals.  On room air.  NGT had to be replaced overnight.  Denies fever, chills or chest pains, shortness of breath.  States he had his NG tube off for 40 minutes while he was trying clear liquids.  Reports severe nausea and vomiting, abdominal pain after the 40 minutes and needed NG tube back on suction.  Goals of care to outline with hospice.  Discussed with palliative care.  His family comes in on Sunday night.  Plan is to move towards hospice by Monday.  3/15.  Afebrile.  Stable vitals.  On room air.  NG tube had to be repositioned overnight.  Denies fever, chills, chest pains, shortness of breath.  Had more nausea, vomiting, abdominal pain yesterday.  Tolerating some clears.  Send family and friends are visiting.    I have discussed this patient's plan of care and discharge plan at IDT rounds today with Case Management, Nursing, Nursing leadership, and other members of the IDT team.    Consultants/Specialty  [facility]     Code Status  DNAR/DNI    Disposition  The patient is not medically cleared for discharge to home or a post-acute facility.  Anticipate discharge to: skilled nursing facility    I have placed the appropriate orders for  post-discharge needs.    Review of Systems  Review of Systems   Constitutional: Negative.  Negative for chills, diaphoresis, fever, malaise/fatigue and weight loss.   HENT: Negative.  Negative for sore throat.    Eyes: Negative.  Negative for blurred vision.   Respiratory: Negative.  Negative for cough and shortness of breath.    Cardiovascular: Negative.  Negative for chest pain, palpitations and leg swelling.   Gastrointestinal:  Positive for abdominal pain and nausea. Negative for vomiting.   Genitourinary: Negative.  Negative for dysuria.   Musculoskeletal: Negative.  Negative for myalgias.   Skin: Negative.  Negative for itching and rash.   Neurological: Negative.  Negative for dizziness, focal weakness, weakness and headaches.   Endo/Heme/Allergies: Negative.  Does not bruise/bleed easily.   Psychiatric/Behavioral: Negative.  Negative for depression, substance abuse and suicidal ideas.    All other systems reviewed and are negative.       Physical Exam  Temp:  [36.6 °C (97.9 °F)-37.1 °C (98.8 °F)] 36.7 °C (98.1 °F)  Pulse:  [] 87  Resp:  [16-17] 16  BP: (117-128)/(70-75) 118/75  SpO2:  [92 %-95 %] 92 %    Physical Exam  Vitals and nursing note reviewed. Exam conducted with a chaperone present.   Constitutional:       General: He is not in acute distress.     Appearance: Normal appearance. He is not diaphoretic.   HENT:      Head: Normocephalic.      Nose: Nose normal.      Mouth/Throat:      Mouth: Mucous membranes are moist.   Eyes:      Pupils: Pupils are equal, round, and reactive to light.   Cardiovascular:      Rate and Rhythm: Normal rate and regular rhythm.      Pulses: Normal pulses.      Heart sounds: Normal heart sounds.   Pulmonary:      Effort: Pulmonary effort is normal.      Breath sounds: Normal breath sounds.   Abdominal:      General: There is distension.      Palpations: Abdomen is soft.      Tenderness: There is abdominal tenderness (mild).      Comments: Colostomy in place, no  output  Urostomy with bag in place, yellow urine   Musculoskeletal:         General: No swelling or deformity. Normal range of motion.   Skin:     General: Skin is warm and dry.      Capillary Refill: Capillary refill takes less than 2 seconds.      Comments: Alizafina wharton   Neurological:      General: No focal deficit present.      Mental Status: He is alert and oriented to person, place, and time.      Cranial Nerves: No cranial nerve deficit.   Psychiatric:         Mood and Affect: Mood normal.         Behavior: Behavior normal.         Fluids    Intake/Output Summary (Last 24 hours) at 3/15/2025 0531  Last data filed at 3/15/2025 0433  Gross per 24 hour   Intake --   Output 2500 ml   Net -2500 ml        Laboratory  Recent Labs     03/13/25  0148   WBC 8.2   RBC 3.98*   HEMOGLOBIN 10.4*   HEMATOCRIT 33.6*   MCV 84.4   MCH 26.1*   MCHC 31.0*   RDW 57.4*   PLATELETCT 279   MPV 8.9*     Recent Labs     03/13/25  0148   SODIUM 135   POTASSIUM 3.9   CHLORIDE 105   CO2 22   GLUCOSE 115*   BUN 11   CREATININE 0.61   CALCIUM 8.0*                   Imaging  DX-ABDOMEN FOR TUBE PLACEMENT   Final Result         1.  Air-filled distended loops of bowel are seen with reactive mucosal pattern in the upper abdomen, appearance suggests ileus or enteritis versus evolving obstructive changes. Recommend radiographic followup to resolution to exclude progression to    obstruction.   2.  Nasogastric tube is coiled within the stomach, the tip terminates overlying the expected location of the gastric antrum.   3.  Hazy left lower lobe infiltrates   4.  Atherosclerosis      DX-ABDOMEN FOR TUBE PLACEMENT   Final Result         1.  Air-filled distended loops of bowel are seen with reactive mucosal pattern in the upper abdomen, appearance suggests ileus or enteritis versus evolving obstructive changes. Recommend radiographic followup to resolution to exclude progression to    obstruction.   2.  Nasogastric tube tip terminates overlying  the expected location of the third or fourth duodenal segment.   3.  Left basilar atelectasis   4.  Atherosclerosis      DX-ABDOMEN FOR TUBE PLACEMENT   Final Result         1.  Air-filled distended loops of bowel are seen with reactive mucosal pattern in the upper abdomen, appearance suggests ileus or enteritis versus evolving obstructive changes. Recommend radiographic followup to resolution to exclude progression to    obstruction.   2.  Nasogastric tube is coiled within the stomach, the tip terminates overlying the expected location of the gastric antrum.   3.  Bibasilar atelectasis and/or infiltrates.      DX-ABDOMEN FOR TUBE PLACEMENT   Final Result      1.  NG tube extends in the fundus of stomach      DX-ABDOMEN FOR TUBE PLACEMENT   Final Result         1.  Nonspecific bowel gas pattern in the upper abdomen.   2.  Hazy left lower lobe infiltrates   3.  Nasogastric tube tip terminates overlying the expected location of the gastric antrum.   4.  Atherosclerosis           Assessment/Plan  * SBO (small bowel obstruction) (HCC)- (present on admission)  Assessment & Plan  3/15/2025  69-year-old male with recurrent prostate cancer, colostomy end ileostomy in place, multiple abdominal surgeries, presented with diffuse abdominal pain, distention, nausea, vomiting  CT of the abdomen pelvis with contrast: High-grade severe small bowel obstruction with transition point left lower quadrant with dilated loops small bowel up to 5.5 cm.    continue NG tube to low continuous suction and, continue bowel rest  IV Zofran as needed for nausea  IV fluid support  IV morphine as needed for pain  Monitor for respiratory depression secondary to IV morphine  Continuous pulse oximetry  Discussed with Dr. Desai     No further options to offer in terms of surgical intervention or chemo.  NG tube to suction.  Clear liquids as tolerated    Severe protein-calorie malnutrition (HCC)  Assessment & Plan  Nutrition Focused Physical Exam  (NFPE)  Weight Loss: -25.6 kg (22%) x 1 year. RD Suspects this change related to cancer, limited PO intake.  Muscle Mass: Severe Wasting at Temples, Clavicles, and Acromion Shoulder Region  Subcutaneous Fat: Moderate Loss at Orbital and Buccal Pads  Fluid Accumulation: generalized RLE edema.   Reduced  Strength: N/A in acute care setting.     Nutrition Diagnosis:       Inadequate Oral Intake related to small bowel obstruction as evidenced by intermittent episodes of nausea/vomiting.       Severe Malnutrition in context of Chronic Illness related to diminished oral intake due to small bowel obstruction with diagnosis of rectal cancer as evidenced by weight loss of 25.6 kg (22%) over the past year, severe muscle mass loss at temples, clavicles, and acromion shoulder region, moderate subcutaneous fat loss at orbital and buccal pads.      Pelvic abscess in male (HCC)  Assessment & Plan  3/15/2025  In 10/1/2024, patient was taken to the OR for anorectal exam with excisional biopsy, I&D of pelvic abscess and closure.  Cultures grew Finegoldia and Peptoniphilus and path was positive for rectal adenocarcinoma   December 2024 during admission rectal surgery consulted and concluded he is not a candidate for any surgeries at this point  He has been on suppressive p.o. Augmentin and followed with ID  While patient is n.p.o. and IV Unasyn is on shortage, will place on cefepime and Flagyl  Patient was seen at ID clinic on 3/4  Cbc in am      Bacteremia- (present on admission)  Assessment & Plan  3/15/2025  Please blood cultures in December 2024 were positive for actinomyces  Has been on p.o. Augmentin per ID recommendation  Current npo, on iv cefepime/ flagyl  Following  Cbc in am     Advance care planning- (present on admission)  Assessment & Plan  Patient was of sound mind and voluntarily participated in the conversation.  Patient and wife were present for the conversation.  I discussed advance care planning face to face  with the patient and family for at least 28 minutes, including diagnosis, prognosis, plan of care, risks and benefits of any therapies that could be offered, as well as alternatives including palliation and hospice, as appropriate.  Forms were completed and placed in the chart.  Patient is agreeable with DNR/DNI    DVT (deep venous thrombosis) (HCC)- (present on admission)  Assessment & Plan  3/15/2025  Chronic Xarelto held while npo, continue therapeutic Lovenox while n.p.o.    Colostomy in place (HCC)- (present on admission)  Assessment & Plan  3/15/2025  Colostomy care    Malignant neoplasm of rectum (HCC)- (present on admission)  Assessment & Plan  3/15/2025  Patient has not been followed by oncology since he was transition to hospice in December.          VTE prophylaxis: lovenox    I have performed a physical exam and reviewed and updated ROS and Plan today (3/15/2025). In review of yesterday's note (3/14/2025), there are no changes except as documented above.    Greater than 51 minutes spent prepping to see patient (e.g. review of tests) obtaining and/or reviewing separately obtained history. Performing a medically appropriate examination and/ evaluation.  Counseling and educating the patient/family/caregiver.  Ordering medications, tests, or procedures.  Referring and communicating with other health care professionals.  Documenting clinical information in EPIC.  Independently interpreting results and communicating results to patient/family/caregiver.  Care coordination.

## 2025-03-15 NOTE — PROGRESS NOTES
Bedside report received.  Assessment complete.  A&O x 4. Patient calls appropriately.  Patient ambulates with x1 assist pivot to wheelchair. .   Patient has 7/10 pain. Patient medicated per MAR.  Denies N&V. Tolerating clear liquid diet..  + output in urostomy; - output in colostomy  Patient denies SOB.  SCD's off - patient refused.  Patient calm and cooperative with plan of care.  Review plan with of care with patient. Call light and personal belongings within reach. Hourly rounding in place. All needs met at this time.

## 2025-03-15 NOTE — CARE PLAN
The patient is Stable - Low risk of patient condition declining or worsening    Shift Goals  Clinical Goals: pain management, rest, NG tube management, IV fluid therapy  Patient Goals: pain management, rest  Family Goals: updates    Progress made toward(s) clinical / shift goals:  Patient is resting in bed with family at bedside. Patient remains free from falls. Patient turns self in bed. Patients pain is managed with prescribed medication. Plan of care discussed with patient who verbalized understanding.     Patient is not progressing towards the following goals:

## 2025-03-15 NOTE — PROGRESS NOTES
Patient has NG tube in place: Right    Skin integrity beneath NG tube assessed with MARITZA Oh    Skin underneath NG tube is visible: yes    NG retaped to visualize skin underneath: yes    Skin description: Pink, tender, CDI

## 2025-03-15 NOTE — PROGRESS NOTES
Bedside report received.  Assessment complete.  A&O x 4. Patient calls appropriately.  Patient ambulates x1 assist with FWW. Bed alarm on.   Patient has 7/10 pain. Pain managed with prescribed medications.  Complains of intermittent nausea. Clear liquid diet ordered.  Right nare NG tube in place. RLQ urostomy. LLQ colostomy.  + void via urostomy, - flatus, - BM via colostomy (last BM 3/9).  Patient denies SOB.  Patient is pleasant and cooperative with care plan.  Review plan with of care with patient. Call light and personal belongings within reach. Hourly rounding in place. All needs met at this time.

## 2025-03-16 NOTE — CARE PLAN
The patient is Stable - Low risk of patient condition declining or worsening    Shift Goals  Clinical Goals: pain management, NG tube management, rest  Patient Goals: pain management, rest  Family Goals: updates    Progress made toward(s) clinical / shift goals:  Patient is resting in bed with family at bedside. Patients pain is managed with prescribed medication. Patient remains free from falls. Patient turns self in bed. Plan of care discussed with patient who verbalized understanding.     Patient is not progressing towards the following goals:

## 2025-03-16 NOTE — PROGRESS NOTES
Bedside report received, assessment completed    A&O x  4, pt calls appropriately  Mobility: Up with x1, Assistive Devices: FWW  Fall Risk Assessment: High, bed alarm on, door notifications in use  Pain Assessment / Reassessment completed, medication provided per MAR  Diet: Clears  LDA:   Central Lines: R Port, Indication: access  Usotomy: RLQ  Ostomy: LLQ     GI/: + urostomy output, - ostomy output  DVT Prophylaxis: Lovenox, SCD refused     Reviewed plan of care with patient, bed in lowest position and locked, pt resting comfortably now, call light within reach, all needs met at this time. Interventions will be executed per plan of care

## 2025-03-16 NOTE — CARE PLAN
The patient is Watcher - Medium risk of patient condition declining or worsening    Shift Goals  Clinical Goals: Comfort, NGT management, change port, abx, and rest  Patient Goals: comfort and rest  Family Goals: updates    Progress made toward(s) clinical / shift goals:  Comfort provided with therapeutic communication and physically comfortable in bed. Intermittently monitoring NGT and output. Port dressing changed. Abx administered as ordered. Pt slept intermittently throughout shift.      Problem: Wound/ / Incision Healing  Goal: Patient's wound/surgical incision will decrease in size and heals properly  Outcome: Progressing     Problem: Hemodynamics  Goal: Patient's hemodynamics, fluid balance and neurologic status will be stable or improve  Outcome: Progressing

## 2025-03-16 NOTE — PROGRESS NOTES
Bedside report received.  Assessment complete.  A&O x 4. Patient calls appropriately.  Patient ambulates  x1 assist with FWW.   Patient has 7/10 pain. Pain managed with prescribed medications.  Denies N&V. Clear liquid diet ordered.  Right nare NG tube in place. LLQ colostomy. RLQ urostomy.  + void via urostomy, - flatus, - BM via colostomy.  Patient denies SOB.  Patient is pleasant and cooperative with care plan.  Review plan with of care with patient. Call light and personal belongings within reach. Hourly rounding in place. All needs met at this time.

## 2025-03-16 NOTE — PROGRESS NOTES
"Hospital Medicine Daily Progress Note    Date of Service  3/16/2025    Chief Complaint  Josh Lerner is a 69 y.o. male admitted 3/9/2025 with abdominal pain     Hospital Course  Patient is a 69-year-old male with recurrent rectal cancer,  pelvic abscess, s/p resection, chemoradiation, urostomy, colostomy and DVT( on Xarelto). He presented to  [facility] ER complaining of diffuse abdominal pain, nausea and vomiting.   He reported minimal oral intake for 1-2 weeks due to nausea but continued having colostomy output.  The day prior to admission, he developed worsening of diffuse abdominal pain and was not able to tolerate any food or fluids due to nausea and vomiting.    In December 2024, he was admitted to [facility] with a pelvic abscess secondary to rectal cancer.  Dr. Sewell, Dr. Kirby, Dr. Graham were consulted.  At that time, he was not felt to be a candidate for any surgery and upon discharge he was transitioned to hospice.   He stated that he hds been on hospice for 2 weeks, however would like to \"live as long as possible\" now and requesting full code.  He has been taking Augmentin indefinite course recommended by ID and  Xarelto for history of DVT  He was also seen at [facility] for his above symptoms.  CBC: WBC 13.9, hemoglobin 12.9, platelets 399  Chemistry: Glucose 136, sodium 133, potassium 5.3, creatinine 0.79, BUN 14, anion gap 12 CO2 26, albumin 2.4.  UA cloudy, 3+ blood, 2+ protein, trace leukocyte esterase  CT of the abdomen pelvis with contrast: High-grade severe small bowel obstruction with transition point left lower quadrant with dilated loops small bowel up to 5.5 cm.   Alveolar process posterior basal segment left lower lobe probably pneumonia, aspiration on differential.  Severe three-vessel coronary artery calcifications.  Cholelithiasis.  Left parastomal hernia with small and large bowel abutting one another, not etiology of bowel obstruction.      An NG tube was placed    Interval " Problem Update  Axox3, patient states he is feeling much better since NGT was placed, currently reports diffuse abdominal pain 2/10, minimal nausea. He denies flatus, no BM. He repeated that he no longer wants to be on hospice and wants to remain a full code. He tells me he does not believe that when he was recently told that surgery was not an option is accurate, I communicated with Dr. Desai and updated him on the situation, he will evaluate patient. Vitals stable, wbc did increase some overnight. ROS otherwise negative.     Patient was seen and examined at bedside.  I have personally reviewed and interpreted vitals, labs, and imaging.    3/11.  Afebrile.  Intermittent tachycardia.  On room air.  Denies fevers, chills, chest pains, shortness of breath.  Abdominal pain, nausea, vomiting is improved since NG tube is in place to suction.  Case was discussed with surgery, palliative care.  Long discussion with patient.  He is agreeable with DNR/DNI.  Continue antibiotics, therapeutic Lovenox for now.  Ongoing goals of care discussion  3/12.  Afebrile.  Stable vitals.  On room air.  Replete potassium, phosphorus.  Denies any fevers, chills, chest pains, shortness of breath.  Denies any abdominal pain, nausea, vomiting.  He is very anxious.  Case was discussed with general surgery, and GI.  The patient is a poor candidate for endoscopic PEG tube.  He is very high risk for procedure and his anatomy may not allow placement.  GI recommends to trial clear liquids while holding NG tube to suction.  Can consider palliative venting G-tube placed by IR.  It may also be possible to go home with NG tube.  On further discussion with patient and family at bedside even if palliative venting G-tube is placed he would prefer to pass in the hospital.  States he wants to keep fighting and cannot care for himself at home.  Not a candidate for TPN.  He still wants to continue anticoagulation antibiotics.  With his antibiotics we can  de-escalate cefepime to ceftriaxone.  Ongoing goals of care discussion.  Procal 0.31  3/13.  Afebrile.  Intermittent tachycardia.  On 0-1 L nasal cannula.  Replete potassium, phosphorus.  Denies fevers, chills, chest pains, shortness of breath.  Was started on clears as tolerated using intermittent NG tube to low intermittent suction.  Has not had much abdominal pain nausea or vomiting.  Discussed with palliative and GI.  Venting G-tube is not an option.  Patient is not yet ready for hospice and would like to continue IV fluids, IV antibiotics, and anticoagulation.  He does have more family coming in this evening.  He is agreeable to talk to hospice.  Ongoing goals of care discussion.  Wbc 13.9 > 9.6 > 8.9 > 8.2  Hgb 11 > 10.8 > 10.4  Na 134 > 135  3/14.  Afebrile.  Stable vitals.  On room air.  NGT had to be replaced overnight.  Denies fever, chills or chest pains, shortness of breath.  States he had his NG tube off for 40 minutes while he was trying clear liquids.  Reports severe nausea and vomiting, abdominal pain after the 40 minutes and needed NG tube back on suction.  Goals of care to outline with hospice.  Discussed with palliative care.  His family comes in on Sunday night.  Plan is to move towards hospice by Monday.  3/15.  Afebrile.  Stable vitals.  On room air.  NG tube had to be repositioned overnight.  Denies fever, chills, chest pains, shortness of breath.  Had more nausea, vomiting, abdominal pain yesterday.  Tolerating some clears.  Send family and friends are visiting.  3/16.  Afebrile.  Stable vitals.  On room air.  Denies fevers, chills, chest pains, shortness of breath.  Still having intermittent nausea, vomiting, abdominal pain with obstructive symptoms.  Able to tolerate only a few clears.  More friends and family are in town.  He will discuss with palliative and hospice again tomorrow.  Continue clear liquid diet, NG tube to low intermittent suction as needed    I have discussed this patient's  plan of care and discharge plan at IDT rounds today with Case Management, Nursing, Nursing leadership, and other members of the IDT team.    Consultants/Specialty  [facility] Surgery     Code Status  DNAR/DNI    Disposition  The patient is not medically cleared for discharge to home or a post-acute facility.  Anticipate discharge to: hospice    I have placed the appropriate orders for post-discharge needs.    Review of Systems  Review of Systems   Constitutional: Negative.  Negative for chills, diaphoresis, fever, malaise/fatigue and weight loss.   HENT: Negative.  Negative for sore throat.    Eyes: Negative.  Negative for blurred vision.   Respiratory: Negative.  Negative for cough and shortness of breath.    Cardiovascular: Negative.  Negative for chest pain, palpitations and leg swelling.   Gastrointestinal:  Positive for abdominal pain and nausea. Negative for vomiting.   Genitourinary: Negative.  Negative for dysuria.   Musculoskeletal: Negative.  Negative for myalgias.   Skin: Negative.  Negative for itching and rash.   Neurological: Negative.  Negative for dizziness, focal weakness, weakness and headaches.   Endo/Heme/Allergies: Negative.  Does not bruise/bleed easily.   Psychiatric/Behavioral: Negative.  Negative for depression, substance abuse and suicidal ideas.    All other systems reviewed and are negative.       Physical Exam  Temp:  [36.4 °C (97.5 °F)-36.8 °C (98.2 °F)] 36.7 °C (98.1 °F)  Pulse:  [] 100  Resp:  [17-18] 17  BP: (115-135)/(71-77) 123/73  SpO2:  [92 %-95 %] 94 %    Physical Exam  Vitals and nursing note reviewed. Exam conducted with a chaperone present.   Constitutional:       General: He is not in acute distress.     Appearance: Normal appearance. He is not diaphoretic.   HENT:      Head: Normocephalic.      Nose: Nose normal.      Mouth/Throat:      Mouth: Mucous membranes are moist.   Eyes:      Pupils: Pupils are equal, round, and reactive to light.   Cardiovascular:      Rate and  Rhythm: Normal rate and regular rhythm.      Pulses: Normal pulses.      Heart sounds: Normal heart sounds.   Pulmonary:      Effort: Pulmonary effort is normal.      Breath sounds: Normal breath sounds.   Abdominal:      General: There is distension.      Palpations: Abdomen is soft.      Tenderness: There is abdominal tenderness (mild).      Comments: Colostomy in place, no output  Urostomy with bag in place, yellow urine   Musculoskeletal:         General: No swelling or deformity. Normal range of motion.   Skin:     General: Skin is warm and dry.      Capillary Refill: Capillary refill takes less than 2 seconds.      Comments: Mulitple tatpro   Neurological:      General: No focal deficit present.      Mental Status: He is alert and oriented to person, place, and time.      Cranial Nerves: No cranial nerve deficit.   Psychiatric:         Mood and Affect: Mood normal.         Behavior: Behavior normal.         Fluids    Intake/Output Summary (Last 24 hours) at 3/16/2025 1041  Last data filed at 3/16/2025 0806  Gross per 24 hour   Intake 180 ml   Output 1770 ml   Net -1590 ml        Laboratory                            Imaging  DX-ABDOMEN FOR TUBE PLACEMENT   Final Result         1.  Air-filled distended loops of bowel are seen with reactive mucosal pattern in the upper abdomen, appearance suggests ileus or enteritis versus evolving obstructive changes. Recommend radiographic followup to resolution to exclude progression to    obstruction.   2.  Nasogastric tube is coiled within the stomach, the tip terminates overlying the expected location of the gastric antrum.   3.  Hazy left lower lobe infiltrates   4.  Atherosclerosis      DX-ABDOMEN FOR TUBE PLACEMENT   Final Result         1.  Air-filled distended loops of bowel are seen with reactive mucosal pattern in the upper abdomen, appearance suggests ileus or enteritis versus evolving obstructive changes. Recommend radiographic followup to resolution to exclude  progression to    obstruction.   2.  Nasogastric tube tip terminates overlying the expected location of the third or fourth duodenal segment.   3.  Left basilar atelectasis   4.  Atherosclerosis      DX-ABDOMEN FOR TUBE PLACEMENT   Final Result         1.  Air-filled distended loops of bowel are seen with reactive mucosal pattern in the upper abdomen, appearance suggests ileus or enteritis versus evolving obstructive changes. Recommend radiographic followup to resolution to exclude progression to    obstruction.   2.  Nasogastric tube is coiled within the stomach, the tip terminates overlying the expected location of the gastric antrum.   3.  Bibasilar atelectasis and/or infiltrates.      DX-ABDOMEN FOR TUBE PLACEMENT   Final Result      1.  NG tube extends in the fundus of stomach      DX-ABDOMEN FOR TUBE PLACEMENT   Final Result         1.  Nonspecific bowel gas pattern in the upper abdomen.   2.  Hazy left lower lobe infiltrates   3.  Nasogastric tube tip terminates overlying the expected location of the gastric antrum.   4.  Atherosclerosis           Assessment/Plan  * SBO (small bowel obstruction) (HCC)- (present on admission)  Assessment & Plan  3/16/2025  69-year-old male with recurrent prostate cancer, colostomy end ileostomy in place, multiple abdominal surgeries, presented with diffuse abdominal pain, distention, nausea, vomiting  CT of the abdomen pelvis with contrast: High-grade severe small bowel obstruction with transition point left lower quadrant with dilated loops small bowel up to 5.5 cm.    continue NG tube to low continuous suction and, continue bowel rest  IV Zofran as needed for nausea  IV fluid support  IV morphine as needed for pain  Monitor for respiratory depression secondary to IV morphine  Continuous pulse oximetry  Discussed with Dr. Desai     No further options to offer in terms of surgical intervention or chemo.  NG tube to suction.  Clear liquids as tolerated    Severe  protein-calorie malnutrition (HCC)  Assessment & Plan  Nutrition Focused Physical Exam (NFPE)  Weight Loss: -25.6 kg (22%) x 1 year. RD Suspects this change related to cancer, limited PO intake.  Muscle Mass: Severe Wasting at Temples, Clavicles, and Acromion Shoulder Region  Subcutaneous Fat: Moderate Loss at Orbital and Buccal Pads  Fluid Accumulation: generalized RLE edema.   Reduced  Strength: N/A in acute care setting.     Nutrition Diagnosis:       Inadequate Oral Intake related to small bowel obstruction as evidenced by intermittent episodes of nausea/vomiting.       Severe Malnutrition in context of Chronic Illness related to diminished oral intake due to small bowel obstruction with diagnosis of rectal cancer as evidenced by weight loss of 25.6 kg (22%) over the past year, severe muscle mass loss at temples, clavicles, and acromion shoulder region, moderate subcutaneous fat loss at orbital and buccal pads.      Pelvic abscess in male (HCC)  Assessment & Plan  3/16/2025  In 10/1/2024, patient was taken to the OR for anorectal exam with excisional biopsy, I&D of pelvic abscess and closure.  Cultures grew Finegoldia and Peptoniphilus and path was positive for rectal adenocarcinoma   December 2024 during admission rectal surgery consulted and concluded he is not a candidate for any surgeries at this point  He has been on suppressive p.o. Augmentin and followed with ID  While patient is n.p.o. and IV Unasyn is on shortage, will place on cefepime and Flagyl  Patient was seen at ID clinic on 3/4    Bacteremia- (present on admission)  Assessment & Plan  3/16/2025  Please blood cultures in December 2024 were positive for actinomyces  Has been on p.o. Augmentin per ID recommendation  Current npo, on iv cefazolin/ flagyl  Following  Cbc in am     Advance care planning- (present on admission)  Assessment & Plan  Patient was of sound mind and voluntarily participated in the conversation.  Patient and wife were  present for the conversation.  I discussed advance care planning face to face with the patient and family for at least 28 minutes, including diagnosis, prognosis, plan of care, risks and benefits of any therapies that could be offered, as well as alternatives including palliation and hospice, as appropriate.  Forms were completed and placed in the chart.  Patient is agreeable with DNR/DNI    DVT (deep venous thrombosis) (HCC)- (present on admission)  Assessment & Plan  3/16/2025  Chronic Xarelto held while npo, continue therapeutic Lovenox while n.p.o.    Colostomy in place (HCC)- (present on admission)  Assessment & Plan  3/16/2025  Colostomy care    Malignant neoplasm of rectum (HCC)- (present on admission)  Assessment & Plan  3/16/2025  Patient has not been followed by oncology since he was transition to hospice in December.          VTE prophylaxis: lovenox    I have performed a physical exam and reviewed and updated ROS and Plan today (3/16/2025). In review of yesterday's note (3/15/2025), there are no changes except as documented above.    Greater than 50 minutes spent prepping to see patient (e.g. review of tests) obtaining and/or reviewing separately obtained history. Performing a medically appropriate examination and/ evaluation.  Counseling and educating the patient/family/caregiver.  Ordering medications, tests, or procedures.  Referring and communicating with other health care professionals.  Documenting clinical information in EPIC.  Independently interpreting results and communicating results to patient/family/caregiver.  Care coordination.
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
## 2025-03-17 NOTE — PROGRESS NOTES
"Assumed care of patient at 0645. Bedside report received. Assessment complete. Family bedside.  AA&Ox4. Patient is tearful this morning. Expressed sadness and concern for family gathering in time before passing. Expressed sadness and disappointment in not getting to eat food today, a favorite holiday of his. Emotional support provided.   Denies CP/SOB. Room air.  Reporting pain. Medicated per MAR. Irrigated NG with water, blue port with air, LCS.   Skin per flow sheets. Urostomy to RLQ, Ostomy to LLQ. Rectal wound, declined dressing application at this time, patient showered this am with night shift staff. NG to R nare.  Tolerating NG tube, not eating anything this am. Denies N/V.  + cloudy thick output on urostomy. No ostomy output at this time.  Pt ambulates SBA with family and staff  Fall prevention measures in place per flowsheets.    Plan of care discussed. Hospice personnel planning on coming by today, patient made aware; patient eager to speak with hospice. Patient expressed \"I want to die at home but I want all my family to arrive first and I am afraid that I won't make it past the ambulance ride.\"      Call light is within reach, treaded slipper socks on, bed in lowest/ locked position, hourly rounding in place, all needs met at this time.  "

## 2025-03-17 NOTE — PROGRESS NOTES
Bedside report received, assessment completed     A&O x  4, pt calls appropriately  Mobility: Up with x1, Assistive Devices: FWW  Fall Risk Assessment: High, bed alarm on, door notifications in use  Pain Assessment / Reassessment completed, medication provided per MAR  Diet: Clears  LDA:   Central Lines: R Port, Indication: access  Usotomy: RLQ  Ostomy: LLQ  NGT: R nare, LCS     GI/: + urostomy output, - ostomy output  DVT Prophylaxis: Lovenox, SCD refused      Reviewed plan of care with patient, bed in lowest position and locked, pt resting comfortably now, call light within reach, all needs met at this time. Interventions will be executed per plan of care

## 2025-03-17 NOTE — CARE PLAN
The patient is Watcher - Medium risk of patient condition declining or worsening    Shift Goals  Clinical Goals: Pain control, comfort, drain management, and shower  Patient Goals: pain control and shower  Family Goals: updates    Progress made toward(s) clinical / shift goals:  Pain controlled per MAR. Pt pain will be 3/10 by end of shift. Intermittently monitoring drains and output. Pt agreeable to shower in AM. Pt slept intermittently throughout shift.       Problem: Pain - Standard  Goal: Alleviation of pain or a reduction in pain to the patient’s comfort goal  Outcome: Progressing     Problem: Gastrointestinal Irritability  Goal: Nausea and vomiting will be absent or improve  Outcome: Progressing

## 2025-03-17 NOTE — DIETARY
"Nutrition Update:    Day 8 of admit.  Josh Lerner is a 69 y.o. male with admitting DX of SBO (small bowel obstruction) ([facility]) [K56.609].  Patient being followed to optimize nutrition.    Current Diet: Clear liquids, Ensure Clear TID w/ meals.    PO intake of meals and supplements over the weekend documented at 0-<25%.  Per Palliative Care note 3/14/25, pt's wishes include moving toward comfort care/hospice pending arrival of family.  Chart notes yesterday indicate ongoing N/V/abdominal pain w/ obstruction symptoms, able to tolerate only a few clears; follow up w/ palliative care and hospice planned for today.  Pt is awaiting hospice visit today. Per RN note this a.m.: Pt 'Expressed sadness and disappointment in not getting to eat food today, a favorite holiday of his...Patient expressed \"I want to die at home but I want all my family to arrive first and I am afraid that I won't make it past the ambulance ride.\"'     Nutrition Diagnosis:       Inadequate Oral Intake related to small bowel obstruction as evidenced by intermittent episodes of nausea/vomiting.     Ongoing  Severe Malnutrition in context of Chronic Illness related to diminished oral intake due to small bowel obstruction with diagnosis of rectal cancer as evidenced by weight loss of 25.6 kg (22%) over the past year, severe muscle mass loss at temples, clavicles, and acromion shoulder region, moderate subcutaneous fat loss at orbital and buccal pads.    Ongoing      Problem: Nutritional:  Goal: Achieve adequate nutritional intake  Description: Patient will consume >50-75% of meals and supplements, diet order >clear liquids and toleated  Outcome: Not progressing    Plan/Recommend:  GOC/POC per hospice/palliative. RD following for any updates and will make nutrition recommendations as appropriate.  If pt transitioned to comfort care status, consider liberalizing diet order for PO intake as desired for pt's comfort and pleasure at end of life. "       RD continues to follow.

## 2025-03-17 NOTE — PROGRESS NOTES
Patient has NG tube in place: Right nare     Skin integrity beneath NG tube assessed with MARITZA Alcaraz     Skin underneath NG tube is visible: yes     NG retaped to visualize skin underneath: NA     Skin description: pink, tender, CDI

## 2025-03-17 NOTE — DISCHARGE PLANNING
"Care Transition Team Assessment      Action(s) Taken: Updated Provider/Nurse on Discharge Plan  \"Josh Lerner is a 69 y.o. male with medical history significant for locally advanced recurrent rectal cancer status postresection, chemotherapy, colostomy, development of pelvic abscess, and DVT on Xarelto initially presented to [place] ER complaining of diffuse abdominal pain, nausea, and vomiting with minimal oral intake for several weeks though he did report colostomy output and was directly admitted 3/9/2025.  CT of abdomen and pelvis notable for small bowel obstruction with transition point in left lower quadrant.  Day prior to admission unable to tolerate any food or fluids due to nausea and vomiting.  He was hospital in December 2024 at [facility] with pelvic abscess secondary to rectal cancer and seen by multiple providers including surgery, oncology, and ID and unfortunately was not a surgical candidate and thus discharged home with hospice care.  Seen by surgeon Dr. Desai earlier today who confirmed patient has unresectable disease and did not recommend further surgical exploration.  He is unable to eat due to obstruction and requirement for nasogastric tube for decompression. '     Pt was discussed in IDT rounds with Dr Peña.   Hospice referral was ordered by Dr Peña.     Pt is now DNAR/DNI .    Pt has family/ Spouse for support.      Pt/ family s Hospice choice is [facility].    Pt was on service with [facility].        Per Trina SALAS of Palliative Care , she \"met with Pt and Pt s wife at bedside.  Pt is not a candidate for venting G tube and bowel obstruction symptoms will likely keep Pt in the hospital  and they were agreeable with Hospice referral and talking with [facility]\"      Rk SALAS of [facility] met with Pt/family at bedside. Plan is to start on Comfort Care tomorrow afternoon and to continue to assess for GIP appropriateness.         Information Source  Orientation Level: " Oriented X4  Information Given By: Patient, Spouse  Who is responsible for making decisions for patient? : Patient/Spouse    Readmission Evaluation  Is this a readmission?: No    Elopement Risk  Legal Hold: No  Ambulatory or Self Mobile in Wheelchair: Yes  Disoriented: No  Psychiatric Symptoms: None  History of Wandering: No  Elopement this Admit: No  Vocalizing Wanting to Leave: No  Displays Behaviors, Body Language Wanting to Leave: No-Not at Risk for Elopement  Elopement Risk: Not at Risk for Elopement    Interdisciplinary Discharge Planning  Primary Care Physician: Christos Sandoval MD  Lives with - Patient's Self Care Capacity: Spouse  Patient or legal guardian wants to designate a caregiver: No  Support Systems: Family Member(s), Spouse / Significant Other  Housing / Facility: [address]  Do You Take your Prescribed Medications Regularly: Yes  Able to Return to Previous ADL's: No  Mobility Issues: Yes  Prior Services: On [facility]  Patient Prefers to be Discharged to: Possible GIP    Discharge Preparedness  What is your plan after discharge?: Other (comment)  What are your discharge supports?: Spouse, Other (comment)  Prior Functional Level: Needs Assist with Activities of Daily Living    Functional Assesment  Prior Functional Level: Needs Assist with Activities of Daily Living    Finances  Financial Barriers to Discharge: No  Prescription Coverage: Yes    Vision / Hearing Impairment  Vision Impairment : Yes  Right Eye Vision: Impaired, Wears Glasses  Left Eye Vision: Impaired, Wears Glasses  Hearing Impairment : Yes  Hearing Impairment: Both Ears, Patient Declines to Wear Hearing Device(s)  Does Pt Need Special Equipment for the Hearing Impaired?: No         Advance Directive  Advance Directive?: None    Domestic Abuse  Possible Abuse/Neglect Reported to: Not Applicable    Psychological Assessment  History of Substance Abuse: None  History of Psychiatric Problems: No  Non-compliant with Treatment:  No  Newly Diagnosed Illness: Yes    Discharge Risks or Barriers  Discharge risks or barriers?: Other (Plan to start on Comfort Care tomorrow)    Anticipated Discharge Information  Discharge Disposition: D/T to hospice home (50) vs GIP

## 2025-03-17 NOTE — DISCHARGE PLANNING
Case Management Discharge Planning    Admission Date: 3/9/2025  GMLOS: 3.6  ALOS: 8    6-Clicks ADL Score: 20  6-Clicks Mobility Score: 18      Anticipated Discharge Dispo: Discharge Disposition: D/T to hospice home (50)    DME Needed: No    Action(s) Taken: Updated Provider/Nurse on Discharge Plan    Pt was discussed in IDT rounds with Dr Peña.    Per boom Pt  is not appropriate for venting peg tube .       Rk SALAS of [facility] to meet with Pt /family at bedside today.     Per Rk RN , Pt is waiting to see one of his grand kids haim.  Per El Pt /family do not plan  to take Pt home.    Pt will need NGT to suction .  Per Rk they cannot accept Pt with NGT to suction.    Per Rk,  Pt will be started on Comfort Care tomorrow afternoon and continue to assess for GIP appropriateness.         Escalations Completed: None    Medically Clear: No    Next Steps:   CM to continue to assist Pt with discharge as needed    Barriers to Discharge:   Medical clearance  Pending Hospice    Is the patient up for discharge tomorrow: No

## 2025-03-17 NOTE — HOSPICE
[facility] referral/consult response    Is this patient accepted to [facility]?: Pt only willing to do GIP  What hospice level of care?: routine  Approved by provider: TARUN Navarro    Anticipated DC date: ?  DC Barriers: wall suction for NGT  Additional Information:  Pt is willing to be comfort care starting this Tuesday afternoon.  He has one granddaughter coming Tuesday evening.  He does not want to go home.  He has N/V when NGT is off suction, GI is unable to put in a venting G-tube, & there are no home suction machines that do low continuous/intermittent suction.   Will continue to follow to assess GIP.

## 2025-03-18 PROBLEM — Z66 DNR (DO NOT RESUSCITATE): Status: ACTIVE | Noted: 2025-01-01
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
## 2025-03-18 NOTE — PROGRESS NOTES
Assumed care of patient at 0645. Bedside report received. Assessment complete. Family bedside.  AA&Ox4. Patient is tearful this morning saying good bye to some family members leaving for the day. Pleasant demeanor. Emotional support provided.     Denies CP/SOB. Room air.  Reporting pain. Medicated per MAR. Irrigated NG with water, blue port with air, LCS. +output    Skin per flow sheets. Urostomy to RLQ, Ostomy to LLQ. Rectal wound, declined dressing application at this time. NG to R nare LIS  Tolerating NG tube, eating clear/fulls at this time. Stated icecream tasted good this am. Denies N/V.  +output urostomy. No ostomy output at this time.  Pt ambulates SBA with family and staff  Fall prevention measures in place per flowsheets.     Plan of care discussed. Palliative bedside this am.      Call light is within reach, treaded slipper socks on, bed in lowest/ locked position, hourly rounding in place, all needs met at this time

## 2025-03-18 NOTE — PROGRESS NOTES
Patient has NG tube in place: Right    Skin integrity beneath NG tube assessed with MARITZA Guillen     Skin underneath NG tube is visible: yes    NG retaped to visualize skin underneath: NA    Skin description:CDI

## 2025-03-18 NOTE — PROGRESS NOTES
Bedside report received.  Assessment complete.  A&O x 4. Patient calls appropriately. Bed alarm on.  Patient ambulates with x1 assist.   Patient has 0/10 pain. Declines interventions   Denies N&V. Tolerating clear liquid diet.  Patient has R nare NG tube in place pr order; accessed R chest port.   Skin per flowsheets.   + void via urostomy, + flatus and + BM via ostomy.  Patient denies SOB, on room air.    Patient is pleasant and cooperative with plan of care.  Review plan with of care with patient. Call light and personal belongings within reach. Hourly rounding in place. All needs met at this time.    /67   Pulse 86   Temp 36.7 °C (98.1 °F) (Temporal)   Resp 18   Ht 1.829 m (6')   Wt 93.4 kg (205 lb 14.6 oz)   SpO2 96%   BMI 27.93 kg/m²

## 2025-03-18 NOTE — PROGRESS NOTES
"Hospital Medicine Daily Progress Note    Date of Service  3/17/2025    Chief Complaint  Josh Lerner is a 69 y.o. male admitted 3/9/2025 with abdominal pain     Hospital Course  Patient is a 69-year-old male with recurrent rectal cancer,  pelvic abscess, s/p resection, chemoradiation, urostomy, colostomy and DVT( on Xarelto). He presented to  [facility] ER complaining of diffuse abdominal pain, nausea and vomiting.   He reported minimal oral intake for 1-2 weeks due to nausea but continued having colostomy output.  The day prior to admission, he developed worsening of diffuse abdominal pain and was not able to tolerate any food or fluids due to nausea and vomiting.    In December 2024, he was admitted to [facility] with a pelvic abscess secondary to rectal cancer.  Dr. Sewell, Dr. Kirby, Dr. Graham were consulted.  At that time, he was not felt to be a candidate for any surgery and upon discharge he was transitioned to hospice.   He stated that he hds been on hospice for 2 weeks, however would like to \"live as long as possible\" now and requesting full code.  He has been taking Augmentin indefinite course recommended by ID and  Xarelto for history of DVT  He was also seen at [facility] for his above symptoms.  CBC: WBC 13.9, hemoglobin 12.9, platelets 399  Chemistry: Glucose 136, sodium 133, potassium 5.3, creatinine 0.79, BUN 14, anion gap 12 CO2 26, albumin 2.4.  UA cloudy, 3+ blood, 2+ protein, trace leukocyte esterase  CT of the abdomen pelvis with contrast: High-grade severe small bowel obstruction with transition point left lower quadrant with dilated loops small bowel up to 5.5 cm.   Alveolar process posterior basal segment left lower lobe probably pneumonia, aspiration on differential.  Severe three-vessel coronary artery calcifications.  Cholelithiasis.  Left parastomal hernia with small and large bowel abutting one another, not etiology of bowel obstruction.      An NG tube was placed    Interval " Problem Update  Axox3, patient states he is feeling much better since NGT was placed, currently reports diffuse abdominal pain 2/10, minimal nausea. He denies flatus, no BM. He repeated that he no longer wants to be on hospice and wants to remain a full code. He tells me he does not believe that when he was recently told that surgery was not an option is accurate, I communicated with Dr. Desai and updated him on the situation, he will evaluate patient. Vitals stable, wbc did increase some overnight. ROS otherwise negative.     Patient was seen and examined at bedside.  I have personally reviewed and interpreted vitals, labs, and imaging.    3/11.  Afebrile.  Intermittent tachycardia.  On room air.  Denies fevers, chills, chest pains, shortness of breath.  Abdominal pain, nausea, vomiting is improved since NG tube is in place to suction.  Case was discussed with surgery, palliative care.  Long discussion with patient.  He is agreeable with DNR/DNI.  Continue antibiotics, therapeutic Lovenox for now.  Ongoing goals of care discussion  3/12.  Afebrile.  Stable vitals.  On room air.  Replete potassium, phosphorus.  Denies any fevers, chills, chest pains, shortness of breath.  Denies any abdominal pain, nausea, vomiting.  He is very anxious.  Case was discussed with general surgery, and GI.  The patient is a poor candidate for endoscopic PEG tube.  He is very high risk for procedure and his anatomy may not allow placement.  GI recommends to trial clear liquids while holding NG tube to suction.  Can consider palliative venting G-tube placed by IR.  It may also be possible to go home with NG tube.  On further discussion with patient and family at bedside even if palliative venting G-tube is placed he would prefer to pass in the hospital.  States he wants to keep fighting and cannot care for himself at home.  Not a candidate for TPN.  He still wants to continue anticoagulation antibiotics.  With his antibiotics we can  de-escalate cefepime to ceftriaxone.  Ongoing goals of care discussion.  Procal 0.31  3/13.  Afebrile.  Intermittent tachycardia.  On 0-1 L nasal cannula.  Replete potassium, phosphorus.  Denies fevers, chills, chest pains, shortness of breath.  Was started on clears as tolerated using intermittent NG tube to low intermittent suction.  Has not had much abdominal pain nausea or vomiting.  Discussed with palliative and GI.  Venting G-tube is not an option.  Patient is not yet ready for hospice and would like to continue IV fluids, IV antibiotics, and anticoagulation.  He does have more family coming in this evening.  He is agreeable to talk to hospice.  Ongoing goals of care discussion.  Wbc 13.9 > 9.6 > 8.9 > 8.2  Hgb 11 > 10.8 > 10.4  Na 134 > 135  3/14.  Afebrile.  Stable vitals.  On room air.  NGT had to be replaced overnight.  Denies fever, chills or chest pains, shortness of breath.  States he had his NG tube off for 40 minutes while he was trying clear liquids.  Reports severe nausea and vomiting, abdominal pain after the 40 minutes and needed NG tube back on suction.  Goals of care to outline with hospice.  Discussed with palliative care.  His family comes in on Sunday night.  Plan is to move towards hospice by Monday.  3/15.  Afebrile.  Stable vitals.  On room air.  NG tube had to be repositioned overnight.  Denies fever, chills, chest pains, shortness of breath.  Had more nausea, vomiting, abdominal pain yesterday.  Tolerating some clears.  Send family and friends are visiting.  3/16.  Afebrile.  Stable vitals.  On room air.  Denies fevers, chills, chest pains, shortness of breath.  Still having intermittent nausea, vomiting, abdominal pain with obstructive symptoms.  Able to tolerate only a few clears.  More friends and family are in town.  He will discuss with palliative and hospice again tomorrow.  Continue clear liquid diet, NG tube to low intermittent suction as needed  3/17.  Afebrile.  Stable vitals.   On room air.  Had a long conversation about goals of care.  Initially patient wanted to go home and spend time with his dogs.  Unfortunately if he is off the NG tube to suction he quickly gets obstructive symptoms with nausea, vomiting, abdominal pain and close very poor oral intake.  I had a long goals of care discussion and also involved hospice as well as palliative care.  Patient has a daughter arriving tomorrow evening and was delayed because of weather.  He plans to go comfort care tomorrow afternoon.  Will keep him in the hospital for NG tube to suction.  Okay to continue IV fluids, anticoagulation, antibiotics for now.    I have discussed this patient's plan of care and discharge plan at IDT rounds today with Case Management, Nursing, Nursing leadership, and other members of the IDT team.    Consultants/Specialty  [facility] Surgery     Code Status  DNAR/DNI    Disposition  The patient is not medically cleared for discharge to home or a post-acute facility.  Anticipate discharge to: hospice    I have placed the appropriate orders for post-discharge needs.    Review of Systems  Review of Systems   Constitutional: Negative.  Negative for chills, diaphoresis, fever, malaise/fatigue and weight loss.   HENT: Negative.  Negative for sore throat.    Eyes: Negative.  Negative for blurred vision.   Respiratory: Negative.  Negative for cough and shortness of breath.    Cardiovascular: Negative.  Negative for chest pain, palpitations and leg swelling.   Gastrointestinal:  Positive for abdominal pain and nausea. Negative for vomiting.   Genitourinary: Negative.  Negative for dysuria.   Musculoskeletal: Negative.  Negative for myalgias.   Skin: Negative.  Negative for itching and rash.   Neurological: Negative.  Negative for dizziness, focal weakness, weakness and headaches.   Endo/Heme/Allergies: Negative.  Does not bruise/bleed easily.   Psychiatric/Behavioral: Negative.  Negative for depression, substance abuse and  suicidal ideas.    All other systems reviewed and are negative.       Physical Exam  Temp:  [36.4 °C (97.5 °F)-36.9 °C (98.4 °F)] 36.4 °C (97.5 °F)  Pulse:  [86-99] 98  Resp:  [18] 18  BP: (113-125)/(72-76) 113/72  SpO2:  [93 %-96 %] 96 %    Physical Exam  Vitals and nursing note reviewed. Exam conducted with a chaperone present.   Constitutional:       General: He is not in acute distress.     Appearance: Normal appearance. He is not diaphoretic.   HENT:      Head: Normocephalic.      Nose: Nose normal.      Mouth/Throat:      Mouth: Mucous membranes are moist.   Eyes:      Pupils: Pupils are equal, round, and reactive to light.   Cardiovascular:      Rate and Rhythm: Normal rate and regular rhythm.      Pulses: Normal pulses.      Heart sounds: Normal heart sounds.   Pulmonary:      Effort: Pulmonary effort is normal.      Breath sounds: Normal breath sounds.   Abdominal:      General: There is distension.      Palpations: Abdomen is soft.      Tenderness: There is abdominal tenderness (mild).      Comments: Colostomy in place, no output  Urostomy with bag in place, yellow urine   Musculoskeletal:         General: No swelling or deformity. Normal range of motion.   Skin:     General: Skin is warm and dry.      Capillary Refill: Capillary refill takes less than 2 seconds.      Comments: Mulitple tatoos   Neurological:      General: No focal deficit present.      Mental Status: He is alert and oriented to person, place, and time.      Cranial Nerves: No cranial nerve deficit.   Psychiatric:         Mood and Affect: Mood normal.         Behavior: Behavior normal.         Fluids    Intake/Output Summary (Last 24 hours) at 3/17/2025 1707  Last data filed at 3/17/2025 0714  Gross per 24 hour   Intake 30 ml   Output 3500 ml   Net -3470 ml        Laboratory                            Imaging  DX-ABDOMEN FOR TUBE PLACEMENT   Final Result         1.  Air-filled distended loops of bowel are seen with reactive mucosal pattern  in the upper abdomen, appearance suggests ileus or enteritis versus evolving obstructive changes. Recommend radiographic followup to resolution to exclude progression to    obstruction.   2.  Nasogastric tube is coiled within the stomach, the tip terminates overlying the expected location of the gastric antrum.   3.  Hazy left lower lobe infiltrates   4.  Atherosclerosis      DX-ABDOMEN FOR TUBE PLACEMENT   Final Result         1.  Air-filled distended loops of bowel are seen with reactive mucosal pattern in the upper abdomen, appearance suggests ileus or enteritis versus evolving obstructive changes. Recommend radiographic followup to resolution to exclude progression to    obstruction.   2.  Nasogastric tube tip terminates overlying the expected location of the third or fourth duodenal segment.   3.  Left basilar atelectasis   4.  Atherosclerosis      DX-ABDOMEN FOR TUBE PLACEMENT   Final Result         1.  Air-filled distended loops of bowel are seen with reactive mucosal pattern in the upper abdomen, appearance suggests ileus or enteritis versus evolving obstructive changes. Recommend radiographic followup to resolution to exclude progression to    obstruction.   2.  Nasogastric tube is coiled within the stomach, the tip terminates overlying the expected location of the gastric antrum.   3.  Bibasilar atelectasis and/or infiltrates.      DX-ABDOMEN FOR TUBE PLACEMENT   Final Result      1.  NG tube extends in the fundus of stomach      DX-ABDOMEN FOR TUBE PLACEMENT   Final Result         1.  Nonspecific bowel gas pattern in the upper abdomen.   2.  Hazy left lower lobe infiltrates   3.  Nasogastric tube tip terminates overlying the expected location of the gastric antrum.   4.  Atherosclerosis           Assessment/Plan  * SBO (small bowel obstruction) (HCC)- (present on admission)  Assessment & Plan  3/17/2025  69-year-old male with recurrent prostate cancer, colostomy end ileostomy in place, multiple abdominal  surgeries, presented with diffuse abdominal pain, distention, nausea, vomiting  CT of the abdomen pelvis with contrast: High-grade severe small bowel obstruction with transition point left lower quadrant with dilated loops small bowel up to 5.5 cm.    continue NG tube to low continuous suction and, continue bowel rest  IV Zofran as needed for nausea  IV fluid support  IV morphine as needed for pain  Monitor for respiratory depression secondary to IV morphine  Continuous pulse oximetry  Discussed with Dr. Desai     No further options to offer in terms of surgical intervention or chemo.  NG tube to suction.  Clear liquids as tolerated    Severe protein-calorie malnutrition (HCC)  Assessment & Plan  Nutrition Focused Physical Exam (NFPE)  Weight Loss: -25.6 kg (22%) x 1 year. RD Suspects this change related to cancer, limited PO intake.  Muscle Mass: Severe Wasting at Temples, Clavicles, and Acromion Shoulder Region  Subcutaneous Fat: Moderate Loss at Orbital and Buccal Pads  Fluid Accumulation: generalized RLE edema.   Reduced  Strength: N/A in acute care setting.     Nutrition Diagnosis:       Inadequate Oral Intake related to small bowel obstruction as evidenced by intermittent episodes of nausea/vomiting.       Severe Malnutrition in context of Chronic Illness related to diminished oral intake due to small bowel obstruction with diagnosis of rectal cancer as evidenced by weight loss of 25.6 kg (22%) over the past year, severe muscle mass loss at temples, clavicles, and acromion shoulder region, moderate subcutaneous fat loss at orbital and buccal pads.      Pelvic abscess in male (HCC)  Assessment & Plan  3/17/2025  In 10/1/2024, patient was taken to the OR for anorectal exam with excisional biopsy, I&D of pelvic abscess and closure.  Cultures grew Finegoldia and Peptoniphilus and path was positive for rectal adenocarcinoma   December 2024 during admission rectal surgery consulted and concluded he is not a  candidate for any surgeries at this point  He has been on suppressive p.o. Augmentin and followed with ID  While patient is n.p.o. and IV Unasyn is on shortage, will place on cefepime and Flagyl  Patient was seen at ID clinic on 3/4    Bacteremia- (present on admission)  Assessment & Plan  3/17/2025  Please blood cultures in December 2024 were positive for actinomyces  Has been on p.o. Augmentin per ID recommendation  Current npo, on iv cefazolin/ flagyl  Following  Cbc in am     Advance care planning- (present on admission)  Assessment & Plan  Patient was of sound mind and voluntarily participated in the conversation.  Patient and family were present for the conversation.  I discussed advance care planning face to face with the patient and family for at least 29 minutes, including diagnosis, prognosis, plan of care, risks and benefits of any therapies that could be offered, as well as alternatives including palliation and hospice, as appropriate.  Forms were completed and placed in the chart.    Remains DNR/DNI.  Plan to go comfort care tomorrow.    DVT (deep venous thrombosis) (HCC)- (present on admission)  Assessment & Plan  3/17/2025  Chronic Xarelto held while npo, continue therapeutic Lovenox while n.p.o.    Colostomy in place (HCC)- (present on admission)  Assessment & Plan  3/17/2025  Colostomy care    Malignant neoplasm of rectum (HCC)- (present on admission)  Assessment & Plan  3/17/2025  Patient has not been followed by oncology since he was transition to hospice in December.          VTE prophylaxis: lovenox    I have performed a physical exam and reviewed and updated ROS and Plan today (3/17/2025). In review of yesterday's note (3/16/2025), there are no changes except as documented above.    Greater than 51 minutes spent prepping to see patient (e.g. review of tests) obtaining and/or reviewing separately obtained history. Performing a medically appropriate examination and/ evaluation.  Counseling and  educating the patient/family/caregiver.  Ordering medications, tests, or procedures.  Referring and communicating with other health care professionals.  Documenting clinical information in EPIC.  Independently interpreting results and communicating results to patient/family/caregiver.  Care coordination.

## 2025-03-18 NOTE — CARE PLAN
The patient is Stable - Low risk of patient condition declining or worsening    Shift Goals  Clinical Goals: assess and monitor NG/urostomy/colostomy and output, manage abx and IVF therapy, pain management, comfort  Patient Goals: pain control, spend time w/ family  Family Goals: support, updates    Progress made toward(s) clinical / shift goals:    Problem: Knowledge Deficit - Standard  Goal: Patient and family/care givers will demonstrate understanding of plan of care, disease process/condition, diagnostic tests and medications  Description: Target End Date:  1-3 days or as soon as patient condition allows    Document in Patient Education    1.  Patient and family/caregiver oriented to unit, equipment, visitation policy and means for communicating concern  2.  Complete/review Learning Assessment  3.  Assess knowledge level of disease process/condition, treatment plan, diagnostic tests and medications  4.  Explain disease process/condition, treatment plan, diagnostic tests and medications  Outcome: Progressing     Problem: Skin Integrity  Goal: Skin integrity is maintained or improved  Description: Target End Date:  Prior to discharge or change in level of careDocument interventions on Skin Risk/Dieudonne flowsheet groups and corresponding LDA1.  Assess and monitor skin integrity, appearance and/or temperature2.  Assess risk factors for impaired skin integrity and/or pressures ulcers3.  Implement precautions to protect skin integrity in collaboration with interdisciplinary team4.  Implement pressure ulcer prevention protocol if at risk for skin breakdown5.  Confirm wound care consult if at risk for skin breakdown6.  Ensure patient use of pressure relieving devices  (Low air loss bed, waffle overlay, heel protectors, ROHO cushion, etc)  Outcome: Progressing       Patient is not progressing towards the following goals:

## 2025-03-18 NOTE — CARE PLAN
Problem: Knowledge Deficit - Standard  Goal: Patient and family/care givers will demonstrate understanding of plan of care, disease process/condition, diagnostic tests and medications  Outcome: Progressing     Problem: Pain - Standard  Goal: Alleviation of pain or a reduction in pain to the patient’s comfort goal  Outcome: Progressing   The patient is Stable - Low risk of patient condition declining or worsening    Shift Goals  Clinical Goals: pain control, comfort, emotional support, drain mngmt, nausea mngmt  Patient Goals: pain control, process end of life, spend time with family  Family Goals: support patient    Progress made toward(s) clinical / shift goals: Lots of family bedside today.  Patient seemed to enjoy engaging and enjoying clear liquids despite NG tube being to suction. NG tube irrigated with water and air throughout day. No nausea, medicated PRN for pain.     Patient is not progressing towards the following goals:

## 2025-03-18 NOTE — PROGRESS NOTES
Patient has NG tube in place: Right nare    Skin integrity beneath NG tube assessed with MARITZA Perkins    Skin underneath NG tube is visible: yes    NG retaped to visualize skin underneath: NA    Skin description: intact, clean, dry, pink

## 2025-03-19 PROBLEM — Z51.5 COMFORT MEASURES ONLY STATUS: Status: ACTIVE | Noted: 2025-01-01

## 2025-03-19 NOTE — CARE PLAN
Problem: Knowledge Deficit - Standard  Goal: Patient and family/care givers will demonstrate understanding of plan of care, disease process/condition, diagnostic tests and medications  Outcome: Progressing     Problem: Pain - Standard  Goal: Alleviation of pain or a reduction in pain to the patient’s comfort goal  Outcome: Progressing   The patient is Watcher - Medium risk of patient condition declining or worsening Hospice     Shift Goals  Clinical Goals: pain mngmt, comfort, skin integrity, emotional support  Patient Goals: pain control, visit family, rest, comfort  Family Goals: comfort    Progress made toward(s) clinical / shift goals:   Patients pain controlled with scheduled and PRN pain medication. Patient had a multitude of visitors today, patient in good sprits visiting friends and family, tearful at times, appropriate.   Dressing change to rectum complete post pain med administration.  Patient up to walk a short distance. Wheelchair around unit and to healing garden with friends, family and staff.   Patient tolerating NG T to suction while still enjoying clear liquid diet.     Patient is not progressing towards the following goals:

## 2025-03-19 NOTE — HOSPICE
Pt is willing to switch over to comfort care.  He is not having as good of a day today due to GI discomfort.   Will re-assess for GIP tomorrow after new comfort measures have started.

## 2025-03-19 NOTE — CARE PLAN
The patient is Stable - Low risk of patient condition declining or worsening    Shift Goals  Clinical Goals: Pain Control; NGT Management; Comfort; Wound Care  Patient Goals: Pain Control; Comfort  Family Goals: Comfort    Progress made toward(s) clinical / shift goals:  Patient medicated per MAR. Non-pharmacologic comfort measures implemented. Safety discussed. Education provided. Ambulation and repositioning encouraged. IS use encouraged. Diet intake monitored.     Problem: Knowledge Deficit - Standard  Goal: Patient and family/care givers will demonstrate understanding of plan of care, disease process/condition, diagnostic tests and medications  Description: Target End Date:  1-3 days or as soon as patient condition allows    Document in Patient Education    1.  Patient and family/caregiver oriented to unit, equipment, visitation policy and means for communicating concern  2.  Complete/review Learning Assessment  3.  Assess knowledge level of disease process/condition, treatment plan, diagnostic tests and medications  4.  Explain disease process/condition, treatment plan, diagnostic tests and medications  Outcome: Progressing     Problem: Pain - Standard  Goal: Alleviation of pain or a reduction in pain to the patient’s comfort goal  Description: Target End Date:  Prior to discharge or change in level of care    Document on Vitals flowsheet    1.  Document pain using the appropriate pain scale per order or unit policy  2.  Educate and implement non-pharmacologic comfort measures (i.e. relaxation, distraction, massage, cold/heat therapy, etc.)  3.  Pain management medications as ordered  4.  Reassess pain after pain med administration per policy  5.  If opiods administered assess patient's response to pain medication is appropriate per POSS sedation scale  6.  Follow pain management plan developed in collaboration with patient and interdisciplinary team (including palliative care or pain specialists if  applicable)  Outcome: Progressing     Problem: Gastrointestinal Irritability  Goal: Nausea and vomiting will be absent or improve  Description: Target End Date:  Prior to discharge or change in level of care    Document on I/O and Assessment flowsheets    1.  Assess for history, duration, frequency, severity, and potential precipitating factors  2.  Administer antiemetics as ordered  3.  Emesis basin within easy reach of the patient, but out of sight if psychogenic component  4.  Eliminate strong odors from surroundings  5.  Introduce cold water, ice chips, roldan products, and room temperature broth or bouillon if tolerated and appropriate to the patient's diet  6.  Encourage small amounts of bland, simple foods like broth, rice, bananas, Jell-O, crackers or toast if tolerated and appropriate to patient's diet  7.  Position the patient upright while eating and for 1 to 2 hours post-meal  8.  Encourage nonpharmacological nausea control techniques such as relaxation, guided imagery, music therapy, distraction, or deep breathing exercises  Outcome: Progressing
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
## 2025-03-19 NOTE — PROGRESS NOTES
Bedside report received, assessment completed    A&O x  4, pt calls appropriately  Mobility: Up with x1-2, Assistive Devices: FWW/ WC  6 Clicks: 18 Mobility/ PT refused/ not indicated  20 ADL/ OT refused/ not indicated   Weight Bearing Restrictions: no restrictions  Fall Risk Assessment: moderate, 12   Fall Precautions Include: + Bed Alarm, + Personal Items at bedside, + Bed in low position, + Door Notifications in place   Pain Assessment / Reassessment completed, medication provided per MAR  Diet: CLD   - Tolerating  LDA:   Central Lines: +, Indication prior chemo treatment   NG/ Cortrak: Suction +     GI/: urostomy void, + flatus, ostomy BM    - Bowel protocols in place: n/a  DVT Prophylaxis: SCD on while in bed    Dieudonne Score: 17, Interventions per flow sheet   POC:     - Pt to go comfort care   Reviewed plan of care with patient, bed in lowest position and locked, pt resting comfortably now, call light within reach, all needs met at this time. Interventions will be executed per plan of care

## 2025-03-19 NOTE — PROGRESS NOTES
"Hospital Medicine Daily Progress Note    Date of Service  3/19/2025    Chief Complaint  Josh Lerner is a 69 y.o. male admitted 3/9/2025 with abdominal pain     Hospital Course  Patient is a 69-year-old male with recurrent rectal cancer,  pelvic abscess, s/p resection, chemoradiation, urostomy, colostomy and DVT( on Xarelto). He presented to  [facility] ER complaining of diffuse abdominal pain, nausea and vomiting.   He reported minimal oral intake for 1-2 weeks due to nausea but continued having colostomy output.  The day prior to admission, he developed worsening of diffuse abdominal pain and was not able to tolerate any food or fluids due to nausea and vomiting.    In December 2024, he was admitted to [facility] with a pelvic abscess secondary to rectal cancer.  Dr. Sewell, Dr. Kirby, Dr. Graham were consulted.  At that time, he was not felt to be a candidate for any surgery and upon discharge he was transitioned to hospice.   He stated that he hds been on hospice for 2 weeks, however would like to \"live as long as possible\" now and requesting full code.  He has been taking Augmentin indefinite course recommended by ID and  Xarelto for history of DVT  He was also seen at [facility] for his above symptoms.  CBC: WBC 13.9, hemoglobin 12.9, platelets 399  Chemistry: Glucose 136, sodium 133, potassium 5.3, creatinine 0.79, BUN 14, anion gap 12 CO2 26, albumin 2.4.  UA cloudy, 3+ blood, 2+ protein, trace leukocyte esterase  CT of the abdomen pelvis with contrast: High-grade severe small bowel obstruction with transition point left lower quadrant with dilated loops small bowel up to 5.5 cm.   Alveolar process posterior basal segment left lower lobe probably pneumonia, aspiration on differential.  Severe three-vessel coronary artery calcifications.  Cholelithiasis.  Left parastomal hernia with small and large bowel abutting one another, not etiology of bowel obstruction.      An NG tube was placed    Interval " Problem Update  I have seen and examined the patient at bedside  I discussed the care plan with the family members at bedside.  All the questions were answered    Patient elected to comfort care  Continue NG tube decompression  Started on PCA for pain control      I have discussed this patient's plan of care and discharge plan at IDT rounds today with Case Management, Nursing, Nursing leadership, and other members of the IDT team.    Consultants/Specialty  [facility] Surgery     Code Status  Comfort Care/DNR    Disposition  The patient is not medically cleared for discharge to home or a post-acute facility.      I have placed the appropriate orders for post-discharge needs.    Review of Systems  Review of Systems   Constitutional: Negative.  Negative for chills, diaphoresis, fever, malaise/fatigue and weight loss.   HENT: Negative.  Negative for sore throat.    Eyes: Negative.  Negative for blurred vision.   Respiratory: Negative.  Negative for cough and shortness of breath.    Cardiovascular: Negative.  Negative for chest pain, palpitations and leg swelling.   Gastrointestinal:  Positive for abdominal pain and nausea. Negative for vomiting.   Genitourinary: Negative.  Negative for dysuria.   Musculoskeletal: Negative.  Negative for myalgias.   Skin: Negative.  Negative for itching and rash.   Neurological: Negative.  Negative for dizziness, focal weakness, weakness and headaches.   Endo/Heme/Allergies: Negative.  Does not bruise/bleed easily.   Psychiatric/Behavioral: Negative.  Negative for depression, substance abuse and suicidal ideas.    All other systems reviewed and are negative.       Physical Exam  Temp:  [36.7 °C (98.1 °F)-36.8 °C (98.2 °F)] 36.8 °C (98.2 °F)  Pulse:  [82-94] 94  Resp:  [16-18] 18  BP: (117-136)/(64-77) 124/77  SpO2:  [93 %-95 %] 93 %    Physical Exam  Vitals and nursing note reviewed. Exam conducted with a chaperone present.   Constitutional:       General: He is not in acute distress.      Appearance: Normal appearance. He is not diaphoretic.   HENT:      Head: Normocephalic.      Nose: Nose normal.      Mouth/Throat:      Mouth: Mucous membranes are moist.   Eyes:      Pupils: Pupils are equal, round, and reactive to light.   Cardiovascular:      Rate and Rhythm: Normal rate and regular rhythm.      Pulses: Normal pulses.      Heart sounds: Normal heart sounds.   Pulmonary:      Effort: Pulmonary effort is normal.      Breath sounds: Normal breath sounds.   Abdominal:      General: There is distension.      Palpations: Abdomen is soft.      Tenderness: There is abdominal tenderness (mild).      Comments: Colostomy in place, no output  Urostomy with bag in place, yellow urine   Musculoskeletal:         General: No swelling or deformity. Normal range of motion.   Skin:     General: Skin is warm and dry.      Capillary Refill: Capillary refill takes less than 2 seconds.      Comments: Mulitple tatpro   Neurological:      General: No focal deficit present.      Mental Status: He is alert and oriented to person, place, and time.      Cranial Nerves: No cranial nerve deficit.   Psychiatric:         Mood and Affect: Mood normal.         Behavior: Behavior normal.         Fluids    Intake/Output Summary (Last 24 hours) at 3/19/2025 1640  Last data filed at 3/19/2025 1623  Gross per 24 hour   Intake 230 ml   Output 2750 ml   Net -2520 ml        Laboratory                            Imaging  DX-ABDOMEN FOR TUBE PLACEMENT   Final Result      1.  Interval retraction of nasogastric tube with tip now at the proximal stomach.   2.  No other significant change from prior exam.         DX-ABDOMEN FOR TUBE PLACEMENT   Final Result      1. The enteric tube terminates within the antrum of the stomach.   2. Stable dilated bowel loops in the upper abdomen.      DX-ABDOMEN FOR TUBE PLACEMENT   Final Result         1.  Air-filled distended loops of bowel are seen with reactive mucosal pattern in the upper abdomen,  appearance suggests ileus or enteritis versus evolving obstructive changes. Recommend radiographic followup to resolution to exclude progression to    obstruction.   2.  Nasogastric tube is coiled within the stomach, the tip terminates overlying the expected location of the gastric antrum.   3.  Hazy left lower lobe infiltrates   4.  Atherosclerosis      DX-ABDOMEN FOR TUBE PLACEMENT   Final Result         1.  Air-filled distended loops of bowel are seen with reactive mucosal pattern in the upper abdomen, appearance suggests ileus or enteritis versus evolving obstructive changes. Recommend radiographic followup to resolution to exclude progression to    obstruction.   2.  Nasogastric tube tip terminates overlying the expected location of the third or fourth duodenal segment.   3.  Left basilar atelectasis   4.  Atherosclerosis      DX-ABDOMEN FOR TUBE PLACEMENT   Final Result         1.  Air-filled distended loops of bowel are seen with reactive mucosal pattern in the upper abdomen, appearance suggests ileus or enteritis versus evolving obstructive changes. Recommend radiographic followup to resolution to exclude progression to    obstruction.   2.  Nasogastric tube is coiled within the stomach, the tip terminates overlying the expected location of the gastric antrum.   3.  Bibasilar atelectasis and/or infiltrates.      DX-ABDOMEN FOR TUBE PLACEMENT   Final Result      1.  NG tube extends in the fundus of stomach      DX-ABDOMEN FOR TUBE PLACEMENT   Final Result         1.  Nonspecific bowel gas pattern in the upper abdomen.   2.  Hazy left lower lobe infiltrates   3.  Nasogastric tube tip terminates overlying the expected location of the gastric antrum.   4.  Atherosclerosis           Assessment/Plan  * SBO (small bowel obstruction) (HCC)- (present on admission)  Assessment & Plan  3/17/2025  69-year-old male with recurrent prostate cancer, colostomy end ileostomy in place, multiple abdominal surgeries, presented  with diffuse abdominal pain, distention, nausea, vomiting  CT of the abdomen pelvis with contrast: High-grade severe small bowel obstruction with transition point left lower quadrant with dilated loops small bowel up to 5.5 cm.    continue NG tube to low continuous suction and, continue bowel rest  IV Zofran as needed for nausea  IV fluid support  IV morphine as needed for pain  Monitor for respiratory depression secondary to IV morphine  Continuous pulse oximetry  Discussed with Dr. Desai     No further options to offer in terms of surgical intervention or chemo.  NG tube to suction.  Clear liquids as tolerated    Comfort measures only status  Assessment & Plan  Started comfort care  Hospice team following for GIP needs    DNR (do not resuscitate)  Assessment & Plan  DNR/DNI    Severe protein-calorie malnutrition (HCC)  Assessment & Plan  Nutrition Focused Physical Exam (NFPE)  Weight Loss: -25.6 kg (22%) x 1 year. RD Suspects this change related to cancer, limited PO intake.  Muscle Mass: Severe Wasting at Temples, Clavicles, and Acromion Shoulder Region  Subcutaneous Fat: Moderate Loss at Orbital and Buccal Pads  Fluid Accumulation: generalized RLE edema.   Reduced  Strength: N/A in acute care setting.     Nutrition Diagnosis:       Inadequate Oral Intake related to small bowel obstruction as evidenced by intermittent episodes of nausea/vomiting.       Severe Malnutrition in context of Chronic Illness related to diminished oral intake due to small bowel obstruction with diagnosis of rectal cancer as evidenced by weight loss of 25.6 kg (22%) over the past year, severe muscle mass loss at temples, clavicles, and acromion shoulder region, moderate subcutaneous fat loss at orbital and buccal pads.      Pelvic abscess in male (HCC)  Assessment & Plan  3/17/2025  In 10/1/2024, patient was taken to the OR for anorectal exam with excisional biopsy, I&D of pelvic abscess and closure.  Cultures grew Finegoldia and  Peptoniphilus and path was positive for rectal adenocarcinoma   December 2024 during admission rectal surgery consulted and concluded he is not a candidate for any surgeries at this point  He has been on suppressive p.o. Augmentin and followed with ID  While patient is n.p.o. and IV Unasyn is on shortage, will place on cefepime and Flagyl  Patient was seen at ID clinic on 3/4    Bacteremia- (present on admission)  Assessment & Plan  3/17/2025  Please blood cultures in December 2024 were positive for actinomyces  Has been on p.o. Augmentin per ID recommendation  Current npo, on iv cefazolin/ flagyl  Following  Cbc in am     Advance care planning- (present on admission)  Assessment & Plan  Patient was of sound mind and voluntarily participated in the conversation.  Patient and family were present for the conversation.  I discussed advance care planning face to face with the patient and family for at least 29 minutes, including diagnosis, prognosis, plan of care, risks and benefits of any therapies that could be offered, as well as alternatives including palliation and hospice, as appropriate.  Forms were completed and placed in the chart.    Remains DNR/DNI.  Plan to go comfort care tomorrow.    3/19 patient continues to have GI symptoms and requiring NG tube in place.  Patient have seen the family members and elected to comfort care.  He understands that we will discontinue current treatments, IV fluid.  The main focus will be to manage his pain and anxiety.  His life expectancy is very guarded.  He wishes to suffer less and feels ready to accept to the ending.  I discussed with palliative team and hospice team.  Comfort care placed.  ACP 20 minutes.     DVT (deep venous thrombosis) ([facility])- (present on admission)  Assessment & Plan  3/17/2025  Chronic Xarelto held while npo, continue therapeutic Lovenox while n.p.o.    Colostomy in place ([facility])- (present on admission)  Assessment & Plan  3/17/2025  Colostomy  care    Malignant neoplasm of rectum (HCC)- (present on admission)  Assessment & Plan  3/17/2025  Patient has not been followed by oncology since he was transition to hospice in December.          VTE prophylaxis: lovenox    I have performed a physical exam and reviewed and updated ROS and Plan today (3/19/2025). In review of yesterday's note (3/18/2025), there are no changes except as documented above.    Greater than 36 minutes spent prepping to see patient (e.g. review of tests) obtaining and/or reviewing separately obtained history. Performing a medically appropriate examination and/ evaluation.  Counseling and educating the patient/family/caregiver.  Ordering medications, tests, or procedures.  Referring and communicating with other health care professionals.  Documenting clinical information in EPIC.  Independently interpreting results and communicating results to patient/family/caregiver.  Care coordination.     VTE Selection

## 2025-03-19 NOTE — PROGRESS NOTES
"Hospital Medicine Daily Progress Note    Date of Service  3/18/2025    Chief Complaint  Josh Lerner is a 69 y.o. male admitted 3/9/2025 with abdominal pain     Hospital Course  Patient is a 69-year-old male with recurrent rectal cancer,  pelvic abscess, s/p resection, chemoradiation, urostomy, colostomy and DVT( on Xarelto). He presented to  [facility] ER complaining of diffuse abdominal pain, nausea and vomiting.   He reported minimal oral intake for 1-2 weeks due to nausea but continued having colostomy output.  The day prior to admission, he developed worsening of diffuse abdominal pain and was not able to tolerate any food or fluids due to nausea and vomiting.    In December 2024, he was admitted to [facility] with a pelvic abscess secondary to rectal cancer.  Dr. Sewell, Dr. Kirby, Dr. Graham were consulted.  At that time, he was not felt to be a candidate for any surgery and upon discharge he was transitioned to hospice.   He stated that he hds been on hospice for 2 weeks, however would like to \"live as long as possible\" now and requesting full code.  He has been taking Augmentin indefinite course recommended by ID and  Xarelto for history of DVT  He was also seen at [facility] for his above symptoms.  CBC: WBC 13.9, hemoglobin 12.9, platelets 399  Chemistry: Glucose 136, sodium 133, potassium 5.3, creatinine 0.79, BUN 14, anion gap 12 CO2 26, albumin 2.4.  UA cloudy, 3+ blood, 2+ protein, trace leukocyte esterase  CT of the abdomen pelvis with contrast: High-grade severe small bowel obstruction with transition point left lower quadrant with dilated loops small bowel up to 5.5 cm.   Alveolar process posterior basal segment left lower lobe probably pneumonia, aspiration on differential.  Severe three-vessel coronary artery calcifications.  Cholelithiasis.  Left parastomal hernia with small and large bowel abutting one another, not etiology of bowel obstruction.      An NG tube was placed    Interval " Problem Update  I have seen and examined the patient at bedside  I discussed the care plan with the family members at bedside.  All the questions were answered    I discussed with palliative care, plan to start comfort care when the granddaughter is here     Continue NG tube for decompression.  Patient is not able to be on off NG due to obstructive symptoms.   Continue IV Ancef and Flagyl  Continue scheduled IV morphine every 3 hour per palliative  Continue IV fluid      I have discussed this patient's plan of care and discharge plan at IDT rounds today with Case Management, Nursing, Nursing leadership, and other members of the IDT team.    Consultants/Specialty  [facility]     Code Status  DNAR/DNI    Disposition  The patient is not medically cleared for discharge to home or a post-acute facility.      I have placed the appropriate orders for post-discharge needs.    Review of Systems  Review of Systems   Constitutional: Negative.  Negative for chills, diaphoresis, fever, malaise/fatigue and weight loss.   HENT: Negative.  Negative for sore throat.    Eyes: Negative.  Negative for blurred vision.   Respiratory: Negative.  Negative for cough and shortness of breath.    Cardiovascular: Negative.  Negative for chest pain, palpitations and leg swelling.   Gastrointestinal:  Positive for abdominal pain and nausea. Negative for vomiting.   Genitourinary: Negative.  Negative for dysuria.   Musculoskeletal: Negative.  Negative for myalgias.   Skin: Negative.  Negative for itching and rash.   Neurological: Negative.  Negative for dizziness, focal weakness, weakness and headaches.   Endo/Heme/Allergies: Negative.  Does not bruise/bleed easily.   Psychiatric/Behavioral: Negative.  Negative for depression, substance abuse and suicidal ideas.    All other systems reviewed and are negative.       Physical Exam  Temp:  [36.5 °C (97.7 °F)-36.7 °C (98.1 °F)] 36.5 °C (97.7 °F)  Pulse:  [74-93] 93  Resp:  [16-18] 18  BP:  (111-121)/(54-70) 120/69  SpO2:  [96 %-97 %] 97 %    Physical Exam  Vitals and nursing note reviewed. Exam conducted with a chaperone present.   Constitutional:       General: He is not in acute distress.     Appearance: Normal appearance. He is not diaphoretic.   HENT:      Head: Normocephalic.      Nose: Nose normal.      Mouth/Throat:      Mouth: Mucous membranes are moist.   Eyes:      Pupils: Pupils are equal, round, and reactive to light.   Cardiovascular:      Rate and Rhythm: Normal rate and regular rhythm.      Pulses: Normal pulses.      Heart sounds: Normal heart sounds.   Pulmonary:      Effort: Pulmonary effort is normal.      Breath sounds: Normal breath sounds.   Abdominal:      General: There is distension.      Palpations: Abdomen is soft.      Tenderness: There is abdominal tenderness (mild).      Comments: Colostomy in place, no output  Urostomy with bag in place, yellow urine   Musculoskeletal:         General: No swelling or deformity. Normal range of motion.   Skin:     General: Skin is warm and dry.      Capillary Refill: Capillary refill takes less than 2 seconds.      Comments: Mulitple tatoos   Neurological:      General: No focal deficit present.      Mental Status: He is alert and oriented to person, place, and time.      Cranial Nerves: No cranial nerve deficit.   Psychiatric:         Mood and Affect: Mood normal.         Behavior: Behavior normal.         Fluids    Intake/Output Summary (Last 24 hours) at 3/18/2025 1746  Last data filed at 3/18/2025 1625  Gross per 24 hour   Intake --   Output 3500 ml   Net -3500 ml        Laboratory                            Imaging  DX-ABDOMEN FOR TUBE PLACEMENT   Final Result         1.  Air-filled distended loops of bowel are seen with reactive mucosal pattern in the upper abdomen, appearance suggests ileus or enteritis versus evolving obstructive changes. Recommend radiographic followup to resolution to exclude progression to    obstruction.   2.   Nasogastric tube is coiled within the stomach, the tip terminates overlying the expected location of the gastric antrum.   3.  Hazy left lower lobe infiltrates   4.  Atherosclerosis      DX-ABDOMEN FOR TUBE PLACEMENT   Final Result         1.  Air-filled distended loops of bowel are seen with reactive mucosal pattern in the upper abdomen, appearance suggests ileus or enteritis versus evolving obstructive changes. Recommend radiographic followup to resolution to exclude progression to    obstruction.   2.  Nasogastric tube tip terminates overlying the expected location of the third or fourth duodenal segment.   3.  Left basilar atelectasis   4.  Atherosclerosis      DX-ABDOMEN FOR TUBE PLACEMENT   Final Result         1.  Air-filled distended loops of bowel are seen with reactive mucosal pattern in the upper abdomen, appearance suggests ileus or enteritis versus evolving obstructive changes. Recommend radiographic followup to resolution to exclude progression to    obstruction.   2.  Nasogastric tube is coiled within the stomach, the tip terminates overlying the expected location of the gastric antrum.   3.  Bibasilar atelectasis and/or infiltrates.      DX-ABDOMEN FOR TUBE PLACEMENT   Final Result      1.  NG tube extends in the fundus of stomach      DX-ABDOMEN FOR TUBE PLACEMENT   Final Result         1.  Nonspecific bowel gas pattern in the upper abdomen.   2.  Hazy left lower lobe infiltrates   3.  Nasogastric tube tip terminates overlying the expected location of the gastric antrum.   4.  Atherosclerosis           Assessment/Plan  * SBO (small bowel obstruction) (HCC)- (present on admission)  Assessment & Plan  3/17/2025  69-year-old male with recurrent prostate cancer, colostomy end ileostomy in place, multiple abdominal surgeries, presented with diffuse abdominal pain, distention, nausea, vomiting  CT of the abdomen pelvis with contrast: High-grade severe small bowel obstruction with transition point left  lower quadrant with dilated loops small bowel up to 5.5 cm.    continue NG tube to low continuous suction and, continue bowel rest  IV Zofran as needed for nausea  IV fluid support  IV morphine as needed for pain  Monitor for respiratory depression secondary to IV morphine  Continuous pulse oximetry  Discussed with Dr. Desai     No further options to offer in terms of surgical intervention or chemo.  NG tube to suction.  Clear liquids as tolerated    DNR (do not resuscitate)  Assessment & Plan  DNR/DNI    Severe protein-calorie malnutrition (HCC)  Assessment & Plan  Nutrition Focused Physical Exam (NFPE)  Weight Loss: -25.6 kg (22%) x 1 year. RD Suspects this change related to cancer, limited PO intake.  Muscle Mass: Severe Wasting at Temples, Clavicles, and Acromion Shoulder Region  Subcutaneous Fat: Moderate Loss at Orbital and Buccal Pads  Fluid Accumulation: generalized RLE edema.   Reduced  Strength: N/A in acute care setting.     Nutrition Diagnosis:       Inadequate Oral Intake related to small bowel obstruction as evidenced by intermittent episodes of nausea/vomiting.       Severe Malnutrition in context of Chronic Illness related to diminished oral intake due to small bowel obstruction with diagnosis of rectal cancer as evidenced by weight loss of 25.6 kg (22%) over the past year, severe muscle mass loss at temples, clavicles, and acromion shoulder region, moderate subcutaneous fat loss at orbital and buccal pads.      Pelvic abscess in male (HCC)  Assessment & Plan  3/17/2025  In 10/1/2024, patient was taken to the OR for anorectal exam with excisional biopsy, I&D of pelvic abscess and closure.  Cultures grew Finegoldia and Peptoniphilus and path was positive for rectal adenocarcinoma   December 2024 during admission rectal surgery consulted and concluded he is not a candidate for any surgeries at this point  He has been on suppressive p.o. Augmentin and followed with ID  While patient is n.p.o. and  IV Unasyn is on shortage, will place on cefepime and Flagyl  Patient was seen at ID clinic on 3/4    Bacteremia- (present on admission)  Assessment & Plan  3/17/2025  Please blood cultures in December 2024 were positive for actinomyces  Has been on p.o. Augmentin per ID recommendation  Current npo, on iv cefazolin/ flagyl  Following  Cbc in am     Advance care planning- (present on admission)  Assessment & Plan  Patient was of sound mind and voluntarily participated in the conversation.  Patient and family were present for the conversation.  I discussed advance care planning face to face with the patient and family for at least 29 minutes, including diagnosis, prognosis, plan of care, risks and benefits of any therapies that could be offered, as well as alternatives including palliation and hospice, as appropriate.  Forms were completed and placed in the chart.    Remains DNR/DNI.  Plan to go comfort care tomorrow.    DVT (deep venous thrombosis) (HCC)- (present on admission)  Assessment & Plan  3/17/2025  Chronic Xarelto held while npo, continue therapeutic Lovenox while n.p.o.    Colostomy in place (HCC)- (present on admission)  Assessment & Plan  3/17/2025  Colostomy care    Malignant neoplasm of rectum (HCC)- (present on admission)  Assessment & Plan  3/17/2025  Patient has not been followed by oncology since he was transition to hospice in December.          VTE prophylaxis: lovenox    I have performed a physical exam and reviewed and updated ROS and Plan today (3/18/2025). In review of yesterday's note (3/17/2025), there are no changes except as documented above.    Greater than 52 minutes spent prepping to see patient (e.g. review of tests) obtaining and/or reviewing separately obtained history. Performing a medically appropriate examination and/ evaluation.  Counseling and educating the patient/family/caregiver.  Ordering medications, tests, or procedures.  Referring and communicating with other health care  professionals.  Documenting clinical information in EPIC.  Independently interpreting results and communicating results to patient/family/caregiver.  Care coordination.     VTE Selection

## 2025-03-19 NOTE — DIETARY
Nutrition Update: Day 10 of admit.  Josh Lerner is a 69 y.o. male with admitting DX of SBO (small bowel obstruction)    RD following pt for nutrition POC.  Clear liquid diet ordered 3/12.   Pt is also receiving Ensure Clear TID with meals.  1+ pitting edema to right lower extremity, generalized edema to left lower extremity.  NGT remains in place.  Palliative met with pt and family yesterday. Pt awaiting the arrival of family, then plans to transition to comfort care.     Nutrition Diagnosis:       Inadequate Oral Intake related to small bowel obstruction as evidenced by intermittent episodes of nausea/vomiting.      Ongoing    Severe Malnutrition in context of Chronic Illness related to diminished oral intake due to small bowel obstruction with diagnosis of rectal cancer as evidenced by weight loss of 25.6 kg (22%) over the past year, severe muscle mass loss at temples, clavicles, and acromion shoulder region, moderate subcutaneous fat loss at orbital and buccal pads.       Problem: Nutritional:  Goal: Achieve adequate nutritional intake  Description: Patient will consume >50-75% of meals and supplements, diet order >clear liquids and toleated  Outcome: Not progressing     Plan/Recommend:  GOC/POC per hospice/palliative. RD following for any updates and will make nutrition recommendations as appropriate.  Diet per MD. Consider liberalizing diet order for PO intake as desired for pt's comfort and pleasure at end of life, as tolerated by pt.    RD following.

## 2025-03-19 NOTE — PROGRESS NOTES
Received report from previous shift RN at 1900.  Assessment complete.  A&O x 4. Patient calls appropriately.  Patient ambulates with x1 assist and FWW. Bed alarm on.   Patient has 3/10 pain. Pain managed with prescribed medications per MAR.  Denies N&V. Tolerating clear liquid diet.  Skin per flowsheets.  + void via urostomy, - flatus, - BM via ostomy.  Patient denies SOB on RA.  /69   Pulse 93   Temp 36.5 °C (97.7 °F) (Temporal)   Resp 18   Ht 1.829 m (6')   Wt 93.4 kg (205 lb 14.6 oz)   SpO2 97%   BMI 27.93 kg/m²     Patient pleasant and cooperative throughout assessment.  Reviewed plan of care with patient, pt verbalizes undersranding. Call light and personal belongings with in reach. Hourly rounding in place. All needs met at this time.

## 2025-03-19 NOTE — CARE PLAN
The patient is Stable - Low risk of patient condition declining or worsening    Shift Goals  Clinical Goals: Pain Mgt/ Shower  Patient Goals: Pain Mgt  Family Goals: Comfort    Progress made toward(s) clinical / shift goals:        Problem: Knowledge Deficit - Standard  Goal: Patient and family/care givers will demonstrate understanding of plan of care, disease process/condition, diagnostic tests and medications  Description: Target End Date:  1-3 days or as soon as patient condition allows    Document in Patient Education    1.  Patient and family/caregiver oriented to unit, equipment, visitation policy and means for communicating concern  2.  Complete/review Learning Assessment  3.  Assess knowledge level of disease process/condition, treatment plan, diagnostic tests and medications  4.  Explain disease process/condition, treatment plan, diagnostic tests and medications  Outcome: Progressing     Problem: Pain - Standard  Goal: Alleviation of pain or a reduction in pain to the patient’s comfort goal  Description: Target End Date:  Prior to discharge or change in level of care    Document on Vitals flowsheet    1.  Document pain using the appropriate pain scale per order or unit policy  2.  Educate and implement non-pharmacologic comfort measures (i.e. relaxation, distraction, massage, cold/heat therapy, etc.)  3.  Pain management medications as ordered  4.  Reassess pain after pain med administration per policy  5.  If opiods administered assess patient's response to pain medication is appropriate per POSS sedation scale  6.  Follow pain management plan developed in collaboration with patient and interdisciplinary team (including palliative care or pain specialists if applicable)  Outcome: Progressing     Problem: Fall Risk  Goal: Patient will remain free from falls  Description: Target End Date:  Prior to discharge or change in level of care    Document interventions on the [employer] Fall Risk Assessment    1.   Assess for fall risk factors  2.  Implement fall precautions  Outcome: Progressing     Problem: Gastrointestinal Irritability  Goal: Nausea and vomiting will be absent or improve  Description: Target End Date:  Prior to discharge or change in level of care    Document on I/O and Assessment flowsheets    1.  Assess for history, duration, frequency, severity, and potential precipitating factors  2.  Administer antiemetics as ordered  3.  Emesis basin within easy reach of the patient, but out of sight if psychogenic component  4.  Eliminate strong odors from surroundings  5.  Introduce cold water, ice chips, roldan products, and room temperature broth or bouillon if tolerated and appropriate to the patient's diet  6.  Encourage small amounts of bland, simple foods like broth, rice, bananas, Jell-O, crackers or toast if tolerated and appropriate to patient's diet  7.  Position the patient upright while eating and for 1 to 2 hours post-meal  8.  Encourage nonpharmacological nausea control techniques such as relaxation, guided imagery, music therapy, distraction, or deep breathing exercises  Outcome: Progressing

## 2025-03-20 NOTE — PROGRESS NOTES
"Hospital Medicine Daily Progress Note    Date of Service  3/20/2025    Chief Complaint  Josh Lerner is a 69 y.o. male admitted 3/9/2025 with abdominal pain     Hospital Course  Patient is a 69-year-old male with recurrent rectal cancer,  pelvic abscess, s/p resection, chemoradiation, urostomy, colostomy and DVT( on Xarelto). He presented to  [facility] ER complaining of diffuse abdominal pain, nausea and vomiting.   He reported minimal oral intake for 1-2 weeks due to nausea but continued having colostomy output.  The day prior to admission, he developed worsening of diffuse abdominal pain and was not able to tolerate any food or fluids due to nausea and vomiting.    In December 2024, he was admitted to [facility] with a pelvic abscess secondary to rectal cancer.  Dr. Sewell, Dr. Kirby, Dr. Graham were consulted.  At that time, he was not felt to be a candidate for any surgery and upon discharge he was transitioned to hospice.   He stated that he hds been on hospice for 2 weeks, however would like to \"live as long as possible\" now and requesting full code.  He has been taking Augmentin indefinite course recommended by ID and  Xarelto for history of DVT  He was also seen at [facility] for his above symptoms.  CBC: WBC 13.9, hemoglobin 12.9, platelets 399  Chemistry: Glucose 136, sodium 133, potassium 5.3, creatinine 0.79, BUN 14, anion gap 12 CO2 26, albumin 2.4.  UA cloudy, 3+ blood, 2+ protein, trace leukocyte esterase  CT of the abdomen pelvis with contrast: High-grade severe small bowel obstruction with transition point left lower quadrant with dilated loops small bowel up to 5.5 cm.   Alveolar process posterior basal segment left lower lobe probably pneumonia, aspiration on differential.  Severe three-vessel coronary artery calcifications.  Cholelithiasis.  Left parastomal hernia with small and large bowel abutting one another, not etiology of bowel obstruction.      An NG tube was placed    Interval " Problem Update  I have seen and examined the patient at bedside    Continue comfort care  Continue PCA  Continue NG tube with decompression  Monitor vitals  Pending GIP    I have discussed this patient's plan of care and discharge plan at IDT rounds today with Case Management, Nursing, Nursing leadership, and other members of the IDT team.    Consultants/Specialty  [facility]     Code Status  Comfort Care/DNR    Disposition  The patient is medically cleared for discharge to home or a post-acute facility.      I have placed the appropriate orders for post-discharge needs.    Review of Systems  Review of Systems   Constitutional: Negative.  Negative for chills, diaphoresis, fever, malaise/fatigue and weight loss.   HENT: Negative.  Negative for sore throat.    Eyes: Negative.  Negative for blurred vision.   Respiratory: Negative.  Negative for cough and shortness of breath.    Cardiovascular: Negative.  Negative for chest pain, palpitations and leg swelling.   Gastrointestinal:  Positive for abdominal pain and nausea. Negative for vomiting.   Genitourinary: Negative.  Negative for dysuria.   Musculoskeletal: Negative.  Negative for myalgias.   Skin: Negative.  Negative for itching and rash.   Neurological: Negative.  Negative for dizziness, focal weakness, weakness and headaches.   Endo/Heme/Allergies: Negative.  Does not bruise/bleed easily.   Psychiatric/Behavioral: Negative.  Negative for depression, substance abuse and suicidal ideas.    All other systems reviewed and are negative.       Physical Exam  Temp:  [36.6 °C (97.9 °F)-37 °C (98.6 °F)] 36.6 °C (97.9 °F)  Pulse:  [63-95] 95  Resp:  [16-20] 20  BP: (125-128)/(65-74) 128/66  SpO2:  [90 %-95 %] 90 %    Physical Exam  Vitals and nursing note reviewed. Exam conducted with a chaperone present.   Constitutional:       General: He is not in acute distress.     Appearance: Normal appearance. He is not diaphoretic.   HENT:      Head: Normocephalic.      Nose:  Nose normal.      Mouth/Throat:      Mouth: Mucous membranes are moist.   Eyes:      Pupils: Pupils are equal, round, and reactive to light.   Cardiovascular:      Rate and Rhythm: Normal rate and regular rhythm.      Pulses: Normal pulses.      Heart sounds: Normal heart sounds.   Pulmonary:      Effort: Pulmonary effort is normal.      Breath sounds: Normal breath sounds.   Abdominal:      General: There is distension.      Palpations: Abdomen is soft.      Tenderness: There is abdominal tenderness (mild).      Comments: Colostomy in place, no output  Urostomy with bag in place, yellow urine   Musculoskeletal:         General: No swelling or deformity. Normal range of motion.   Skin:     General: Skin is warm and dry.      Capillary Refill: Capillary refill takes less than 2 seconds.      Comments: Mulitple tatoos   Neurological:      General: No focal deficit present.      Mental Status: He is alert and oriented to person, place, and time.      Cranial Nerves: No cranial nerve deficit.   Psychiatric:         Mood and Affect: Mood normal.         Behavior: Behavior normal.         Fluids    Intake/Output Summary (Last 24 hours) at 3/20/2025 1629  Last data filed at 3/20/2025 1109  Gross per 24 hour   Intake 249.8 ml   Output 2950 ml   Net -2700.2 ml        Laboratory                            Imaging  DX-ABDOMEN FOR TUBE PLACEMENT   Final Result      1.  Interval retraction of nasogastric tube with tip now at the proximal stomach.   2.  No other significant change from prior exam.         DX-ABDOMEN FOR TUBE PLACEMENT   Final Result      1. The enteric tube terminates within the antrum of the stomach.   2. Stable dilated bowel loops in the upper abdomen.      DX-ABDOMEN FOR TUBE PLACEMENT   Final Result         1.  Air-filled distended loops of bowel are seen with reactive mucosal pattern in the upper abdomen, appearance suggests ileus or enteritis versus evolving obstructive changes. Recommend radiographic  followup to resolution to exclude progression to    obstruction.   2.  Nasogastric tube is coiled within the stomach, the tip terminates overlying the expected location of the gastric antrum.   3.  Hazy left lower lobe infiltrates   4.  Atherosclerosis      DX-ABDOMEN FOR TUBE PLACEMENT   Final Result         1.  Air-filled distended loops of bowel are seen with reactive mucosal pattern in the upper abdomen, appearance suggests ileus or enteritis versus evolving obstructive changes. Recommend radiographic followup to resolution to exclude progression to    obstruction.   2.  Nasogastric tube tip terminates overlying the expected location of the third or fourth duodenal segment.   3.  Left basilar atelectasis   4.  Atherosclerosis      DX-ABDOMEN FOR TUBE PLACEMENT   Final Result         1.  Air-filled distended loops of bowel are seen with reactive mucosal pattern in the upper abdomen, appearance suggests ileus or enteritis versus evolving obstructive changes. Recommend radiographic followup to resolution to exclude progression to    obstruction.   2.  Nasogastric tube is coiled within the stomach, the tip terminates overlying the expected location of the gastric antrum.   3.  Bibasilar atelectasis and/or infiltrates.      DX-ABDOMEN FOR TUBE PLACEMENT   Final Result      1.  NG tube extends in the fundus of stomach      DX-ABDOMEN FOR TUBE PLACEMENT   Final Result         1.  Nonspecific bowel gas pattern in the upper abdomen.   2.  Hazy left lower lobe infiltrates   3.  Nasogastric tube tip terminates overlying the expected location of the gastric antrum.   4.  Atherosclerosis           Assessment/Plan  * SBO (small bowel obstruction) (HCC)- (present on admission)  Assessment & Plan  3/17/2025  69-year-old male with recurrent prostate cancer, colostomy end ileostomy in place, multiple abdominal surgeries, presented with diffuse abdominal pain, distention, nausea, vomiting  CT of the abdomen pelvis with contrast:  High-grade severe small bowel obstruction with transition point left lower quadrant with dilated loops small bowel up to 5.5 cm.    continue NG tube to low continuous suction and, continue bowel rest  IV Zofran as needed for nausea  IV fluid support  IV morphine as needed for pain  Monitor for respiratory depression secondary to IV morphine  Continuous pulse oximetry  Discussed with Dr. Desai     No further options to offer in terms of surgical intervention or chemo.  NG tube to suction.  Clear liquids as tolerated    Comfort measures only status  Assessment & Plan  Started comfort care  Hospice team following for GIP needs    DNR (do not resuscitate)  Assessment & Plan  DNR/DNI    Severe protein-calorie malnutrition (HCC)  Assessment & Plan  Nutrition Focused Physical Exam (NFPE)  Weight Loss: -25.6 kg (22%) x 1 year. RD Suspects this change related to cancer, limited PO intake.  Muscle Mass: Severe Wasting at Temples, Clavicles, and Acromion Shoulder Region  Subcutaneous Fat: Moderate Loss at Orbital and Buccal Pads  Fluid Accumulation: generalized RLE edema.   Reduced  Strength: N/A in acute care setting.     Nutrition Diagnosis:       Inadequate Oral Intake related to small bowel obstruction as evidenced by intermittent episodes of nausea/vomiting.       Severe Malnutrition in context of Chronic Illness related to diminished oral intake due to small bowel obstruction with diagnosis of rectal cancer as evidenced by weight loss of 25.6 kg (22%) over the past year, severe muscle mass loss at temples, clavicles, and acromion shoulder region, moderate subcutaneous fat loss at orbital and buccal pads.      Pelvic abscess in male (HCC)  Assessment & Plan  3/17/2025  In 10/1/2024, patient was taken to the OR for anorectal exam with excisional biopsy, I&D of pelvic abscess and closure.  Cultures grew Finegoldia and Peptoniphilus and path was positive for rectal adenocarcinoma   December 2024 during admission rectal  surgery consulted and concluded he is not a candidate for any surgeries at this point  He has been on suppressive p.o. Augmentin and followed with ID  While patient is n.p.o. and IV Unasyn is on shortage, will place on cefepime and Flagyl  Patient was seen at ID clinic on 3/4    Bacteremia- (present on admission)  Assessment & Plan  3/17/2025  Please blood cultures in December 2024 were positive for actinomyces  Has been on p.o. Augmentin per ID recommendation  Current npo, on iv cefazolin/ flagyl  Following  Cbc in am     Advance care planning- (present on admission)  Assessment & Plan  Patient was of sound mind and voluntarily participated in the conversation.  Patient and family were present for the conversation.  I discussed advance care planning face to face with the patient and family for at least 29 minutes, including diagnosis, prognosis, plan of care, risks and benefits of any therapies that could be offered, as well as alternatives including palliation and hospice, as appropriate.  Forms were completed and placed in the chart.    Remains DNR/DNI.  Plan to go comfort care tomorrow.    3/19 patient continues to have GI symptoms and requiring NG tube in place.  Patient have seen the family members and elected to comfort care.  He understands that we will discontinue current treatments, IV fluid.  The main focus will be to manage his pain and anxiety.  His life expectancy is very guarded.  He wishes to suffer less and feels ready to accept to the ending.  I discussed with palliative team and hospice team.  Comfort care placed.  ACP 20 minutes.     DVT (deep venous thrombosis) (HCC)- (present on admission)  Assessment & Plan  3/17/2025  Chronic Xarelto held while npo, continue therapeutic Lovenox while n.p.o.    Colostomy in place (HCC)- (present on admission)  Assessment & Plan  3/17/2025  Colostomy care    Malignant neoplasm of rectum (HCC)- (present on admission)  Assessment & Plan  3/17/2025  Patient has  not been followed by oncology since he was transition to hospice in December.          VTE prophylaxis: lovenox    I have performed a physical exam and reviewed and updated ROS and Plan today (3/20/2025). In review of yesterday's note (3/19/2025), there are no changes except as documented above.    Greater than 36 minutes spent prepping to see patient (e.g. review of tests) obtaining and/or reviewing separately obtained history. Performing a medically appropriate examination and/ evaluation.  Counseling and educating the patient/family/caregiver.  Ordering medications, tests, or procedures.  Referring and communicating with other health care professionals.  Documenting clinical information in EPIC.  Independently interpreting results and communicating results to patient/family/caregiver.  Care coordination.     VTE Selection

## 2025-03-20 NOTE — PROGRESS NOTES
Pt transitioned to comfort care measures on 3/19. Weekly skin check deferred. NGT assessments also deferred.

## 2025-03-20 NOTE — PROGRESS NOTES
Received report from previous shift RN at 1900.  Comfort care measures only.  A&O x 4. Patient calls appropriately.  Pt declines pain at this time, PCA in place.  Patient denies SOB on RA.  /71   Pulse 63   Temp 36.8 °C (98.2 °F) (Temporal)   Resp 18   Ht 1.829 m (6')   Wt 93.4 kg (205 lb 14.6 oz)   SpO2 94%   BMI 27.93 kg/m²     Patient pleasant and cooperative throughout assessment.  Call light and personal belongings with in reach. Hourly rounding in place. All needs met at this time.

## 2025-03-20 NOTE — CARE PLAN
Problem: Knowledge Deficit - Standard  Goal: Patient and family/care givers will demonstrate understanding of plan of care, disease process/condition, diagnostic tests and medications  Outcome: Progressing     Problem: Pain - Standard  Goal: Alleviation of pain or a reduction in pain to the patient’s comfort goal  Outcome: Progressing     Problem: Fall Risk  Goal: Patient will remain free from falls  Outcome: Progressing     Problem: Skin Integrity  Goal: Skin integrity is maintained or improved  Outcome: Progressing   The patient is Stable - Low risk of patient condition declining or worsening    Shift Goals  Clinical Goals: Pain control, comfort  Patient Goals: Pain control, rest  Family Goals: Pain control, rest    Progress made toward(s) clinical / shift goals:  Morphine PCA in use, comfort care measures    Patient is not progressing towards the following goals:

## 2025-03-20 NOTE — HOSPICE
Met with pt, he is comfortable with current PCA setup.  Not GIP appropriate today, will reassess tomorrow.
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
## 2025-03-20 NOTE — PROGRESS NOTES
Assumed care of patient from night shift RN.  Patient is alert and oriented times 4, states pain of 2/10, Morphoine PCA in use.  VSS /65   Pulse 88   Temp 37 °C (98.6 °F) (Temporal)   Resp 16   Ht 1.829 m (6')   Wt 93.4 kg (205 lb 14.6 oz)   SpO2 94%   BMI 27.93 kg/m²   Comfort care measures in place.  On RA with saturations in the mid 90s.  NG tube to the L nare to LCS  Colostomy to the LLQ, no output  Urostomy to the RLQ  Diet as tolerated.  Rectal wound, patient prefers no assessment at this time  Patient is a 1 person assist with a FWW, generalized weakness  POC discussed for the day, bed is locked and in the lowest position, call light is within reach.  All needs are met at this time, hourly rounding is in place.

## 2025-03-20 NOTE — PROGRESS NOTES
Patient has NG tube in place: Right/Left nare    Skin integrity beneath NG tube assessed with MARITZA Cerrato    Skin underneath NG tube is visible: yes    NG retaped to visualize skin underneath: n/a    Skin description:Skin intact

## 2025-03-20 NOTE — DISCHARGE PLANNING
Case Management Discharge Planning    Admission Date: 3/9/2025  GMLOS: 5.6  ALOS: 11    6-Clicks ADL Score: 20  6-Clicks Mobility Score: 18      Anticipated Discharge Dispo: Discharge Disposition: D/T to hospice home (50)    DME Needed: No    Action(s) Taken: Updated Provider/Nurse on Discharge Plan    Pt is now on Comfort Care. Rk SALAS of [facility] [place] saw Pt today, plan to continue to assess for GIP.    This RN CM met with Su /Pt s Spouse and provided her [facility] docs.   Su to return docs to her HR.     Escalations Completed: None    Medically Clear: No    Next Steps:   CM to continue to assist Pt with discharge as needed    Barriers to Discharge:   Medical clearance  Pt on Comfort Care     Is the patient up for discharge tomorrow: No         6

## 2025-03-20 NOTE — CARE PLAN
The patient is Stable - Low risk of patient condition declining or worsening    Shift Goals  Clinical Goals: Pain/Nausea Control; Bathe/Wash Hair; Comfort  Patient Goals: Pain/Nausea Control; Comfort  Family Goals: Pain/Nausea Control; Comfort    Progress made toward(s) clinical / shift goals:  Patient medicated per MAR. Non-pharmacologic comfort measures implemented. Safety discussed. Education provided. Ambulation and repositioning encouraged. IS use encouraged. Diet intake monitored.     Problem: Knowledge Deficit - Standard  Goal: Patient and family/care givers will demonstrate understanding of plan of care, disease process/condition, diagnostic tests and medications  Description: Target End Date:  1-3 days or as soon as patient condition allows    Document in Patient Education    1.  Patient and family/caregiver oriented to unit, equipment, visitation policy and means for communicating concern  2.  Complete/review Learning Assessment  3.  Assess knowledge level of disease process/condition, treatment plan, diagnostic tests and medications  4.  Explain disease process/condition, treatment plan, diagnostic tests and medications  Outcome: Progressing     Problem: Pain - Standard  Goal: Alleviation of pain or a reduction in pain to the patient’s comfort goal  Description: Target End Date:  Prior to discharge or change in level of care    Document on Vitals flowsheet    1.  Document pain using the appropriate pain scale per order or unit policy  2.  Educate and implement non-pharmacologic comfort measures (i.e. relaxation, distraction, massage, cold/heat therapy, etc.)  3.  Pain management medications as ordered  4.  Reassess pain after pain med administration per policy  5.  If opiods administered assess patient's response to pain medication is appropriate per POSS sedation scale  6.  Follow pain management plan developed in collaboration with patient and interdisciplinary team (including palliative care or pain  specialists if applicable)  Outcome: Progressing

## 2025-03-21 PROBLEM — C20 RECTAL ADENOCARCINOMA (HCC): Status: ACTIVE | Noted: 2025-01-01

## 2025-03-21 NOTE — PROGRESS NOTES
"Hospital Medicine Daily Progress Note    Date of Service  3/21/2025    Chief Complaint  Josh Lerner is a 69 y.o. male admitted 3/9/2025 with abdominal pain     Hospital Course  Patient is a 69-year-old male with recurrent rectal cancer,  pelvic abscess, s/p resection, chemoradiation, urostomy, colostomy and DVT( on Xarelto). He presented to  [facility] ER complaining of diffuse abdominal pain, nausea and vomiting.   He reported minimal oral intake for 1-2 weeks due to nausea but continued having colostomy output.  The day prior to admission, he developed worsening of diffuse abdominal pain and was not able to tolerate any food or fluids due to nausea and vomiting.    In December 2024, he was admitted to [facility] with a pelvic abscess secondary to rectal cancer.  Dr. Sewell, Dr. Kirby, Dr. Graham were consulted.  At that time, he was not felt to be a candidate for any surgery and upon discharge he was transitioned to hospice.   He stated that he hds been on hospice for 2 weeks, however would like to \"live as long as possible\" now and requesting full code.  He has been taking Augmentin indefinite course recommended by ID and  Xarelto for history of DVT  He was also seen at [facility] for his above symptoms.  CBC: WBC 13.9, hemoglobin 12.9, platelets 399  Chemistry: Glucose 136, sodium 133, potassium 5.3, creatinine 0.79, BUN 14, anion gap 12 CO2 26, albumin 2.4.  UA cloudy, 3+ blood, 2+ protein, trace leukocyte esterase  CT of the abdomen pelvis with contrast: High-grade severe small bowel obstruction with transition point left lower quadrant with dilated loops small bowel up to 5.5 cm.   Alveolar process posterior basal segment left lower lobe probably pneumonia, aspiration on differential.  Severe three-vessel coronary artery calcifications.  Cholelithiasis.  Left parastomal hernia with small and large bowel abutting one another, not etiology of bowel obstruction.      An NG tube was placed    Interval " Problem Update  I have seen and examined the patient at bedside    Continue PCA for pain control  Continue NG tube with suction  Continue comfort care  I discussed with the hospice team , patient is accepted by [facility]    I have discussed this patient's plan of care and discharge plan at IDT rounds today with Case Management, Nursing, Nursing leadership, and other members of the IDT team.    Consultants/Specialty  [facility]     Code Status  Comfort Care/DNR    Disposition  The patient is medically cleared for discharge to home or a post-acute facility.      I have placed the appropriate orders for post-discharge needs.    Review of Systems  Review of Systems   Constitutional: Negative.  Negative for chills, diaphoresis, fever, malaise/fatigue and weight loss.   HENT: Negative.  Negative for sore throat.    Eyes: Negative.  Negative for blurred vision.   Respiratory: Negative.  Negative for cough and shortness of breath.    Cardiovascular: Negative.  Negative for chest pain, palpitations and leg swelling.   Gastrointestinal:  Positive for abdominal pain and nausea. Negative for vomiting.   Genitourinary: Negative.  Negative for dysuria.   Musculoskeletal: Negative.  Negative for myalgias.   Skin: Negative.  Negative for itching and rash.   Neurological: Negative.  Negative for dizziness, focal weakness, weakness and headaches.   Endo/Heme/Allergies: Negative.  Does not bruise/bleed easily.   Psychiatric/Behavioral: Negative.  Negative for depression, substance abuse and suicidal ideas.    All other systems reviewed and are negative.       Physical Exam  Temp:  [36.6 °C (97.9 °F)-36.8 °C (98.2 °F)] 36.6 °C (97.9 °F)  Pulse:  [76-95] 78  Resp:  [18-20] 18  BP: (109-128)/(64-73) 112/73  SpO2:  [90 %-96 %] 96 %    Physical Exam  Vitals and nursing note reviewed. Exam conducted with a chaperone present.   Constitutional:       General: He is not in acute distress.     Appearance: Normal appearance. He is not diaphoretic.    HENT:      Head: Normocephalic.      Nose: Nose normal.      Mouth/Throat:      Mouth: Mucous membranes are moist.   Eyes:      Pupils: Pupils are equal, round, and reactive to light.   Cardiovascular:      Rate and Rhythm: Normal rate and regular rhythm.      Pulses: Normal pulses.      Heart sounds: Normal heart sounds.   Pulmonary:      Effort: Pulmonary effort is normal.      Breath sounds: Normal breath sounds.   Abdominal:      General: There is distension.      Palpations: Abdomen is soft.      Tenderness: There is abdominal tenderness (mild).      Comments: Colostomy in place, no output  Urostomy with bag in place, yellow urine   Musculoskeletal:         General: No swelling or deformity. Normal range of motion.   Skin:     General: Skin is warm and dry.      Capillary Refill: Capillary refill takes less than 2 seconds.      Comments: Mulitple tatoos   Neurological:      General: No focal deficit present.      Mental Status: He is alert and oriented to person, place, and time.      Cranial Nerves: No cranial nerve deficit.   Psychiatric:         Mood and Affect: Mood normal.         Behavior: Behavior normal.         Fluids  No intake or output data in the 24 hours ending 03/21/25 1218       Laboratory                            Imaging  No orders to display        Assessment/Plan  No new Assessment & Plan notes have been filed under this hospital service since the last note was generated.  Service: Hospital Medicine       VTE prophylaxis: lovenox    I have performed a physical exam and reviewed and updated ROS and Plan today (3/21/2025). In review of yesterday's note (3/20/2025), there are no changes except as documented above.    Greater than 36 minutes spent prepping to see patient (e.g. review of tests) obtaining and/or reviewing separately obtained history. Performing a medically appropriate examination and/ evaluation.  Counseling and educating the patient/family/caregiver.  Ordering medications,  tests, or procedures.  Referring and communicating with other health care professionals.  Documenting clinical information in EPIC.  Independently interpreting results and communicating results to patient/family/caregiver.  Care coordination.     VTE Selection

## 2025-03-21 NOTE — CARE PLAN
The patient is Stable - Low risk of patient condition declining or worsening    Shift Goals  Clinical Goals: pain management, rest, NG tube  Patient Goals: pain management, rest  Family Goals: updates    Progress made toward(s) clinical / shift goals:  Patient is resting bed. Patients pain is managed with prescribed medication. Patient turns self in bed. Patient remains free from falls. NG tube to low continuous suction. Plan of care discussed with patient who verbalized understanding.       Problem: Knowledge Deficit - Standard  Goal: Patient and family/care givers will demonstrate understanding of plan of care, disease process/condition, diagnostic tests and medications  Outcome: Progressing     Problem: Pain - Standard  Goal: Alleviation of pain or a reduction in pain to the patient’s comfort goal  Outcome: Progressing       Problem: Fall Risk  Goal: Patient will remain free from falls  Outcome: Progressing     Problem: Wound/ / Incision Healing  Goal: Patient's wound/surgical incision will decrease in size and heals properly  Outcome: Progressing     Problem: Skin Integrity  Goal: Skin integrity is maintained or improved  Outcome: Progressing     Patient is not progressing towards the following goals:
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
## 2025-03-21 NOTE — PROGRESS NOTES
Palliative Care   Work excuse letter provided to patient's wife via email and in person.  Patient had some issues overnight with his Port-A-Cath not working however they were able to reestablish IV access.  He reports he had significant pain crisis when PCA was ceased.  Pain and symptoms are now well-controlled.  Plan for transition to [facility] hospice today.  Patient confirms he feels himself getting weaker and more edematous.  No questions per patient/family.    MAKENZIE SanchezR.N.  Palliative Care Nurse Practitioner  [phone]

## 2025-03-21 NOTE — PROGRESS NOTES
Bedside report received.  Assessment complete.  A&O x 4. Patient calls appropriately.  Patient ambulates x1 assist with FWW.   Patient has 0/10 pain.   Denies N&V. Tolerating clear liquid diet.  LLQ Colostomy, RLQ urostomy, Right nare NG tube in place.  + void via urostomy, - flatus, - BM via colostomy.  Patient denies SOB.  Patient is pleasant and cooperative with care plan.  Review plan with of care with patient. Call light and personal belongings within reach. Hourly rounding in place. All needs met at this time.

## 2025-03-21 NOTE — DISCHARGE PLANNING
Case Management Discharge Planning    Admission Date: 3/9/2025  GMLOS: 5.6  ALOS: 12    6-Clicks ADL Score: 20  6-Clicks Mobility Score: 18      Anticipated Discharge Dispo: Discharge Disposition: GIP    DME Needed: No    Action(s) Taken: Updated Provider/Nurse on Discharge Plan    Pt was discussed in IDT rounds with Dr Roman.    Per Rk SALAS of [facility] , Pt now meets GIP criteria and he will assist with transition to GIP.    Pt 's wife/ family at bedside.       Escalations Completed: None    Medically Clear: No    Next Steps:   CM to continue to assist Pt/family     Barriers to Discharge:   Medical clearance  Plan to transition to GIP    Is the patient up for discharge tomorrow: No

## 2025-03-21 NOTE — PROGRESS NOTES
Patient has NG tube in place: Right    Skin integrity beneath NG tube assessed with MARITZA Molina    Skin underneath NG tube is visible: yes    NG retaped to visualize skin underneath: NA    Skin description: intact

## 2025-03-21 NOTE — CARE PLAN
The patient is Stable - Low risk of patient condition declining or worsening    Shift Goals  Clinical Goals: Pain control, rest  Patient Goals: pain control, rest  Family Goals: Pain control, rest    Progress made toward(s) clinical / shift goals:  Comfort care, Morphine PCA in use    Patient is not progressing towards the following goals:

## 2025-03-21 NOTE — PROGRESS NOTES
Assumed care of patient from night shift RN at 0700.  Patient is alert and oriented times 4, states pain of 3/10, Morphine PCA in use.  Patient transitioned to GIP today.  VSS   PIV in the LFA, patent and running NS at 10mL/hr with Morphine PCA.  On RA with saturations in the mid 90s  NG tube to the R nare to LCS  Colostomy to the LLQ, no output  Urostomy to the RLQ, low urine output  Advanced to a regular diet, no trays delivered  POC discussed for the day with the patient and family at bedside.  Bed is locked and in the lowest position, call light is within reach.  All needs are met at this time, hourly rounding is in place

## 2025-03-21 NOTE — CARE PLAN
Problem: Pain - Standard  Goal: Alleviation of pain or a reduction in pain to the patient’s comfort goal  Outcome: Progressing   The patient is Stable - Low risk of patient condition declining or worsening    Shift Goals  Clinical Goals: Pain control, rest  Patient Goals: pain control, rest  Family Goals: Pain control, rest    Progress made toward(s) clinical / shift goals:  Morphine PCA in use with adequate pain control    Patient is not progressing towards the following goals:

## 2025-03-22 NOTE — CARE PLAN
The patient is Stable - Low risk of patient condition declining or worsening    Shift Goals  Clinical Goals: Pain management, comfort, rest, NG tube  Patient Goals: rest, pain management  Family Goals: updates    Progress made toward(s) clinical / shift goals:  Patient resting in bed. Patients pain managed with prescribed pain medication. Morphine PCA in use. Patients nausea controlled with prescribed medication. NG tube in place to low continuous suction. Care plan discussed with patient who verbalized understanding.       Problem: Pain - Standard  Goal: Alleviation of pain or a reduction in pain to the patient’s comfort goal  Outcome: Progressing       Patient is not progressing towards the following goals:

## 2025-03-22 NOTE — PROGRESS NOTES
"Bedside report received.  Assessment complete.  A&O x 4. Patient calls appropriately.   Patient has 0/10 pain. Pain managed with prescribed medications.  Denies N&V. Tolerating regular diet.  Right nare NG tube. RLQ urostomy, LLQ colostomy. Morphine PCA in use.  + void via urostomy, - flatus, - BM via colostomy.  Patient denies SOB.  Patient is pleasant and cooperative with care plan.  Review plan with of care with patient. Call light and personal belongings within reach. Hourly rounding in place. All needs met at this time.    Ht 1.829 m (6' 0.01\")   Wt 93.4 kg (205 lb 14.6 oz)   BMI 27.92 kg/m²     "

## 2025-03-22 NOTE — PROGRESS NOTES
Patient has NG tube in place: Right nare    Skin integrity beneath NG tube assessed with MARITZA Molina    Skin underneath NG tube is visible: yes    NG retaped to visualize skin underneath: yes    Skin description:intact

## 2025-03-22 NOTE — CARE PLAN
The patient is Stable - Low risk of patient condition declining or worsening    Shift Goals  Clinical Goals: Pain/nausea control, NGT/drain monitoring, repositioning, comfort.  Patient Goals: Bed bath, ice cream, soda, lidocaine jelly  Family Goals: updates    Progress made toward(s) clinical / shift goals:  Medicated for pain; see MAR. NGT in place to LCS, patient snacking frequently. Ostomy LLQ minimal output. RLQ Urostomy, sediment/dark yellow/tan urine noted. Patient turning/shifting self while in bed, full turns needing assistance. Bed bath performed/linens changed. Patient napped mid-shift. Lidocaine gel ordered/applied. Patient eating ice cream/drinking soda throughout shift. Patient napped intermittently during shift.     Patient is not progressing towards the following goals: Increased garbled speech noted as shift progressed.

## 2025-03-22 NOTE — PROGRESS NOTES
Bedside report received from NOC RN; assumed care. Wife bedside at all times. Assessment complete. A&O x 3-4, intermittent confusion reported. VSS. 94% on RA. SOB with laying flat/exertion. Baseline numbness/tingling to toes reported. Patient nausea, vomiting. Blurry vision reported with both eyes looking at objects, clears with shutting one eye at a time. Dizziness reported with movement/standing. IV MS pump effective for pain. Ativan administered for mild restlessness/itching. Bolus button effective for pain; see MAR.  NGT to right nare; CDI dressing, brown drainage noted in cannister. Abdomen distended round. LLQ ostomy -effluent. Urostomy RLQ with small yellow/dark urine, + void noted in collazo bag. + eructation. - flatus. LBM 03/22. Patient tolerating ice cream/soda/water at present time.  Patient bedbound at present time. Patient reported shifting weight with the ability to turn himself once initial assistance provided. Discussed plan of care with patient. All questions answered.  Moderate fall risk. Bed/strip alarm engaged. Bed in locked/lowest position.  Call light/personal belongings within reach.  All needs met, patient eating ice cream at present time.

## 2025-03-23 NOTE — CARE PLAN
The patient is Stable - Low risk of patient condition declining or worsening    Shift Goals  Clinical Goals: Pain/itching/PCA management, repostioning, bed bath, NGT monitoring, rest  Patient Goals: Bed bath  Family Goals: updates    Progress made toward(s) clinical / shift goals: Bolus button effective for body pain. Lidocaine being utilized for oral/nasal pain; see MAR. NGT irrigated several times to keep abdomen distended. Bed bath provided; see MAR. Family/friends present at all times.     Patient is not progressing towards the following goals: NA.

## 2025-03-23 NOTE — PROGRESS NOTES
Pt high fall risk per HD scale. Pt refuses bed alarm, is on comfort care and unable to mobilize due to condition. Charge updated

## 2025-03-23 NOTE — PROGRESS NOTES
Patient has NG tube in place: Right nare     Skin integrity beneath NG tube assessed with RN     Skin underneath NG tube is visible: yes     NG retaped to visualize skin underneath: yes     Skin description:intact

## 2025-03-23 NOTE — PROGRESS NOTES
Patient has NG tube in place: Right nare    Skin integrity beneath NG tube assessed with RN Alona    Skin underneath NG tube is visible: yes    NG retaped to visualize skin underneath: NA    Skin description: CDI   ambulatory

## 2025-03-23 NOTE — PROGRESS NOTES
Bedside report received.  Assessment complete.  A&O x 4. Patient calls appropriately.  Patient has 0/10 pain. Patient declines intervention at this time.  Denies N&V. Tolerating regular diet.  LLQ colostomy. RLQ urostomy. Right nare NG tube.  + void via urostomy, - flatus, - BM via colostomy.  Patient denies SOB.  Patient is pleasant and cooperative with care plan.  Review plan with of care with patient. Call light and personal belongings within reach. Hourly rounding in place. All needs met at this time.

## 2025-03-23 NOTE — CARE PLAN
The patient is Stable - Low risk of patient condition declining or worsening    Shift Goals  Clinical Goals: Pain management, comfort, NG management  Patient Goals: pain management, rest  Family Goals: updates    Progress made toward(s) clinical / shift goals:  Patient is resting in bed. Patients pain is controlled with prescribed medication. Morphine PCA in use. NG tube to low continuous suction. Plan of care discussed with patient who verbalized understanding.     Problem: Pain - Standard  Goal: Alleviation of pain or a reduction in pain to the patient’s comfort goal  Outcome: Progressing       Problem: Knowledge Deficit - Comfort Care  Goal: Patient and family/care givers will demonstrate understanding of dying process and grieving  Outcome: Progressing     Patient is not progressing towards the following goals:

## 2025-03-23 NOTE — PROGRESS NOTES
Bedside report received from NOC RN; assumed care. Wife bedside at all times. Assessment complete. A&O x 3-4, intermittent confusion reported to date. VSS. 94% on RA. SOB with laying flat/exertion. Baseline numbness/tingling to toes reported. Patient nausea, vomiting. Blurry vision reported with both eyes looking at objects, clears with shutting one eye at a time. Dizziness reported with movement/standing. IV MS pump effective for pain. Ativan administered for mild restlessness/itching. Bolus button effective for pain; see MAR.  NGT to right nare; CDI dressing, brown drainage noted in cannister. Abdomen distended round. LLQ ostomy -effluent. Urostomy RLQ with moderate yellow/tan urine noted with sedimentation, + void in collazo bag. + eructation. - flatus. LBM 03/22. Patient tolerating ice cream/soda/water at present time.  Patient bedbound at present time. Patient turning self while in bed. Discussed plan of care with patient. All questions answered.  High fall risk. Bed/strip alarm engaged. Bed in locked/lowest position.  Call light/personal belongings within reach.  All needs met, patient visiting with family at present time.

## 2025-03-24 NOTE — CARE PLAN
Problem: Psychosocial - Comfort Care  Goal: Spiritual and cultural needs incorporated into hospitalization  Description: Target End Date:  End of day 11.  Encourage verbalization of feelings, concerns, expectations and needs2.  Collaborate with Case Management/Social Services3.  Collaborate with Pastoral Care to meet spiritual needs  Outcome: Progressing     Problem: Pain - Standard  Goal: Alleviation of pain or a reduction in pain to the patient’s comfort goal  Description: Target End Date:  Prior to discharge or change in level of careDocument on Vitals flowsheet1.  Document pain using the appropriate pain scale per order or unit policy2.  Educate and implement non-pharmacologic comfort measures (i.e. relaxation, distraction, massage, cold/heat therapy, etc.)3.  Pain management medications as ordered4.  Reassess pain after pain med administration per policy5.  If opiods administered assess patient's response to pain medication is appropriate per POSS sedation scale6.  Follow pain management plan developed in collaboration with patient and interdisciplinary team (including palliative care or pain specialists if applicable)  Outcome: Progressing   The patient is Stable - Low risk of patient condition declining or worsening    Shift Goals  Clinical Goals: Comfort care measures  Patient Goals: coffee/floats  Family Goals: updates    Progress made toward(s) clinical / shift goals:  Comfort care measures in place    Patient is not progressing towards the following goals:

## 2025-03-24 NOTE — PROGRESS NOTES
Bedside report received from NOC RN; assumed care. Wife bedside at all times. Anxiety/irritability noted at change of shift. Ativan/Lidocaine administered. Assessment complete. A&O x 2-3 increased confusion noted. intermittent confusion reported to date. VSS. 83% on RA at beginning of shift. Throat/nose/tongue burning/irritability reported. Increasing SOB reported. Baseline numbness/tingling to toes reported. Patient denied nausea, vomiting. IV MS pump in place, continuous rate increased by MD.  NGT to right nare; CDI dressing, brown drainage noted in cannister. Abdomen distended round. LLQ ostomy -effluent. Urostomy RLQ with moderate yellow/tan urine noted with sedimentation, + void in collzao bag. + eructation. - flatus. LBM 03/22. Patient was drinking soda at beginning of shift. Patient bedbound at present time. Patient requesting to remain in place unless Discussed plan of care with patient. All questions answered.  High fall risk. Bed frame alarm engaged. Bed in locked/lowest position.  Call light/personal belongings within reach.  All needs met, wife/family present at all times.

## 2025-03-24 NOTE — PROGRESS NOTES
Brief GIP Progress Note    24 hr interval history - patient has received 14.8 mg IV morphine from PCA in the last 12 hours. He had bout of restlessness this morning and received 1 mg IV Ativan. Patient is now somnolent and comfortable appearing. NGT on continuous suction. Family reports patient is more fatigued, confused and hallucinating at times. He remains bed bound.    Patient is somnolent on exam. Did not force awake out of respect for comfort. Breathing is regular. Pulses are easily palpable all 4 extremities. Face is relaxed. Pain-AD 0/10 at this time. PPS 30%.     Plan: Given patient is more somnolent, after discussing with family, we will change over PCA to continuous infusion of concentrated morphine and add PRN IV pushes of morphine for moderate to severe pain or dyspnea. This will also decrease excess fluid volume of approximately 200cc/24 hrs. Hospice RN with be visiting today for continued support. Otherwise all questions answered.     Patient continues to meet GIP appropriateness due to complete reliance on continuous suction of nasogastric tube for comfort in the setting of terminal malignant bowel obstruction, along with requiring IV comfort medications and 24 hour nursing care for management of NG tube and IV pump and medications.     Thank you all for your care of Mr. Lerner and his loving family.     Juanito Wilcox, DO, FACP, DC   Hospice and Palliative Care  [facility] Medical Director  [id number] [place] [address]  P: [phone]  F: [phone]  C: [phone]

## 2025-03-24 NOTE — PROGRESS NOTES
Patient has NG tube in place: Right nare     Skin integrity beneath NG tube assessed with MARITZA Moser     Skin underneath NG tube is visible: yes     NG retaped to visualize skin underneath: NA     Skin description: CDI

## 2025-03-24 NOTE — DISCHARGE SUMMARY
Discharge Summary    CHIEF COMPLAINT ON ADMISSION  No chief complaint on file.      Reason for Admission  Comfort Care    Admission Date  3/21/2025     CODE STATUS  Comfort Care/DNR    HPI & HOSPITAL COURSE  Patient is a 69-year-old male with recurrent rectal cancer,  pelvic abscess, s/p resection, chemoradiation, urostomy, colostomy and DVT( on Xarelto). He presented to  [facility] ER complaining of diffuse abdominal pain, nausea and vomiting. continued having colostomy output.   In December 2024, he was admitted to [facility] with a pelvic abscess secondary to rectal cancer.  Dr. Sewell, Dr. Kirby, Dr. Graham were consulted.  At that time, he was not felt to be a candidate for any surgery and upon discharge he was transitioned to hospice.  He was on hospice for 2 weeks, then revoked hospice and requested full code.  He has been taking Augmentin indefinite course recommended by ID and  Xarelto for history of DVT    CT of the abdomen pelvis with contrast: High-grade severe small bowel obstruction with transition point left lower quadrant with dilated loops small bowel up to 5.5 cm. Alveolar process posterior basal segment left lower lobe probably pneumonia, aspiration on differential.  Severe three-vessel coronary artery calcifications.  Cholelithiasis.Left parastomal hernia with small and large bowel abutting one another, not etiology of bowel obstruction.     NG tube placed for suction. patient has been treated with IV cefepime and Flagyl.  With further goals of discussion, the patient elected to hospice /comfort care.  Patient is accepted by [facility]. Thus the patient is discharged to [facility] inpatient hospice.       Therefore, he is discharged in guarded and stable condition to hospice.    The patient met 2-midnight criteria for an inpatient stay at the time of discharge.      FOLLOW UP ITEMS POST DISCHARGE  Hospice    DISCHARGE DIAGNOSES  Principal Problem:    Rectal adenocarcinoma (HCC) (POA: Yes)  Resolved  Problems:    * No resolved hospital problems. *      FOLLOW UP  Future Appointments   Date Time Provider Department Center   3/24/2025  1:30 PM Cecily Kendrick R.N. [facility] None   3/25/2025 To Be Determined Alba Taylor R.N. [facility] None   3/25/2025 To Be Determined Zonia De Souza, MSW [facility] None   3/26/2025 To Be Determined Alba Taylor R.N. [facility] None   3/27/2025 To Be Determined Alba Taylor R.N. [facility] None   3/28/2025 To Be Determined Cecily Kendrick R.N. [facility] None   3/29/2025 To Be Determined Cecily Kendrick R.N. [facility] None   4/2/2025  8:30 AM [facility] [address]     No follow-up provider specified.    MEDICATIONS ON DISCHARGE     Medication List        ASK your doctor about these medications        Instructions   amoxicillin-clavulanate 875-125 MG Tabs  Commonly known as: Augmentin   Take 1 Tablet by mouth 2 times a day for 60 days.  Dose: 1 Tablet     rivaroxaban 20 MG Tabs tablet  Commonly known as: Xarelto   Doctor's comments: Please bill through [facility].  Take 1 Tablet by mouth with dinner for 180 days.  Dose: 20 mg              Allergies  No Known Allergies    DIET  Orders Placed This Encounter   Procedures    Diet Order Diet: Regular (Do not auto send trays, by request only)     Standing Status:   Standing     Number of Occurrences:   1     Diet::   Regular [1]              Do not auto send trays, by request only       ACTIVITY  As tolerated.  Weight bearing as tolerated    LINES, DRAINS, AND WOUNDS  This is an automated list. Peripheral IVs will be removed prior to discharge.  Single Lumen Implantable Port 03/11/25 Right Chest (Active)   Access Date 03/11/25 03/11/25 0100   Access Time 0104 03/11/25 0100   Accessed by Kadie SALAS 03/11/25 0100   Accessed In Unit 03/11/25 0100   Needle Size 20 G 03/11/25 0100   Needle Length 3/4 in 03/11/25 0100   Date Needle Changed 03/11/25 03/11/25 0100   Next Needle Change Date 03/15/25 03/11/25 0100   Site Assessment Clean;Dry;Intact 03/20/25  2058   Line Status Infusing 03/20/25 2058   Dressing Type Biopatch;Transparent 03/20/25 2058   Dressing Status Clean;Dry;Intact 03/20/25 2058   Dressing Intervention N/A 03/20/25 2058   Dressing Change Due 03/22/25 03/20/25 2058   Date Primary Tubing Changed 03/15/25 03/20/25 2058   Date Secondary Tubing Changed 03/18/25 03/19/25 1000   Date IV Connector(s) Changed 03/16/25 03/17/25 2011   NEXT Primary Tubing Change  03/22/25 03/20/25 2058   NEXT Secondary Tubing Change  03/19/25 03/18/25 2020   NEXT IV Connector(s) Change 03/22/25 03/20/25 1000   Line Necessity Assessed Lack of Peripheral Access 03/20/25 2058   De-Accessed Date 03/21/25 03/21/25 0400   De-Accessed Time 0350 03/21/25 0400       Peripheral IV 03/20/25 20 G Anterior;Left Forearm (Active)   Site Assessment Clean;Dry;Intact 03/23/25 0948   Dressing Type Transparent Film 03/23/25 0948   Line Status Infusing 03/23/25 0948   Dressing Status Clean;Dry;Intact 03/23/25 0948   Dressing Intervention N/A 03/23/25 0948   Dressing Change Due 03/27/25 03/23/25 0948   Date Primary Tubing Changed 03/22/25 03/23/25 0948   NEXT Primary Tubing Change  03/29/25 03/23/25 0948   Infiltration Grading ([facility]) 0 03/23/25 0948   Phlebitis Scale ([facility] Only) 0 03/23/25 0948       Wound 05/16/23 Chest Upper Right Port Placement (Active)       Wound 03/09/25 Incision Buttocks;Sacrum Midline Surgical Full thickness (Active)   Wound Image     03/10/25 1000   Site Assessment Clean;Dry;DIDIER 03/23/25 0948   Periwound Assessment Clean;Dry;DIDIER 03/23/25 0948   Margins DIDIER 03/23/25 0948   Closure DIDIER 03/22/25 1315   Drainage Amount None 03/22/25 1315   Drainage Description Yellow;Clear 03/21/25 2040   Treatments Site care;Cleansed 03/22/25 1315   Wound Cleansing Approved Wound Cleanser 03/22/25 1315   Periwound Protectant Skin Protectant Wipes to Periwound 03/18/25 1600   Dressing Status Clean;Dry;Intact 03/22/25 1315   Dressing Changed Changed 03/22/25 1315   Dressing  Cleansing/Solutions Not Applicable 03/19/25 2000   Dressing Options Offloading Dressing - Sacral 03/22/25 1315   Dressing Change/Treatment Frequency As Needed 03/23/25 0948   NEXT Dressing Change/Treatment Date 03/23/25 03/22/25 1315   NEXT Weekly Photo (Inpatient Only) 03/17/25 03/10/25 1000   Wound Team Following Weekly 03/10/25 1000   Non-staged Wound Description Full thickness 03/10/25 1000   Wound Length (cm) 1.5 cm 03/10/25 1000   Wound Width (cm) 0.6 cm 03/10/25 1000   Wound Depth (cm) 2.1 cm 03/10/25 1000   Wound Surface Area (cm^2) 0.9 cm^2 03/10/25 1000   Wound Volume (cm^3) 1.89 cm^3 03/10/25 1000       Peripheral IV 03/20/25 20 G Anterior;Left Forearm (Active)   Site Assessment Clean;Dry;Intact 03/23/25 0948   Dressing Type Transparent Film 03/23/25 0948   Line Status Infusing 03/23/25 0948   Dressing Status Clean;Dry;Intact 03/23/25 0948   Dressing Intervention N/A 03/23/25 0948   Dressing Change Due 03/27/25 03/23/25 0948   Date Primary Tubing Changed 03/22/25 03/23/25 0948   NEXT Primary Tubing Change  03/29/25 03/23/25 0948   Infiltration Grading ([facility], [facility]) 0 03/23/25 0948   Phlebitis Scale ([facility] Only) 0 03/23/25 0948               MENTAL STATUS ON TRANSFER             CONSULTATIONS      PROCEDURES      LABORATORY  Lab Results   Component Value Date    SODIUM 135 03/13/2025    POTASSIUM 3.9 03/13/2025    CHLORIDE 105 03/13/2025    CO2 22 03/13/2025    GLUCOSE 115 (H) 03/13/2025    BUN 11 03/13/2025    CREATININE 0.61 03/13/2025        Lab Results   Component Value Date    WBC 8.2 03/13/2025    HEMOGLOBIN 10.4 (L) 03/13/2025    HEMATOCRIT 33.6 (L) 03/13/2025    PLATELETCT 279 03/13/2025        Total time of the discharge process exceeds 36 minutes.

## 2025-03-25 NOTE — PROGRESS NOTES
Interventions over the past 24 hours attempting to control symptoms: increased continuous rate on PCA with +effect. x2 lorazepam doses givent today for nausea.   Patient response to interventions: +effect  Continued GIP needs: nausea and vomiting despite multiple changes to medication  Discharge plan: discharging after nausea and pain are under control.  Provider, Brenden, notified with current assessment findings and recommendations. Changes made to plan of care: none  New orders and changes to plan of care discussed with bedside RNJose De Jesus.

## 2025-03-25 NOTE — PROGRESS NOTES
Patient has NG tube in place: Right nare     Skin integrity beneath NG tube assessed with MARITZA Perkins     Skin underneath NG tube is visible: yes     NG retaped to visualize skin underneath: NA     Skin description: CDI

## 2025-03-25 NOTE — CARE PLAN
The patient is Stable - Low risk of patient condition declining or worsening    Shift Goals  Clinical Goals: Comfort care measures, NGT monitoring, ostomy monitoring, rest  Patient Goals: coffee/floats, oral/throat pain control  Family Goals: updates    Progress made toward(s) clinical / shift goals:  Comfort provided throughout shift. NGT with minimal drainage during shift. - effluent in ostomy. Malodorous urine noted from urostomy. Shampoo cap provided today. PCA titrated twice per Hospice MD/family order/request. Viscous lidocaine/benadryl administered for pain/itching. Patient had periods of apnea intermittently during shift, improved with titration of MS down to 1.5 mg. Patient speaking with family at present time.     Patient is not progressing towards the following goals:

## 2025-03-25 NOTE — CARE PLAN
Problem: Psychosocial - Comfort Care  Goal: Spiritual and cultural needs incorporated into hospitalization  Description: Target End Date:  End of day 11.  Encourage verbalization of feelings, concerns, expectations and needs2.  Collaborate with Case Management/Social Services3.  Collaborate with Pastoral Care to meet spiritual needs  Outcome: Progressing     Problem: Pain - Standard  Goal: Alleviation of pain or a reduction in pain to the patient’s comfort goal  Description: Target End Date:  Prior to discharge or change in level of careDocument on Vitals flowsheet1.  Document pain using the appropriate pain scale per order or unit policy2.  Educate and implement non-pharmacologic comfort measures (i.e. relaxation, distraction, massage, cold/heat therapy, etc.)3.  Pain management medications as ordered4.  Reassess pain after pain med administration per policy5.  If opiods administered assess patient's response to pain medication is appropriate per POSS sedation scale6.  Follow pain management plan developed in collaboration with patient and interdisciplinary team (including palliative care or pain specialists if applicable)  Outcome: Progressing   The patient is Unstable - High likelihood or risk of patient condition declining or worsening    Shift Goals  Clinical Goals: comfort  Patient Goals: coffee, floats  Family Goals: updates    Progress made toward(s) clinical / shift goals:  Comfort care measures in place    Patient is not progressing towards the following goals:

## 2025-03-25 NOTE — CARE PLAN
The patient is Stable - Low risk of patient condition declining or worsening    Shift Goals  Clinical Goals: comfort  Patient Goals: rest  Family Goals: stay updated    Progress made toward(s) clinical / shift goals:      Problem: Pain - Standard  Goal: Alleviation of pain or a reduction in pain to the patient’s comfort goal  Outcome: Progressing  Note: Cont'd morphine 1.5ml/hr. PRN ativan x1 for NG discomfort/nausea.     Problem: Psychosocial - Comfort Care  Goal: Patient's level of anxiety will decrease  Outcome: Progressing  Note: Wife at bedside. Feed for pleasure, PRN medications available, turning for comfort.

## 2025-03-26 NOTE — PROGRESS NOTES
"Interventions over the past 24 hours attempting to control symptoms: maintained continuous rate on PCA with +effect. x2 lorazepam doses given overnight for nausea. And one dose of morphine give this am. Patient requested more morphine but when bedside RN came in with meds, family asked to hold off.   Patient response to interventions: +effect  Continued GIP needs: nausea and vomiting despite multiple changes to medication  Discharge plan: discharging after nausea and pain are under control.  Provider, Brenden, notified with current assessment findings and recommendations. Changes made to plan of care: none  New orders and changes to plan of care discussed with bedside RNRadha.    Patient states, \"tomorrow is my big day,\" referring to the end of his life.   "

## 2025-03-27 NOTE — PROGRESS NOTES
Report received from RN, assumed care at 0645  Pt is comfort care   Pt responds to voice and painful stimuli   Pt is calm and sleeping comfortably  Pt has a urostomy present   Pt has a colostomy present with zero output   Pt is a Q2 turn   Pt has a PCA  Plan of care discussed with family, all questions answered. Explained importance of oral care. Call light is within reach, bed in lowest/ locked position, hourly rounding in place, all needs met at this time

## 2025-03-27 NOTE — PROGRESS NOTES
4 Eyes Skin Assessment Completed by MARITZA Garcia and MARITZA Milton.    Head WDL  Ears WDL  Nose WDL, NG to right nare  Mouth WDL  Neck WDL  Breast/Chest WDL  Shoulder Blades comfort care; did not turn due to discomfort   Spine comfort care- did not turn due to discomfort   (R) Arm/Elbow/Hand Bruising  (L) Arm/Elbow/Hand Bruising  Abdomen Incision, LLQ ostomy. RLQ urostomy   Groin WDL  Scrotum/Coccyx/Buttocks comfort care; did not turn due to discomfort   (R) Leg Swelling, abrasion   (L) Leg Swelling  (R) Heel/Foot/Toe Swelling  (L) Heel/Foot/Toe WDL          Devices In Places NG tube      Interventions In Place Pillows, Q2 Turns, Low Air Loss Mattress, and Heels Loaded W/Pillows    Possible Skin Injury Yes, orders previously placed; patient on comfort care

## 2025-03-27 NOTE — DISCHARGE PLANNING
Case Management Discharge Planning    Admission Date: 3/21/2025  GMLOS: 1.8  ALOS: 6    6-Clicks ADL Score: 18  6-Clicks Mobility Score: 18      Anticipated Discharge Dispo: Discharge Disposition: D/T to hospice home (50)/GIP    DME Needed: No    Action(s) Taken: Updated Provider/Nurse on Discharge Plan    Pt was discussed in IDT rounds.  Pt remains on GIP level of care     Escalations Completed: None    Medically Clear: Yes for Comfort Care     Next Steps:   CM to continue to assist Pt with discharge as needed    Barriers to Discharge:   Comfort Care   GIP    Is the patient up for discharge tomorrow: No

## 2025-03-28 NOTE — CARE PLAN
The patient is Stable - Low risk of patient condition declining or worsening    Shift Goals  Clinical Goals: pain control, comfort,  Patient Goals: DIDIER  Family Goals: Updates    Progress made toward(s) clinical / shift goals:        Problem: Pain - Standard  Goal: Alleviation of pain or a reduction in pain to the patient’s comfort goal  Description: Target End Date:  Prior to discharge or change in level of careDocument on Vitals flowsheet1.  Document pain using the appropriate pain scale per order or unit policy2.  Educate and implement non-pharmacologic comfort measures (i.e. relaxation, distraction, massage, cold/heat therapy, etc.)3.  Pain management medications as ordered4.  Reassess pain after pain med administration per policy5.  If opiods administered assess patient's response to pain medication is appropriate per POSS sedation scale6.  Follow pain management plan developed in collaboration with patient and interdisciplinary team (including palliative care or pain specialists if applicable)  Outcome: Progressing     Problem: Knowledge Deficit - Comfort Care  Goal: Patient and family/care givers will demonstrate understanding of dying process and grieving  Description: Target End Date:  1-3 days or as soon as patient condition allowsProvide support and education regarding the dying process and grieving.  Outcome: Progressing     Problem: Psychosocial - Comfort Care  Goal: Spiritual and cultural needs incorporated into hospitalization  Description: Target End Date:  End of day 11.  Encourage verbalization of feelings, concerns, expectations and needs2.  Collaborate with Case Management/Social Services3.  Collaborate with Pastoral Care to meet spiritual needs  Outcome: Progressing

## 2025-03-28 NOTE — PROGRESS NOTES
Bedside report received.  Patient on comfort care  Patient unresponsive.    Patient medicated per MAR.  NGT in place  + output in urostomy ; - ouput in colostomy  Review plan with of care with patient's family. Call light and personal belongings within reach. Hourly rounding in place. All needs met at this time.

## 2025-03-28 NOTE — PROGRESS NOTES
Pt is comfort care and does not seem to be in any distress. His NGT has not had any output during this shift. It looks as though the NGT may have moved. I informed patient's family to inform the RN if pt looks like he is becoming uncomfortable or nauseous.

## 2025-03-28 NOTE — CARE PLAN
The patient is Stable - Low risk of patient condition declining or worsening    Shift Goals  Clinical Goals: pain control, comfort,  Patient Goals: DIDIER  Family Goals: Updates    Progress made toward(s) clinical / shift goals: POC discussed with patient and family. Patient medicated per MAR. Patient sleeping throughout shift.       Problem: Pain - Standard  Goal: Alleviation of pain or a reduction in pain to the patient’s comfort goal  Description: Target End Date:  Prior to discharge or change in level of careDocument on Vitals flowsheet1.  Document pain using the appropriate pain scale per order or unit policy2.  Educate and implement non-pharmacologic comfort measures (i.e. relaxation, distraction, massage, cold/heat therapy, etc.)3.  Pain management medications as ordered4.  Reassess pain after pain med administration per policy5.  If opiods administered assess patient's response to pain medication is appropriate per POSS sedation scale6.  Follow pain management plan developed in collaboration with patient and interdisciplinary team (including palliative care or pain specialists if applicable)  Outcome: Progressing     Problem: Knowledge Deficit - Comfort Care  Goal: Patient and family/care givers will demonstrate understanding of dying process and grieving  Description: Target End Date:  1-3 days or as soon as patient condition allowsProvide support and education regarding the dying process and grieving.  Outcome: Progressing       Patient is not progressing towards the following goals:

## 2025-03-28 NOTE — PROGRESS NOTES
Bedside report received, assessment completed    A&O x  DIDIER, resting, Comfort Care  Mobility: Up with max assist  6 Clicks:  Mobility/ PT n/a   ADL/ OT n/a  Fall Risk Assessment: HIGH   Fall Precautions Include: + Fall Risk Bracelet, + Bed Alarm, + Personal Items at bedside, + Bed in low position, + Door Notifications in place   Pain Assessment / Reassessment completed, medication provided per MAR  Diet: Regular   - Tolerating  LDA:   IV Access: 20g LFA, CDI/ flushed/  Infusing per MAR  NG/ Cortrak: Suction LCS  LLQ Urostomy      GI/: urostomy void, - flatus, Ostomy no output BM    - Bowel protocols in place: n/a  DVT Prophylaxis: n/a, SCD n/a   Dieudonne Score: 10, Interventions per flow sheet    - Family requesting to let pt rest, turning creates facial grimace and discomfort   Skin Assessment: Family Refused full skin assessment   POC:     - Comfort Care    Reviewed plan of care with patient, bed in lowest position and locked, pt resting comfortably now, call light within reach, all needs met at this time. Interventions will be executed per plan of care

## 2025-03-29 NOTE — PROGRESS NOTES
Bedside report received, assessment completed    A&O x  0, pt unable to call   Mobility: Up with max assist, Assistive Devices: bed-bound   6 Clicks:  Mobility/ PT not indicated    ADL/ OT not indicated    Weight Bearing Restrictions: none  Fall Risk Assessment: low, pt bedbound   Fall Precautions Include: + Personal Items at bedside, + Bed in low position, + Door Notifications in place   Pain Assessment / Reassessment completed, medication provided per MAR  Diet: Regular   - Tolerating  LDA:   IV Access: 20g LFA, CDI/ flushed/ SL/ Infusing per MAR  Ostomy: LLQ  Urostomy: RLQ     GI/: urostomy void, + flatus, Ostomy BM    - Bowel protocols in place: n/a  DVT Prophylaxis: CI, SCD refused by family    Dieudonne Score: 9, Interventions per flow sheet   Skin Assessment: CC pt, family refusing turns/ repositioning   POC:     - comfort care   Reviewed plan of care with patient, bed in lowest position and locked, pt resting comfortably now, call light within reach, all needs met at this time. Interventions will be executed per plan of care

## 2025-03-29 NOTE — PROGRESS NOTES
Bedside report received.  Assessment complete.  Pt on comfort care.   A&O x 0. Patient unresponsive to voice. Pt calm with unlabored breathing at this time.   Patient non ambulatory.   Patient on PCA pump per MAR.   + urostomy output, - colostomy output.    NG tube to low continuous suction.  Patient has family at bedside.   All needs met at this time.

## 2025-03-29 NOTE — PROGRESS NOTES
Patient is on comfort care. 2 RN skin check, NG tube skin check, and CHG bath declined at this time. Bed frame alarm in use, declined use of strip alarm.

## 2025-03-29 NOTE — PROGRESS NOTES
Assumed care of patient [id number], Report from [facility] RN. Comfort Care Patient, family bedside

## 2025-03-29 NOTE — CARE PLAN
The patient is Stable - Low risk of patient condition declining or worsening    Shift Goals  Clinical Goals: Comfort  Patient Goals: DIDIER  Family Goals: Updates    Progress made toward(s) clinical / shift goals:       Problem: Pain - Standard  Goal: Alleviation of pain or a reduction in pain to the patient’s comfort goal  Description: Target End Date:  Prior to discharge or change in level of careDocument on Vitals flowsheet1.  Document pain using the appropriate pain scale per order or unit policy2.  Educate and implement non-pharmacologic comfort measures (i.e. relaxation, distraction, massage, cold/heat therapy, etc.)3.  Pain management medications as ordered4.  Reassess pain after pain med administration per policy5.  If opiods administered assess patient's response to pain medication is appropriate per POSS sedation scale6.  Follow pain management plan developed in collaboration with patient and interdisciplinary team (including palliative care or pain specialists if applicable)  Outcome: Progressing     Problem: Knowledge Deficit - Comfort Care  Goal: Patient and family/care givers will demonstrate understanding of dying process and grieving  Description: Target End Date:  1-3 days or as soon as patient condition allowsProvide support and education regarding the dying process and grieving.  Outcome: Progressing     Problem: Psychosocial - Comfort Care  Goal: Spiritual and cultural needs incorporated into hospitalization  Description: Target End Date:  End of day 11.  Encourage verbalization of feelings, concerns, expectations and needs2.  Collaborate with Case Management/Social Services3.  Collaborate with Pastoral Care to meet spiritual needs  Outcome: Progressing  Goal: Patient's level of anxiety will decrease  Description: Target End Date:  1-3 days or as soon as patient condition allows1.  Collaborate with patient and family/caregiver to identify triggers and develop strategies to cope with anxiety2.   Implement stimuli reduction, calming techniques3.  Pharmacologic management per provider order4.  Encourage patient/family/care giver participation5.  Collaborate with interdisciplinary team including Psychologist or Behavioral Health Team as needed  Outcome: Progressing  Goal: Patient and family will demonstrate ability to cope with life altering diagnosis and/or procedure  Description: Target End Date:  1-3 days or as soon as patient condition allows1. Expect initial shock and disbelief following diagnosis of cancer and traumatizing procedures (disfiguring surgery, colostomy, amputation).2.  Assess patient and family/caregiver for stage of grief currently being experienced. Explain process as appropriate.3.  Provide open, nonjudgmental environment. Use therapeutic communication skills of Active-Listening, acknowledgment, and so on.4.  Encourage verbalization of thoughts or concerns and accept expressions of sadness, anger, rejection. Acknowledge normality of these feelings5.  Be aware of mood swings, hostility, and other acting-out behavior. Set limits on inappropriate behavior, redirect negative thinking6.  Be aware of debilitating depression. Ask patient direct questions about state of mind.7.  Visit frequently and provide physical contact as appropriate, or provide frequent phone support as appropriate for setting. Arrange for care provider and support person to stay with patient as needed8.  Reinforce teaching regarding disease process and treatments and provide information as appropriate about dying. Be honest; do not give false hope while providing emotional support9.  Review past life experiences, role changes, and coping skills. Talk about things that interest the patient  Outcome: Progressing  Goal: Privacy will be maintained for patient and family  Description: Target End Date:  End of day 1Obtain private room  Outcome: Progressing

## 2025-03-29 NOTE — CARE PLAN
Problem: Pain - Standard  Goal: Alleviation of pain or a reduction in pain to the patient’s comfort goal  Outcome: Progressing     Problem: Knowledge Deficit - Comfort Care  Goal: Patient and family/care givers will demonstrate understanding of dying process and grieving  Outcome: Progressing     Problem: Psychosocial - Comfort Care  Goal: Spiritual and cultural needs incorporated into hospitalization  Outcome: Progressing  Goal: Patient's level of anxiety will decrease  Outcome: Progressing  Goal: Patient and family will demonstrate ability to cope with life altering diagnosis and/or procedure  Outcome: Progressing  Goal: Privacy will be maintained for patient and family  Outcome: Progressing     Problem: Discharge Planning - Comfort Care  Goal: Patient's continuum of care needs are met  Outcome: Progressing     Problem: Respiratory - Comfort Care  Goal: Patient's respiratory function will be supported  Outcome: Progressing   The patient is Stable - Low risk of patient condition declining or worsening    Shift Goals  Clinical Goals: Pain management, comfort  Patient Goals: DIDIER  Family Goals: Updates    Progress made toward(s) clinical / shift goals:  Patient is unresponsive to voice and touch. Patient medicated as needed for pain management and comfort.   Patient is not progressing towards the following goals:

## 2025-03-30 NOTE — PROGRESS NOTES
Bedside report received.  Assessment complete.  Pt on comfort care.   A&O x 0. Patient unresponsive to voice. Pt calm with intermittent agonal breathing. PRN medication administered per MAR.  Patient non ambulatory.   Patient on PCA pump per MAR.   + urostomy output, - colostomy output.    Patient has family at bedside.   All needs met at this time.

## 2025-03-30 NOTE — PROGRESS NOTES
Patient is on comfort care. 2 RN skin check and CHG bath declined at this time. Declined use of strip alarm, frame alarm in use. Patient appears comfortable at this time.

## 2025-03-30 NOTE — PROGRESS NOTES
Patient pronounced dead by 2 RNs on 3/29 at 2235. NAM notified. Expiration navigators completed. Post mortem care complete. Transport dispatch called for pickup.

## 2025-03-30 NOTE — HOSPICE
Notified pt's hospital nurse Kadie that this pt is on hospice and to call this RN if in need of any new orders, unmanaged symptoms arise despite using PRN comfort meds, or pt passes. Kadie Verbalized understanding and states pt is comfortable at this time and declines any needs.

## 2025-03-30 NOTE — CARE PLAN
Problem: Pain - Standard  Goal: Alleviation of pain or a reduction in pain to the patient’s comfort goal  Outcome: Progressing     Problem: Knowledge Deficit - Comfort Care  Goal: Patient and family/care givers will demonstrate understanding of dying process and grieving  Outcome: Progressing     Problem: Psychosocial - Comfort Care  Goal: Spiritual and cultural needs incorporated into hospitalization  Outcome: Progressing  Goal: Patient's level of anxiety will decrease  Outcome: Progressing  Goal: Patient and family will demonstrate ability to cope with life altering diagnosis and/or procedure  Outcome: Progressing  Goal: Privacy will be maintained for patient and family  Outcome: Progressing     Problem: Discharge Planning - Comfort Care  Goal: Patient's continuum of care needs are met  Outcome: Progressing     Problem: Respiratory - Comfort Care  Goal: Patient's respiratory function will be supported  Outcome: Progressing   The patient is Stable - Low risk of patient condition declining or worsening    Shift Goals  Clinical Goals: Comfort  Patient Goals: DIDIER  Family Goals: Updates    Progress made toward(s) clinical / shift goals:  Patient unresponsive to voice and touch. Patient medicated as needed for pain management and comfort till time of death. Time of death pronounced 3/29/2025 at 10:35 PM by MARITZA Newsome and MARITZA Engle.     Patient is not progressing towards the following goals:

## 2025-03-31 NOTE — DISCHARGE SUMMARY
DISCHARGE SUMMARY     Patient ID:    Name:             Josh Lerner   YOB: 1955  Age:                 69 y.o.  male   MRN:               [id number]      [facility] Admit Date: 3/21/2025       Discharge Date: 3/29/2025    Service: [facility] Team    Admission/Discharge Diagnoses:   Metastatic rectal adenocarcinoma  Terminal small bowel obstruction    BRIEF SUMMARY OF [facility] HOSPITAL ADMISSION/STAY:   Josh Lerner is 69 y.o. male with a history of rectal adenocarcinoma, diagnosed as stage IIc disease in 2023. Patient underwent neoadjuvant chemoradiation followed by surgical resection. Patient had positive margins and then underwent cystoprostatectomy and was eventually discovered to have recurrence near anal sphincter. Patient has suffered repeated hospitalizations for pelvic abscesses, tumors, bacteremia and sepsis. Patient presented to [facility] on 3/9/25 as a transfer from [facility] for severe abdominal pain and was found to have small bowel obstruction, which was deemed non-operable. Patient has been obtaining medical management of SBO and has nasogastric tube.      Over the last several weeks, patient has been entirely dependent on continuous suction of NGT for comfort. He has been intolerant to any clamping of tube for PO trial. He has been without food for several weeks. He chews food for pleasure and spits it out. After series of goals of care conversations and aggressive management of symptoms, patient and his very loving family have decided to focus on comfort via hospice benefit. All antibiotics, maintenance fluids and non-symptom management medications have been stopped. Patient was started on morphine PCA, scheduled IV dexamethasone, pepcid, zofran, with scopalamine patch for palliation of terminal bowel obstruction.      Patient continues to be reliant on continuous nasogastric decompression, IV pain and symptom management and in need for 24/7 nursing care to  monitor and maintain NGT and IV therapies, so he will be admitted to general inpatient hospice for comfort at end of life.     3/21-3/29 - Patient remained largely comfortable on aggressive regimen of continuous gastric suction and multimodal evidence-based palliative therapies for malignant bowel obstruction. He  as a result of his terminal condition. Thank you all for your care of Mr. Lerner and his very loving family and friends.     DISPOSITION:     CONDITION:     FOLLOW UP:  Bereavement    Juanito Wilcox DO, FACP, DC   Hospice and Palliative Care  [facility] Medical Director  [address] [place] [address]  P: [phone]  F: [phone]  C: [phone]
